# Patient Record
Sex: FEMALE | Race: WHITE | Employment: OTHER | URBAN - METROPOLITAN AREA
[De-identification: names, ages, dates, MRNs, and addresses within clinical notes are randomized per-mention and may not be internally consistent; named-entity substitution may affect disease eponyms.]

---

## 2017-05-08 ENCOUNTER — OFFICE VISIT (OUTPATIENT)
Dept: ORTHOPEDIC SURGERY | Age: 77
End: 2017-05-08

## 2017-05-08 VITALS
WEIGHT: 270 LBS | HEART RATE: 86 BPM | DIASTOLIC BLOOD PRESSURE: 73 MMHG | HEIGHT: 64 IN | SYSTOLIC BLOOD PRESSURE: 164 MMHG | BODY MASS INDEX: 46.1 KG/M2

## 2017-05-08 DIAGNOSIS — Z96.651 HISTORY OF TOTAL RIGHT KNEE REPLACEMENT: ICD-10-CM

## 2017-05-08 DIAGNOSIS — Z96.652 HISTORY OF TOTAL LEFT KNEE REPLACEMENT: ICD-10-CM

## 2017-05-08 DIAGNOSIS — Z96.611 S/P REVERSE TOTAL SHOULDER ARTHROPLASTY, RIGHT: Primary | ICD-10-CM

## 2017-05-08 PROCEDURE — G8427 DOCREV CUR MEDS BY ELIG CLIN: HCPCS | Performed by: PHYSICIAN ASSISTANT

## 2017-05-08 PROCEDURE — 1123F ACP DISCUSS/DSCN MKR DOCD: CPT | Performed by: PHYSICIAN ASSISTANT

## 2017-05-08 PROCEDURE — G8417 CALC BMI ABV UP PARAM F/U: HCPCS | Performed by: PHYSICIAN ASSISTANT

## 2017-05-08 PROCEDURE — 99212 OFFICE O/P EST SF 10 MIN: CPT | Performed by: PHYSICIAN ASSISTANT

## 2017-05-08 PROCEDURE — 1090F PRES/ABSN URINE INCON ASSESS: CPT | Performed by: PHYSICIAN ASSISTANT

## 2017-05-08 PROCEDURE — 1036F TOBACCO NON-USER: CPT | Performed by: PHYSICIAN ASSISTANT

## 2017-05-08 PROCEDURE — 4040F PNEUMOC VAC/ADMIN/RCVD: CPT | Performed by: PHYSICIAN ASSISTANT

## 2017-05-08 PROCEDURE — G8400 PT W/DXA NO RESULTS DOC: HCPCS | Performed by: PHYSICIAN ASSISTANT

## 2017-05-12 ENCOUNTER — OFFICE VISIT (OUTPATIENT)
Dept: ORTHOPEDIC SURGERY | Age: 77
End: 2017-05-12

## 2017-05-12 VITALS
RESPIRATION RATE: 16 BRPM | WEIGHT: 270 LBS | SYSTOLIC BLOOD PRESSURE: 132 MMHG | DIASTOLIC BLOOD PRESSURE: 73 MMHG | HEART RATE: 84 BPM | BODY MASS INDEX: 46.1 KG/M2 | HEIGHT: 64 IN

## 2017-05-12 DIAGNOSIS — G56.03 BILATERAL CARPAL TUNNEL SYNDROME: Primary | ICD-10-CM

## 2017-05-12 PROCEDURE — 4040F PNEUMOC VAC/ADMIN/RCVD: CPT | Performed by: ORTHOPAEDIC SURGERY

## 2017-05-12 PROCEDURE — 1036F TOBACCO NON-USER: CPT | Performed by: ORTHOPAEDIC SURGERY

## 2017-05-12 PROCEDURE — G8417 CALC BMI ABV UP PARAM F/U: HCPCS | Performed by: ORTHOPAEDIC SURGERY

## 2017-05-12 PROCEDURE — 99214 OFFICE O/P EST MOD 30 MIN: CPT | Performed by: ORTHOPAEDIC SURGERY

## 2017-05-12 PROCEDURE — 20526 THER INJECTION CARP TUNNEL: CPT | Performed by: ORTHOPAEDIC SURGERY

## 2017-05-12 PROCEDURE — 1090F PRES/ABSN URINE INCON ASSESS: CPT | Performed by: ORTHOPAEDIC SURGERY

## 2017-05-12 PROCEDURE — G8427 DOCREV CUR MEDS BY ELIG CLIN: HCPCS | Performed by: ORTHOPAEDIC SURGERY

## 2017-05-12 PROCEDURE — 1123F ACP DISCUSS/DSCN MKR DOCD: CPT | Performed by: ORTHOPAEDIC SURGERY

## 2017-05-12 PROCEDURE — G8400 PT W/DXA NO RESULTS DOC: HCPCS | Performed by: ORTHOPAEDIC SURGERY

## 2017-08-28 ENCOUNTER — OFFICE VISIT (OUTPATIENT)
Dept: ORTHOPEDIC SURGERY | Age: 77
End: 2017-08-28

## 2017-08-28 VITALS
HEART RATE: 79 BPM | HEIGHT: 64 IN | RESPIRATION RATE: 16 BRPM | SYSTOLIC BLOOD PRESSURE: 121 MMHG | DIASTOLIC BLOOD PRESSURE: 70 MMHG | BODY MASS INDEX: 46.1 KG/M2 | WEIGHT: 270 LBS

## 2017-08-28 DIAGNOSIS — G56.02 CARPAL TUNNEL SYNDROME OF LEFT WRIST: Primary | ICD-10-CM

## 2017-08-28 PROCEDURE — G8427 DOCREV CUR MEDS BY ELIG CLIN: HCPCS | Performed by: ORTHOPAEDIC SURGERY

## 2017-08-28 PROCEDURE — 99213 OFFICE O/P EST LOW 20 MIN: CPT | Performed by: ORTHOPAEDIC SURGERY

## 2017-08-28 PROCEDURE — G8417 CALC BMI ABV UP PARAM F/U: HCPCS | Performed by: ORTHOPAEDIC SURGERY

## 2017-08-28 PROCEDURE — G8400 PT W/DXA NO RESULTS DOC: HCPCS | Performed by: ORTHOPAEDIC SURGERY

## 2017-08-28 PROCEDURE — 1090F PRES/ABSN URINE INCON ASSESS: CPT | Performed by: ORTHOPAEDIC SURGERY

## 2017-08-28 PROCEDURE — 20526 THER INJECTION CARP TUNNEL: CPT | Performed by: ORTHOPAEDIC SURGERY

## 2017-08-28 PROCEDURE — 1123F ACP DISCUSS/DSCN MKR DOCD: CPT | Performed by: ORTHOPAEDIC SURGERY

## 2017-08-28 PROCEDURE — 1036F TOBACCO NON-USER: CPT | Performed by: ORTHOPAEDIC SURGERY

## 2017-08-28 PROCEDURE — 4040F PNEUMOC VAC/ADMIN/RCVD: CPT | Performed by: ORTHOPAEDIC SURGERY

## 2017-08-28 RX ORDER — CEPHALEXIN 250 MG/1
250 CAPSULE ORAL DAILY
COMMUNITY
Start: 2017-07-31

## 2018-04-06 ENCOUNTER — OFFICE VISIT (OUTPATIENT)
Dept: ORTHOPEDIC SURGERY | Age: 78
End: 2018-04-06

## 2018-04-06 VITALS
DIASTOLIC BLOOD PRESSURE: 74 MMHG | HEIGHT: 64 IN | SYSTOLIC BLOOD PRESSURE: 136 MMHG | BODY MASS INDEX: 46.1 KG/M2 | RESPIRATION RATE: 16 BRPM | WEIGHT: 270 LBS | HEART RATE: 75 BPM

## 2018-04-06 DIAGNOSIS — G56.02 CARPAL TUNNEL SYNDROME OF LEFT WRIST: Primary | ICD-10-CM

## 2018-04-06 PROCEDURE — 99213 OFFICE O/P EST LOW 20 MIN: CPT | Performed by: ORTHOPAEDIC SURGERY

## 2018-04-06 PROCEDURE — 20526 THER INJECTION CARP TUNNEL: CPT | Performed by: ORTHOPAEDIC SURGERY

## 2018-04-06 PROCEDURE — G8417 CALC BMI ABV UP PARAM F/U: HCPCS | Performed by: ORTHOPAEDIC SURGERY

## 2018-04-06 PROCEDURE — 4040F PNEUMOC VAC/ADMIN/RCVD: CPT | Performed by: ORTHOPAEDIC SURGERY

## 2018-04-06 PROCEDURE — G8400 PT W/DXA NO RESULTS DOC: HCPCS | Performed by: ORTHOPAEDIC SURGERY

## 2018-04-06 PROCEDURE — G8427 DOCREV CUR MEDS BY ELIG CLIN: HCPCS | Performed by: ORTHOPAEDIC SURGERY

## 2018-04-06 PROCEDURE — 1036F TOBACCO NON-USER: CPT | Performed by: ORTHOPAEDIC SURGERY

## 2018-04-06 PROCEDURE — 1090F PRES/ABSN URINE INCON ASSESS: CPT | Performed by: ORTHOPAEDIC SURGERY

## 2018-04-06 PROCEDURE — 1123F ACP DISCUSS/DSCN MKR DOCD: CPT | Performed by: ORTHOPAEDIC SURGERY

## 2018-05-09 ENCOUNTER — OFFICE VISIT (OUTPATIENT)
Dept: ORTHOPEDIC SURGERY | Age: 78
End: 2018-05-09

## 2018-05-09 VITALS
WEIGHT: 210 LBS | BODY MASS INDEX: 35.85 KG/M2 | DIASTOLIC BLOOD PRESSURE: 76 MMHG | SYSTOLIC BLOOD PRESSURE: 136 MMHG | TEMPERATURE: 97.3 F | HEART RATE: 70 BPM | HEIGHT: 64 IN

## 2018-05-09 DIAGNOSIS — Z96.611 S/P REVERSE TOTAL SHOULDER ARTHROPLASTY, RIGHT: Primary | ICD-10-CM

## 2018-05-09 DIAGNOSIS — Z96.651 HISTORY OF TOTAL RIGHT KNEE REPLACEMENT: ICD-10-CM

## 2018-05-09 DIAGNOSIS — M19.012 ARTHRITIS OF LEFT SHOULDER REGION: ICD-10-CM

## 2018-05-09 DIAGNOSIS — M25.512 LEFT SHOULDER PAIN, UNSPECIFIED CHRONICITY: ICD-10-CM

## 2018-05-09 DIAGNOSIS — Z96.652 HISTORY OF TOTAL LEFT KNEE REPLACEMENT: ICD-10-CM

## 2018-05-09 PROCEDURE — G8417 CALC BMI ABV UP PARAM F/U: HCPCS | Performed by: PHYSICIAN ASSISTANT

## 2018-05-09 PROCEDURE — 1123F ACP DISCUSS/DSCN MKR DOCD: CPT | Performed by: PHYSICIAN ASSISTANT

## 2018-05-09 PROCEDURE — G8427 DOCREV CUR MEDS BY ELIG CLIN: HCPCS | Performed by: PHYSICIAN ASSISTANT

## 2018-05-09 PROCEDURE — 99212 OFFICE O/P EST SF 10 MIN: CPT | Performed by: PHYSICIAN ASSISTANT

## 2018-05-09 PROCEDURE — G8400 PT W/DXA NO RESULTS DOC: HCPCS | Performed by: PHYSICIAN ASSISTANT

## 2018-05-09 PROCEDURE — 20610 DRAIN/INJ JOINT/BURSA W/O US: CPT | Performed by: PHYSICIAN ASSISTANT

## 2018-05-09 PROCEDURE — 4040F PNEUMOC VAC/ADMIN/RCVD: CPT | Performed by: PHYSICIAN ASSISTANT

## 2018-05-09 PROCEDURE — 1090F PRES/ABSN URINE INCON ASSESS: CPT | Performed by: PHYSICIAN ASSISTANT

## 2018-05-09 PROCEDURE — 1036F TOBACCO NON-USER: CPT | Performed by: PHYSICIAN ASSISTANT

## 2018-12-07 ENCOUNTER — ANESTHESIA EVENT (OUTPATIENT)
Dept: OPERATING ROOM | Age: 78
End: 2018-12-07
Payer: MEDICARE

## 2018-12-07 ENCOUNTER — OFFICE VISIT (OUTPATIENT)
Dept: ORTHOPEDIC SURGERY | Age: 78
End: 2018-12-07
Payer: MEDICARE

## 2018-12-07 VITALS
DIASTOLIC BLOOD PRESSURE: 79 MMHG | HEART RATE: 69 BPM | BODY MASS INDEX: 46.1 KG/M2 | WEIGHT: 270 LBS | HEIGHT: 64 IN | SYSTOLIC BLOOD PRESSURE: 155 MMHG

## 2018-12-07 DIAGNOSIS — G56.02 CARPAL TUNNEL SYNDROME OF LEFT WRIST: Primary | ICD-10-CM

## 2018-12-07 PROCEDURE — 1036F TOBACCO NON-USER: CPT | Performed by: ORTHOPAEDIC SURGERY

## 2018-12-07 PROCEDURE — 4040F PNEUMOC VAC/ADMIN/RCVD: CPT | Performed by: ORTHOPAEDIC SURGERY

## 2018-12-07 PROCEDURE — G8427 DOCREV CUR MEDS BY ELIG CLIN: HCPCS | Performed by: ORTHOPAEDIC SURGERY

## 2018-12-07 PROCEDURE — 1090F PRES/ABSN URINE INCON ASSESS: CPT | Performed by: ORTHOPAEDIC SURGERY

## 2018-12-07 PROCEDURE — 99214 OFFICE O/P EST MOD 30 MIN: CPT | Performed by: ORTHOPAEDIC SURGERY

## 2018-12-07 PROCEDURE — G8400 PT W/DXA NO RESULTS DOC: HCPCS | Performed by: ORTHOPAEDIC SURGERY

## 2018-12-07 PROCEDURE — G8484 FLU IMMUNIZE NO ADMIN: HCPCS | Performed by: ORTHOPAEDIC SURGERY

## 2018-12-07 PROCEDURE — 1101F PT FALLS ASSESS-DOCD LE1/YR: CPT | Performed by: ORTHOPAEDIC SURGERY

## 2018-12-07 PROCEDURE — G8417 CALC BMI ABV UP PARAM F/U: HCPCS | Performed by: ORTHOPAEDIC SURGERY

## 2018-12-07 PROCEDURE — 1123F ACP DISCUSS/DSCN MKR DOCD: CPT | Performed by: ORTHOPAEDIC SURGERY

## 2018-12-07 RX ORDER — SODIUM CHLORIDE 0.9 % (FLUSH) 0.9 %
10 SYRINGE (ML) INJECTION EVERY 12 HOURS SCHEDULED
Status: CANCELLED | OUTPATIENT
Start: 2018-12-07

## 2018-12-07 RX ORDER — SODIUM CHLORIDE 0.9 % (FLUSH) 0.9 %
10 SYRINGE (ML) INJECTION PRN
Status: CANCELLED | OUTPATIENT
Start: 2018-12-07

## 2018-12-07 RX ORDER — SODIUM CHLORIDE 9 MG/ML
INJECTION, SOLUTION INTRAVENOUS CONTINUOUS
Status: CANCELLED | OUTPATIENT
Start: 2018-12-07

## 2018-12-07 NOTE — LETTER
[facility]  Surgery Scheduling Form:      DEMOGRAPHICS:                                                                                                              .    Patient Name:  Mildred Brown  Patient :  1940   Patient SS#:  [id number]    Patient Phone:  [phone] (home) [phone] (work)    Patient Address:  [address] [place] [address]    PCP:  Dave Lobo DO  Insurance:   Payor/Plan Subscr  Sex Relation Sub. Ins. ID Effective Group Num   1. [id number] [facility] 1940 Female  [id number] 1/1/15                                    [address] [address]   2. [address] 1940 Female  [id number]                                    [address]     DIAGNOSIS & PROCEDURE:                                                                                            .  Diagnosis:   left Carpal Tunnel Syndrome (354.0)    G56.02  Operation:  left Carpal Tunnel Release  [CPT: 12886]  Location:  [facility] [place]  Surgeon:  Jessica Estrada    SCHEDULING INFORMATION:                                                                                         .  Surgeon's Scheduling Instruction:  elective    Requested Date:    OR Time:  11:00 Patient Arrival Time:  9:30    OR Time Required:  15  Minutes  Anesthesia:  MAC/TIVA  Equipment:  None  Mini C-Arm:  No   Standard C-Arm:  No  Status:  outpatient  PAT Required:  Yes  Comments:                 ALLERGIES:   SEE LIST                       Rosaria Burow B. Waunita Goodell, MD  18     BILLING INFORMATION:                                                                                                    .    Procedure:       CPT Code Modifier
4. I have also been informed by the informing physician that there are other risks from both known and unknown causes that are attendant to the performance of any surgical procedure. I am aware that the practice of medicine and surgery is not an exact science, and that no guarantees have been made to me concerning the results of the operation and/or procedure(s). 5. I   CONSENT / REFUSE CONSENT  (strike the phrase that does not apply) to the taking of photographs before, during and/or after the operation or procedure for scientific/educational purposes. 6. I consent to the administration of anesthesia and to the use of such anesthetics as may be deemed advisable by the anesthesiologist who has been engaged by me or my physician. 7. I certify that I have read and understand the above consent to operation and/or other procedure(s); that the explanations therein referred to were made to me by the informing physician in advance of my signing this consent; that all blanks or statements requiring insertion or completion were filled in and inapplicable paragraphs, if any, were stricken before I signed; and that all questions asked by me about the operation and/or procedure(s) which I have consented to have been fully answered in a satisfactory manner.               _______________________  Witness        Signature Of Patient          Guero MckeonAndrea Juarez      _______________________  Informing Physician         Signature of Informing Physician           If patient is unable to sign or is a minor, complete one of the following:    (A)  Patient is a minor   years of age. (B)  Patient is unable to sign because: The undersigned represents that he or she is duly authorized to execute this consent for and on behalf of the above named patient.                Witness               o  Parent  o  Guardian   o  Spouse       o  Other (specify)

## 2018-12-08 NOTE — PROGRESS NOTES
Ms. Delbert Andrade returns today in follow-up of her previously treated  left Carpal Tunnel Syndrome. She was last seen in April, 2018 at which time she was treated with Steroid Injection. She experienced moderate relief of her initial symptoms. She  has noticed symptom recurrence over the last several months. She returns today with Worsened symptoms of left Carpal Tunnel Syndrome, requesting further treatment. The patient's , past medical history, medications, allergies,  family history, social history, and review of systems have been reviewed and are recorded in the chart. Physical Exam:  Vitals  BP: (!) 155/79  Pulse: 69  Height: 5' 4\" (162.6 cm)  Weight: 270 lb (122.5 kg)  Ms. Delbert Andrade appears well, she is in no apparent distress, she demonstrates appropriate mood & affect. Skin, bilaterally: Skin color, texture, turgor normal. No rashes or lesions. 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 Wrist range of motion is equal bilateral.   There is no evidence of gross joint instability bilaterally. Sensation is subjectively tingling in the Median Innervated Digits and objectively present in the same distribution on the Left, greater than Right. Vascular examination reveals normal and good capillary refill bilaterally. Swelling is mild in the distal volar forearm on the Left, normal on the Right. Muscular strength is clinically appropriate bilaterally. Impression:  Ms. Delbert Andrade is showing evidence of persistent Carpal Tunnel Syndrome after previous treatment. She requests additional treatment at this time. Plan:  After discussion of the treatment options available for carpal tunnel syndrome and review of her past treatments, I have outlined for Ms. Delbert Andrade the surgical treatment of carpal tunnel syndrome and release of the transverse carpal ligament.   I have discussed the details of the surgical procedure, the pre, thien and

## 2018-12-11 RX ORDER — ALENDRONATE SODIUM 70 MG/1
70 TABLET ORAL
COMMUNITY
Start: 2017-07-11

## 2018-12-11 RX ORDER — FERROUS SULFATE 325(65) MG
325 TABLET ORAL
COMMUNITY
End: 2019-06-27 | Stop reason: ALTCHOICE

## 2018-12-11 RX ORDER — RIBOFLAVIN (VITAMIN B2) 100 MG
100 TABLET ORAL DAILY
COMMUNITY
End: 2019-10-08

## 2018-12-13 ENCOUNTER — HOSPITAL ENCOUNTER (OUTPATIENT)
Age: 78
Setting detail: OUTPATIENT SURGERY
Discharge: HOME | End: 2018-12-13
Attending: ORTHOPAEDIC SURGERY | Admitting: ORTHOPAEDIC SURGERY
Payer: MEDICARE

## 2018-12-13 ENCOUNTER — ANESTHESIA (OUTPATIENT)
Dept: OPERATING ROOM | Age: 78
End: 2018-12-13
Payer: MEDICARE

## 2018-12-13 VITALS
HEIGHT: 64 IN | RESPIRATION RATE: 18 BRPM | DIASTOLIC BLOOD PRESSURE: 81 MMHG | HEART RATE: 79 BPM | TEMPERATURE: 96.9 F | BODY MASS INDEX: 37.11 KG/M2 | SYSTOLIC BLOOD PRESSURE: 124 MMHG | WEIGHT: 217.37 LBS | OXYGEN SATURATION: 97 %

## 2018-12-13 VITALS
OXYGEN SATURATION: 93 % | SYSTOLIC BLOOD PRESSURE: 124 MMHG | RESPIRATION RATE: 1 BRPM | DIASTOLIC BLOOD PRESSURE: 59 MMHG

## 2018-12-13 PROCEDURE — 3700000000 HC ANESTHESIA ATTENDED CARE: Performed by: ORTHOPAEDIC SURGERY

## 2018-12-13 PROCEDURE — 7100000011 HC PHASE II RECOVERY - ADDTL 15 MIN: Performed by: ORTHOPAEDIC SURGERY

## 2018-12-13 PROCEDURE — 7100000001 HC PACU RECOVERY - ADDTL 15 MIN: Performed by: ORTHOPAEDIC SURGERY

## 2018-12-13 PROCEDURE — 2500000003 HC RX 250 WO HCPCS: Performed by: NURSE ANESTHETIST, CERTIFIED REGISTERED

## 2018-12-13 PROCEDURE — 2709999900 HC NON-CHARGEABLE SUPPLY: Performed by: ORTHOPAEDIC SURGERY

## 2018-12-13 PROCEDURE — 7100000000 HC PACU RECOVERY - FIRST 15 MIN: Performed by: ORTHOPAEDIC SURGERY

## 2018-12-13 PROCEDURE — 7100000010 HC PHASE II RECOVERY - FIRST 15 MIN: Performed by: ORTHOPAEDIC SURGERY

## 2018-12-13 PROCEDURE — 2580000003 HC RX 258: Performed by: NURSE ANESTHETIST, CERTIFIED REGISTERED

## 2018-12-13 PROCEDURE — 6360000002 HC RX W HCPCS: Performed by: NURSE ANESTHETIST, CERTIFIED REGISTERED

## 2018-12-13 PROCEDURE — 2500000003 HC RX 250 WO HCPCS: Performed by: ORTHOPAEDIC SURGERY

## 2018-12-13 PROCEDURE — 3600000005 HC SURGERY LEVEL 5 BASE: Performed by: ORTHOPAEDIC SURGERY

## 2018-12-13 RX ORDER — LIDOCAINE HYDROCHLORIDE 20 MG/ML
INJECTION, SOLUTION EPIDURAL; INFILTRATION; INTRACAUDAL; PERINEURAL PRN
Status: DISCONTINUED | OUTPATIENT
Start: 2018-12-13 | End: 2018-12-13 | Stop reason: SDUPTHER

## 2018-12-13 RX ORDER — FENTANYL CITRATE 50 UG/ML
50 INJECTION, SOLUTION INTRAMUSCULAR; INTRAVENOUS EVERY 5 MIN PRN
Status: DISCONTINUED | OUTPATIENT
Start: 2018-12-13 | End: 2018-12-13 | Stop reason: HOSPADM

## 2018-12-13 RX ORDER — OXYCODONE HYDROCHLORIDE AND ACETAMINOPHEN 5; 325 MG/1; MG/1
1 TABLET ORAL PRN
Status: DISCONTINUED | OUTPATIENT
Start: 2018-12-13 | End: 2018-12-13 | Stop reason: HOSPADM

## 2018-12-13 RX ORDER — ONDANSETRON 2 MG/ML
4 INJECTION INTRAMUSCULAR; INTRAVENOUS
Status: DISCONTINUED | OUTPATIENT
Start: 2018-12-13 | End: 2018-12-13 | Stop reason: HOSPADM

## 2018-12-13 RX ORDER — PROPOFOL 10 MG/ML
INJECTION, EMULSION INTRAVENOUS PRN
Status: DISCONTINUED | OUTPATIENT
Start: 2018-12-13 | End: 2018-12-13 | Stop reason: SDUPTHER

## 2018-12-13 RX ORDER — MORPHINE SULFATE 2 MG/ML
1 INJECTION, SOLUTION INTRAMUSCULAR; INTRAVENOUS EVERY 5 MIN PRN
Status: DISCONTINUED | OUTPATIENT
Start: 2018-12-13 | End: 2018-12-13 | Stop reason: HOSPADM

## 2018-12-13 RX ORDER — PROPOFOL 10 MG/ML
INJECTION, EMULSION INTRAVENOUS CONTINUOUS PRN
Status: DISCONTINUED | OUTPATIENT
Start: 2018-12-13 | End: 2018-12-13 | Stop reason: SDUPTHER

## 2018-12-13 RX ORDER — SODIUM CHLORIDE 9 MG/ML
INJECTION, SOLUTION INTRAVENOUS CONTINUOUS PRN
Status: DISCONTINUED | OUTPATIENT
Start: 2018-12-13 | End: 2018-12-13 | Stop reason: SDUPTHER

## 2018-12-13 RX ORDER — MEPERIDINE HYDROCHLORIDE 25 MG/ML
12.5 INJECTION INTRAMUSCULAR; INTRAVENOUS; SUBCUTANEOUS EVERY 5 MIN PRN
Status: DISCONTINUED | OUTPATIENT
Start: 2018-12-13 | End: 2018-12-13 | Stop reason: HOSPADM

## 2018-12-13 RX ORDER — MORPHINE SULFATE 2 MG/ML
2 INJECTION, SOLUTION INTRAMUSCULAR; INTRAVENOUS EVERY 5 MIN PRN
Status: DISCONTINUED | OUTPATIENT
Start: 2018-12-13 | End: 2018-12-13 | Stop reason: HOSPADM

## 2018-12-13 RX ORDER — FENTANYL CITRATE 50 UG/ML
25 INJECTION, SOLUTION INTRAMUSCULAR; INTRAVENOUS EVERY 5 MIN PRN
Status: DISCONTINUED | OUTPATIENT
Start: 2018-12-13 | End: 2018-12-13 | Stop reason: HOSPADM

## 2018-12-13 RX ORDER — OXYCODONE HYDROCHLORIDE AND ACETAMINOPHEN 5; 325 MG/1; MG/1
2 TABLET ORAL PRN
Status: DISCONTINUED | OUTPATIENT
Start: 2018-12-13 | End: 2018-12-13 | Stop reason: HOSPADM

## 2018-12-13 RX ADMIN — PROPOFOL 100 MG: 10 INJECTION, EMULSION INTRAVENOUS at 11:04

## 2018-12-13 RX ADMIN — PROPOFOL 140 MCG/KG/MIN: 10 INJECTION, EMULSION INTRAVENOUS at 11:04

## 2018-12-13 RX ADMIN — SODIUM CHLORIDE: 9 INJECTION, SOLUTION INTRAVENOUS at 11:00

## 2018-12-13 RX ADMIN — LIDOCAINE HYDROCHLORIDE 100 MG: 20 INJECTION, SOLUTION EPIDURAL; INFILTRATION; INTRACAUDAL; PERINEURAL at 11:04

## 2018-12-13 ASSESSMENT — PULMONARY FUNCTION TESTS
PIF_VALUE: 0
PIF_VALUE: 1
PIF_VALUE: 0

## 2018-12-13 ASSESSMENT — PAIN SCALES - GENERAL: PAINLEVEL_OUTOF10: 0

## 2018-12-13 ASSESSMENT — PAIN - FUNCTIONAL ASSESSMENT: PAIN_FUNCTIONAL_ASSESSMENT: 0-10

## 2018-12-13 ASSESSMENT — LIFESTYLE VARIABLES: SMOKING_STATUS: 0

## 2018-12-13 NOTE — PROGRESS NOTES
OPA removed
awake and oriented, VSS, no c/o at this time
[facility] fax number:  [phone]  Please note these are generalized instructions for all surgical cases, you may be provided with more specific instructions according to your surgery.

## 2018-12-13 NOTE — OP NOTE
incised under direct visualization. The contents of the carpal tunnel were inspected and found to be free of mass, lesion or other abnormality. Digital palpation revealed no further constriction about the median nerve. The wound was irrigated copiously with sterile saline for irrigation and the pneumotourniquet was deflated after a period of 3 minutes elevation. The fingers were immediately pink & well perfused. Hemostasis was easily obtained with direct pressure and electrocautery and the wound was closed with interrupted sutures. The wound was dressed with adaptic, dry sterile dressings and a bulky soft hand & wrist dressing was applied. Ms. Ebony Knowles  was awakened from light sedation, having tolerated the procedure without apparent complication. She  was returned to the recovery room in stable condition. At the conclusion of the procedure all needle, instrument, and sponge counts were correct. Livia Jeffrey MD   12/13/2018, 11:15 AM

## 2018-12-13 NOTE — H&P
Pre-operative Update of H&P:    I  have seen & examined Ms. Judge Robertson related solely to her hand and upper extremity conditions, prior to the scheduled procedure on the date of her surgery. The indications for the planned surgical procedure & and her upper-extremity conditionare unchanged. Please see the Anesthesia Pre-Op Note from date of surgery for Ms. Regina Lawson's systemic evaluation.

## 2018-12-21 ENCOUNTER — OFFICE VISIT (OUTPATIENT)
Dept: ORTHOPEDIC SURGERY | Age: 78
End: 2018-12-21

## 2018-12-21 VITALS — HEIGHT: 64 IN | RESPIRATION RATE: 16 BRPM | WEIGHT: 217 LBS | BODY MASS INDEX: 37.05 KG/M2

## 2018-12-21 DIAGNOSIS — G56.03 BILATERAL CARPAL TUNNEL SYNDROME: Primary | ICD-10-CM

## 2018-12-21 PROCEDURE — 99024 POSTOP FOLLOW-UP VISIT: CPT | Performed by: PHYSICIAN ASSISTANT

## 2018-12-21 PROCEDURE — APPSS15 APP SPLIT SHARED TIME 0-15 MINUTES: Performed by: PHYSICIAN ASSISTANT

## 2018-12-21 NOTE — PROGRESS NOTES
Ms. Pushpa Banerjee returns today in follow-up of her recent left Carpal Tunnel Release done approximately 1 week ago. She has done well noting mild discomfort and no other reported complications. She notes pre-operative symptoms to be Partially Resolved at this time. Physical Exam:  Skin incision is healing well, no significant drainage, no dehiscence. Digital range of motion is full and equal bilateral.  Wrist range of motion is full and equal bilateral.  Sensation is Partially Resolved from preoperatvely  Vascular examination reveals normal, good capillary refill and good color. Swelling is minimal.  There is no clinical evidence of persistant Median Nerve compression. Impression:  Ms. Pushpa Banerjee is doing well after recent left Carpal Tunnel Release. Plan:  Ms. Pushpa Banerjee is instructed in work on Active & Passive range of motion of the digits, wrist, & elbow. These modalities were specifically demonstrated to her today. We discussed the appropriateness of gradual resumption of use of the operated hand and the return to normal use as comfort allows. She is given instructions regarding management of the fresh surgical incision and progressive use of desensitization and tissue massage techniques. We discussed the appropriate expectations and timeline for symptom improvement. She is provided a written patient instruction sheet titled: Postoperative Instructions After Carpal Tunnel Release. Further, she may schedule an appointment for approximately 2-4 weeks from now, or contact me by telephone over the next 2-4 weeks if her symptoms have not fully resolved or if she has not regained full & painless return of function after sutures have been removed. She is also specifically instructed to return to the office or call for an appointment sooner if her symptoms are changing or worsening prior to that time.

## 2019-01-02 ENCOUNTER — TELEPHONE (OUTPATIENT)
Dept: ORTHOPEDIC SURGERY | Age: 79
End: 2019-01-02

## 2019-03-05 ENCOUNTER — OFFICE VISIT (OUTPATIENT)
Dept: ORTHOPEDIC SURGERY | Age: 79
End: 2019-03-05
Payer: MEDICARE

## 2019-03-05 VITALS
SYSTOLIC BLOOD PRESSURE: 118 MMHG | TEMPERATURE: 97.8 F | WEIGHT: 217 LBS | HEIGHT: 64 IN | HEART RATE: 90 BPM | BODY MASS INDEX: 37.05 KG/M2 | DIASTOLIC BLOOD PRESSURE: 77 MMHG

## 2019-03-05 DIAGNOSIS — M25.552 PAIN OF LEFT HIP JOINT: ICD-10-CM

## 2019-03-05 DIAGNOSIS — M16.12 ARTHRITIS OF LEFT HIP: ICD-10-CM

## 2019-03-05 DIAGNOSIS — M19.012 ARTHRITIS OF LEFT SHOULDER REGION: ICD-10-CM

## 2019-03-05 DIAGNOSIS — M25.512 LEFT SHOULDER PAIN, UNSPECIFIED CHRONICITY: Primary | ICD-10-CM

## 2019-03-05 PROCEDURE — G8427 DOCREV CUR MEDS BY ELIG CLIN: HCPCS | Performed by: PHYSICIAN ASSISTANT

## 2019-03-05 PROCEDURE — 99214 OFFICE O/P EST MOD 30 MIN: CPT | Performed by: PHYSICIAN ASSISTANT

## 2019-03-05 PROCEDURE — G8417 CALC BMI ABV UP PARAM F/U: HCPCS | Performed by: PHYSICIAN ASSISTANT

## 2019-03-05 PROCEDURE — 1101F PT FALLS ASSESS-DOCD LE1/YR: CPT | Performed by: PHYSICIAN ASSISTANT

## 2019-03-05 PROCEDURE — G8400 PT W/DXA NO RESULTS DOC: HCPCS | Performed by: PHYSICIAN ASSISTANT

## 2019-03-05 PROCEDURE — 1090F PRES/ABSN URINE INCON ASSESS: CPT | Performed by: PHYSICIAN ASSISTANT

## 2019-03-05 PROCEDURE — 4040F PNEUMOC VAC/ADMIN/RCVD: CPT | Performed by: PHYSICIAN ASSISTANT

## 2019-03-05 PROCEDURE — 20610 DRAIN/INJ JOINT/BURSA W/O US: CPT | Performed by: PHYSICIAN ASSISTANT

## 2019-03-05 PROCEDURE — 1123F ACP DISCUSS/DSCN MKR DOCD: CPT | Performed by: PHYSICIAN ASSISTANT

## 2019-03-05 PROCEDURE — G8484 FLU IMMUNIZE NO ADMIN: HCPCS | Performed by: PHYSICIAN ASSISTANT

## 2019-03-05 PROCEDURE — 1036F TOBACCO NON-USER: CPT | Performed by: PHYSICIAN ASSISTANT

## 2019-03-06 PROBLEM — M16.12 ARTHRITIS OF LEFT HIP: Status: ACTIVE | Noted: 2019-03-06

## 2019-03-06 PROBLEM — M19.012 ARTHRITIS OF LEFT SHOULDER REGION: Status: ACTIVE | Noted: 2019-03-06

## 2019-04-29 ENCOUNTER — OFFICE VISIT (OUTPATIENT)
Dept: ORTHOPEDIC SURGERY | Age: 79
End: 2019-04-29
Payer: MEDICARE

## 2019-04-29 VITALS
RESPIRATION RATE: 16 BRPM | HEART RATE: 79 BPM | WEIGHT: 217 LBS | DIASTOLIC BLOOD PRESSURE: 70 MMHG | SYSTOLIC BLOOD PRESSURE: 123 MMHG | HEIGHT: 64 IN | BODY MASS INDEX: 37.05 KG/M2

## 2019-04-29 DIAGNOSIS — G56.01 CARPAL TUNNEL SYNDROME OF RIGHT WRIST: Primary | ICD-10-CM

## 2019-04-29 PROCEDURE — 99213 OFFICE O/P EST LOW 20 MIN: CPT | Performed by: ORTHOPAEDIC SURGERY

## 2019-04-29 PROCEDURE — 1090F PRES/ABSN URINE INCON ASSESS: CPT | Performed by: ORTHOPAEDIC SURGERY

## 2019-04-29 PROCEDURE — 4040F PNEUMOC VAC/ADMIN/RCVD: CPT | Performed by: ORTHOPAEDIC SURGERY

## 2019-04-29 PROCEDURE — 20526 THER INJECTION CARP TUNNEL: CPT | Performed by: ORTHOPAEDIC SURGERY

## 2019-04-29 PROCEDURE — 1123F ACP DISCUSS/DSCN MKR DOCD: CPT | Performed by: ORTHOPAEDIC SURGERY

## 2019-04-29 PROCEDURE — G8400 PT W/DXA NO RESULTS DOC: HCPCS | Performed by: ORTHOPAEDIC SURGERY

## 2019-04-29 PROCEDURE — G8427 DOCREV CUR MEDS BY ELIG CLIN: HCPCS | Performed by: ORTHOPAEDIC SURGERY

## 2019-04-29 PROCEDURE — G8417 CALC BMI ABV UP PARAM F/U: HCPCS | Performed by: ORTHOPAEDIC SURGERY

## 2019-04-29 PROCEDURE — 1036F TOBACCO NON-USER: CPT | Performed by: ORTHOPAEDIC SURGERY

## 2019-04-29 NOTE — PROGRESS NOTES
Ms. Jorge Calderón returns today in follow-up of her previously treated  right Carpal Tunnel Syndrome. She was last seen in May, 2017 at which time she was treated with Steroid Injection. She experienced moderate relief of her initial symptoms. She  has noticed symptom worsening over the last several months. She returns today with worsened symptoms of right Carpal Tunnel Syndrome, requesting further treatment. The patient's , past medical history, medications, allergies,  family history, social history, and review of systems have been reviewed and are recorded in the chart. Physical Exam:  Vitals  BP: 123/70  Pulse: 79  Resp: 16  Height: 5' 4\" (162.6 cm)  Weight: 217 lb (98.4 kg)  Ms. Jorge Calderón appears well, she is in no apparent distress, she demonstrates appropriate mood & affect. Skin, bilaterally: Skin color, texture, turgor normal. No rashes or lesions. Digital range of motion is severely limited by distal osteoarthritis . Wrist range of motion is equal bilateral.   There is no evidence of gross joint instability bilaterally. Sensation is subjectively decreased, tingling in the Median Innervated Digits and objectively present in the same distribution on the Right, normal on the Left. Vascular examination reveals normal and good capillary refill bilaterally. Swelling is mild in the distal volar forearm on the Right, normal on the Left. Muscular strength is clinically appropriate bilaterally. Examination for Carpal Tunnel Syndrome shows Carpal Tunnel Compression Test to be mildly positive on the right & negative  on the left. The patient displays moderate baseline symptoms to potentially confound the exam.  The thenar musculature is mildly atrophied & weakened. Examination for Stenosing Tenosynovitis demonstrates no evidence of tenderness, thickening or nodularity at the A-1 pulleys of the digits bilaterally.   There no palpable triggering or any finger or thumb. Impression:  Ms. Karin Sadler is showing evidence of persistent Carpal Tunnel Syndrome after previous treatment. She requests additional treatment at this time. Plan:  After discussion of the treatment options available for carpal tunnel syndrome and review of Ms. Regina Lawson's previous treatments, we have selected to proceed with an injection to the right carpal tunnel(s). I have outlined for her the nature of the injection, and the pre, thien and post injection considerations and the appropriate expectations for this injection. We discussed appropriate expectations for symptom resolution & likely duration of the relief. She voiced an understanding of these, had her questions answered fully and did wish to proceed. Procedure: right Carpal Tunnel Injection  [second Injection]: After full discussion of the nature of this process and outlining a treatment plan with Ms. Karin Sadler, we discussed the complications, limitations, expectations, alternatives, and risks of injection to the carpal tunnel. She understood this information well and verbally consented to this treatment. The skin of the symptomatic extremity was prepped with Isopropyl Alcohol and under aseptic conditions the carpal tunnel was injected with a combination of 1 ml of 0.25% Bupivacaine without Epinephrine and 40 mg of Triamcinolone (40 mg/ml). There was good filling of the carpal tunnel. A  dry, sterile bandage was applied and she tolerated the injection without difficulty. I advised her of the expected response, possible reactions and the instructions for care of the hand. I have also discussed with Ms. Karin Sadler the other treatment options available to her for this condition. We have today selected to proceed with treatment by injection with steroid medication.   She and I have agreed that if our current course of Injection treatment does not prove to be effective over the short term future, that she will schedule a follow-up appointment to discuss and select an alternate course of therapy including possibly further conservative treatment or surgical treatment. Ms. Dilan Khan has been given a full verbal list of instructions and precautions related to her present condition. I have asked her to followup with me in the office at the prescribed time. She is also specifically requested to call or return to the office sooner if her symptoms change or worsen prior to the next scheduled appointment.

## 2019-04-29 NOTE — Clinical Note
Dear  Sumit Weston, DO,Thank you very much for your referral or Ms. Jm Burks to me for evaluation and treatment of her Hand & Wrist condition. I appreciate your confidence in me and thank you for allowing me the opportunity to care for your patients. If I can be of any further assistance to you on this or any other patient, please do not hesitate to contact me. Sincerely,Manfred Alcantar MD

## 2019-04-29 NOTE — PATIENT INSTRUCTIONS
Information & Instructions   After Finger, Hand, Wrist, or Elbow Injection    Rah Lee MD    You have received an injection of local anesthetic (Bupivicaine without Epinephrine) for comfort & a steroid (Kenalog) for its strong anti-inflammatory effects. In order to give the medication a chance to reduce your inflammation and discomfort, it is recommended that you take it easy for a day or so. You may use your hand and arm as you feel comfortable, but you should avoid highly strenuous activity and heavy use for several days. Relief from the injection will often not begin for several days, and you may not feel full relief for up to one month. It is not uncommon to experience some local discomfort or pain at or around the injection site for a few days. To relieve these symptoms you may do the following if you feel necessary:       Apply ice to the affected area 20 minutes on and 20 minutes off. Do not apply ice directly to the skin. Use a thin layer (T-shirt, pillowcase, towel, etc.) to protect the skin. - If allowed by your other medical physicians, you may take -     2 Tylenol extra strength tablets every 4-6 hours       1-2 Aleve tablets twice a day     2-3 Advil tablets two to three times a day    If you are diabetic, the steroid medication may increase your blood sugar, so you are advised to monitor your sugar more closely so you can adjust it accordingly for a few days following your injection. If you need assistance with the control of you blood sugar, please contact you primary care physician for further advice. I will request that you please call the office one month after your injection at [phone] if you have not experienced relief of your symptoms (unless I have instructed you otherwise). If your injection has given you good relief of you symptoms as expected, then you only need to call the office if your symptoms return.

## 2019-05-09 ENCOUNTER — OFFICE VISIT (OUTPATIENT)
Dept: ORTHOPEDIC SURGERY | Age: 79
End: 2019-05-09
Payer: MEDICARE

## 2019-05-09 VITALS
DIASTOLIC BLOOD PRESSURE: 80 MMHG | BODY MASS INDEX: 37.05 KG/M2 | WEIGHT: 217 LBS | HEIGHT: 64 IN | SYSTOLIC BLOOD PRESSURE: 143 MMHG | HEART RATE: 78 BPM | TEMPERATURE: 97.2 F

## 2019-05-09 DIAGNOSIS — Z96.611 S/P REVERSE TOTAL SHOULDER ARTHROPLASTY, RIGHT: Primary | ICD-10-CM

## 2019-05-09 DIAGNOSIS — Z96.652 HISTORY OF TOTAL LEFT KNEE REPLACEMENT: ICD-10-CM

## 2019-05-09 DIAGNOSIS — Z96.651 HISTORY OF TOTAL RIGHT KNEE REPLACEMENT: ICD-10-CM

## 2019-05-09 PROCEDURE — 1090F PRES/ABSN URINE INCON ASSESS: CPT | Performed by: PHYSICIAN ASSISTANT

## 2019-05-09 PROCEDURE — G8427 DOCREV CUR MEDS BY ELIG CLIN: HCPCS | Performed by: PHYSICIAN ASSISTANT

## 2019-05-09 PROCEDURE — 1123F ACP DISCUSS/DSCN MKR DOCD: CPT | Performed by: PHYSICIAN ASSISTANT

## 2019-05-09 PROCEDURE — G8417 CALC BMI ABV UP PARAM F/U: HCPCS | Performed by: PHYSICIAN ASSISTANT

## 2019-05-09 PROCEDURE — 4040F PNEUMOC VAC/ADMIN/RCVD: CPT | Performed by: PHYSICIAN ASSISTANT

## 2019-05-09 PROCEDURE — 99212 OFFICE O/P EST SF 10 MIN: CPT | Performed by: PHYSICIAN ASSISTANT

## 2019-05-09 PROCEDURE — 1036F TOBACCO NON-USER: CPT | Performed by: PHYSICIAN ASSISTANT

## 2019-05-09 PROCEDURE — G8400 PT W/DXA NO RESULTS DOC: HCPCS | Performed by: PHYSICIAN ASSISTANT

## 2019-05-09 NOTE — PROGRESS NOTES
Subjective:      Patient ID: Dav Jacinto is a 77 y.o.  female. Chief Complaint   Patient presents with    Shoulder Problem     Right reverse TSR 6.11.2014    Knee Problem     Right TKA 3.12.2013    Knee Problem     Left TKA 3.23.2010        HPI: She is here for follow-up on her joint arthroplasties including left and right knee as well as right shoulder. All 3 joint arthroplasties are doing well. No complaints or issues. She does state that her left shoulder is doing much better status post cortisone injection several weeks ago. Review of Systems:   Negative for fever or chills. Past Medical History:   Diagnosis Date    Arthritis     Constipation     Environmental allergies     Fall at home 1/25/15    fell in garage, called 911 for assist to get up    GERD (gastroesophageal reflux disease)     Hyperlipidemia     Neuropathy of leg     left leg foot drop wears brace    OAB (overactive bladder)     UTI (lower urinary tract infection)        Family History   Problem Relation Age of Onset    Heart Disease Mother     High Blood Pressure Mother     Stroke Mother     Heart Disease Father     High Blood Pressure Father     Stroke Father        Past Surgical History:   Procedure Laterality Date    BACK SURGERY  12/16/10    L4-5 Left complete, radical facetectomy, L3-4-5 nerve root exploration and foraminotomy, pedicle screws    BREAST BIOPSY Right     x2    CARPAL TUNNEL RELEASE Left 12/13/2018    LEFT CARPAL TUNNEL RELEASE performed by Lewis Chaves MD at [address]  06/23/2011    HOT SNARE CECAL POLYPECTOMY    COLONOSCOPY  9-8-14    Colonoscopy. No biopsies.   No polypectomies    HYSTERECTOMY, VAGINAL      TVT    JOINT REPLACEMENT Bilateral     LAMINECTOMY  2015    L3-4-5 decompression of nerve roots      PILONIDAL CYST EXCISION      SALPINGO-OOPHORECTOMY      bilateral    SHOULDER ARTHROPLASTY Right 6/11/14    SINUS SURGERY      right x2    SPINAL FUSION 12/16/10    posterior fusion, local bone graft    TOTAL KNEE ARTHROPLASTY  2010    left knee    TOTAL KNEE ARTHROPLASTY Right 2013    TUBAL LIGATION      UPPER GASTROINTESTINAL ENDOSCOPY  14    Esophagogastroduodenoscopy, with biopsy of the antrum       Social History     Occupational History    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   Tobacco Use    Smoking status: Former Smoker     Last attempt to quit: 3/11/1963     Years since quittin.2    Smokeless tobacco: Never Used    Tobacco comment: quite age 20   Substance and Sexual Activity    Alcohol use: Yes     Comment: soc    Drug use: No    Sexual activity: Not on file       Current Outpatient Medications   Medication Sig Dispense Refill    alendronate (FOSAMAX) 70 MG tablet Take 70 mg by mouth      Ascorbic Acid (VITAMIN C) 100 MG tablet Take 100 mg by mouth      ferrous sulfate 325 (65 Fe) MG tablet Take 325 mg by mouth      Mirabegron ER 50 MG TB24 Take by mouth      cephALEXin (KEFLEX) 250 MG capsule daily       pantoprazole (PROTONIX) 20 MG tablet Take 20 mg by mouth daily      gabapentin (NEURONTIN) 300 MG capsule Take 2 capsules by mouth 2 times daily. 90 capsule 3    Loratadine (CLARITIN) 10 MG CAPS Take 1 capsule by mouth as needed       B Complex-Biotin-FA (HM VITAMIN B100 COMPLEX PO) Take 1 capsule by mouth daily.  Calcium Carb-Cholecalciferol (CALCIUM + D3) 600-200 MG-UNIT TABS Take 1 capsule by mouth 2 times daily       Vitamin D (CHOLECALCIFEROL) 1000 UNITS CAPS capsule Take 1,000 Units by mouth daily.  polyethylene glycol (MIRALAX) PACK packet Take 17 g by mouth daily.  fluticasone (FLONASE) 50 MCG/ACT nasal spray 2 sprays by Nasal route as needed.  fish oil-omega-3 fatty acids 1000 MG capsule Take 1 g by mouth daily.  Multiple Vitamin (MULTIVITAMIN PO) Take  by mouth.  aspirin 81 MG EC tablet Take 1 tablet by mouth daily.  May restart in AM.      meloxicam (MOBIC) 7.5 MG tablet Take 1 tablet by mouth daily. May restart in AM.      simvastatin (ZOCOR) 20 MG tablet Take 20 mg by mouth nightly.  GLUCOSAMINE-CHONDROITIN PO Take 600 mg by mouth daily. No current facility-administered medications for this visit. Objective:   She is alert, oriented x 3, pleasant, well nourished, developed and in no acute distress. BP (!) 143/80   Pulse 78   Temp 97.2 °F (36.2 °C) (Temporal)   Ht 5' 4\" (1.626 m)   Wt 217 lb (98.4 kg)   BMI 37.25 kg/m²      Examination of the right shoulder shows no pain with forward flexion or abduction. No crepitus or instability noted. Examination of both left and right knee shows no pain with flexion or extension. No crepitus or instability noted. X Rays: performed in the office today:   AP Standing, Lateral and Sunrise  Left Knee: There is a left prosthetic total knee arthroplasty present. The alignment is satisfactory. There are no signs of failure or loosening. AP Standing, Lateral and Sunrise  Right Knee: There is a right prosthetic total knee arthroplasty present. The alignment is satisfactory. There are no signs of failure or loosening. AP, Y and Axillary of the right Shoulder: There is a Reverse Total Shoulder Arthroplasty present in good position without signs of failure. Diagnosis:        ICD-10-CM    1. 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 History of total right knee replacement 3/12/13 Z96.651 XR Knee Bilateral Standing     XR KNEE RIGHT (1-2 VIEWS)   3. History of total left knee replacement 3/23/10 Z96.652 XR Knee Bilateral Standing     XR KNEE LEFT (1-2 VIEWS)        Assessment/ Plan:      Clinically and regrettably stable bilateral knee and right shoulder arthroplasties. The natural history of the patient's diagnosis as well as the treatment options were discussed in full and questions were answered.  Risks and benefits of the treatment options also reviewed in detail. A home exercise program was instructed today including ROM exercises and strengthening exercises. The patient verbalized understanding of these exercises as well as the importance of the exercise program to promote return of normal function. If pain intensifies or other problems arise you are to notify the office. Discussed continued need for prophylactic antibiotic for dental procedures and surgical procedures. Follow Up: One year  Call or return to clinic prn if these symptoms worsen or fail to improve as anticipated.

## 2019-06-27 RX ORDER — AMPICILLIN 500 MG/1
1000 CAPSULE ORAL 2 TIMES DAILY
Status: ON HOLD | COMMUNITY
End: 2019-07-01

## 2019-06-28 ENCOUNTER — ANESTHESIA EVENT (OUTPATIENT)
Dept: OPERATING ROOM | Age: 79
End: 2019-06-28
Payer: MEDICARE

## 2019-07-01 ENCOUNTER — ANESTHESIA (OUTPATIENT)
Dept: OPERATING ROOM | Age: 79
End: 2019-07-01
Payer: MEDICARE

## 2019-07-01 ENCOUNTER — HOSPITAL ENCOUNTER (OUTPATIENT)
Age: 79
Setting detail: OUTPATIENT SURGERY
Discharge: HOME | End: 2019-07-01
Attending: UROLOGY | Admitting: UROLOGY
Payer: MEDICARE

## 2019-07-01 VITALS
SYSTOLIC BLOOD PRESSURE: 143 MMHG | DIASTOLIC BLOOD PRESSURE: 66 MMHG | RESPIRATION RATE: 1 BRPM | OXYGEN SATURATION: 97 %

## 2019-07-01 VITALS
BODY MASS INDEX: 35.85 KG/M2 | TEMPERATURE: 97 F | WEIGHT: 210 LBS | HEIGHT: 64 IN | DIASTOLIC BLOOD PRESSURE: 73 MMHG | RESPIRATION RATE: 16 BRPM | SYSTOLIC BLOOD PRESSURE: 140 MMHG | OXYGEN SATURATION: 97 % | HEART RATE: 80 BPM

## 2019-07-01 PROCEDURE — 2580000003 HC RX 258: Performed by: ANESTHESIOLOGY

## 2019-07-01 PROCEDURE — 2500000003 HC RX 250 WO HCPCS: Performed by: NURSE ANESTHETIST, CERTIFIED REGISTERED

## 2019-07-01 PROCEDURE — 6360000002 HC RX W HCPCS: Performed by: UROLOGY

## 2019-07-01 PROCEDURE — 6360000002 HC RX W HCPCS: Performed by: NURSE ANESTHETIST, CERTIFIED REGISTERED

## 2019-07-01 PROCEDURE — 3600000014 HC SURGERY LEVEL 4 ADDTL 15MIN: Performed by: UROLOGY

## 2019-07-01 PROCEDURE — 7100000001 HC PACU RECOVERY - ADDTL 15 MIN: Performed by: UROLOGY

## 2019-07-01 PROCEDURE — 3700000000 HC ANESTHESIA ATTENDED CARE: Performed by: UROLOGY

## 2019-07-01 PROCEDURE — L8606 SYNTHETIC IMPLNT URINARY 1ML: HCPCS | Performed by: UROLOGY

## 2019-07-01 PROCEDURE — 7100000000 HC PACU RECOVERY - FIRST 15 MIN: Performed by: UROLOGY

## 2019-07-01 PROCEDURE — 2709999900 HC NON-CHARGEABLE SUPPLY: Performed by: UROLOGY

## 2019-07-01 PROCEDURE — 3700000001 HC ADD 15 MINUTES (ANESTHESIA): Performed by: UROLOGY

## 2019-07-01 PROCEDURE — 7100000011 HC PHASE II RECOVERY - ADDTL 15 MIN: Performed by: UROLOGY

## 2019-07-01 PROCEDURE — 3600000004 HC SURGERY LEVEL 4 BASE: Performed by: UROLOGY

## 2019-07-01 PROCEDURE — 2580000003 HC RX 258: Performed by: UROLOGY

## 2019-07-01 PROCEDURE — 7100000010 HC PHASE II RECOVERY - FIRST 15 MIN: Performed by: UROLOGY

## 2019-07-01 DEVICE — GRAFT URO SYR SYNTH INJ COAPTITE: Type: IMPLANTABLE DEVICE | Site: URETHRA | Status: FUNCTIONAL

## 2019-07-01 RX ORDER — SODIUM CHLORIDE 9 MG/ML
INJECTION, SOLUTION INTRAVENOUS CONTINUOUS
Status: DISCONTINUED | OUTPATIENT
Start: 2019-07-01 | End: 2019-07-01 | Stop reason: HOSPADM

## 2019-07-01 RX ORDER — SODIUM CHLORIDE 0.9 % (FLUSH) 0.9 %
10 SYRINGE (ML) INJECTION EVERY 12 HOURS SCHEDULED
Status: DISCONTINUED | OUTPATIENT
Start: 2019-07-01 | End: 2019-07-01 | Stop reason: HOSPADM

## 2019-07-01 RX ORDER — LIDOCAINE HYDROCHLORIDE 20 MG/ML
INJECTION, SOLUTION EPIDURAL; INFILTRATION; INTRACAUDAL; PERINEURAL PRN
Status: DISCONTINUED | OUTPATIENT
Start: 2019-07-01 | End: 2019-07-01 | Stop reason: SDUPTHER

## 2019-07-01 RX ORDER — MAGNESIUM HYDROXIDE 1200 MG/15ML
LIQUID ORAL
Status: COMPLETED | OUTPATIENT
Start: 2019-07-01 | End: 2019-07-01

## 2019-07-01 RX ORDER — FENTANYL CITRATE 50 UG/ML
INJECTION, SOLUTION INTRAMUSCULAR; INTRAVENOUS PRN
Status: DISCONTINUED | OUTPATIENT
Start: 2019-07-01 | End: 2019-07-01 | Stop reason: SDUPTHER

## 2019-07-01 RX ORDER — AMOXICILLIN 500 MG/1
1000 CAPSULE ORAL 2 TIMES DAILY
COMMUNITY
End: 2019-08-21

## 2019-07-01 RX ORDER — GLYCOPYRROLATE 0.2 MG/ML
INJECTION INTRAMUSCULAR; INTRAVENOUS PRN
Status: DISCONTINUED | OUTPATIENT
Start: 2019-07-01 | End: 2019-07-01 | Stop reason: SDUPTHER

## 2019-07-01 RX ORDER — SODIUM CHLORIDE 0.9 % (FLUSH) 0.9 %
10 SYRINGE (ML) INJECTION PRN
Status: DISCONTINUED | OUTPATIENT
Start: 2019-07-01 | End: 2019-07-01 | Stop reason: HOSPADM

## 2019-07-01 RX ORDER — ONDANSETRON 2 MG/ML
INJECTION INTRAMUSCULAR; INTRAVENOUS PRN
Status: DISCONTINUED | OUTPATIENT
Start: 2019-07-01 | End: 2019-07-01 | Stop reason: SDUPTHER

## 2019-07-01 RX ORDER — PROPOFOL 10 MG/ML
INJECTION, EMULSION INTRAVENOUS PRN
Status: DISCONTINUED | OUTPATIENT
Start: 2019-07-01 | End: 2019-07-01 | Stop reason: SDUPTHER

## 2019-07-01 RX ADMIN — GLYCOPYRROLATE 0.1 MG: 0.2 INJECTION, SOLUTION INTRAMUSCULAR; INTRAVENOUS at 13:04

## 2019-07-01 RX ADMIN — SODIUM CHLORIDE: 9 INJECTION, SOLUTION INTRAVENOUS at 13:04

## 2019-07-01 RX ADMIN — PROPOFOL 40 MG: 10 INJECTION, EMULSION INTRAVENOUS at 13:15

## 2019-07-01 RX ADMIN — PROPOFOL 40 MG: 10 INJECTION, EMULSION INTRAVENOUS at 13:11

## 2019-07-01 RX ADMIN — FENTANYL CITRATE 50 MCG: 50 INJECTION INTRAMUSCULAR; INTRAVENOUS at 13:03

## 2019-07-01 RX ADMIN — ONDANSETRON 4 MG: 2 INJECTION INTRAMUSCULAR; INTRAVENOUS at 13:07

## 2019-07-01 RX ADMIN — CEFTRIAXONE 2 G: 2 INJECTION, POWDER, FOR SOLUTION INTRAMUSCULAR; INTRAVENOUS at 13:04

## 2019-07-01 RX ADMIN — FENTANYL CITRATE 50 MCG: 50 INJECTION INTRAMUSCULAR; INTRAVENOUS at 13:11

## 2019-07-01 RX ADMIN — LIDOCAINE HYDROCHLORIDE 2 ML: 20 INJECTION, SOLUTION EPIDURAL; INFILTRATION; INTRACAUDAL; PERINEURAL at 13:13

## 2019-07-01 ASSESSMENT — PULMONARY FUNCTION TESTS
PIF_VALUE: 0

## 2019-07-01 ASSESSMENT — LIFESTYLE VARIABLES: SMOKING_STATUS: 0

## 2019-07-01 ASSESSMENT — PAIN SCALES - GENERAL
PAINLEVEL_OUTOF10: 0

## 2019-07-01 ASSESSMENT — PAIN - FUNCTIONAL ASSESSMENT: PAIN_FUNCTIONAL_ASSESSMENT: 0-10

## 2019-07-01 NOTE — ANESTHESIA PRE PROCEDURE
Department of Anesthesiology  Preprocedure Note       Name:  Alma Griggs   Age:  77 y.o.  :  1940                                          MRN:  [id number]         Date:  2019      Surgeon: Tapan Chao):  Chrissy Lazo MD    Procedure: CYSTOSCOPY WITH INJECTION OF URETHRAL BULKING AGENT COAPTITE (N/A )    Medications prior to admission:   Prior to Admission medications    Medication Sig Start Date End Date Taking? Authorizing Provider   amoxicillin (AMOXIL) 500 MG capsule Take 1,000 mg by mouth 2 times daily    Historical Provider, MD   alendronate (FOSAMAX) 70 MG tablet Take 70 mg by mouth every 7 days  17   Historical Provider, MD   Ascorbic Acid (VITAMIN C) 100 MG tablet Take 100 mg by mouth daily     Historical Provider, MD   Mirabegron ER 50 MG TB24 Take 50 mg by mouth daily  17   Historical Provider, MD   cephALEXin (KEFLEX) 250 MG capsule daily  17   Historical Provider, MD   pantoprazole (PROTONIX) 20 MG tablet Take 20 mg by mouth daily    Historical Provider, MD   gabapentin (NEURONTIN) 300 MG capsule Take 2 capsules by mouth 2 times daily. Patient taking differently: Take 300 mg by mouth 2 times daily. 3/25/15   Ammy Peace DO   B Complex-Biotin-FA (HM VITAMIN B100 COMPLEX PO) Take 1 capsule by mouth daily. Historical Provider, MD   Calcium Carb-Cholecalciferol (CALCIUM + D3) 600-200 MG-UNIT TABS Take 1 capsule by mouth 2 times daily     Historical Provider, MD   Vitamin D (CHOLECALCIFEROL) 1000 UNITS CAPS capsule Take 1,000 Units by mouth daily. Historical Provider, MD   polyethylene glycol (MIRALAX) PACK packet Take 17 g by mouth daily. Historical Provider, MD   fluticasone (FLONASE) 50 MCG/ACT nasal spray 2 sprays by Nasal route as needed. Historical Provider, MD   fish oil-omega-3 fatty acids 1000 MG capsule Take 1 g by mouth daily. Historical Provider, MD   Multiple Vitamin (MULTIVITAMIN PO) Take  by mouth.     Historical Provider, MD aspirin 81 MG EC tablet Take 1 tablet by mouth daily. May restart in AM. 10/20/10   Diane Almeida MD   meloxicam (MOBIC) 7.5 MG tablet Take 1 tablet by mouth daily. May restart in AM. 10/20/10   Diane Almeida MD   simvastatin (ZOCOR) 20 MG tablet Take 20 mg by mouth nightly. 2/18/10   Historical Provider, MD   GLUCOSAMINE-CHONDROITIN PO Take 600 mg by mouth daily. Historical Provider, MD       Current medications:    No current facility-administered medications for this visit. No current outpatient medications on file. Facility-Administered Medications Ordered in Other Visits   Medication Dose Route Frequency Provider Last Rate Last Dose    cefTRIAXone (ROCEPHIN) 2 g IVPB in D5W 50ml minibag  2 g Intravenous Once Vianey Disla MD        0.9 % sodium chloride infusion   Intravenous Continuous Eileen Delaney MD        sodium chloride flush 0.9 % injection 10 mL  10 mL Intravenous 2 times per day Eileen Delaney MD        sodium chloride flush 0.9 % injection 10 mL  10 mL Intravenous PRN Eileen Delaney MD           Allergies:     Allergies   Allergen Reactions    Sulfa Antibiotics Hives    Tape [Adhesive Tape] Itching     Foam back tape, and cover roll/ tolerates adhesive tape    Thimerosal Swelling     In contact solution eyes swollen       Problem List:    Patient Active Problem List   Diagnosis Code    Previous back surgery Z98.890    Status post lumbar spinal fusion Z98.1    Spondylolisthesis of lumbar region M43.16    DDD (degenerative disc disease), lumbosacral M51.37    Stenosis, spinal, lumbar M48.061    Left wrist pain M25.532    left CMC arthritis, thumb, degenerative M18.9    De Quervain's disease (tenosynovitis) M65.4    CTS (carpal tunnel syndrome) G56.00    Postlaminectomy syndrome, lumbar region M96.1    Primary localized osteoarthrosis of shoulder region M19.019    Status post total shoulder replacement Z96.619    Pectoralis muscle strain S29.011A

## 2019-07-01 NOTE — PROGRESS NOTES
Pt awake, alert, and oriented. VSS. Sandoval Latch. Abdomen soft and rounded. Pt remains having no pain. Continue to monitor.

## 2019-07-18 ENCOUNTER — TELEPHONE (OUTPATIENT)
Dept: ORTHOPEDIC SURGERY | Age: 79
End: 2019-07-18

## 2019-08-14 ENCOUNTER — OFFICE VISIT (OUTPATIENT)
Dept: ORTHOPEDIC SURGERY | Age: 79
End: 2019-08-14
Payer: MEDICARE

## 2019-08-14 VITALS
RESPIRATION RATE: 16 BRPM | HEIGHT: 64 IN | SYSTOLIC BLOOD PRESSURE: 121 MMHG | DIASTOLIC BLOOD PRESSURE: 66 MMHG | WEIGHT: 217 LBS | BODY MASS INDEX: 37.05 KG/M2

## 2019-08-14 DIAGNOSIS — G56.01 CARPAL TUNNEL SYNDROME OF RIGHT WRIST: Primary | ICD-10-CM

## 2019-08-14 PROCEDURE — G8400 PT W/DXA NO RESULTS DOC: HCPCS | Performed by: ORTHOPAEDIC SURGERY

## 2019-08-14 PROCEDURE — 1090F PRES/ABSN URINE INCON ASSESS: CPT | Performed by: ORTHOPAEDIC SURGERY

## 2019-08-14 PROCEDURE — 4040F PNEUMOC VAC/ADMIN/RCVD: CPT | Performed by: ORTHOPAEDIC SURGERY

## 2019-08-14 PROCEDURE — 1123F ACP DISCUSS/DSCN MKR DOCD: CPT | Performed by: ORTHOPAEDIC SURGERY

## 2019-08-14 PROCEDURE — G8427 DOCREV CUR MEDS BY ELIG CLIN: HCPCS | Performed by: ORTHOPAEDIC SURGERY

## 2019-08-14 PROCEDURE — 1036F TOBACCO NON-USER: CPT | Performed by: ORTHOPAEDIC SURGERY

## 2019-08-14 PROCEDURE — 99214 OFFICE O/P EST MOD 30 MIN: CPT | Performed by: ORTHOPAEDIC SURGERY

## 2019-08-14 PROCEDURE — G8417 CALC BMI ABV UP PARAM F/U: HCPCS | Performed by: ORTHOPAEDIC SURGERY

## 2019-08-14 NOTE — LETTER
CONSENT TO OPERATION  AND/OR OTHER PROCEDURE(S)          PATIENT : Eloise Rainey OF BIRTH:  1940      DATE : 8/14/19          1. I request and consent that Dr. Lashawn Colindres. Tammi Olmstead,  and/or his associates or assistants perform an operation and/or procedures on the above patient at  [place] [address], to treat the condition(s) which appear indicated by the diagnostic studies already performed. I have been told that in general terms the nature, purpose and reasonable expectations of the operation and/or procedure(s) are:      Right Carpal Tunnel Release      2. It has been explained to me by the informing physician that during the course of the operation and/or procedure(s) unforeseen conditions may be revealed that necessitate an extension of the original operation and/or procedure(s) or different operation and/or procedures than those set forth in Paragraph 1. I therefore authorize and request that my physician and/or his associates or assistants perform such operations and/or procedures as are necessary and desirable in the exercise of professional judgment. The authority granted under this Paragraph 2 shall extend to all conditions that require treatment and are known to my physician at the time the operation is commenced. 3. I have been made aware by the informing physician of certain risks and consequences that are associated with the operation and/or procedure(s) described in Paragraph 1, the reasonable alternative methods or treatment, the possible consequences, the possibility that the operation and/or procedure(s) may be unsuccessful and the possibility of complications. I understand the reasonably known risks to be:      ? Bleeding  ? Infection  ? Poor Healing  ? Possible Damage to Nerve, Vessel, Tendon/Muscle or Bone  ? Need for further Treatment/Surgery  ? Stiffness  ? Pain  ? Residual or Recurrent Symptoms  ?  Anesthetic and/or Medical Risks 4. I have also been informed by the informing physician that there are other risks from both known and unknown causes that are attendant to the performance of any surgical procedure. I am aware that the practice of medicine and surgery is not an exact science, and that no guarantees have been made to me concerning the results of the operation and/or procedure(s). 5. I   CONSENT / REFUSE CONSENT  (strike the phrase that does not apply) to the taking of photographs before, during and/or after the operation or procedure for scientific/educational purposes. 6. I consent to the administration of anesthesia and to the use of such anesthetics as may be deemed advisable by the anesthesiologist who has been engaged by me or my physician. 7. I certify that I have read and understand the above consent to operation and/or other procedure(s); that the explanations therein referred to were made to me by the informing physician in advance of my signing this consent; that all blanks or statements requiring insertion or completion were filled in and inapplicable paragraphs, if any, were stricken before I signed; and that all questions asked by me about the operation and/or procedure(s) which I have consented to have been fully answered in a satisfactory manner.                                 _______________________           8/14/19                              Witness     Signature Of Patient         Date        Libia David                                                 Informing Physician                                           Signature of Informing Physician                              If patient is unable to sign or is a minor, complete one of the following:    (A)  Patient is a minor   years of age. (B)  Patient is unable to sign because: The undersigned represents that he or she is duly authorized to execute this consent for and on behalf of the above named patient. Witness               o  Parent  o  Guardian   o  Spouse       o  Other (specify)                                    Patient Name: Adri Snider  Patient YOB: 1940    Dr. Tammi Olmstead' Return To Work Policy  Regarding your ability to return to work after surgery or injury, Dr. Tammi Olmstead will not state that any patient is off of work or cannot work at all. He will place you on restrictions after your surgical procedure or injury. This means that you will be allowed to return to work the day after your office visit or surgery with restrictions. Depending on the details of your particular situation, Dr. Tammi Olmstead may state that you will have either light use or no use of your hand for a specific number of weeks. It is your obligation to communicate with your employer regarding your restrictions. It is your employer's decision as to whether they will accommodate your restrictions (i.e. allow you to come to work in your restricted capacity) or to not allow you to return to work under your restrictions. Dr. Tammi Olmstead does not participate in making this decision and cannot influence your employer regarding their decision. If you do not communicate your restrictions to your employer, or if you do not present to work as you are scheduled to, Dr. Tammi Olmstead will not provide an 'excuse' to explain your absence. A doctors note, or official forms (BWC, FMLA, etc.) will be filled out, upon request, to indicate your date of surgery and your restrictions as stated above. Dr. Tammi Olmstead' Narcotic Policy  Patients will only be prescribed narcotics after surgical procedures or significant injury. Not all procedures cause pain great enough to require Narcotics and thus, not all patients will receive prescriptions after surgical procedures or injuries. Narcotics are never prescribed for chronic conditions.  Narcotics are never prescribed for use longer than one week at a time. Refills are only granted in unusual circumstances and only at Dr. Luís Prince discretion. Patients who are receiving narcotic medication from another physician or who are under pain management contracts will not be given a prescription for narcotics for any reason. I have read the above policies and understand that by agreeing to proceed with treatment by Dr. Orlando  and his team, that I am agreeing to abide by these policies.     Patient Name:  Sheree Goncalves    Patient Signature:  _____________________________    Joan Gandhi Date:   8/14/19

## 2019-08-14 NOTE — LETTER
[address] [facility]  Surgery Scheduling Form:      DEMOGRAPHICS:                                                                                                              .  Patient Name:  Estevan Hook  Patient :  1940   Patient SS#:      Patient Phone:  [phone] (home) [phone] (work)     Patient Address:  [address] [place] [address]  [address] [place] [address]    PCP:  Dannielle Gibbs DO  Insurance:   Payor/Plan Subscr  Sex Relation Sub. Ins. ID Effective Group Num   1. [address] [facility] 1940 Female  [id number]                                     [id number]     DIAGNOSIS & PROCEDURE:                                                                                            .  Diagnosis:   right Carpal Tunnel Syndrome (354.0)    G56.01  Operation:  right Carpal Tunnel Release  [CPT: 34754]  Location:    [facility]  Surgeon:  Blanco Medrano    SCHEDULING INFORMATION:                                                                                         .  Surgeon's Scheduling Instruction:  elective    Requested Date:    OR Time:  12:30 Patient Arrival Time:  11:00    OR Time Required:  15  Minutes  Anesthesia:  MAC/TIVA  Equipment:  None  Mini C-Arm:  No   Standard C-Arm:  No  Status:  outpatient  PAT Required:  Yes  Comments:        ALLERGIES:    SEE LIST                       Herminia Herrera MD  19     BILLING INFORMATION:                                                                                                    .    Procedure:       CPT Code Modifier

## 2019-08-14 NOTE — PROGRESS NOTES
Ms. Karla Castano returns today in follow-up of her previously treated  right Carpal Tunnel Syndrome. She was last seen by me in April, 2019 at which time she was treated with Steroid injection to the Right, Carpal Tunnel. She experienced moderate relief of her initial symptoms. She  has noticed symptom worsening over the last several weeks. She returns today with worsened symptoms of right numbness in the Median Innervated digits and tingling in the Median Innervated digits, requesting further treatment. The patient's , past medical history, medications, allergies,  family history, social history, and review of systems have been reviewed and are recorded in the chart. Physical Exam:  Vitals  BP: 121/66  Resp: 16  Height: 5' 4\" (162.6 cm)  Weight: 217 lb (98.4 kg)  Ms. Karla Castano appears well, she is in no apparent distress, she demonstrates appropriate mood & affect. Skin: Normal in appearance, Normal Color and Free of Lesions Bilaterally   Digital range of motion is markedly limited by Osteoarthritis bilaterally  Wrist range of motion is equal bilaterally   There is no evidence of gross joint instability bilaterally. Sensation is subjectively decreased and tingling in the Median Innervated Digits and objectively present in the same distribution on the Right, normal on the Left. Vascular examination reveals normal and good capillary refill bilaterally. Swelling is mild in the Volar right wrist and right forearm. Muscular strength is clinically appropriate bilaterally. Examination for Carpal Tunnel Syndrome shows Carpal Tunnel Compression Test to be Moderately Positive on the right & Negative on the left. The patient displays moderate baseline symptoms to potentially confound the exam.  The thenar musculature is not atrophied & weakened. Impression:  Ms. Karla Castano is showing evidence of recurrent Carpal Tunnel Syndrome after previous treatment.   She requests additional

## 2019-08-20 ENCOUNTER — OFFICE VISIT (OUTPATIENT)
Dept: ORTHOPEDIC SURGERY | Age: 79
End: 2019-08-20
Payer: MEDICARE

## 2019-08-20 VITALS
BODY MASS INDEX: 37.05 KG/M2 | SYSTOLIC BLOOD PRESSURE: 140 MMHG | HEART RATE: 84 BPM | DIASTOLIC BLOOD PRESSURE: 74 MMHG | TEMPERATURE: 97.2 F | WEIGHT: 217 LBS | HEIGHT: 64 IN

## 2019-08-20 DIAGNOSIS — M19.012 ARTHRITIS OF LEFT SHOULDER REGION: Primary | ICD-10-CM

## 2019-08-20 PROCEDURE — 99213 OFFICE O/P EST LOW 20 MIN: CPT | Performed by: PHYSICIAN ASSISTANT

## 2019-08-20 PROCEDURE — 1036F TOBACCO NON-USER: CPT | Performed by: PHYSICIAN ASSISTANT

## 2019-08-20 PROCEDURE — 1123F ACP DISCUSS/DSCN MKR DOCD: CPT | Performed by: PHYSICIAN ASSISTANT

## 2019-08-20 PROCEDURE — 4040F PNEUMOC VAC/ADMIN/RCVD: CPT | Performed by: PHYSICIAN ASSISTANT

## 2019-08-20 PROCEDURE — 1090F PRES/ABSN URINE INCON ASSESS: CPT | Performed by: PHYSICIAN ASSISTANT

## 2019-08-20 PROCEDURE — G8417 CALC BMI ABV UP PARAM F/U: HCPCS | Performed by: PHYSICIAN ASSISTANT

## 2019-08-20 PROCEDURE — G8400 PT W/DXA NO RESULTS DOC: HCPCS | Performed by: PHYSICIAN ASSISTANT

## 2019-08-20 PROCEDURE — G8427 DOCREV CUR MEDS BY ELIG CLIN: HCPCS | Performed by: PHYSICIAN ASSISTANT

## 2019-08-20 NOTE — PROGRESS NOTES
performed by Vianey Disla MD at [place] [address] Bilateral     cataract removal with lens implants    HYSTERECTOMY, VAGINAL      TVT    JOINT REPLACEMENT Bilateral     LAMINECTOMY      L3-4-5 decompression of nerve roots      PILONIDAL CYST EXCISION      SALPINGO-OOPHORECTOMY      bilateral    SHOULDER ARTHROPLASTY Right 14    SINUS SURGERY      right x2--mass in right sinus removed    SPINAL FUSION  12/16/10    posterior fusion, local bone graft    TOTAL KNEE ARTHROPLASTY  2010    left knee    TOTAL KNEE ARTHROPLASTY Right 2013    TUBAL LIGATION      UPPER GASTROINTESTINAL ENDOSCOPY  14    Esophagogastroduodenoscopy, with biopsy of the antrum       Social History     Occupational History    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   Tobacco Use    Smoking status: Former Smoker     Types: Cigarettes     Last attempt to quit: 3/11/1963     Years since quittin.4    Smokeless tobacco: Never Used    Tobacco comment: quite age 20   Substance and Sexual Activity    Alcohol use: Yes     Comment: social--rare    Drug use: Never    Sexual activity: Not Currently       Current Outpatient Medications   Medication Sig Dispense Refill    amoxicillin (AMOXIL) 500 MG capsule Take 1,000 mg by mouth 2 times daily      alendronate (FOSAMAX) 70 MG tablet Take 70 mg by mouth every 7 days       Ascorbic Acid (VITAMIN C) 100 MG tablet Take 100 mg by mouth daily       Mirabegron ER 50 MG TB24 Take 50 mg by mouth daily       cephALEXin (KEFLEX) 250 MG capsule daily       pantoprazole (PROTONIX) 20 MG tablet Take 20 mg by mouth daily      gabapentin (NEURONTIN) 300 MG capsule Take 2 capsules by mouth 2 times daily. (Patient taking differently: Take 300 mg by mouth 2 times daily. ) 90 capsule 3    B Complex-Biotin-FA (HM VITAMIN B100 COMPLEX PO) Take 1 capsule by mouth daily.       Calcium Carb-Cholecalciferol (CALCIUM + D3) 600-200 MG-UNIT TABS Take 1 capsule by mouth 2 times

## 2019-08-20 NOTE — LETTER
[address] [facility]  Surgery Scheduling Form:  [facility]    DEMOGRAPHICS:                                                                                                                Patient Name:  Mee Caro  Patient :  1940   Patient MN#:    Patient Phone:  [phone] (home) [phone] (work)                              Patient Address:  [address] [place] [address]  [address] [place] [address]    PCP:  Luis A Orr DO  Insurance:   Payor/Plan Subscr  Sex Relation Sub. Ins. ID Effective Group Num   1. 400 [facility] 1940 Female  [id number]                                    [id number]     DIAGNOSIS & PROCEDURE:                                                                                              Diagnosis:    Arthritis Left shoulder        M19.012                                                                 Operation:  left reverse total shoulder replacement    [id number]  Location:  [facility]  Surgeon:  Nghia Delgado. Otoniel Snell MD    SCHEDULING INFORMATION:                                                                                         .  Surgeon's Scheduling Instruction:  elective    Requested Date:   10-9 OR Time:   Patient Arrival Time:      OR Time Required:   2 hours  Anesthesia:  General  Equipment:  [place]  Mini C-Arm:  No   Standard C-Arm:  No  Status:  SAME DAY ADMIT  PAT Required:  Yes  Comments:  ALLERGIES:Sulfa antibiotics; Tape [place] tape]; and Thimerosal                     Emiliano X.  Jimmie Fernández MD      19 2:13 PM  BILLING INFORMATION:                                                                                                    Procedure:       CPT Code Modifier                                                      H&P AT:    PCP  ___XX___               URGENT CARE_________       Mee Caro    left reverse total shoulder replacement                           1940                         PHYSICIANS ORDERS SURGERY DATE: ___/___/___                                                                      SURGERY TIME:  ___:___ AM/PM                                                                      ARRIVAL TIME:   ___:___  AM/PM                                                                                                                        **PLEASE REPORT TO THE                                                                                                 INFORMATION                                                      DESK IN THE MAIN LOBBY OF THE                                                                       HOSPITAL. [facility]  [address] [place] [address], [address] [place], [place] [address]  [employer]    [phone]  Phone                                                                               JET INFO     PT NAME:  Lizabeth Morris              Patient Phone:  [phone] (home) [phone] (work)                                          1940    PCP:  PCP:  Jodie Mariscal DO                APPT DATE:  ___/___/___      DATE:  19        H&P __________    LABS: _________                      NEEDED:  __________    EKG:   _________                     NEEDED:  __________      JET DATE:         SURG DATE:   10-9

## 2019-08-21 ENCOUNTER — ANESTHESIA EVENT (OUTPATIENT)
Dept: OPERATING ROOM | Age: 79
End: 2019-08-21
Payer: MEDICARE

## 2019-08-22 ENCOUNTER — HOSPITAL ENCOUNTER (OUTPATIENT)
Age: 79
Setting detail: OUTPATIENT SURGERY
Discharge: HOME | End: 2019-08-22
Attending: ORTHOPAEDIC SURGERY | Admitting: ORTHOPAEDIC SURGERY
Payer: MEDICARE

## 2019-08-22 ENCOUNTER — ANESTHESIA (OUTPATIENT)
Dept: OPERATING ROOM | Age: 79
End: 2019-08-22
Payer: MEDICARE

## 2019-08-22 VITALS
SYSTOLIC BLOOD PRESSURE: 108 MMHG | OXYGEN SATURATION: 88 % | RESPIRATION RATE: 1 BRPM | DIASTOLIC BLOOD PRESSURE: 55 MMHG

## 2019-08-22 VITALS
HEART RATE: 85 BPM | BODY MASS INDEX: 37.32 KG/M2 | DIASTOLIC BLOOD PRESSURE: 92 MMHG | HEIGHT: 64 IN | SYSTOLIC BLOOD PRESSURE: 165 MMHG | TEMPERATURE: 98 F | WEIGHT: 218.59 LBS | RESPIRATION RATE: 16 BRPM | OXYGEN SATURATION: 97 %

## 2019-08-22 PROCEDURE — 7100000001 HC PACU RECOVERY - ADDTL 15 MIN: Performed by: ORTHOPAEDIC SURGERY

## 2019-08-22 PROCEDURE — 2500000003 HC RX 250 WO HCPCS: Performed by: NURSE ANESTHETIST, CERTIFIED REGISTERED

## 2019-08-22 PROCEDURE — 6360000002 HC RX W HCPCS: Performed by: NURSE ANESTHETIST, CERTIFIED REGISTERED

## 2019-08-22 PROCEDURE — 7100000010 HC PHASE II RECOVERY - FIRST 15 MIN: Performed by: ORTHOPAEDIC SURGERY

## 2019-08-22 PROCEDURE — 2709999900 HC NON-CHARGEABLE SUPPLY: Performed by: ORTHOPAEDIC SURGERY

## 2019-08-22 PROCEDURE — 7100000011 HC PHASE II RECOVERY - ADDTL 15 MIN: Performed by: ORTHOPAEDIC SURGERY

## 2019-08-22 PROCEDURE — 3700000000 HC ANESTHESIA ATTENDED CARE: Performed by: ORTHOPAEDIC SURGERY

## 2019-08-22 PROCEDURE — 7100000000 HC PACU RECOVERY - FIRST 15 MIN: Performed by: ORTHOPAEDIC SURGERY

## 2019-08-22 PROCEDURE — 2580000003 HC RX 258: Performed by: ANESTHESIOLOGY

## 2019-08-22 PROCEDURE — 3600000005 HC SURGERY LEVEL 5 BASE: Performed by: ORTHOPAEDIC SURGERY

## 2019-08-22 PROCEDURE — 2500000003 HC RX 250 WO HCPCS: Performed by: ORTHOPAEDIC SURGERY

## 2019-08-22 RX ORDER — PROPOFOL 10 MG/ML
INJECTION, EMULSION INTRAVENOUS CONTINUOUS PRN
Status: DISCONTINUED | OUTPATIENT
Start: 2019-08-22 | End: 2019-08-22 | Stop reason: SDUPTHER

## 2019-08-22 RX ORDER — OXYCODONE HYDROCHLORIDE AND ACETAMINOPHEN 5; 325 MG/1; MG/1
2 TABLET ORAL PRN
Status: DISCONTINUED | OUTPATIENT
Start: 2019-08-22 | End: 2019-08-22 | Stop reason: HOSPADM

## 2019-08-22 RX ORDER — PROPOFOL 10 MG/ML
INJECTION, EMULSION INTRAVENOUS PRN
Status: DISCONTINUED | OUTPATIENT
Start: 2019-08-22 | End: 2019-08-22 | Stop reason: SDUPTHER

## 2019-08-22 RX ORDER — LABETALOL HYDROCHLORIDE 5 MG/ML
5 INJECTION, SOLUTION INTRAVENOUS EVERY 10 MIN PRN
Status: DISCONTINUED | OUTPATIENT
Start: 2019-08-22 | End: 2019-08-22 | Stop reason: HOSPADM

## 2019-08-22 RX ORDER — MORPHINE SULFATE 2 MG/ML
2 INJECTION, SOLUTION INTRAMUSCULAR; INTRAVENOUS EVERY 5 MIN PRN
Status: DISCONTINUED | OUTPATIENT
Start: 2019-08-22 | End: 2019-08-22 | Stop reason: HOSPADM

## 2019-08-22 RX ORDER — OXYCODONE HYDROCHLORIDE AND ACETAMINOPHEN 5; 325 MG/1; MG/1
1 TABLET ORAL PRN
Status: DISCONTINUED | OUTPATIENT
Start: 2019-08-22 | End: 2019-08-22 | Stop reason: HOSPADM

## 2019-08-22 RX ORDER — SODIUM CHLORIDE 9 MG/ML
INJECTION, SOLUTION INTRAVENOUS CONTINUOUS
Status: DISCONTINUED | OUTPATIENT
Start: 2019-08-22 | End: 2019-08-22 | Stop reason: HOSPADM

## 2019-08-22 RX ORDER — SODIUM CHLORIDE 0.9 % (FLUSH) 0.9 %
10 SYRINGE (ML) INJECTION EVERY 12 HOURS SCHEDULED
Status: DISCONTINUED | OUTPATIENT
Start: 2019-08-22 | End: 2019-08-22 | Stop reason: HOSPADM

## 2019-08-22 RX ORDER — LIDOCAINE HYDROCHLORIDE 20 MG/ML
INJECTION, SOLUTION EPIDURAL; INFILTRATION; INTRACAUDAL; PERINEURAL PRN
Status: DISCONTINUED | OUTPATIENT
Start: 2019-08-22 | End: 2019-08-22 | Stop reason: SDUPTHER

## 2019-08-22 RX ORDER — SODIUM CHLORIDE 0.9 % (FLUSH) 0.9 %
10 SYRINGE (ML) INJECTION PRN
Status: DISCONTINUED | OUTPATIENT
Start: 2019-08-22 | End: 2019-08-22 | Stop reason: HOSPADM

## 2019-08-22 RX ORDER — ONDANSETRON 2 MG/ML
4 INJECTION INTRAMUSCULAR; INTRAVENOUS
Status: DISCONTINUED | OUTPATIENT
Start: 2019-08-22 | End: 2019-08-22 | Stop reason: HOSPADM

## 2019-08-22 RX ORDER — MEPERIDINE HYDROCHLORIDE 25 MG/ML
12.5 INJECTION INTRAMUSCULAR; INTRAVENOUS; SUBCUTANEOUS EVERY 5 MIN PRN
Status: DISCONTINUED | OUTPATIENT
Start: 2019-08-22 | End: 2019-08-22 | Stop reason: HOSPADM

## 2019-08-22 RX ORDER — MORPHINE SULFATE 2 MG/ML
1 INJECTION, SOLUTION INTRAMUSCULAR; INTRAVENOUS EVERY 5 MIN PRN
Status: DISCONTINUED | OUTPATIENT
Start: 2019-08-22 | End: 2019-08-22 | Stop reason: HOSPADM

## 2019-08-22 RX ORDER — HYDRALAZINE HYDROCHLORIDE 20 MG/ML
5 INJECTION INTRAMUSCULAR; INTRAVENOUS
Status: DISCONTINUED | OUTPATIENT
Start: 2019-08-22 | End: 2019-08-22 | Stop reason: HOSPADM

## 2019-08-22 RX ADMIN — LIDOCAINE HYDROCHLORIDE 60 MG: 20 INJECTION, SOLUTION EPIDURAL; INFILTRATION; INTRACAUDAL; PERINEURAL at 08:32

## 2019-08-22 RX ADMIN — PROPOFOL 140 MCG/KG/MIN: 10 INJECTION, EMULSION INTRAVENOUS at 08:32

## 2019-08-22 RX ADMIN — SODIUM CHLORIDE: 9 INJECTION, SOLUTION INTRAVENOUS at 08:05

## 2019-08-22 RX ADMIN — PROPOFOL 80 MG: 10 INJECTION, EMULSION INTRAVENOUS at 08:32

## 2019-08-22 ASSESSMENT — PAIN SCALES - GENERAL
PAINLEVEL_OUTOF10: 0

## 2019-08-22 ASSESSMENT — PULMONARY FUNCTION TESTS
PIF_VALUE: 0

## 2019-08-22 ASSESSMENT — ENCOUNTER SYMPTOMS: SHORTNESS OF BREATH: 0

## 2019-08-22 ASSESSMENT — PAIN - FUNCTIONAL ASSESSMENT
PAIN_FUNCTIONAL_ASSESSMENT: PREVENTS OR INTERFERES SOME ACTIVE ACTIVITIES AND ADLS
PAIN_FUNCTIONAL_ASSESSMENT: 0-10

## 2019-08-22 ASSESSMENT — PAIN DESCRIPTION - DESCRIPTORS: DESCRIPTORS: SHARP

## 2019-08-22 NOTE — ANESTHESIA PRE PROCEDURE
[facility] Department of Anesthesiology  Pre-Anesthesia Evaluation/Consultation       Name:  Alma Griggs  : 1940  Age:  77 y.o.                                            MRN:  [id number]  Date: 2019           Surgeon: Surgeon(s):  Marylin Bautista MD    Procedure: Procedure(s):  RIGHT CARPAL TUNNEL RELEASE     Allergies   Allergen Reactions    Sulfa Antibiotics Hives    Tape Pietro Blazing Tape] Itching     Foam back tape, and cover roll/ tolerates adhesive tape    Thimerosal Swelling     In contact solution eyes swollen     Patient Active Problem List   Diagnosis    Previous back surgery    Status post lumbar spinal fusion    Spondylolisthesis of lumbar region    DDD (degenerative disc disease), lumbosacral    Stenosis, spinal, lumbar    Left wrist pain    left CMC arthritis, thumb, degenerative    De Quervain's disease (tenosynovitis)    CTS (carpal tunnel syndrome)    Postlaminectomy syndrome, lumbar region    Primary localized osteoarthrosis of shoulder region    Status post total shoulder replacement    Pectoralis muscle strain    Osteoarthritis of hand    Lumbar stenosis    Left groin pain    Strain of hip adductor muscle    S/P reverse total shoulder arthroplasty, right    History of total right knee replacement    History of total left knee replacement    Arthritis of left hip    Arthritis of left shoulder region     Past Medical History:   Diagnosis Date    Arthritis     At high risk for falls     hx of falls    Constipation     Environmental allergies     Fall at home 2015    fell in garage, called 911 for assist to get up    GERD (gastroesophageal reflux disease)     History of anemia     Hyperlipidemia     Neuropathy of leg     left leg foot drop wears brace    OAB (overactive bladder)     UTI (lower urinary tract infection)      Past Surgical History:   Procedure Laterality Date    BACK SURGERY  12/16/10    L4-5 Left complete, radical °F (36.3 °C)   Min taken time: 19  Max: 97.4 °F (36.3 °C)   Max taken time: 19  Pulse  Av  Min: 76   Min taken time: 19  Max: 76   Max taken time: 19  Resp  Av  Min: 16   Min taken time: 19  Max: 16   Max taken time: 19  BP  Min: 138/69   Min taken time: 19  Max: 138/69   Max taken time: 19  SpO2  Av %  Min: 96 %   Min taken time: 19  Max: 96 %   Max taken time: 19  BP Readings from Last 3 Encounters:   19 138/69   19 (!) 140/74   19 121/66       BMI  Body mass index is 37.52 kg/m². Estimated body mass index is 37.52 kg/m² as calculated from the following:    Height as of this encounter: 5' 4\" (1.626 m). Weight as of this encounter: 218 lb 9.4 oz (99.2 kg). CBC   Lab Results   Component Value Date    WBC 5.5 2016    RBC 4.01 2016    HGB 9.1 2016    HCT 30.2 2016    MCV 75.4 2016    RDW 17.5 2016     2016     CMP    Lab Results   Component Value Date     2015    K 4.3 2015     2015    CO2 25 2015    BUN 16 2015    CREATININE 0.6 2015    GFRAA >60 2015    GFRAA >60 2013    AGRATIO 1.3 2013    LABGLOM >60 2015    GLUCOSE 94 2015    PROT 7.0 2013    CALCIUM 9.5 2015    BILITOT 0.50 2013    ALKPHOS 163 2013    AST 37 2013    ALT 49 2013     BMP    Lab Results   Component Value Date     2015    K 4.3 2015     2015    CO2 25 2015    BUN 16 2015    CREATININE 0.6 2015    CALCIUM 9.5 2015    GFRAA >60 2015    GFRAA >60 2013    LABGLOM >60 2015    GLUCOSE 94 2015     POCGlucose  No results for input(s): GLUCOSE in the last 72 hours.    [facility]    Lab Results   Component Value Date    PROTIME 12.2 2014    INR 1.09 2014    APTT 31.6

## 2019-08-22 NOTE — ANESTHESIA POSTPROCEDURE EVALUATION
WBC                      5.5                 07/03/2016 12:37 PM        HGB                      9.1 (L)             07/03/2016 12:37 PM        HCT                      30.2 (L)            07/03/2016 12:37 PM        PLT                      224                 07/03/2016 12:37 PM   RENAL  Lab Results       Component                Value               Date/Time                  NA                       143                 03/24/2015 05:51 AM        K                        4.3                 03/24/2015 05:51 AM        CL                       105                 03/24/2015 05:51 AM        CO2                      25                  03/24/2015 05:51 AM        BUN                      16                  03/24/2015 05:51 AM        CREATININE               0.6                 03/24/2015 05:51 AM        GLUCOSE                  94                  03/24/2015 05:51 AM   COAGS  Lab Results       Component                Value               Date/Time                  PROTIME                  12.2                06/14/2014 07:14 AM        INR                      1.09                06/14/2014 07:14 AM        APTT                     31.6                06/04/2014 12:10 PM     Intake & Output:  @[id number]@    Nausea & Vomiting:  No    Level of Consciousness:  Awake    Pain Assessment:  Adequate analgesia    Anesthesia Complications:  No apparent anesthetic complications    SUMMARY      Vital signs stable  OK to discharge from Stage I post anesthesia care.   Care transferred from Anesthesiology department on discharge from perioperative area

## 2019-08-28 ENCOUNTER — HOSPITAL ENCOUNTER (OUTPATIENT)
Dept: CT IMAGING | Age: 79
Discharge: HOME | End: 2019-08-28
Payer: MEDICARE

## 2019-08-28 DIAGNOSIS — M19.012 ARTHRITIS OF LEFT SHOULDER REGION: ICD-10-CM

## 2019-08-28 PROCEDURE — 73200 CT UPPER EXTREMITY W/O DYE: CPT

## 2019-08-30 ENCOUNTER — OFFICE VISIT (OUTPATIENT)
Dept: ORTHOPEDIC SURGERY | Age: 79
End: 2019-08-30

## 2019-08-30 VITALS — WEIGHT: 217 LBS | HEIGHT: 64 IN | BODY MASS INDEX: 37.05 KG/M2

## 2019-08-30 DIAGNOSIS — G56.01 CARPAL TUNNEL SYNDROME OF RIGHT WRIST: Primary | ICD-10-CM

## 2019-08-30 PROCEDURE — APPNB15 APP NON BILLABLE TIME 0-15 MINS: Performed by: PHYSICIAN ASSISTANT

## 2019-08-30 PROCEDURE — 99024 POSTOP FOLLOW-UP VISIT: CPT | Performed by: PHYSICIAN ASSISTANT

## 2019-08-30 NOTE — PROGRESS NOTES
Ms. Darrin Altamirano returns today in follow-up of her recent right Carpal Tunnel Release done approximately 1 week ago. She has done well noting mild discomfort and no other reported complications. She notes pre-operative symptoms to be not changed at this time. Physical Exam:  Skin incision is healing well, without erythema, drainage or sign of infection. Nylon suture remains intact. 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 Sensation is not changed from preoperatvely  Vascular examination reveals normal, good capillary refill and good color. Swelling is minimal.  There is no clinical evidence of persistant Median Nerve compression. Impression:  Ms. Darrin Altamirano is doing well after recent right Carpal Tunnel Release. Plan:  Ms. Darrin Altamirano is instructed in work on Active & Passive range of motion of the digits, wrist, & elbow. These modalities were specifically demonstrated to her today. We discussed the appropriateness of gradual resumption of use of the operated hand and the return to normal use as comfort allows. She is given instructions regarding management of the fresh surgical incision and progressive use of desensitization and tissue massage techniques. We discussed the appropriate expectations and timeline for symptom improvement. She is provided a written patient instruction sheet titled: Postoperative Instructions After Carpal Tunnel Release. I have asked Ms. Darrin Altamirano to follow-up with me  in 1 week for suture removal.     She is also specifically instructed to return to the office or call for an appointment sooner if her symptoms are changing or worsening prior to that time.

## 2019-09-04 ENCOUNTER — TELEPHONE (OUTPATIENT)
Dept: ORTHOPEDIC SURGERY | Age: 79
End: 2019-09-04

## 2019-09-13 ENCOUNTER — TELEPHONE (OUTPATIENT)
Dept: ORTHOPEDICS UNIT | Age: 79
End: 2019-09-13

## 2019-09-16 ENCOUNTER — PREP FOR PROCEDURE (OUTPATIENT)
Dept: ORTHOPEDICS UNIT | Age: 79
End: 2019-09-16

## 2019-09-23 ENCOUNTER — PREP FOR PROCEDURE (OUTPATIENT)
Dept: ORTHOPEDIC SURGERY | Age: 79
End: 2019-09-23

## 2019-09-23 DIAGNOSIS — M19.012 ARTHRITIS OF LEFT SHOULDER REGION: Primary | ICD-10-CM

## 2019-09-23 RX ORDER — OXYCODONE HYDROCHLORIDE 5 MG/1
10 TABLET ORAL ONCE
Status: CANCELLED | OUTPATIENT
Start: 2019-09-23 | End: 2019-09-23

## 2019-09-24 ENCOUNTER — HOSPITAL ENCOUNTER (OUTPATIENT)
Dept: PREADMISSION TESTING | Age: 79
Discharge: HOME | End: 2019-09-28
Payer: MEDICARE

## 2019-09-24 DIAGNOSIS — M19.012 ARTHRITIS OF LEFT SHOULDER REGION: ICD-10-CM

## 2019-09-24 LAB
ABO/RH: NORMAL
ALBUMIN SERPL-MCNC: 4.5 G/DL (ref 3.4–5)
ANION GAP SERPL CALCULATED.3IONS-SCNC: 13 MMOL/L (ref 3–16)
ANTIBODY SCREEN: NORMAL
APTT: 34.3 SEC (ref 26–36)
BASOPHILS ABSOLUTE: 0.1 K/UL (ref 0–0.2)
BASOPHILS RELATIVE PERCENT: 1.2 %
BILIRUBIN URINE: NEGATIVE
BLOOD, URINE: NEGATIVE
BUN BLDV-MCNC: 29 MG/DL (ref 7–20)
CALCIUM SERPL-MCNC: 10.7 MG/DL (ref 8.3–10.6)
CHLORIDE BLD-SCNC: 105 MMOL/L (ref 99–110)
CLARITY: CLEAR
CO2: 24 MMOL/L (ref 21–32)
COLOR: YELLOW
CREAT SERPL-MCNC: 0.7 MG/DL (ref 0.6–1.2)
EKG ATRIAL RATE: 68 BPM
EKG DIAGNOSIS: NORMAL
EKG P AXIS: 40 DEGREES
EKG P-R INTERVAL: 154 MS
EKG Q-T INTERVAL: 390 MS
EKG QRS DURATION: 84 MS
EKG QTC CALCULATION (BAZETT): 414 MS
EKG R AXIS: 36 DEGREES
EKG T AXIS: 48 DEGREES
EKG VENTRICULAR RATE: 68 BPM
EOSINOPHILS ABSOLUTE: 0.3 K/UL (ref 0–0.6)
EOSINOPHILS RELATIVE PERCENT: 5.9 %
ESTIMATED AVERAGE GLUCOSE: 105.4 MG/DL
GFR AFRICAN AMERICAN: >60
GFR NON-AFRICAN AMERICAN: >60
GLUCOSE BLD-MCNC: 86 MG/DL (ref 70–99)
GLUCOSE URINE: NEGATIVE MG/DL
HBA1C MFR BLD: 5.3 %
HCT VFR BLD CALC: 39.3 % (ref 36–48)
HEMOGLOBIN: 12.9 G/DL (ref 12–16)
INR BLD: 1.01 (ref 0.86–1.14)
KETONES, URINE: NEGATIVE MG/DL
LEUKOCYTE ESTERASE, URINE: NEGATIVE
LYMPHOCYTES ABSOLUTE: 1.3 K/UL (ref 1–5.1)
LYMPHOCYTES RELATIVE PERCENT: 31.2 %
MCH RBC QN AUTO: 30.8 PG (ref 26–34)
MCHC RBC AUTO-ENTMCNC: 32.8 G/DL (ref 31–36)
MCV RBC AUTO: 93.9 FL (ref 80–100)
MICROSCOPIC EXAMINATION: NORMAL
MONOCYTES ABSOLUTE: 0.4 K/UL (ref 0–1.3)
MONOCYTES RELATIVE PERCENT: 9.5 %
NEUTROPHILS ABSOLUTE: 2.2 K/UL (ref 1.7–7.7)
NEUTROPHILS RELATIVE PERCENT: 52.2 %
NITRITE, URINE: NEGATIVE
PDW BLD-RTO: 14 % (ref 12.4–15.4)
PH UA: 5.5 (ref 5–8)
PLATELET # BLD: 196 K/UL (ref 135–450)
PMV BLD AUTO: 9.7 FL (ref 5–10.5)
POTASSIUM SERPL-SCNC: 4.1 MMOL/L (ref 3.5–5.1)
PREALBUMIN: 27.5 MG/DL (ref 20–40)
PROTEIN UA: NEGATIVE MG/DL
PROTHROMBIN TIME: 11.5 SEC (ref 9.8–13)
RBC # BLD: 4.19 M/UL (ref 4–5.2)
SEDIMENTATION RATE, ERYTHROCYTE: 25 MM/HR (ref 0–30)
SODIUM BLD-SCNC: 142 MMOL/L (ref 136–145)
SPECIFIC GRAVITY UA: 1.01 (ref 1–1.03)
URINE REFLEX TO CULTURE: NORMAL
URINE TYPE: NORMAL
UROBILINOGEN, URINE: 0.2 E.U./DL
VITAMIN D 25-HYDROXY: 49.5 NG/ML
WBC # BLD: 4.3 K/UL (ref 4–11)

## 2019-09-24 PROCEDURE — 87641 MR-STAPH DNA AMP PROBE: CPT

## 2019-09-24 PROCEDURE — 93010 ELECTROCARDIOGRAM REPORT: CPT | Performed by: INTERNAL MEDICINE

## 2019-09-24 PROCEDURE — 82306 VITAMIN D 25 HYDROXY: CPT

## 2019-09-24 PROCEDURE — 85730 THROMBOPLASTIN TIME PARTIAL: CPT

## 2019-09-24 PROCEDURE — 86901 BLOOD TYPING SEROLOGIC RH(D): CPT

## 2019-09-24 PROCEDURE — 85025 COMPLETE CBC W/AUTO DIFF WBC: CPT

## 2019-09-24 PROCEDURE — 81003 URINALYSIS AUTO W/O SCOPE: CPT

## 2019-09-24 PROCEDURE — 86850 RBC ANTIBODY SCREEN: CPT

## 2019-09-24 PROCEDURE — 83036 HEMOGLOBIN GLYCOSYLATED A1C: CPT

## 2019-09-24 PROCEDURE — 86900 BLOOD TYPING SEROLOGIC ABO: CPT

## 2019-09-24 PROCEDURE — 85652 RBC SED RATE AUTOMATED: CPT

## 2019-09-24 PROCEDURE — 85610 PROTHROMBIN TIME: CPT

## 2019-09-24 PROCEDURE — 84134 ASSAY OF PREALBUMIN: CPT

## 2019-09-24 PROCEDURE — 93005 ELECTROCARDIOGRAM TRACING: CPT

## 2019-09-24 PROCEDURE — 80048 BASIC METABOLIC PNL TOTAL CA: CPT

## 2019-09-24 PROCEDURE — 82040 ASSAY OF SERUM ALBUMIN: CPT

## 2019-09-25 LAB — MRSA SCREEN RT-PCR: NORMAL

## 2019-09-26 ENCOUNTER — TELEPHONE (OUTPATIENT)
Dept: ORTHOPEDIC SURGERY | Age: 79
End: 2019-09-26

## 2019-09-26 DIAGNOSIS — M19.012 ARTHRITIS OF LEFT SHOULDER REGION: Primary | ICD-10-CM

## 2019-10-03 ENCOUNTER — OFFICE VISIT (OUTPATIENT)
Dept: ORTHOPEDIC SURGERY | Age: 79
End: 2019-10-03
Payer: MEDICARE

## 2019-10-03 VITALS
WEIGHT: 215 LBS | TEMPERATURE: 97.8 F | HEIGHT: 64 IN | BODY MASS INDEX: 36.7 KG/M2 | SYSTOLIC BLOOD PRESSURE: 124 MMHG | DIASTOLIC BLOOD PRESSURE: 72 MMHG

## 2019-10-03 DIAGNOSIS — M19.012 ARTHRITIS OF LEFT SHOULDER REGION: Primary | ICD-10-CM

## 2019-10-03 PROCEDURE — G8400 PT W/DXA NO RESULTS DOC: HCPCS | Performed by: ORTHOPAEDIC SURGERY

## 2019-10-03 PROCEDURE — 1090F PRES/ABSN URINE INCON ASSESS: CPT | Performed by: ORTHOPAEDIC SURGERY

## 2019-10-03 PROCEDURE — G8427 DOCREV CUR MEDS BY ELIG CLIN: HCPCS | Performed by: ORTHOPAEDIC SURGERY

## 2019-10-03 PROCEDURE — 99214 OFFICE O/P EST MOD 30 MIN: CPT | Performed by: ORTHOPAEDIC SURGERY

## 2019-10-03 PROCEDURE — 1036F TOBACCO NON-USER: CPT | Performed by: ORTHOPAEDIC SURGERY

## 2019-10-03 PROCEDURE — G8484 FLU IMMUNIZE NO ADMIN: HCPCS | Performed by: ORTHOPAEDIC SURGERY

## 2019-10-03 PROCEDURE — G8417 CALC BMI ABV UP PARAM F/U: HCPCS | Performed by: ORTHOPAEDIC SURGERY

## 2019-10-03 PROCEDURE — 4040F PNEUMOC VAC/ADMIN/RCVD: CPT | Performed by: ORTHOPAEDIC SURGERY

## 2019-10-03 PROCEDURE — 1123F ACP DISCUSS/DSCN MKR DOCD: CPT | Performed by: ORTHOPAEDIC SURGERY

## 2019-10-08 ENCOUNTER — ANESTHESIA EVENT (OUTPATIENT)
Dept: OPERATING ROOM | Age: 79
DRG: 483 | End: 2019-10-08
Payer: MEDICARE

## 2019-10-08 RX ORDER — MULTIVIT WITH MINERALS/LUTEIN
1000 TABLET ORAL DAILY
COMMUNITY

## 2019-10-09 ENCOUNTER — HOSPITAL ENCOUNTER (INPATIENT)
Age: 79
LOS: 1 days | Discharge: NURSING, REHAB OR LONG-TERM CARE | DRG: 483 | End: 2019-10-10
Attending: ORTHOPAEDIC SURGERY | Admitting: ORTHOPAEDIC SURGERY
Payer: MEDICARE

## 2019-10-09 ENCOUNTER — APPOINTMENT (OUTPATIENT)
Dept: GENERAL RADIOLOGY | Age: 79
DRG: 483 | End: 2019-10-09
Attending: ORTHOPAEDIC SURGERY
Payer: MEDICARE

## 2019-10-09 ENCOUNTER — ANESTHESIA (OUTPATIENT)
Dept: OPERATING ROOM | Age: 79
DRG: 483 | End: 2019-10-09
Payer: MEDICARE

## 2019-10-09 VITALS
TEMPERATURE: 95.7 F | SYSTOLIC BLOOD PRESSURE: 102 MMHG | OXYGEN SATURATION: 90 % | RESPIRATION RATE: 1 BRPM | DIASTOLIC BLOOD PRESSURE: 55 MMHG

## 2019-10-09 DIAGNOSIS — M19.012 ARTHRITIS OF LEFT SHOULDER REGION: ICD-10-CM

## 2019-10-09 PROBLEM — M13.812 OTHER SPECIFIED ARTHRITIS, LEFT SHOULDER: Status: ACTIVE | Noted: 2019-10-09

## 2019-10-09 PROBLEM — M17.11 ARTHRITIS OF RIGHT KNEE: Status: ACTIVE | Noted: 2019-10-09

## 2019-10-09 LAB
ABO/RH: NORMAL
ANTIBODY SCREEN: NORMAL
GLUCOSE BLD-MCNC: 123 MG/DL (ref 70–99)
GLUCOSE BLD-MCNC: 191 MG/DL (ref 70–99)
PERFORMED ON: ABNORMAL
PERFORMED ON: ABNORMAL

## 2019-10-09 PROCEDURE — 3600000005 HC SURGERY LEVEL 5 BASE: Performed by: ORTHOPAEDIC SURGERY

## 2019-10-09 PROCEDURE — 6360000002 HC RX W HCPCS

## 2019-10-09 PROCEDURE — 86850 RBC ANTIBODY SCREEN: CPT

## 2019-10-09 PROCEDURE — 7100000000 HC PACU RECOVERY - FIRST 15 MIN: Performed by: ORTHOPAEDIC SURGERY

## 2019-10-09 PROCEDURE — 2500000003 HC RX 250 WO HCPCS: Performed by: ORTHOPAEDIC SURGERY

## 2019-10-09 PROCEDURE — 86901 BLOOD TYPING SEROLOGIC RH(D): CPT

## 2019-10-09 PROCEDURE — 88304 TISSUE EXAM BY PATHOLOGIST: CPT

## 2019-10-09 PROCEDURE — 88311 DECALCIFY TISSUE: CPT

## 2019-10-09 PROCEDURE — 97166 OT EVAL MOD COMPLEX 45 MIN: CPT

## 2019-10-09 PROCEDURE — 6360000002 HC RX W HCPCS: Performed by: ORTHOPAEDIC SURGERY

## 2019-10-09 PROCEDURE — 97530 THERAPEUTIC ACTIVITIES: CPT

## 2019-10-09 PROCEDURE — 2580000003 HC RX 258: Performed by: ORTHOPAEDIC SURGERY

## 2019-10-09 PROCEDURE — 73020 X-RAY EXAM OF SHOULDER: CPT

## 2019-10-09 PROCEDURE — C1776 JOINT DEVICE (IMPLANTABLE): HCPCS | Performed by: ORTHOPAEDIC SURGERY

## 2019-10-09 PROCEDURE — 2500000003 HC RX 250 WO HCPCS

## 2019-10-09 PROCEDURE — 2060000000 HC ICU INTERMEDIATE R&B

## 2019-10-09 PROCEDURE — 3600000015 HC SURGERY LEVEL 5 ADDTL 15MIN: Performed by: ORTHOPAEDIC SURGERY

## 2019-10-09 PROCEDURE — 6370000000 HC RX 637 (ALT 250 FOR IP): Performed by: ORTHOPAEDIC SURGERY

## 2019-10-09 PROCEDURE — 6360000002 HC RX W HCPCS: Performed by: ANESTHESIOLOGY

## 2019-10-09 PROCEDURE — 7100000001 HC PACU RECOVERY - ADDTL 15 MIN: Performed by: ORTHOPAEDIC SURGERY

## 2019-10-09 PROCEDURE — 0RRK00Z REPLACEMENT OF LEFT SHOULDER JOINT WITH REVERSE BALL AND SOCKET SYNTHETIC SUBSTITUTE, OPEN APPROACH: ICD-10-PCS | Performed by: ORTHOPAEDIC SURGERY

## 2019-10-09 PROCEDURE — 2580000003 HC RX 258: Performed by: ANESTHESIOLOGY

## 2019-10-09 PROCEDURE — 97535 SELF CARE MNGMENT TRAINING: CPT

## 2019-10-09 PROCEDURE — 3700000001 HC ADD 15 MINUTES (ANESTHESIA): Performed by: ORTHOPAEDIC SURGERY

## 2019-10-09 PROCEDURE — 3700000000 HC ANESTHESIA ATTENDED CARE: Performed by: ORTHOPAEDIC SURGERY

## 2019-10-09 PROCEDURE — 3209999900 FLUORO FOR SURGICAL PROCEDURES

## 2019-10-09 PROCEDURE — 2709999900 HC NON-CHARGEABLE SUPPLY: Performed by: ORTHOPAEDIC SURGERY

## 2019-10-09 PROCEDURE — 86900 BLOOD TYPING SEROLOGIC ABO: CPT

## 2019-10-09 DEVICE — SPHERE GLEN DIA36MM STD REVERSED AEQUALIS PERFORM: Type: IMPLANTABLE DEVICE | Site: SHOULDER | Status: FUNCTIONAL

## 2019-10-09 DEVICE — SCREW BONE L35MM DIA6.5MM TI ST FULL THRD CTRL FOR GLEN: Type: IMPLANTABLE DEVICE | Site: SHOULDER | Status: FUNCTIONAL

## 2019-10-09 DEVICE — BASEPLATE GLEN DIA25MM 35DEG HALF WDG AUG REVERSED AEQUALIS: Type: IMPLANTABLE DEVICE | Site: SHOULDER | Status: FUNCTIONAL

## 2019-10-09 DEVICE — SCREW BONE L26MM DIA5MM TI ST FULL THRD PERIPH FOR GLEN: Type: IMPLANTABLE DEVICE | Site: SHOULDER | Status: FUNCTIONAL

## 2019-10-09 DEVICE — IMPLANTABLE DEVICE: Type: IMPLANTABLE DEVICE | Site: SHOULDER | Status: FUNCTIONAL

## 2019-10-09 DEVICE — TRAY HUM THK+0MM 3.5MM OFFSET SHLDR HI REVERSED AEQUALIS: Type: IMPLANTABLE DEVICE | Site: SHOULDER | Status: FUNCTIONAL

## 2019-10-09 DEVICE — INSERT HUM DIA36MM THK+6MM B-12.5DEG SHLDR REVERSED: Type: IMPLANTABLE DEVICE | Site: SHOULDER | Status: FUNCTIONAL

## 2019-10-09 RX ORDER — ASPIRIN 81 MG/1
81 TABLET ORAL 2 TIMES DAILY
Qty: 28 TABLET | Refills: 0 | Status: SHIPPED | OUTPATIENT
Start: 2019-10-09 | End: 2019-10-23

## 2019-10-09 RX ORDER — OXYCODONE HYDROCHLORIDE 5 MG/1
5 TABLET ORAL EVERY 4 HOURS PRN
Qty: 40 TABLET | Refills: 0 | Status: SHIPPED | OUTPATIENT
Start: 2019-10-09 | End: 2019-10-16

## 2019-10-09 RX ORDER — PANTOPRAZOLE SODIUM 20 MG/1
20 TABLET, DELAYED RELEASE ORAL DAILY
Status: DISCONTINUED | OUTPATIENT
Start: 2019-10-10 | End: 2019-10-10 | Stop reason: HOSPADM

## 2019-10-09 RX ORDER — DOCUSATE SODIUM 100 MG/1
100 CAPSULE, LIQUID FILLED ORAL 2 TIMES DAILY
Status: DISCONTINUED | OUTPATIENT
Start: 2019-10-09 | End: 2019-10-10 | Stop reason: HOSPADM

## 2019-10-09 RX ORDER — LABETALOL HYDROCHLORIDE 5 MG/ML
5 INJECTION, SOLUTION INTRAVENOUS EVERY 10 MIN PRN
Status: DISCONTINUED | OUTPATIENT
Start: 2019-10-09 | End: 2019-10-09 | Stop reason: HOSPADM

## 2019-10-09 RX ORDER — FENTANYL CITRATE 50 UG/ML
INJECTION, SOLUTION INTRAMUSCULAR; INTRAVENOUS PRN
Status: DISCONTINUED | OUTPATIENT
Start: 2019-10-09 | End: 2019-10-09 | Stop reason: SDUPTHER

## 2019-10-09 RX ORDER — ASPIRIN 81 MG/1
81 TABLET ORAL DAILY
Qty: 30 TABLET | Refills: 3 | Status: SHIPPED | OUTPATIENT
Start: 2019-10-09

## 2019-10-09 RX ORDER — SUCCINYLCHOLINE/SOD CL,ISO/PF 200MG/10ML
SYRINGE (ML) INTRAVENOUS PRN
Status: DISCONTINUED | OUTPATIENT
Start: 2019-10-09 | End: 2019-10-09 | Stop reason: SDUPTHER

## 2019-10-09 RX ORDER — GABAPENTIN 300 MG/1
300 CAPSULE ORAL 2 TIMES DAILY
Qty: 60 CAPSULE | Refills: 1 | Status: SHIPPED | OUTPATIENT
Start: 2019-10-09 | End: 2019-10-10 | Stop reason: HOSPADM

## 2019-10-09 RX ORDER — OXYCODONE HYDROCHLORIDE 10 MG/1
10 TABLET ORAL EVERY 4 HOURS PRN
Status: DISCONTINUED | OUTPATIENT
Start: 2019-10-09 | End: 2019-10-10 | Stop reason: HOSPADM

## 2019-10-09 RX ORDER — SIMVASTATIN 20 MG
20 TABLET ORAL NIGHTLY
Status: DISCONTINUED | OUTPATIENT
Start: 2019-10-09 | End: 2019-10-10 | Stop reason: HOSPADM

## 2019-10-09 RX ORDER — SODIUM CHLORIDE 0.9 % (FLUSH) 0.9 %
10 SYRINGE (ML) INJECTION PRN
Status: DISCONTINUED | OUTPATIENT
Start: 2019-10-09 | End: 2019-10-09 | Stop reason: HOSPADM

## 2019-10-09 RX ORDER — LIDOCAINE HYDROCHLORIDE 20 MG/ML
INJECTION, SOLUTION EPIDURAL; INFILTRATION; INTRACAUDAL; PERINEURAL PRN
Status: DISCONTINUED | OUTPATIENT
Start: 2019-10-09 | End: 2019-10-09 | Stop reason: SDUPTHER

## 2019-10-09 RX ORDER — SODIUM CHLORIDE 0.9 % (FLUSH) 0.9 %
10 SYRINGE (ML) INJECTION PRN
Status: DISCONTINUED | OUTPATIENT
Start: 2019-10-09 | End: 2019-10-10 | Stop reason: HOSPADM

## 2019-10-09 RX ORDER — DEXAMETHASONE SODIUM PHOSPHATE 4 MG/ML
INJECTION, SOLUTION INTRA-ARTICULAR; INTRALESIONAL; INTRAMUSCULAR; INTRAVENOUS; SOFT TISSUE PRN
Status: DISCONTINUED | OUTPATIENT
Start: 2019-10-09 | End: 2019-10-09 | Stop reason: SDUPTHER

## 2019-10-09 RX ORDER — PROPOFOL 10 MG/ML
INJECTION, EMULSION INTRAVENOUS PRN
Status: DISCONTINUED | OUTPATIENT
Start: 2019-10-09 | End: 2019-10-09 | Stop reason: SDUPTHER

## 2019-10-09 RX ORDER — SODIUM CHLORIDE 450 MG/100ML
INJECTION, SOLUTION INTRAVENOUS CONTINUOUS
Status: DISCONTINUED | OUTPATIENT
Start: 2019-10-09 | End: 2019-10-10 | Stop reason: HOSPADM

## 2019-10-09 RX ORDER — ONDANSETRON 2 MG/ML
4 INJECTION INTRAMUSCULAR; INTRAVENOUS
Status: DISCONTINUED | OUTPATIENT
Start: 2019-10-09 | End: 2019-10-09 | Stop reason: HOSPADM

## 2019-10-09 RX ORDER — SODIUM CHLORIDE 0.9 % (FLUSH) 0.9 %
10 SYRINGE (ML) INJECTION EVERY 12 HOURS SCHEDULED
Status: DISCONTINUED | OUTPATIENT
Start: 2019-10-09 | End: 2019-10-10 | Stop reason: HOSPADM

## 2019-10-09 RX ORDER — INSULIN GLARGINE 100 [IU]/ML
0.25 INJECTION, SOLUTION SUBCUTANEOUS NIGHTLY
Status: DISCONTINUED | OUTPATIENT
Start: 2019-10-09 | End: 2019-10-10

## 2019-10-09 RX ORDER — GABAPENTIN 300 MG/1
300 CAPSULE ORAL 2 TIMES DAILY
Status: DISCONTINUED | OUTPATIENT
Start: 2019-10-09 | End: 2019-10-10

## 2019-10-09 RX ORDER — OXYCODONE HYDROCHLORIDE 5 MG/1
10 TABLET ORAL ONCE
Status: COMPLETED | OUTPATIENT
Start: 2019-10-09 | End: 2019-10-09

## 2019-10-09 RX ORDER — NICOTINE POLACRILEX 4 MG
15 LOZENGE BUCCAL PRN
Status: DISCONTINUED | OUTPATIENT
Start: 2019-10-09 | End: 2019-10-10 | Stop reason: HOSPADM

## 2019-10-09 RX ORDER — OMEGA-3/DHA/EPA/FISH OIL 300-1000MG
1 CAPSULE ORAL DAILY
Qty: 1 CAPSULE | Refills: 0
Start: 2019-10-09

## 2019-10-09 RX ORDER — MORPHINE SULFATE 2 MG/ML
2 INJECTION, SOLUTION INTRAMUSCULAR; INTRAVENOUS EVERY 5 MIN PRN
Status: DISCONTINUED | OUTPATIENT
Start: 2019-10-09 | End: 2019-10-09 | Stop reason: HOSPADM

## 2019-10-09 RX ORDER — MORPHINE SULFATE 2 MG/ML
1 INJECTION, SOLUTION INTRAMUSCULAR; INTRAVENOUS EVERY 5 MIN PRN
Status: DISCONTINUED | OUTPATIENT
Start: 2019-10-09 | End: 2019-10-09 | Stop reason: HOSPADM

## 2019-10-09 RX ORDER — MORPHINE SULFATE 2 MG/ML
2 INJECTION, SOLUTION INTRAMUSCULAR; INTRAVENOUS
Status: DISCONTINUED | OUTPATIENT
Start: 2019-10-09 | End: 2019-10-10 | Stop reason: HOSPADM

## 2019-10-09 RX ORDER — DEXTROSE MONOHYDRATE 50 MG/ML
100 INJECTION, SOLUTION INTRAVENOUS PRN
Status: DISCONTINUED | OUTPATIENT
Start: 2019-10-09 | End: 2019-10-10 | Stop reason: HOSPADM

## 2019-10-09 RX ORDER — OXYCODONE HYDROCHLORIDE AND ACETAMINOPHEN 5; 325 MG/1; MG/1
1 TABLET ORAL PRN
Status: DISCONTINUED | OUTPATIENT
Start: 2019-10-09 | End: 2019-10-09 | Stop reason: HOSPADM

## 2019-10-09 RX ORDER — OXYCODONE HYDROCHLORIDE AND ACETAMINOPHEN 5; 325 MG/1; MG/1
2 TABLET ORAL PRN
Status: DISCONTINUED | OUTPATIENT
Start: 2019-10-09 | End: 2019-10-09 | Stop reason: HOSPADM

## 2019-10-09 RX ORDER — ONDANSETRON 2 MG/ML
INJECTION INTRAMUSCULAR; INTRAVENOUS PRN
Status: DISCONTINUED | OUTPATIENT
Start: 2019-10-09 | End: 2019-10-09 | Stop reason: SDUPTHER

## 2019-10-09 RX ORDER — POLYETHYLENE GLYCOL 3350 17 G/17G
17 POWDER, FOR SOLUTION ORAL DAILY
Status: DISCONTINUED | OUTPATIENT
Start: 2019-10-09 | End: 2019-10-10 | Stop reason: HOSPADM

## 2019-10-09 RX ORDER — MEPERIDINE HYDROCHLORIDE 25 MG/ML
12.5 INJECTION INTRAMUSCULAR; INTRAVENOUS; SUBCUTANEOUS EVERY 5 MIN PRN
Status: DISCONTINUED | OUTPATIENT
Start: 2019-10-09 | End: 2019-10-09 | Stop reason: HOSPADM

## 2019-10-09 RX ORDER — OXYCODONE HYDROCHLORIDE 5 MG/1
5 TABLET ORAL EVERY 4 HOURS PRN
Status: DISCONTINUED | OUTPATIENT
Start: 2019-10-09 | End: 2019-10-10 | Stop reason: HOSPADM

## 2019-10-09 RX ORDER — ROCURONIUM BROMIDE 10 MG/ML
INJECTION, SOLUTION INTRAVENOUS PRN
Status: DISCONTINUED | OUTPATIENT
Start: 2019-10-09 | End: 2019-10-09 | Stop reason: SDUPTHER

## 2019-10-09 RX ORDER — HYDRALAZINE HYDROCHLORIDE 20 MG/ML
5 INJECTION INTRAMUSCULAR; INTRAVENOUS
Status: DISCONTINUED | OUTPATIENT
Start: 2019-10-09 | End: 2019-10-09 | Stop reason: HOSPADM

## 2019-10-09 RX ORDER — SODIUM CHLORIDE 9 MG/ML
INJECTION, SOLUTION INTRAVENOUS CONTINUOUS
Status: DISCONTINUED | OUTPATIENT
Start: 2019-10-09 | End: 2019-10-09

## 2019-10-09 RX ORDER — ONDANSETRON 2 MG/ML
4 INJECTION INTRAMUSCULAR; INTRAVENOUS EVERY 6 HOURS PRN
Status: DISCONTINUED | OUTPATIENT
Start: 2019-10-09 | End: 2019-10-10 | Stop reason: HOSPADM

## 2019-10-09 RX ORDER — SODIUM CHLORIDE 0.9 % (FLUSH) 0.9 %
10 SYRINGE (ML) INJECTION EVERY 12 HOURS SCHEDULED
Status: DISCONTINUED | OUTPATIENT
Start: 2019-10-09 | End: 2019-10-09 | Stop reason: HOSPADM

## 2019-10-09 RX ORDER — FLUTICASONE PROPIONATE 50 MCG
2 SPRAY, SUSPENSION (ML) NASAL PRN
Status: DISCONTINUED | OUTPATIENT
Start: 2019-10-09 | End: 2019-10-10 | Stop reason: HOSPADM

## 2019-10-09 RX ORDER — DEXTROSE MONOHYDRATE 25 G/50ML
12.5 INJECTION, SOLUTION INTRAVENOUS PRN
Status: DISCONTINUED | OUTPATIENT
Start: 2019-10-09 | End: 2019-10-10 | Stop reason: HOSPADM

## 2019-10-09 RX ADMIN — SUGAMMADEX 200 MG: 100 INJECTION, SOLUTION INTRAVENOUS at 11:17

## 2019-10-09 RX ADMIN — OXYCODONE HYDROCHLORIDE 10 MG: 5 TABLET ORAL at 09:10

## 2019-10-09 RX ADMIN — LIDOCAINE HYDROCHLORIDE 50 MG: 20 INJECTION, SOLUTION EPIDURAL; INFILTRATION; INTRACAUDAL; PERINEURAL at 10:08

## 2019-10-09 RX ADMIN — Medication 120 MG: at 10:08

## 2019-10-09 RX ADMIN — POLYETHYLENE GLYCOL 3350 17 G: 17 POWDER, FOR SOLUTION ORAL at 18:10

## 2019-10-09 RX ADMIN — SODIUM CHLORIDE: 9 INJECTION, SOLUTION INTRAVENOUS at 09:00

## 2019-10-09 RX ADMIN — TRANEXAMIC ACID 1000 MG: 1 INJECTION, SOLUTION INTRAVENOUS at 10:13

## 2019-10-09 RX ADMIN — HYDROMORPHONE HYDROCHLORIDE 0.5 MG: 1 INJECTION, SOLUTION INTRAMUSCULAR; INTRAVENOUS; SUBCUTANEOUS at 10:30

## 2019-10-09 RX ADMIN — OXYCODONE HYDROCHLORIDE 10 MG: 10 TABLET ORAL at 16:28

## 2019-10-09 RX ADMIN — ONDANSETRON 4 MG: 2 INJECTION INTRAMUSCULAR; INTRAVENOUS at 10:30

## 2019-10-09 RX ADMIN — Medication 2 G: at 18:10

## 2019-10-09 RX ADMIN — PROPOFOL 120 MG: 10 INJECTION, EMULSION INTRAVENOUS at 10:08

## 2019-10-09 RX ADMIN — VITAMIN D, TAB 1000IU (100/BT) 1000 UNITS: 25 TAB at 18:10

## 2019-10-09 RX ADMIN — ROCURONIUM BROMIDE 30 MG: 10 INJECTION INTRAVENOUS at 10:13

## 2019-10-09 RX ADMIN — HYDROMORPHONE HYDROCHLORIDE 0.5 MG: 1 INJECTION, SOLUTION INTRAMUSCULAR; INTRAVENOUS; SUBCUTANEOUS at 10:25

## 2019-10-09 RX ADMIN — SIMVASTATIN 20 MG: 20 TABLET, FILM COATED ORAL at 20:29

## 2019-10-09 RX ADMIN — FENTANYL CITRATE 100 MCG: 50 INJECTION INTRAMUSCULAR; INTRAVENOUS at 10:08

## 2019-10-09 RX ADMIN — INSULIN LISPRO 1 UNITS: 100 INJECTION, SOLUTION INTRAVENOUS; SUBCUTANEOUS at 23:06

## 2019-10-09 RX ADMIN — DOCUSATE SODIUM 100 MG: 100 CAPSULE, LIQUID FILLED ORAL at 20:29

## 2019-10-09 RX ADMIN — ROCURONIUM BROMIDE 10 MG: 10 INJECTION INTRAVENOUS at 10:08

## 2019-10-09 RX ADMIN — GABAPENTIN 300 MG: 300 CAPSULE ORAL at 20:29

## 2019-10-09 RX ADMIN — HYDROMORPHONE HYDROCHLORIDE 0.5 MG: 1 INJECTION, SOLUTION INTRAMUSCULAR; INTRAVENOUS; SUBCUTANEOUS at 12:52

## 2019-10-09 RX ADMIN — INSULIN GLARGINE 25 UNITS: 100 INJECTION, SOLUTION SUBCUTANEOUS at 23:06

## 2019-10-09 RX ADMIN — Medication 2 G: at 09:55

## 2019-10-09 RX ADMIN — OXYCODONE HYDROCHLORIDE 5 MG: 5 TABLET ORAL at 20:29

## 2019-10-09 RX ADMIN — FAMOTIDINE 20 MG: 10 INJECTION, SOLUTION INTRAVENOUS at 20:29

## 2019-10-09 RX ADMIN — SODIUM CHLORIDE: 4.5 INJECTION, SOLUTION INTRAVENOUS at 23:39

## 2019-10-09 RX ADMIN — DEXAMETHASONE SODIUM PHOSPHATE 8 MG: 4 INJECTION, SOLUTION INTRAMUSCULAR; INTRAVENOUS at 10:30

## 2019-10-09 ASSESSMENT — PULMONARY FUNCTION TESTS
PIF_VALUE: 3
PIF_VALUE: 19
PIF_VALUE: 4
PIF_VALUE: 0
PIF_VALUE: 17
PIF_VALUE: 1
PIF_VALUE: 20
PIF_VALUE: 19
PIF_VALUE: 35
PIF_VALUE: 19
PIF_VALUE: 0
PIF_VALUE: 19
PIF_VALUE: 13
PIF_VALUE: 0
PIF_VALUE: 19
PIF_VALUE: 2
PIF_VALUE: 1
PIF_VALUE: 20
PIF_VALUE: 3
PIF_VALUE: 21
PIF_VALUE: 1
PIF_VALUE: 0
PIF_VALUE: 2
PIF_VALUE: 4
PIF_VALUE: 0
PIF_VALUE: 20
PIF_VALUE: 2
PIF_VALUE: 0
PIF_VALUE: 0
PIF_VALUE: 19
PIF_VALUE: 19
PIF_VALUE: 20
PIF_VALUE: 20
PIF_VALUE: 19
PIF_VALUE: 19
PIF_VALUE: 20
PIF_VALUE: 20
PIF_VALUE: 0
PIF_VALUE: 20
PIF_VALUE: 19
PIF_VALUE: 19
PIF_VALUE: 17
PIF_VALUE: 20
PIF_VALUE: 1
PIF_VALUE: 20
PIF_VALUE: 20
PIF_VALUE: 19
PIF_VALUE: 0
PIF_VALUE: 20
PIF_VALUE: 2
PIF_VALUE: 0
PIF_VALUE: 19
PIF_VALUE: 3
PIF_VALUE: 20
PIF_VALUE: 19
PIF_VALUE: 18
PIF_VALUE: 3
PIF_VALUE: 0
PIF_VALUE: 0
PIF_VALUE: 1
PIF_VALUE: 3
PIF_VALUE: 2
PIF_VALUE: 20
PIF_VALUE: 19
PIF_VALUE: 3
PIF_VALUE: 19
PIF_VALUE: 20
PIF_VALUE: 3
PIF_VALUE: 1
PIF_VALUE: 1
PIF_VALUE: 19
PIF_VALUE: 0
PIF_VALUE: 19
PIF_VALUE: 20
PIF_VALUE: 18
PIF_VALUE: 0
PIF_VALUE: 19
PIF_VALUE: 0
PIF_VALUE: 19
PIF_VALUE: 3
PIF_VALUE: 18
PIF_VALUE: 19
PIF_VALUE: 18
PIF_VALUE: 19
PIF_VALUE: 21
PIF_VALUE: 0
PIF_VALUE: 19
PIF_VALUE: 18
PIF_VALUE: 4
PIF_VALUE: 19
PIF_VALUE: 0
PIF_VALUE: 1
PIF_VALUE: 0
PIF_VALUE: 20
PIF_VALUE: 4
PIF_VALUE: 4
PIF_VALUE: 20
PIF_VALUE: 6
PIF_VALUE: 3
PIF_VALUE: 20
PIF_VALUE: 18
PIF_VALUE: 1
PIF_VALUE: 19
PIF_VALUE: 18
PIF_VALUE: 19
PIF_VALUE: 3
PIF_VALUE: 20
PIF_VALUE: 3
PIF_VALUE: 20
PIF_VALUE: 1
PIF_VALUE: 3
PIF_VALUE: 0
PIF_VALUE: 2
PIF_VALUE: 19

## 2019-10-09 ASSESSMENT — PAIN DESCRIPTION - LOCATION
LOCATION: SHOULDER

## 2019-10-09 ASSESSMENT — ENCOUNTER SYMPTOMS: SHORTNESS OF BREATH: 0

## 2019-10-09 ASSESSMENT — PAIN DESCRIPTION - DESCRIPTORS
DESCRIPTORS: ACHING;CONSTANT
DESCRIPTORS: ACHING;CONSTANT
DESCRIPTORS: ACHING
DESCRIPTORS: ACHING
DESCRIPTORS: ACHING;TENDER
DESCRIPTORS: ACHING;DULL
DESCRIPTORS: ACHING
DESCRIPTORS: ACHING

## 2019-10-09 ASSESSMENT — PAIN DESCRIPTION - PROGRESSION
CLINICAL_PROGRESSION: NOT CHANGED
CLINICAL_PROGRESSION: NOT CHANGED
CLINICAL_PROGRESSION: GRADUALLY IMPROVING
CLINICAL_PROGRESSION: NOT CHANGED
CLINICAL_PROGRESSION: NOT CHANGED
CLINICAL_PROGRESSION: GRADUALLY WORSENING
CLINICAL_PROGRESSION: NOT CHANGED

## 2019-10-09 ASSESSMENT — PAIN - FUNCTIONAL ASSESSMENT
PAIN_FUNCTIONAL_ASSESSMENT: PREVENTS OR INTERFERES WITH MANY ACTIVE NOT PASSIVE ACTIVITIES
PAIN_FUNCTIONAL_ASSESSMENT: ACTIVITIES ARE NOT PREVENTED
PAIN_FUNCTIONAL_ASSESSMENT: PREVENTS OR INTERFERES SOME ACTIVE ACTIVITIES AND ADLS
PAIN_FUNCTIONAL_ASSESSMENT: PREVENTS OR INTERFERES SOME ACTIVE ACTIVITIES AND ADLS
PAIN_FUNCTIONAL_ASSESSMENT: 0-10
PAIN_FUNCTIONAL_ASSESSMENT: PREVENTS OR INTERFERES SOME ACTIVE ACTIVITIES AND ADLS
PAIN_FUNCTIONAL_ASSESSMENT: ACTIVITIES ARE NOT PREVENTED
PAIN_FUNCTIONAL_ASSESSMENT: PREVENTS OR INTERFERES WITH MANY ACTIVE NOT PASSIVE ACTIVITIES

## 2019-10-09 ASSESSMENT — PAIN DESCRIPTION - ONSET
ONSET: ON-GOING

## 2019-10-09 ASSESSMENT — PAIN DESCRIPTION - PAIN TYPE
TYPE: SURGICAL PAIN
TYPE: SURGICAL PAIN
TYPE: ACUTE PAIN;SURGICAL PAIN
TYPE: ACUTE PAIN;SURGICAL PAIN
TYPE: SURGICAL PAIN

## 2019-10-09 ASSESSMENT — PAIN DESCRIPTION - ORIENTATION
ORIENTATION: LEFT

## 2019-10-09 ASSESSMENT — PAIN SCALES - GENERAL
PAINLEVEL_OUTOF10: 5
PAINLEVEL_OUTOF10: 5
PAINLEVEL_OUTOF10: 3
PAINLEVEL_OUTOF10: 5
PAINLEVEL_OUTOF10: 4
PAINLEVEL_OUTOF10: 7
PAINLEVEL_OUTOF10: 7

## 2019-10-09 ASSESSMENT — PAIN DESCRIPTION - FREQUENCY
FREQUENCY: CONTINUOUS

## 2019-10-10 VITALS
DIASTOLIC BLOOD PRESSURE: 67 MMHG | OXYGEN SATURATION: 95 % | SYSTOLIC BLOOD PRESSURE: 124 MMHG | TEMPERATURE: 97.6 F | HEIGHT: 64 IN | WEIGHT: 218.59 LBS | HEART RATE: 75 BPM | BODY MASS INDEX: 37.32 KG/M2 | RESPIRATION RATE: 16 BRPM

## 2019-10-10 LAB
GLUCOSE BLD-MCNC: 104 MG/DL (ref 70–99)
GLUCOSE BLD-MCNC: 110 MG/DL (ref 70–99)
HCT VFR BLD CALC: 32.9 % (ref 36–48)
HEMOGLOBIN: 11.1 G/DL (ref 12–16)
PERFORMED ON: ABNORMAL
PERFORMED ON: ABNORMAL

## 2019-10-10 PROCEDURE — 94760 N-INVAS EAR/PLS OXIMETRY 1: CPT

## 2019-10-10 PROCEDURE — 2580000003 HC RX 258: Performed by: ORTHOPAEDIC SURGERY

## 2019-10-10 PROCEDURE — 6360000002 HC RX W HCPCS: Performed by: ORTHOPAEDIC SURGERY

## 2019-10-10 PROCEDURE — 85018 HEMOGLOBIN: CPT

## 2019-10-10 PROCEDURE — 97162 PT EVAL MOD COMPLEX 30 MIN: CPT

## 2019-10-10 PROCEDURE — 85014 HEMATOCRIT: CPT

## 2019-10-10 PROCEDURE — 97116 GAIT TRAINING THERAPY: CPT

## 2019-10-10 PROCEDURE — 97530 THERAPEUTIC ACTIVITIES: CPT

## 2019-10-10 PROCEDURE — 97535 SELF CARE MNGMENT TRAINING: CPT

## 2019-10-10 PROCEDURE — 6370000000 HC RX 637 (ALT 250 FOR IP): Performed by: ORTHOPAEDIC SURGERY

## 2019-10-10 PROCEDURE — 36415 COLL VENOUS BLD VENIPUNCTURE: CPT

## 2019-10-10 RX ORDER — DOCUSATE SODIUM 100 MG/1
100 CAPSULE, LIQUID FILLED ORAL 2 TIMES DAILY
COMMUNITY

## 2019-10-10 RX ORDER — GABAPENTIN 300 MG/1
600 CAPSULE ORAL 2 TIMES DAILY
Status: DISCONTINUED | OUTPATIENT
Start: 2019-10-10 | End: 2019-10-10 | Stop reason: HOSPADM

## 2019-10-10 RX ORDER — GABAPENTIN 600 MG/1
600 TABLET ORAL 2 TIMES DAILY
Qty: 60 TABLET | Refills: 3 | Status: SHIPPED | OUTPATIENT
Start: 2019-10-10 | End: 2019-11-09

## 2019-10-10 RX ORDER — ACETAMINOPHEN 325 MG/1
650 TABLET ORAL 3 TIMES DAILY PRN
Status: DISCONTINUED | OUTPATIENT
Start: 2019-10-10 | End: 2019-10-10 | Stop reason: HOSPADM

## 2019-10-10 RX ADMIN — OXYCODONE HYDROCHLORIDE 10 MG: 10 TABLET ORAL at 10:56

## 2019-10-10 RX ADMIN — ACETAMINOPHEN 650 MG: 325 TABLET ORAL at 09:30

## 2019-10-10 RX ADMIN — OXYCODONE HYDROCHLORIDE 5 MG: 5 TABLET ORAL at 05:55

## 2019-10-10 RX ADMIN — OXYCODONE HYDROCHLORIDE 10 MG: 10 TABLET ORAL at 16:33

## 2019-10-10 RX ADMIN — GABAPENTIN 300 MG: 300 CAPSULE ORAL at 09:15

## 2019-10-10 RX ADMIN — DOCUSATE SODIUM 100 MG: 100 CAPSULE, LIQUID FILLED ORAL at 09:15

## 2019-10-10 RX ADMIN — PANTOPRAZOLE SODIUM 20 MG: 20 TABLET, DELAYED RELEASE ORAL at 09:15

## 2019-10-10 RX ADMIN — OXYCODONE HYDROCHLORIDE 10 MG: 10 TABLET ORAL at 01:16

## 2019-10-10 RX ADMIN — POLYETHYLENE GLYCOL 3350 17 G: 17 POWDER, FOR SOLUTION ORAL at 09:15

## 2019-10-10 RX ADMIN — Medication 2 G: at 01:16

## 2019-10-10 RX ADMIN — VITAMIN D, TAB 1000IU (100/BT) 1000 UNITS: 25 TAB at 09:15

## 2019-10-10 RX ADMIN — SODIUM CHLORIDE, PRESERVATIVE FREE 10 ML: 5 INJECTION INTRAVENOUS at 09:15

## 2019-10-10 ASSESSMENT — PAIN DESCRIPTION - FREQUENCY
FREQUENCY: CONTINUOUS

## 2019-10-10 ASSESSMENT — PAIN DESCRIPTION - PAIN TYPE
TYPE: SURGICAL PAIN
TYPE: ACUTE PAIN;SURGICAL PAIN
TYPE: SURGICAL PAIN
TYPE: SURGICAL PAIN
TYPE: ACUTE PAIN;SURGICAL PAIN
TYPE: ACUTE PAIN;SURGICAL PAIN
TYPE: SURGICAL PAIN
TYPE: ACUTE PAIN;SURGICAL PAIN

## 2019-10-10 ASSESSMENT — PAIN DESCRIPTION - PROGRESSION
CLINICAL_PROGRESSION: NOT CHANGED

## 2019-10-10 ASSESSMENT — PAIN DESCRIPTION - DESCRIPTORS
DESCRIPTORS: ACHING;CONSTANT
DESCRIPTORS: ACHING
DESCRIPTORS: ACHING;CONSTANT
DESCRIPTORS: ACHING;CONSTANT
DESCRIPTORS: ACHING
DESCRIPTORS: ACHING;CONSTANT
DESCRIPTORS: ACHING;CONSTANT
DESCRIPTORS: ACHING
DESCRIPTORS: ACHING
DESCRIPTORS: ACHING;CONSTANT

## 2019-10-10 ASSESSMENT — PAIN DESCRIPTION - LOCATION
LOCATION: SHOULDER

## 2019-10-10 ASSESSMENT — PAIN DESCRIPTION - ONSET
ONSET: ON-GOING

## 2019-10-10 ASSESSMENT — PAIN DESCRIPTION - ORIENTATION
ORIENTATION: LEFT

## 2019-10-10 ASSESSMENT — PAIN SCALES - GENERAL
PAINLEVEL_OUTOF10: 5
PAINLEVEL_OUTOF10: 7
PAINLEVEL_OUTOF10: 7
PAINLEVEL_OUTOF10: 2
PAINLEVEL_OUTOF10: 4
PAINLEVEL_OUTOF10: 4
PAINLEVEL_OUTOF10: 5
PAINLEVEL_OUTOF10: 2
PAINLEVEL_OUTOF10: 3
PAINLEVEL_OUTOF10: 4
PAINLEVEL_OUTOF10: 7
PAINLEVEL_OUTOF10: 2

## 2019-10-10 ASSESSMENT — PAIN - FUNCTIONAL ASSESSMENT
PAIN_FUNCTIONAL_ASSESSMENT: PREVENTS OR INTERFERES SOME ACTIVE ACTIVITIES AND ADLS
PAIN_FUNCTIONAL_ASSESSMENT: PREVENTS OR INTERFERES SOME ACTIVE ACTIVITIES AND ADLS
PAIN_FUNCTIONAL_ASSESSMENT: PREVENTS OR INTERFERES WITH MANY ACTIVE NOT PASSIVE ACTIVITIES
PAIN_FUNCTIONAL_ASSESSMENT: PREVENTS OR INTERFERES SOME ACTIVE ACTIVITIES AND ADLS
PAIN_FUNCTIONAL_ASSESSMENT: PREVENTS OR INTERFERES WITH MANY ACTIVE NOT PASSIVE ACTIVITIES
PAIN_FUNCTIONAL_ASSESSMENT: PREVENTS OR INTERFERES SOME ACTIVE ACTIVITIES AND ADLS

## 2019-10-14 ENCOUNTER — TELEPHONE (OUTPATIENT)
Dept: ORTHOPEDICS UNIT | Age: 79
End: 2019-10-14

## 2019-10-15 ENCOUNTER — TELEPHONE (OUTPATIENT)
Dept: ORTHOPEDICS UNIT | Age: 79
End: 2019-10-15

## 2019-10-24 ENCOUNTER — OFFICE VISIT (OUTPATIENT)
Dept: ORTHOPEDIC SURGERY | Age: 79
End: 2019-10-24

## 2019-10-24 VITALS — HEIGHT: 64 IN | BODY MASS INDEX: 37.22 KG/M2 | WEIGHT: 218 LBS | RESPIRATION RATE: 16 BRPM | TEMPERATURE: 97.9 F

## 2019-10-24 DIAGNOSIS — Z96.612 STATUS POST REVERSE TOTAL SHOULDER REPLACEMENT, LEFT: Primary | ICD-10-CM

## 2019-10-24 PROCEDURE — 99024 POSTOP FOLLOW-UP VISIT: CPT | Performed by: ORTHOPAEDIC SURGERY

## 2019-10-25 ENCOUNTER — TELEPHONE (OUTPATIENT)
Dept: ORTHOPEDIC SURGERY | Age: 79
End: 2019-10-25

## 2019-10-31 ENCOUNTER — HOSPITAL ENCOUNTER (OUTPATIENT)
Age: 79
Discharge: HOME | End: 2019-10-31
Payer: MEDICARE

## 2019-10-31 LAB
A/G RATIO: 1.7 (ref 1.1–2.2)
ALBUMIN SERPL-MCNC: 4.3 G/DL (ref 3.4–5)
ALP BLD-CCNC: 141 U/L (ref 40–129)
ALT SERPL-CCNC: 27 U/L (ref 10–40)
ANION GAP SERPL CALCULATED.3IONS-SCNC: 13 MMOL/L (ref 3–16)
AST SERPL-CCNC: 24 U/L (ref 15–37)
BASOPHILS ABSOLUTE: 0 K/UL (ref 0–0.2)
BASOPHILS RELATIVE PERCENT: 1.3 %
BILIRUB SERPL-MCNC: 0.4 MG/DL (ref 0–1)
BUN BLDV-MCNC: 18 MG/DL (ref 7–20)
CALCIUM SERPL-MCNC: 10 MG/DL (ref 8.3–10.6)
CHLORIDE BLD-SCNC: 106 MMOL/L (ref 99–110)
CHOLESTEROL, TOTAL: 159 MG/DL (ref 0–199)
CO2: 23 MMOL/L (ref 21–32)
CREAT SERPL-MCNC: 0.6 MG/DL (ref 0.6–1.2)
EOSINOPHILS ABSOLUTE: 0.1 K/UL (ref 0–0.6)
EOSINOPHILS RELATIVE PERCENT: 3.9 %
FERRITIN: 76.2 NG/ML (ref 15–150)
GFR AFRICAN AMERICAN: >60
GFR NON-AFRICAN AMERICAN: >60
GLOBULIN: 2.5 G/DL
GLUCOSE BLD-MCNC: 93 MG/DL (ref 70–99)
HCT VFR BLD CALC: 35.8 % (ref 36–48)
HDLC SERPL-MCNC: 61 MG/DL (ref 40–60)
HEMOGLOBIN: 11.9 G/DL (ref 12–16)
IRON SATURATION: 19 % (ref 15–50)
IRON: 46 UG/DL (ref 37–145)
LDL CHOLESTEROL CALCULATED: 86 MG/DL
LYMPHOCYTES ABSOLUTE: 0.9 K/UL (ref 1–5.1)
LYMPHOCYTES RELATIVE PERCENT: 25.3 %
MCH RBC QN AUTO: 30.9 PG (ref 26–34)
MCHC RBC AUTO-ENTMCNC: 33.3 G/DL (ref 31–36)
MCV RBC AUTO: 92.8 FL (ref 80–100)
MONOCYTES ABSOLUTE: 0.4 K/UL (ref 0–1.3)
MONOCYTES RELATIVE PERCENT: 10.5 %
NEUTROPHILS ABSOLUTE: 2 K/UL (ref 1.7–7.7)
NEUTROPHILS RELATIVE PERCENT: 59 %
PDW BLD-RTO: 13.7 % (ref 12.4–15.4)
PLATELET # BLD: 226 K/UL (ref 135–450)
PMV BLD AUTO: 9.7 FL (ref 5–10.5)
POTASSIUM SERPL-SCNC: 4.2 MMOL/L (ref 3.5–5.1)
RBC # BLD: 3.85 M/UL (ref 4–5.2)
SODIUM BLD-SCNC: 142 MMOL/L (ref 136–145)
TOTAL IRON BINDING CAPACITY: 242 UG/DL (ref 260–445)
TOTAL PROTEIN: 6.8 G/DL (ref 6.4–8.2)
TRIGL SERPL-MCNC: 59 MG/DL (ref 0–150)
VLDLC SERPL CALC-MCNC: 12 MG/DL
WBC # BLD: 3.4 K/UL (ref 4–11)

## 2019-10-31 PROCEDURE — 83540 ASSAY OF IRON: CPT

## 2019-10-31 PROCEDURE — 83550 IRON BINDING TEST: CPT

## 2019-10-31 PROCEDURE — 82728 ASSAY OF FERRITIN: CPT

## 2019-10-31 PROCEDURE — 80053 COMPREHEN METABOLIC PANEL: CPT

## 2019-10-31 PROCEDURE — 80061 LIPID PANEL: CPT

## 2019-10-31 PROCEDURE — 36415 COLL VENOUS BLD VENIPUNCTURE: CPT

## 2019-10-31 PROCEDURE — 85025 COMPLETE CBC W/AUTO DIFF WBC: CPT

## 2019-11-13 ENCOUNTER — TELEPHONE (OUTPATIENT)
Dept: ORTHOPEDIC SURGERY | Age: 79
End: 2019-11-13

## 2019-12-05 ENCOUNTER — OFFICE VISIT (OUTPATIENT)
Dept: ORTHOPEDIC SURGERY | Age: 79
End: 2019-12-05

## 2019-12-05 VITALS — HEIGHT: 64 IN | BODY MASS INDEX: 37.22 KG/M2 | WEIGHT: 218 LBS | TEMPERATURE: 97.2 F

## 2019-12-05 DIAGNOSIS — Z96.612 STATUS POST REVERSE TOTAL SHOULDER REPLACEMENT, LEFT: Primary | ICD-10-CM

## 2019-12-05 PROCEDURE — 99024 POSTOP FOLLOW-UP VISIT: CPT | Performed by: PHYSICIAN ASSISTANT

## 2019-12-05 PROCEDURE — APPSS15 APP SPLIT SHARED TIME 0-15 MINUTES: Performed by: PHYSICIAN ASSISTANT

## 2020-02-06 ENCOUNTER — OFFICE VISIT (OUTPATIENT)
Dept: ORTHOPEDIC SURGERY | Age: 80
End: 2020-02-06
Payer: MEDICARE

## 2020-02-06 VITALS
HEIGHT: 64 IN | DIASTOLIC BLOOD PRESSURE: 83 MMHG | BODY MASS INDEX: 35.34 KG/M2 | WEIGHT: 207 LBS | HEART RATE: 92 BPM | TEMPERATURE: 97.7 F | SYSTOLIC BLOOD PRESSURE: 164 MMHG

## 2020-02-06 PROCEDURE — G8484 FLU IMMUNIZE NO ADMIN: HCPCS | Performed by: PHYSICIAN ASSISTANT

## 2020-02-06 PROCEDURE — G8400 PT W/DXA NO RESULTS DOC: HCPCS | Performed by: PHYSICIAN ASSISTANT

## 2020-02-06 PROCEDURE — 1036F TOBACCO NON-USER: CPT | Performed by: PHYSICIAN ASSISTANT

## 2020-02-06 PROCEDURE — G8427 DOCREV CUR MEDS BY ELIG CLIN: HCPCS | Performed by: PHYSICIAN ASSISTANT

## 2020-02-06 PROCEDURE — 99212 OFFICE O/P EST SF 10 MIN: CPT | Performed by: PHYSICIAN ASSISTANT

## 2020-02-06 PROCEDURE — 1123F ACP DISCUSS/DSCN MKR DOCD: CPT | Performed by: PHYSICIAN ASSISTANT

## 2020-02-06 PROCEDURE — 4040F PNEUMOC VAC/ADMIN/RCVD: CPT | Performed by: PHYSICIAN ASSISTANT

## 2020-02-06 PROCEDURE — G8417 CALC BMI ABV UP PARAM F/U: HCPCS | Performed by: PHYSICIAN ASSISTANT

## 2020-02-06 PROCEDURE — 1090F PRES/ABSN URINE INCON ASSESS: CPT | Performed by: PHYSICIAN ASSISTANT

## 2020-02-07 NOTE — PROGRESS NOTES
COLONOSCOPY  14    Colonoscopy. No biopsies. No polypectomies    CYSTOSCOPY N/A 2019    CYSTOSCOPY WITH INJECTION OF URETHRAL BULKING AGENT COAPTITE performed by Emilee Suh MD at [address] Bilateral     cataract removal with lens implants    HYSTERECTOMY, VAGINAL      TVT    JOINT REPLACEMENT Bilateral     LAMINECTOMY      L3-4-5 decompression of nerve roots      PILONIDAL CYST EXCISION      SALPINGO-OOPHORECTOMY      bilateral    SHOULDER ARTHROPLASTY Right 14    SHOULDER SURGERY Left 10/9/2019    LEFT REVERSE TOTAL SHOULDER REPLACEMENT performed by Leland Hodges MD at [place] [address]      right x2--mass in right sinus removed    SPINAL FUSION  12/16/10    posterior fusion, local bone graft    TOTAL KNEE ARTHROPLASTY  2010    left knee    TOTAL KNEE ARTHROPLASTY Right 2013    TUBAL LIGATION      UPPER GASTROINTESTINAL ENDOSCOPY  14    Esophagogastroduodenoscopy, with biopsy of the antrum       Social History     Occupational History    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   Tobacco Use    Smoking status: Former Smoker     Types: Cigarettes     Last attempt to quit: 3/11/1963     Years since quittin.9    Smokeless tobacco: Never Used    Tobacco comment: quite age 20   Substance and Sexual Activity    Alcohol use: Yes     Comment: social--rare    Drug use: Never    Sexual activity: Not Currently       Current Outpatient Medications   Medication Sig Dispense Refill    docusate sodium (COLACE) 100 MG capsule Take 100 mg by mouth 2 times daily      gabapentin (NEURONTIN) 600 MG tablet Take 1 tablet by mouth 2 times daily for 30 days. 60 tablet 3    aspirin 81 MG EC tablet Take 1 tablet by mouth daily Hold for 14 days post shoulder surgery .  See new rx for aspirin and frequency for DVT prophylaxis for 14 days after surgery 30 tablet 3    aspirin EC 81 MG EC tablet Take 1 tablet by mouth 2 times daily for 14 days Please avoid missing doses. 28 tablet 0    fish oil-omega-3 fatty acids 1000 MG capsule Take 1 capsule by mouth daily HOLD for 14 days post shoulder surgery 1 capsule 0    Ascorbic Acid (VITAMIN C) 1000 MG tablet Take 1,000 mg by mouth daily       Cetirizine HCl 10 MG CAPS Take by mouth      alendronate (FOSAMAX) 70 MG tablet Take 70 mg by mouth every 7 days       cephALEXin (KEFLEX) 250 MG capsule Take 250 mg by mouth daily       pantoprazole (PROTONIX) 40 MG tablet Take 40 mg by mouth daily       B Complex-Biotin-FA (HM VITAMIN B100 COMPLEX PO) Take 1 capsule by mouth daily.  Calcium Carb-Cholecalciferol (CALCIUM + D3) 600-200 MG-UNIT TABS Take 1 capsule by mouth 2 times daily       Vitamin D (CHOLECALCIFEROL) 1000 UNITS CAPS capsule Take 1,000 Units by mouth daily.  polyethylene glycol (MIRALAX) PACK packet Take 17 g by mouth daily.  fluticasone (FLONASE) 50 MCG/ACT nasal spray 2 sprays by Nasal route as needed.  Multiple Vitamin (MULTIVITAMIN PO) Take  by mouth.  simvastatin (ZOCOR) 20 MG tablet Take 20 mg by mouth nightly.  GLUCOSAMINE-CHONDROITIN PO Take 600 mg by mouth daily. No current facility-administered medications for this visit. Objective:   She is alert, oriented x 3, pleasant, well nourished, developed and in no acute distress. BP (!) 164/83   Pulse 92   Temp 97.7 °F (36.5 °C) (Temporal)   Ht 5' 4\" (1.626 m)   Wt 207 lb (93.9 kg)   BMI 35.53 kg/m²      She has no pain, crepitus or instability with left or right shoulder range of motion testing. Has no pain, crepitus or instability with left or right knee range of motion testing. X Rays: performed in the office today:   AP, Y and Axillary of the left  Shoulder: There is a left Reverse Total Shoulder Arthroplasty present in good position without signs of failure. AP, Y and Axillary of the right Shoulder:   There is a right Reverse Total Shoulder Arthroplasty present in good position without signs of failure. AP Standing, Lateral and Sunrise  Left Knee: There is a left prosthetic total knee arthroplasty present. The alignment is satisfactory. There are no signs of failure or loosening. AP Standing, Lateral and Sunrise  Right Knee: There is a right prosthetic total knee arthroplasty present. The alignment is satisfactory. There are no signs of failure or loosening. Diagnosis:        ICD-10-CM    1. Status post reverse total shoulder replacement, left 10/9/2019 Z96.612 XR SHOULDER LEFT (MIN 2 VIEWS)   2. S/p reverse total shoulder arthroplasty, right 6-11-14 Z96.611 XR SHOULDER RIGHT (MIN 2 VIEWS)   3. History of total right knee replacement 3/12/13 Z96.651 XR Knee Bilateral Standing     XR KNEE RIGHT (1-2 VIEWS)   4. History of total left knee replacement 3/23/10 Z96.652 XR Knee Bilateral Standing     XR KNEE LEFT (1-2 VIEWS)        Assessment/ Plan:      Clinically and radiographically stable bilateral reverse shoulder arthroplasties and bilateral knee arthroplasties. The natural history of the patient's diagnosis as well as the treatment options were discussed in full and questions were answered. Risks and benefits of the treatment options also reviewed in detail. Recommend annual x-ray and clinical evaluations. Prophylactic antibiotics for dental or surgical procedures for lifetime recommended. Follow Up: 1 year  Call or return to clinic prn if these symptoms worsen or fail to improve as anticipated.

## 2020-12-29 ENCOUNTER — HOSPITAL ENCOUNTER (OUTPATIENT)
Age: 80
Discharge: HOME | End: 2020-12-29
Payer: MEDICARE

## 2020-12-29 LAB
BASOPHILS ABSOLUTE: 0.1 K/UL (ref 0–0.2)
BASOPHILS RELATIVE PERCENT: 1.2 %
EOSINOPHILS ABSOLUTE: 0.3 K/UL (ref 0–0.6)
EOSINOPHILS RELATIVE PERCENT: 6.5 %
FERRITIN: 42.1 NG/ML (ref 15–150)
HCT VFR BLD CALC: 37.5 % (ref 36–48)
HEMOGLOBIN: 11.8 G/DL (ref 12–16)
IRON SATURATION: 17 % (ref 15–50)
IRON: 56 UG/DL (ref 37–145)
LYMPHOCYTES ABSOLUTE: 1.2 K/UL (ref 1–5.1)
LYMPHOCYTES RELATIVE PERCENT: 28.6 %
MCH RBC QN AUTO: 26.7 PG (ref 26–34)
MCHC RBC AUTO-ENTMCNC: 31.5 G/DL (ref 31–36)
MCV RBC AUTO: 84.5 FL (ref 80–100)
MONOCYTES ABSOLUTE: 0.5 K/UL (ref 0–1.3)
MONOCYTES RELATIVE PERCENT: 10.9 %
NEUTROPHILS ABSOLUTE: 2.3 K/UL (ref 1.7–7.7)
NEUTROPHILS RELATIVE PERCENT: 52.8 %
PDW BLD-RTO: 21.6 % (ref 12.4–15.4)
PLATELET # BLD: 215 K/UL (ref 135–450)
PMV BLD AUTO: 9.8 FL (ref 5–10.5)
RBC # BLD: 4.44 M/UL (ref 4–5.2)
T4 FREE: 1.1 NG/DL (ref 0.9–1.8)
TOTAL IRON BINDING CAPACITY: 322 UG/DL (ref 260–445)
TSH SERPL DL<=0.05 MIU/L-ACNC: 4.74 UIU/ML (ref 0.27–4.2)
WBC # BLD: 4.3 K/UL (ref 4–11)

## 2020-12-29 PROCEDURE — 84439 ASSAY OF FREE THYROXINE: CPT

## 2020-12-29 PROCEDURE — 82728 ASSAY OF FERRITIN: CPT

## 2020-12-29 PROCEDURE — 83550 IRON BINDING TEST: CPT

## 2020-12-29 PROCEDURE — 85025 COMPLETE CBC W/AUTO DIFF WBC: CPT

## 2020-12-29 PROCEDURE — 84443 ASSAY THYROID STIM HORMONE: CPT

## 2020-12-29 PROCEDURE — 83540 ASSAY OF IRON: CPT

## 2020-12-29 PROCEDURE — 36415 COLL VENOUS BLD VENIPUNCTURE: CPT

## 2021-03-10 ENCOUNTER — OFFICE VISIT (OUTPATIENT)
Dept: ORTHOPEDIC SURGERY | Age: 81
End: 2021-03-10
Payer: MEDICARE

## 2021-03-10 VITALS — TEMPERATURE: 97.1 F | HEIGHT: 64 IN | WEIGHT: 207 LBS | BODY MASS INDEX: 35.34 KG/M2

## 2021-03-10 DIAGNOSIS — Z96.652 HISTORY OF TOTAL LEFT KNEE REPLACEMENT: ICD-10-CM

## 2021-03-10 DIAGNOSIS — Z96.651 HISTORY OF TOTAL RIGHT KNEE REPLACEMENT: ICD-10-CM

## 2021-03-10 DIAGNOSIS — Z96.612 STATUS POST REVERSE TOTAL SHOULDER REPLACEMENT, LEFT: Primary | ICD-10-CM

## 2021-03-10 DIAGNOSIS — Z96.611 S/P REVERSE TOTAL SHOULDER ARTHROPLASTY, RIGHT: ICD-10-CM

## 2021-03-10 PROCEDURE — G8417 CALC BMI ABV UP PARAM F/U: HCPCS | Performed by: PHYSICIAN ASSISTANT

## 2021-03-10 PROCEDURE — G8400 PT W/DXA NO RESULTS DOC: HCPCS | Performed by: PHYSICIAN ASSISTANT

## 2021-03-10 PROCEDURE — G8427 DOCREV CUR MEDS BY ELIG CLIN: HCPCS | Performed by: PHYSICIAN ASSISTANT

## 2021-03-10 PROCEDURE — 1090F PRES/ABSN URINE INCON ASSESS: CPT | Performed by: PHYSICIAN ASSISTANT

## 2021-03-10 PROCEDURE — G8484 FLU IMMUNIZE NO ADMIN: HCPCS | Performed by: PHYSICIAN ASSISTANT

## 2021-03-10 PROCEDURE — 1036F TOBACCO NON-USER: CPT | Performed by: PHYSICIAN ASSISTANT

## 2021-03-10 PROCEDURE — 1123F ACP DISCUSS/DSCN MKR DOCD: CPT | Performed by: PHYSICIAN ASSISTANT

## 2021-03-10 PROCEDURE — 4040F PNEUMOC VAC/ADMIN/RCVD: CPT | Performed by: PHYSICIAN ASSISTANT

## 2021-03-10 PROCEDURE — 99212 OFFICE O/P EST SF 10 MIN: CPT | Performed by: PHYSICIAN ASSISTANT

## 2021-03-10 NOTE — PROGRESS NOTES
Subjective:      Patient ID: Miguel Angel Honeycutt is a 79 y.o.  female. Chief Complaint   Patient presents with    Follow-up     L reverse TSA 10.9.19; R reverse TSA 6.11.14; L TKA 3.23.10; R TKA 3.12.13        HPI:  Here for annual follow up visit. S/P bilateral knee and shoulder arthroplasties. Surgeon: Mavis Calderón   Issues or complaints: None      Review of Systems:   A 14 point review of systems and history form completed by the patient has been reviewed. This form is scanned in the media tab of the patient's chart under today's date. Past Medical History:   Diagnosis Date    Arthritis     At high risk for falls     hx of falls    Constipation     Environmental allergies     Fall at home 01/25/2015    fell in garage, called 911 for assist to get up    GERD (gastroesophageal reflux disease)     History of anemia     Hyperlipidemia     Neuropathy of leg     left leg foot drop wears brace    OAB (overactive bladder)     UTI (lower urinary tract infection)        Family History   Problem Relation Age of Onset    Heart Disease Mother     High Blood Pressure Mother     Stroke Mother     Heart Disease Father     High Blood Pressure Father     Stroke Father        Past Surgical History:   Procedure Laterality Date    BACK SURGERY  12/16/10    L4-5 Left complete, radical facetectomy, L3-4-5 nerve root exploration and foraminotomy, pedicle screws    BREAST BIOPSY Right     x2    CARPAL TUNNEL RELEASE Left 12/13/2018    LEFT CARPAL TUNNEL RELEASE performed by Vivke [facility]irre [facility] at [facility] Right 8/22/2019    RIGHT CARPAL TUNNEL RELEASE performed by Vivek Aguirre MD at [place] [address]  06/23/2011    HOT SNARE CECAL POLYPECTOMY    COLONOSCOPY  9-8-14    Colonoscopy. No biopsies.   No polypectomies    CYSTOSCOPY N/A 7/1/2019    CYSTOSCOPY WITH INJECTION OF URETHRAL BULKING AGENT COAPTITE performed by Tyler Aguilera MD at [place] [address] Bilateral     cataract removal with lens implants    HYSTERECTOMY, VAGINAL      TVT    JOINT REPLACEMENT Bilateral     LAMINECTOMY      L3-4-5 decompression of nerve roots      PILONIDAL CYST EXCISION      SALPINGO-OOPHORECTOMY      bilateral    SHOULDER ARTHROPLASTY Right 14    SHOULDER SURGERY Left 10/9/2019    LEFT REVERSE TOTAL SHOULDER REPLACEMENT performed by [facility]      right x2--mass in right sinus removed    SPINAL FUSION  12/16/10    posterior fusion, local bone graft    TOTAL KNEE ARTHROPLASTY  2010    left knee    TOTAL KNEE ARTHROPLASTY Right 2013    TUBAL LIGATION      UPPER GASTROINTESTINAL ENDOSCOPY  14    Esophagogastroduodenoscopy, with biopsy of the antrum       Social History     Occupational History    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   Tobacco Use    Smoking status: Former Smoker     Types: Cigarettes     Quit date: 3/11/1963     Years since quittin.0    Smokeless tobacco: Never Used    Tobacco comment: quite age 20   Substance and Sexual Activity    Alcohol use: Yes     Comment: social--rare    Drug use: Never    Sexual activity: Not Currently       Current Outpatient Medications   Medication Sig Dispense Refill    docusate sodium (COLACE) 100 MG capsule Take 100 mg by mouth 2 times daily      gabapentin (NEURONTIN) 600 MG tablet Take 1 tablet by mouth 2 times daily for 30 days. 60 tablet 3    aspirin 81 MG EC tablet Take 1 tablet by mouth daily Hold for 14 days post shoulder surgery . See new rx for aspirin and frequency for DVT prophylaxis for 14 days after surgery 30 tablet 3    aspirin EC 81 MG EC tablet Take 1 tablet by mouth 2 times daily for 14 days Please avoid missing doses.  28 tablet 0    fish oil-omega-3 fatty acids 1000 MG capsule Take 1 capsule by mouth daily HOLD for 14 days post shoulder surgery 1 capsule 0    Ascorbic Acid (VITAMIN C) 1000 MG tablet Take 1,000 mg by mouth daily       Cetirizine HCl 10 MG CAPS Take by mouth      alendronate (FOSAMAX) 70 MG tablet Take 70 mg by mouth every 7 days       cephALEXin (KEFLEX) 250 MG capsule Take 250 mg by mouth daily       pantoprazole (PROTONIX) 40 MG tablet Take 40 mg by mouth daily       B Complex-Biotin-FA (HM VITAMIN B100 COMPLEX PO) Take 1 capsule by mouth daily.  Calcium Carb-Cholecalciferol (CALCIUM + D3) 600-200 MG-UNIT TABS Take 1 capsule by mouth 2 times daily       Vitamin D (CHOLECALCIFEROL) 1000 UNITS CAPS capsule Take 1,000 Units by mouth daily.  polyethylene glycol (MIRALAX) PACK packet Take 17 g by mouth daily.  fluticasone (FLONASE) 50 MCG/ACT nasal spray 2 sprays by Nasal route as needed.  Multiple Vitamin (MULTIVITAMIN PO) Take  by mouth.  simvastatin (ZOCOR) 20 MG tablet Take 20 mg by mouth nightly.  GLUCOSAMINE-CHONDROITIN PO Take 600 mg by mouth daily. No current facility-administered medications for this visit. Objective:   She is alert, oriented x 3, pleasant, well nourished, developed and in no acute distress. Temp 97.1 °F (36.2 °C) (Temporal)   Ht 5' 4\" (1.626 m)   Wt 207 lb (93.9 kg)   BMI 35.53 kg/m²      Examination of the left and right knee: Inspection of the skin demonstrates a well-healed midline incision. Inspection of the soft tissues without significant swelling or erythema. The overall alignment of the knee is neutral.  There is minimal intra-articular effusion. AROM     Extension 0     Flexion  125   There no pain associated with ROM testing. Pes anserine bursa non-tender to palpation. Patellar tendon non-tender to palpation. Quadriceps tendon non-tender to palpation. Collateral ligaments non-tender to palpation. Popliteal fossa non-tender to palpation. Retro patellar crepitus is not present. There is no instability with varus/valgus stress testing in full extension or 90 degrees of flexion.   There is no instability with anterior drawer testing. Extensor Mechanism is intact. Examination of the left and right shoulder:  No pain or crepitus with active or passive range of motion. No instability noted. Active forward flexion to about 110 degrees. Internal rotation to ASIS. External rotation 20 degrees. X Rays: was performed in the office today:   AP, Y and Axillary of the left Shoulder: There is a Reverse Total Shoulder Arthroplasty present in good position without signs of failure. AP, Y and Axillary of the right Shoulder: There is a Reverse Total Shoulder Arthroplasty present in good position without signs of failure. AP Standing, Lateral and Sunrise Left Knee: There is a left cemented knee arthroplasty present. The alignment is satisfactory. There are no signs of failure or loosening. AP Standing, Lateral and Sunrise Right Knee: There is a right cemented total knee arthroplasty present. The alignment is satisfactory. There are no signs of failure or loosening. Diagnosis:        ICD-10-CM    1. Status post reverse total shoulder replacement, left 10/9/2019  Z96.612 XR SHOULDER LEFT (MIN 2 VIEWS)   2. S/p reverse total shoulder arthroplasty, right 6-11-14  Z96.611 XR SHOULDER RIGHT (MIN 2 VIEWS)   3. History of total right knee replacement 3/12/13  Z96.651 XR KNEE BILATERAL STANDING     XR KNEE RIGHT (1-2 VIEWS)   4. History of total left knee replacement 3/23/10  Z96.652 XR KNEE BILATERAL STANDING     XR KNEE LEFT (1-2 VIEWS)          Assessment/ Plan:     Assessment:    Clinically and radiographically stable bilateral shoulder knee arthroplasties years post op. 15 minutes was the total time spent on today's visit reviewing test results or histories in preparation for the visit, time spent on documentation, ordering prescriptions, tests, procedures after the visit. Plan:  1. Medications-discussed need for prophylactic antibiotics for dental or surgical procedures.   2. PT- A home exercise program was reviewed today including ROM exercises and strengthening exercises. The patient verbalized understanding of these exercises as well as the importance of the exercise program to promote normal function. 3. Activity restrictions discussed. Avoid running and jumping activities. 4. Further Imaging- annually. 5. Procedures- none. 6. Follow up- 1 year. 7. Call or return to clinic if these symptoms worsen or fail to improve as anticipated.

## 2021-06-02 ENCOUNTER — HOSPITAL ENCOUNTER (OUTPATIENT)
Age: 81
Discharge: HOME | End: 2021-06-02
Payer: MEDICARE

## 2021-06-02 LAB
A/G RATIO: 1.6 (ref 1.1–2.2)
ALBUMIN SERPL-MCNC: 4.2 G/DL (ref 3.4–5)
ALP BLD-CCNC: 67 U/L (ref 40–129)
ALT SERPL-CCNC: 26 U/L (ref 10–40)
ANION GAP SERPL CALCULATED.3IONS-SCNC: 8 MMOL/L (ref 3–16)
AST SERPL-CCNC: 27 U/L (ref 15–37)
BASOPHILS ABSOLUTE: 0.1 K/UL (ref 0–0.2)
BASOPHILS RELATIVE PERCENT: 1.2 %
BILIRUB SERPL-MCNC: 0.7 MG/DL (ref 0–1)
BUN BLDV-MCNC: 27 MG/DL (ref 7–20)
CALCIUM SERPL-MCNC: 9.9 MG/DL (ref 8.3–10.6)
CHLORIDE BLD-SCNC: 103 MMOL/L (ref 99–110)
CHOLESTEROL, TOTAL: 144 MG/DL (ref 0–199)
CO2: 27 MMOL/L (ref 21–32)
CREAT SERPL-MCNC: 0.9 MG/DL (ref 0.6–1.2)
EOSINOPHILS ABSOLUTE: 0.3 K/UL (ref 0–0.6)
EOSINOPHILS RELATIVE PERCENT: 4.9 %
FERRITIN: 103.6 NG/ML (ref 15–150)
GFR AFRICAN AMERICAN: >60
GFR NON-AFRICAN AMERICAN: >60
GLOBULIN: 2.6 G/DL
GLUCOSE BLD-MCNC: 85 MG/DL (ref 70–99)
HCT VFR BLD CALC: 39.5 % (ref 36–48)
HDLC SERPL-MCNC: 63 MG/DL (ref 40–60)
HEMOGLOBIN: 13.5 G/DL (ref 12–16)
IRON SATURATION: 50 % (ref 15–50)
IRON: 124 UG/DL (ref 37–145)
LDL CHOLESTEROL CALCULATED: 62 MG/DL
LYMPHOCYTES ABSOLUTE: 1.3 K/UL (ref 1–5.1)
LYMPHOCYTES RELATIVE PERCENT: 24.7 %
MCH RBC QN AUTO: 32.9 PG (ref 26–34)
MCHC RBC AUTO-ENTMCNC: 34.2 G/DL (ref 31–36)
MCV RBC AUTO: 96 FL (ref 80–100)
MONOCYTES ABSOLUTE: 0.6 K/UL (ref 0–1.3)
MONOCYTES RELATIVE PERCENT: 11.4 %
NEUTROPHILS ABSOLUTE: 3 K/UL (ref 1.7–7.7)
NEUTROPHILS RELATIVE PERCENT: 57.8 %
PDW BLD-RTO: 13.8 % (ref 12.4–15.4)
PLATELET # BLD: 159 K/UL (ref 135–450)
PMV BLD AUTO: 10.4 FL (ref 5–10.5)
POTASSIUM SERPL-SCNC: 5 MMOL/L (ref 3.5–5.1)
RBC # BLD: 4.12 M/UL (ref 4–5.2)
SODIUM BLD-SCNC: 138 MMOL/L (ref 136–145)
T4 FREE: 1.1 NG/DL (ref 0.9–1.8)
TOTAL IRON BINDING CAPACITY: 250 UG/DL (ref 260–445)
TOTAL PROTEIN: 6.8 G/DL (ref 6.4–8.2)
TRIGL SERPL-MCNC: 97 MG/DL (ref 0–150)
TSH SERPL DL<=0.05 MIU/L-ACNC: 3.68 UIU/ML (ref 0.27–4.2)
VLDLC SERPL CALC-MCNC: 19 MG/DL
WBC # BLD: 5.2 K/UL (ref 4–11)

## 2021-06-02 PROCEDURE — 80061 LIPID PANEL: CPT

## 2021-06-02 PROCEDURE — 84439 ASSAY OF FREE THYROXINE: CPT

## 2021-06-02 PROCEDURE — 83540 ASSAY OF IRON: CPT

## 2021-06-02 PROCEDURE — 83550 IRON BINDING TEST: CPT

## 2021-06-02 PROCEDURE — 82728 ASSAY OF FERRITIN: CPT

## 2021-06-02 PROCEDURE — 80053 COMPREHEN METABOLIC PANEL: CPT

## 2021-06-02 PROCEDURE — 36415 COLL VENOUS BLD VENIPUNCTURE: CPT

## 2021-06-02 PROCEDURE — 85025 COMPLETE CBC W/AUTO DIFF WBC: CPT

## 2021-06-02 PROCEDURE — 84443 ASSAY THYROID STIM HORMONE: CPT

## 2021-06-02 PROCEDURE — 83036 HEMOGLOBIN GLYCOSYLATED A1C: CPT

## 2021-06-03 LAB
ESTIMATED AVERAGE GLUCOSE: 105.4 MG/DL
HBA1C MFR BLD: 5.3 %

## 2021-10-27 ENCOUNTER — TELEPHONE (OUTPATIENT)
Dept: ORTHOPEDIC SURGERY | Age: 81
End: 2021-10-27

## 2021-10-27 NOTE — TELEPHONE ENCOUNTER
Other Patient is calling and would like a call backm regarding right hand being swollen and swelling going into fingers. Patient says hand is feeling tight.  Ph [phone]

## 2021-11-01 ENCOUNTER — OFFICE VISIT (OUTPATIENT)
Dept: ORTHOPEDIC SURGERY | Age: 81
End: 2021-11-01
Payer: MEDICARE

## 2021-11-01 VITALS — HEIGHT: 64 IN | BODY MASS INDEX: 35.34 KG/M2 | WEIGHT: 207 LBS | RESPIRATION RATE: 15 BRPM

## 2021-11-01 DIAGNOSIS — M25.431 SWELLING OF RIGHT WRIST: Primary | ICD-10-CM

## 2021-11-01 PROCEDURE — G8400 PT W/DXA NO RESULTS DOC: HCPCS | Performed by: ORTHOPAEDIC SURGERY

## 2021-11-01 PROCEDURE — G8484 FLU IMMUNIZE NO ADMIN: HCPCS | Performed by: ORTHOPAEDIC SURGERY

## 2021-11-01 PROCEDURE — G8427 DOCREV CUR MEDS BY ELIG CLIN: HCPCS | Performed by: ORTHOPAEDIC SURGERY

## 2021-11-01 PROCEDURE — 99213 OFFICE O/P EST LOW 20 MIN: CPT | Performed by: ORTHOPAEDIC SURGERY

## 2021-11-01 PROCEDURE — 4040F PNEUMOC VAC/ADMIN/RCVD: CPT | Performed by: ORTHOPAEDIC SURGERY

## 2021-11-01 PROCEDURE — 1123F ACP DISCUSS/DSCN MKR DOCD: CPT | Performed by: ORTHOPAEDIC SURGERY

## 2021-11-01 PROCEDURE — G8417 CALC BMI ABV UP PARAM F/U: HCPCS | Performed by: ORTHOPAEDIC SURGERY

## 2021-11-01 PROCEDURE — 1036F TOBACCO NON-USER: CPT | Performed by: ORTHOPAEDIC SURGERY

## 2021-11-01 PROCEDURE — 1090F PRES/ABSN URINE INCON ASSESS: CPT | Performed by: ORTHOPAEDIC SURGERY

## 2021-11-01 NOTE — Clinical Note
Dear  Karen Hall, DO,    Thank you very much for your referral or Ms. Soheila Ramírez to me for evaluation and treatment of her Hand & Wrist condition. I appreciate your confidence in me and thank you for allowing me the opportunity to care for your patients. If I can be of any further assistance to you on this or any other patient, please do not hesitate to contact me. Sincerely,    Johnny Sousa.  Man Vazquez MD

## 2021-11-01 NOTE — PROGRESS NOTES
Ms. Bryan Morel is a 79 y.o. right handed woman  who is seen today in Hand Surgical Consultation at the request of Tamara Diop DO. She is seen today regarding a 1 week(s) history of right wrist swelling without history previous of injury. She was not seen for this concern by her primary care physician; previous treatment has included no prior treatments used. She reports a history or warmth, some discoloration and  minimal pain located in the Dorsal wrist, no tenderness of the remaining hand or elbow. She  notes today, no neurologic symptoms in the hand. Symptoms are improving over time. I have today reviewed with Bryan Morel the clinically relevant, past medical history, medications, allergies,  family history, social history, and Review Of Systems & I have documented any details relevant to today's presenting complaints in my history above. Ms. Jeannette Lawson's self-reported past medical history, medications, allergies,  family history, social history, and Review Of Systems have been scanned into the chart under the \"Media\" tab. Physical Exam:  Ms. Jeannette Lawson's most recent vitals:  Vitals  Resp: 15  Height: 5' 4\" (162.6 cm)  Weight: 207 lb (93.9 kg)    She is well nourished, oriented to person, place & time. She demonstrates appropriate mood and affect as well as normal gait and station. Skin: Normal in appearance, Normal Color and Free of Lesions Bilaterally   There is minimal pink coloration about the dorsal ulnar carpus  Digital range of motion is limited by Osteoarthritis bilaterally  Wrist range of motion is limited by osteoarthritis bilaterally and swelling on the right. Sensation is subjectively normal in the Whole Hand. All other digits are normally sensate bilaterally  Vascular examination reveals normal, good capillary refill and good color bilaterally. There is no ecchymosis bilaterally. Swelling is mild in the Dorsal wrist & hand.   No other digit or hand bilaterally shows sign of swelling. There is no evidence of gross joint instability bilaterally. Muscular strength is clinically appropriate bilaterally. Minimal pain is elicited with palpation of the Dorsal ulnar aspect of the wrist and about the TFCC. The remainder of the intra-carpal joints are not tender to palpation. There is clinical evidence of skeletal deformity or mal-rotation. Radiographic Evaluation:  Radiographs, taken In My Office were Personally Reviewed & Interpreted by myself today (3 views of the right wrist). They demonstrate no evidence of an acute fracture of the distal radius, ulna, or carpal bones. There is mild widening of the scapho-lunate interval &  mild flexion of the scaphoid. There is mild evidence of degenerative change at the Scapho-lunate Joint(s), all other aspects of the carpus are relatively spared. There is evidence of radiodense accumulation seen in the area of the TFCC & in the SL Interval. There is severe evidence of degenerative change to the distal interphalangeal joints of the index finger and middle finger and proximal interphalangeal joints of the index finger, Middle Finger, Ring Finger and Small Finger. There is moderate evidence of degenerative change to the thumb distal interphalangeal joint and interphalangeal joint and Ring Finger and Small Finger distal interphalangeal joints. There is not evidence of other injury or bony fracture. Impression:  Ms. Israel Peraza has developed an inflammatory appearance such as CPPD that is likely responsible for her dorsal wrist swelling presents requesting further treatment. Plan:  I have had a thorough discussion with Ms. Israel Peraza regarding the treatment options available for her initially presenting Right wrist Pseudo-Gout, which is causing her significant symptoms and difficulty. I have outlined for Ms. Israel Peraza the risk, benefits and consequences of the various treatment modalities, including a reasonable expectation for the long term success of each. We have discussed the possibility that further, more aggressive treatment may be required for her current presenting condition. Based upon our current discussion and a reasonable understating of the options available to her, Ms. Cruz Belcher has selected to proceed with a conservative plan of treatment consisting of: the use of protective splints as needed, activity modification, and the judicious use of over-the-counter anti-inflammatory medications if allowed by her primary care physician. Instructions were given regarding splint use and wear as well as suggestions for use of the other modalities were discussed. We also discussed that appropriate Medical Management by her Primary Care Physician is the mainstay of treatment for Calcium Pyrophosphate Deposition Disease . I have clearly explained to her that the above outlined treatment plan should not be expected to 'cure' her medical condition, but we are rather treating the symptoms with which she presents. She has understood that in order to achieve more durable relief of her symptoms and to prevent future worsening or further damage, that appropriate Medical Management would be required. Ms. Cruz Belcher  voiced an appropriate understanding of our discussion, the options available to her, and of the expectations of her selected  treatment. I have referred Ms. Cruz Belcher back to Danielle Quintanilla DO for further evaluation and treatment of her systemic medical condition as is medically appropriate. Ms. Cruz Belcher has been given a full verbal list of instructions and precautions related to her present condition. I have asked her to followup with me in the office at the prescribed time. She is also specifically requested to call or return to the office sooner if her symptoms change or worsen prior to the next scheduled appointment.

## 2021-12-08 ENCOUNTER — HOSPITAL ENCOUNTER (OUTPATIENT)
Age: 81
Discharge: HOME | End: 2021-12-08
Payer: MEDICARE

## 2021-12-08 LAB
A/G RATIO: 1.7 (ref 1.1–2.2)
ALBUMIN SERPL-MCNC: 4.3 G/DL (ref 3.4–5)
ALP BLD-CCNC: 75 U/L (ref 40–129)
ALT SERPL-CCNC: 30 U/L (ref 10–40)
ANION GAP SERPL CALCULATED.3IONS-SCNC: 11 MMOL/L (ref 3–16)
AST SERPL-CCNC: 26 U/L (ref 15–37)
BILIRUB SERPL-MCNC: 0.7 MG/DL (ref 0–1)
BUN BLDV-MCNC: 27 MG/DL (ref 7–20)
CALCIUM SERPL-MCNC: 10.4 MG/DL (ref 8.3–10.6)
CHLORIDE BLD-SCNC: 104 MMOL/L (ref 99–110)
CHOLESTEROL, TOTAL: 156 MG/DL (ref 0–199)
CO2: 26 MMOL/L (ref 21–32)
CREAT SERPL-MCNC: 0.8 MG/DL (ref 0.6–1.2)
FERRITIN: 124.9 NG/ML (ref 15–150)
GFR AFRICAN AMERICAN: >60
GFR NON-AFRICAN AMERICAN: >60
GLUCOSE BLD-MCNC: 90 MG/DL (ref 70–99)
HDLC SERPL-MCNC: 70 MG/DL (ref 40–60)
IRON SATURATION: 50 % (ref 15–50)
IRON: 121 UG/DL (ref 37–145)
LDL CHOLESTEROL CALCULATED: 73 MG/DL
POTASSIUM SERPL-SCNC: 4.3 MMOL/L (ref 3.5–5.1)
REASON FOR REJECTION: NORMAL
REJECTED TEST: NORMAL
SODIUM BLD-SCNC: 141 MMOL/L (ref 136–145)
TOTAL IRON BINDING CAPACITY: 240 UG/DL (ref 260–445)
TOTAL PROTEIN: 6.9 G/DL (ref 6.4–8.2)
TRIGL SERPL-MCNC: 64 MG/DL (ref 0–150)
VLDLC SERPL CALC-MCNC: 13 MG/DL

## 2021-12-08 PROCEDURE — 80053 COMPREHEN METABOLIC PANEL: CPT

## 2021-12-08 PROCEDURE — 82728 ASSAY OF FERRITIN: CPT

## 2021-12-08 PROCEDURE — 83540 ASSAY OF IRON: CPT

## 2021-12-08 PROCEDURE — 83550 IRON BINDING TEST: CPT

## 2021-12-08 PROCEDURE — 80061 LIPID PANEL: CPT

## 2021-12-08 PROCEDURE — 36415 COLL VENOUS BLD VENIPUNCTURE: CPT

## 2022-03-09 ENCOUNTER — OFFICE VISIT (OUTPATIENT)
Dept: ORTHOPEDIC SURGERY | Age: 82
End: 2022-03-09
Payer: MEDICARE

## 2022-03-09 DIAGNOSIS — Z96.611 S/P REVERSE TOTAL SHOULDER ARTHROPLASTY, RIGHT: ICD-10-CM

## 2022-03-09 DIAGNOSIS — Z96.651 HISTORY OF TOTAL RIGHT KNEE REPLACEMENT: ICD-10-CM

## 2022-03-09 DIAGNOSIS — Z96.652 HISTORY OF TOTAL LEFT KNEE REPLACEMENT: ICD-10-CM

## 2022-03-09 DIAGNOSIS — Z96.612 STATUS POST REVERSE TOTAL SHOULDER REPLACEMENT, LEFT: Primary | ICD-10-CM

## 2022-03-09 PROCEDURE — G8400 PT W/DXA NO RESULTS DOC: HCPCS | Performed by: PHYSICIAN ASSISTANT

## 2022-03-09 PROCEDURE — G8484 FLU IMMUNIZE NO ADMIN: HCPCS | Performed by: PHYSICIAN ASSISTANT

## 2022-03-09 PROCEDURE — G8427 DOCREV CUR MEDS BY ELIG CLIN: HCPCS | Performed by: PHYSICIAN ASSISTANT

## 2022-03-09 PROCEDURE — 4040F PNEUMOC VAC/ADMIN/RCVD: CPT | Performed by: PHYSICIAN ASSISTANT

## 2022-03-09 PROCEDURE — G8417 CALC BMI ABV UP PARAM F/U: HCPCS | Performed by: PHYSICIAN ASSISTANT

## 2022-03-09 PROCEDURE — 1123F ACP DISCUSS/DSCN MKR DOCD: CPT | Performed by: PHYSICIAN ASSISTANT

## 2022-03-09 PROCEDURE — 99213 OFFICE O/P EST LOW 20 MIN: CPT | Performed by: PHYSICIAN ASSISTANT

## 2022-03-09 PROCEDURE — 1036F TOBACCO NON-USER: CPT | Performed by: PHYSICIAN ASSISTANT

## 2022-03-09 PROCEDURE — 1090F PRES/ABSN URINE INCON ASSESS: CPT | Performed by: PHYSICIAN ASSISTANT

## 2022-03-09 NOTE — PROGRESS NOTES
Subjective:      Patient ID: Gab Jerry is a 79 y.o. female who is here for follow up evaluation of her bilateral knee and bilateral shoulder arthroplasties. Right total knee 2013  Left total knee 2010  Right reverse shoulder 2014  Left reverse shoulder 2019    No issues or complaints. Review of Systems:  I have reviewed the clinically relevant past medical history, medications, allergies, family history, social history, and 13 point Review of Systems from the patient's recent history form & documented any details relevant to today's presenting complaints in the history above. The patient's self-reported past medical history, medications, allergies, family history, social history, and Review of Systems form from today's date have been scanned into the chart under the \"Media\" tab.        Past Medical History:   Diagnosis Date    Arthritis     At high risk for falls     hx of falls    Constipation     Environmental allergies     Fall at home 01/25/2015    fell in garage, called 911 for assist to get up    GERD (gastroesophageal reflux disease)     History of anemia     Hyperlipidemia     Neuropathy of leg     left leg foot drop wears brace    OAB (overactive bladder)     UTI (lower urinary tract infection)        Family History   Problem Relation Age of Onset    Heart Disease Mother     High Blood Pressure Mother     Stroke Mother     Heart Disease Father     High Blood Pressure Father     Stroke Father        Past Surgical History:   Procedure Laterality Date    BACK SURGERY  12/16/10    L4-5 Left complete, radical facetectomy, L3-4-5 nerve root exploration and foraminotomy, pedicle screws    BREAST BIOPSY Right     x2    CARPAL TUNNEL RELEASE Left 12/13/2018    LEFT CARPAL TUNNEL RELEASE performed by Adriana Phillips MD at [facility] Right 8/22/2019    RIGHT CARPAL TUNNEL RELEASE performed by Adriana Phillips MD at [address] [place] [address]  06/23/2011    [address] [place] [address] CECAL POLYPECTOMY    COLONOSCOPY  14    Colonoscopy. No biopsies. No polypectomies    CYSTOSCOPY N/A 2019    CYSTOSCOPY WITH INJECTION OF URETHRAL BULKING AGENT COAPTITE performed by Cirilo Summers MD at [facility] Bilateral     cataract removal with lens implants    HYSTERECTOMY, VAGINAL      TVT    JOINT REPLACEMENT Bilateral     LAMINECTOMY      L3-4-5 decompression of nerve roots      PILONIDAL CYST EXCISION      SALPINGO-OOPHORECTOMY      bilateral    SHOULDER ARTHROPLASTY Right 14    SHOULDER SURGERY Left 10/9/2019    LEFT REVERSE TOTAL SHOULDER REPLACEMENT performed by Jono Lou MD at [place] [address]      right x2--mass in right sinus removed    SPINAL FUSION  12/16/10    posterior fusion, local bone graft    TOTAL KNEE ARTHROPLASTY  2010    left knee    TOTAL KNEE ARTHROPLASTY Right 2013    TUBAL LIGATION      UPPER GASTROINTESTINAL ENDOSCOPY  14    Esophagogastroduodenoscopy, with biopsy of the antrum       Social History     Occupational History    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   Tobacco Use    Smoking status: Former Smoker     Types: Cigarettes     Quit date: 3/11/1963     Years since quittin.0    Smokeless tobacco: Never Used    Tobacco comment: quite age 20   Vaping Use    Vaping Use: Never used   Substance and Sexual Activity    Alcohol use: Yes     Comment: social--rare    Drug use: Never    Sexual activity: Not Currently       Current Outpatient Medications   Medication Sig Dispense Refill    docusate sodium (COLACE) 100 MG capsule Take 100 mg by mouth 2 times daily      gabapentin (NEURONTIN) 600 MG tablet Take 1 tablet by mouth 2 times daily for 30 days. 60 tablet 3    aspirin 81 MG EC tablet Take 1 tablet by mouth daily Hold for 14 days post shoulder surgery .  See new rx for aspirin and frequency for DVT prophylaxis for 14 days after surgery 30 tablet 3    aspirin EC 81 MG EC tablet Take 1 tablet by mouth 2 times daily for 14 days Please avoid missing doses. 28 tablet 0    fish oil-omega-3 fatty acids 1000 MG capsule Take 1 capsule by mouth daily HOLD for 14 days post shoulder surgery 1 capsule 0    Ascorbic Acid (VITAMIN C) 1000 MG tablet Take 1,000 mg by mouth daily       Cetirizine HCl 10 MG CAPS Take by mouth      alendronate (FOSAMAX) 70 MG tablet Take 70 mg by mouth every 7 days       cephALEXin (KEFLEX) 250 MG capsule Take 250 mg by mouth daily       pantoprazole (PROTONIX) 40 MG tablet Take 40 mg by mouth daily       B Complex-Biotin-FA (HM VITAMIN B100 COMPLEX PO) Take 1 capsule by mouth daily.  Calcium Carb-Cholecalciferol (CALCIUM + D3) 600-200 MG-UNIT TABS Take 1 capsule by mouth 2 times daily       Vitamin D (CHOLECALCIFEROL) 1000 UNITS CAPS capsule Take 1,000 Units by mouth daily.  polyethylene glycol (MIRALAX) PACK packet Take 17 g by mouth daily.  fluticasone (FLONASE) 50 MCG/ACT nasal spray 2 sprays by Nasal route as needed.  Multiple Vitamin (MULTIVITAMIN PO) Take  by mouth.  simvastatin (ZOCOR) 20 MG tablet Take 20 mg by mouth nightly.  GLUCOSAMINE-CHONDROITIN PO Take 600 mg by mouth daily. No current facility-administered medications for this visit. Objective:     She is alert, oriented x 3, pleasant, well nourished, developed and in no   acute distress. There were no vitals taken for this visit. Examination of the left and right knee: Inspection of the skin demonstrates a well-healed midline incision. Inspection of the soft tissues without significant swelling or erythema. The overall alignment of the knee is neutral.  There is minimal intra-articular effusion. AROM     Extension 0     Flexion  115   There no pain associated with ROM testing. Pes anserine bursa non-tender to palpation. Patellar tendon non-tender to palpation. Quadriceps tendon non-tender to palpation.   Collateral ligaments non-tender to palpation. Popliteal fossa non-tender to palpation. Retro patellar crepitus is not present. There is no instability with varus/valgus stress testing in full extension or 90 degrees of flexion. There is no instability with anterior drawer testing. Extensor Mechanism is intact. Examination of the left and right shoulder:  No pain or crepitus with active or passive range of motion. No instability noted. Active forward flexion to about 110 degrees. Internal rotation to ASIS. External rotation 20 degrees. X Rays: performed in the office today:   AP, Y and Axillary of the left Shoulder: There is a Reverse Total Shoulder Arthroplasty present in good position without signs of failure.       AP, Y and Axillary of the right Shoulder: There is a Reverse Total Shoulder Arthroplasty present in good position without signs of failure.       AP Standing, Lateral and Sunrise Left Knee: There is a left cemented knee arthroplasty present. The alignment is satisfactory. There are no signs of failure or loosening.      AP Standing, Lateral and Sunrise Right Knee: There is a right cemented total knee arthroplasty present. The alignment is satisfactory. There are no signs of failure or loosening.        Diagnosis:       ICD-10-CM    1. Status post reverse total shoulder replacement, left  Z96.612 XR SHOULDER LEFT (MIN 2 VIEWS)   2. S/P reverse total shoulder arthroplasty, right  Z96.611 XR SHOULDER RIGHT (MIN 2 VIEWS)   3. History of total right knee replacement  Z96.651 XR KNEE BILATERAL STANDING     XR KNEE RIGHT (1-2 VIEWS)   4. History of total left knee replacement  Z96.652 XR KNEE BILATERAL STANDING     XR KNEE LEFT (1-2 VIEWS)        Assessment and Plan:       Assessment:    Clinically and radiographically stable bilateral shoulder knee arthroplasties years post op.   15 minutes was the total time spent on today's visit reviewing test results or histories in preparation for the visit, time spent on documentation, ordering prescriptions, tests, procedures after the visit.     Plan:  1. Medications-discussed need for prophylactic antibiotics for dental or surgical procedures. 2. PT- A home exercise program was reviewed today including ROM exercises and strengthening exercises. The patient verbalized understanding of these exercises as well as the importance of the exercise program to promote normal function. 3. Activity restrictions discussed. Avoid running and jumping activities. 4. Further Imaging- annually. 5. Procedures- none. 6. Follow up- 1 year. 7. Call or return to clinic if these symptoms worsen or fail to improve as anticipated. Barbra Monteiro PA-C   Senior Physician Assistant   [facility]                                         Disclaimer: This note was generated with use of a verbal recognition program (DRAGON) and an attempt was made to check for errors. It is possible that there are still dictated errors within this office note. If so, please bring any significant errors to my attention for an addendum. All efforts were made to ensure that this office note is accurate.

## 2022-06-09 ENCOUNTER — HOSPITAL ENCOUNTER (OUTPATIENT)
Age: 82
Discharge: HOME | End: 2022-06-09
Payer: MEDICARE

## 2022-06-09 LAB
A/G RATIO: 1.4 (ref 1.1–2.2)
ALBUMIN SERPL-MCNC: 4.2 G/DL (ref 3.4–5)
ALP BLD-CCNC: 93 U/L (ref 40–129)
ALT SERPL-CCNC: 41 U/L (ref 10–40)
ANION GAP SERPL CALCULATED.3IONS-SCNC: 12 MMOL/L (ref 3–16)
AST SERPL-CCNC: 36 U/L (ref 15–37)
BASOPHILS ABSOLUTE: 0 K/UL (ref 0–0.2)
BASOPHILS RELATIVE PERCENT: 1.1 %
BILIRUB SERPL-MCNC: 0.6 MG/DL (ref 0–1)
BUN BLDV-MCNC: 25 MG/DL (ref 7–20)
CALCIUM SERPL-MCNC: 10.5 MG/DL (ref 8.3–10.6)
CHLORIDE BLD-SCNC: 105 MMOL/L (ref 99–110)
CHOLESTEROL, TOTAL: 164 MG/DL (ref 0–199)
CO2: 24 MMOL/L (ref 21–32)
CREAT SERPL-MCNC: 0.8 MG/DL (ref 0.6–1.2)
EOSINOPHILS ABSOLUTE: 0.2 K/UL (ref 0–0.6)
EOSINOPHILS RELATIVE PERCENT: 5.2 %
FERRITIN: 180.2 NG/ML (ref 15–150)
GFR AFRICAN AMERICAN: >60
GFR NON-AFRICAN AMERICAN: >60
GLUCOSE BLD-MCNC: 87 MG/DL (ref 70–99)
HCT VFR BLD CALC: 39.8 % (ref 36–48)
HDLC SERPL-MCNC: 63 MG/DL (ref 40–60)
HEMOGLOBIN: 13.4 G/DL (ref 12–16)
IRON SATURATION: 44 % (ref 15–50)
IRON: 102 UG/DL (ref 37–145)
LDL CHOLESTEROL CALCULATED: 87 MG/DL
LYMPHOCYTES ABSOLUTE: 1 K/UL (ref 1–5.1)
LYMPHOCYTES RELATIVE PERCENT: 22.7 %
MCH RBC QN AUTO: 32.4 PG (ref 26–34)
MCHC RBC AUTO-ENTMCNC: 33.7 G/DL (ref 31–36)
MCV RBC AUTO: 96.2 FL (ref 80–100)
MONOCYTES ABSOLUTE: 0.4 K/UL (ref 0–1.3)
MONOCYTES RELATIVE PERCENT: 10.1 %
NEUTROPHILS ABSOLUTE: 2.7 K/UL (ref 1.7–7.7)
NEUTROPHILS RELATIVE PERCENT: 60.9 %
PDW BLD-RTO: 13.8 % (ref 12.4–15.4)
PLATELET # BLD: 158 K/UL (ref 135–450)
PMV BLD AUTO: 9.9 FL (ref 5–10.5)
POTASSIUM SERPL-SCNC: 4.5 MMOL/L (ref 3.5–5.1)
RBC # BLD: 4.14 M/UL (ref 4–5.2)
SODIUM BLD-SCNC: 141 MMOL/L (ref 136–145)
TOTAL IRON BINDING CAPACITY: 234 UG/DL (ref 260–445)
TOTAL PROTEIN: 7.1 G/DL (ref 6.4–8.2)
TRIGL SERPL-MCNC: 69 MG/DL (ref 0–150)
TSH SERPL DL<=0.05 MIU/L-ACNC: 3.82 UIU/ML (ref 0.27–4.2)
VLDLC SERPL CALC-MCNC: 14 MG/DL
WBC # BLD: 4.4 K/UL (ref 4–11)

## 2022-06-09 PROCEDURE — 85025 COMPLETE CBC W/AUTO DIFF WBC: CPT

## 2022-06-09 PROCEDURE — 83540 ASSAY OF IRON: CPT

## 2022-06-09 PROCEDURE — 36415 COLL VENOUS BLD VENIPUNCTURE: CPT

## 2022-06-09 PROCEDURE — 84443 ASSAY THYROID STIM HORMONE: CPT

## 2022-06-09 PROCEDURE — 80061 LIPID PANEL: CPT

## 2022-06-09 PROCEDURE — 80053 COMPREHEN METABOLIC PANEL: CPT

## 2022-06-09 PROCEDURE — 83550 IRON BINDING TEST: CPT

## 2022-06-09 PROCEDURE — 82728 ASSAY OF FERRITIN: CPT

## 2022-12-05 ENCOUNTER — TELEPHONE (OUTPATIENT)
Dept: ORTHOPEDIC SURGERY | Age: 82
End: 2022-12-05

## 2022-12-05 ENCOUNTER — OFFICE VISIT (OUTPATIENT)
Dept: ORTHOPEDIC SURGERY | Age: 82
End: 2022-12-05
Payer: MEDICARE

## 2022-12-05 VITALS — HEIGHT: 64 IN | WEIGHT: 207 LBS | BODY MASS INDEX: 35.34 KG/M2 | RESPIRATION RATE: 16 BRPM

## 2022-12-05 DIAGNOSIS — Z96.612 STATUS POST REVERSE TOTAL SHOULDER REPLACEMENT, LEFT: Primary | ICD-10-CM

## 2022-12-05 PROCEDURE — 1036F TOBACCO NON-USER: CPT | Performed by: PHYSICIAN ASSISTANT

## 2022-12-05 PROCEDURE — 1090F PRES/ABSN URINE INCON ASSESS: CPT | Performed by: PHYSICIAN ASSISTANT

## 2022-12-05 PROCEDURE — G8427 DOCREV CUR MEDS BY ELIG CLIN: HCPCS | Performed by: PHYSICIAN ASSISTANT

## 2022-12-05 PROCEDURE — G8417 CALC BMI ABV UP PARAM F/U: HCPCS | Performed by: PHYSICIAN ASSISTANT

## 2022-12-05 PROCEDURE — 99213 OFFICE O/P EST LOW 20 MIN: CPT | Performed by: PHYSICIAN ASSISTANT

## 2022-12-05 PROCEDURE — 1123F ACP DISCUSS/DSCN MKR DOCD: CPT | Performed by: PHYSICIAN ASSISTANT

## 2022-12-05 PROCEDURE — G8400 PT W/DXA NO RESULTS DOC: HCPCS | Performed by: PHYSICIAN ASSISTANT

## 2022-12-05 PROCEDURE — G8484 FLU IMMUNIZE NO ADMIN: HCPCS | Performed by: PHYSICIAN ASSISTANT

## 2022-12-06 NOTE — PROGRESS NOTES
Subjective:      Patient ID: Reji Boyle is a 79 y.o. female who is here for follow up evaluation of left shoulder pain. She has a history of a prior left reverse shoulder arthroplasty performed 10/9/2019. She states last week while doing an activity she had a discomfort in the left shoulder. There was a rather intense pain that lasted for several moments. Pain gradually improved. Again last night she felt similar pain in the left shoulder when she stretched her arm. She is having left shoulder pain now that wax and wanes between 5 and 10/10 VAS. She does report falling multiple times in the past few weeks. Review Of Systems:   Constitutional: denies fever, chills, weight loss. MSK: denies pain in other joints, muscle aches. Neurological: denies numbness, tingling, weakness LUE.        Past Medical History:   Diagnosis Date    Arthritis     At high risk for falls     hx of falls    Constipation     Environmental allergies     Fall at home 01/25/2015    fell in garage, called 911 for assist to get up    GERD (gastroesophageal reflux disease)     History of anemia     Hyperlipidemia     Neuropathy of leg     left leg foot drop wears brace    OAB (overactive bladder)     UTI (lower urinary tract infection)        Family History   Problem Relation Age of Onset    Heart Disease Mother     High Blood Pressure Mother     Stroke Mother     Heart Disease Father     High Blood Pressure Father     Stroke Father        Past Surgical History:   Procedure Laterality Date    BACK SURGERY  12/16/10    L4-5 Left complete, radical facetectomy, L3-4-5 nerve root exploration and foraminotomy, pedicle screws    BREAST BIOPSY Right     x2    CARPAL TUNNEL RELEASE Left 12/13/2018    LEFT CARPAL TUNNEL RELEASE performed by Richard Presley MD at [facility] Right 8/22/2019    RIGHT CARPAL TUNNEL RELEASE performed by Richard Presley MD at [address] [facility],[address]  06/23/2011    HOT SNARE CECAL POLYPECTOMY    COLONOSCOPY  14    Colonoscopy. No biopsies. No polypectomies    CYSTOSCOPY N/A 2019    CYSTOSCOPY WITH INJECTION OF URETHRAL BULKING AGENT COAPTITE performed by Laron Ivory MD at [facility] Bilateral     cataract removal with lens implants    HYSTERECTOMY, VAGINAL      TVT    JOINT REPLACEMENT Bilateral     LAMINECTOMY      L3-4-5 decompression of nerve roots      PILONIDAL CYST EXCISION      SALPINGO-OOPHORECTOMY      bilateral    SHOULDER ARTHROPLASTY Right 14    SHOULDER SURGERY Left 10/9/2019    LEFT REVERSE TOTAL SHOULDER REPLACEMENT performed by Alize Mills MD at [facility]      right x2--mass in right sinus removed    SPINAL FUSION  12/16/10    posterior fusion, local bone graft    TOTAL KNEE ARTHROPLASTY  2010    left knee    TOTAL KNEE ARTHROPLASTY Right 2013    TUBAL LIGATION      UPPER GASTROINTESTINAL ENDOSCOPY  14    Esophagogastroduodenoscopy, with biopsy of the antrum       Social History     Occupational History    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   Tobacco Use    Smoking status: Former     Types: Cigarettes     Quit date: 3/11/1963     Years since quittin.7    Smokeless tobacco: Never    Tobacco comments:     quite age 20   Vaping Use    Vaping Use: Never used   Substance and Sexual Activity    Alcohol use: Yes     Comment: social--rare    Drug use: Never    Sexual activity: Not Currently       Current Outpatient Medications   Medication Sig Dispense Refill    docusate sodium (COLACE) 100 MG capsule Take 100 mg by mouth 2 times daily      gabapentin (NEURONTIN) 600 MG tablet Take 1 tablet by mouth 2 times daily for 30 days. 60 tablet 3    aspirin 81 MG EC tablet Take 1 tablet by mouth daily Hold for 14 days post shoulder surgery .  See new rx for aspirin and frequency for DVT prophylaxis for 14 days after surgery 30 tablet 3    aspirin EC 81 MG EC tablet Take 1 tablet by mouth 2 times daily for 14 days Please avoid missing doses. 28 tablet 0    fish oil-omega-3 fatty acids 1000 MG capsule Take 1 capsule by mouth daily HOLD for 14 days post shoulder surgery 1 capsule 0    Ascorbic Acid (VITAMIN C) 1000 MG tablet Take 1,000 mg by mouth daily       Cetirizine HCl 10 MG CAPS Take by mouth      alendronate (FOSAMAX) 70 MG tablet Take 70 mg by mouth every 7 days       cephALEXin (KEFLEX) 250 MG capsule Take 250 mg by mouth daily       pantoprazole (PROTONIX) 40 MG tablet Take 40 mg by mouth daily       B Complex-Biotin-FA (HM VITAMIN B100 COMPLEX PO) Take 1 capsule by mouth daily. Calcium Carb-Cholecalciferol (CALCIUM + D3) 600-200 MG-UNIT TABS Take 1 capsule by mouth 2 times daily       Vitamin D (CHOLECALCIFEROL) 1000 UNITS CAPS capsule Take 1,000 Units by mouth daily. polyethylene glycol (MIRALAX) PACK packet Take 17 g by mouth daily. fluticasone (FLONASE) 50 MCG/ACT nasal spray 2 sprays by Nasal route as needed. Multiple Vitamin (MULTIVITAMIN PO) Take  by mouth. simvastatin (ZOCOR) 20 MG tablet Take 20 mg by mouth nightly. GLUCOSAMINE-CHONDROITIN PO Take 600 mg by mouth daily. No current facility-administered medications for this visit. Objective:     She is alert, oriented x 3, pleasant, well nourished, developed and in no   acute distress. Resp 16   Ht 5' 4\" (1.626 m)   Wt 207 lb (93.9 kg)   BMI 35.53 kg/m²      Left shoulder exam:  Mild pain without crepitus with active or passive range of motion. No instability noted. Active forward flexion to about 110 degrees. Internal rotation to ASIS. External rotation 20 degrees. X Rays: performed in the office today:   AP, Y and Axillary of the left Shoulder: There is a Reverse Total Shoulder Arthroplasty present in good position without signs of failure.       Diagnosis:       ICD-10-CM    1. Status post reverse total shoulder replacement, left  Z96.612 XR SHOULDER LEFT (MIN 2 VIEWS)           Assessment and Plan: Assessment:  Left shoulder pain status post left reverse shoulder arthroplasty. No clinical or radiographic signs of failure. She has fallen several times due to her thoracic spinal myelopathy. I had an extensive discussion with Ms. Deanna Larsen regarding the natural history, etiology, and long term consequences of her condition. I have presented reasonable alternatives to the patient's proposed care, treatment, and services. Risks and benefits of the treatment options also reviewed in detail. I have outlined a treatment plan with them. She has had full opportunity to ask her questions. I have answered them all to her satisfaction. I feel that Ms. Deanna Larsen understands our discussion today. Plan:    PT-   A home exercise program was instructed today including ROM exercises and strengthening exercises. The patient verbalized understanding of these exercises as well as the importance of the exercise program to promote return of normal function. If pain intensifies or other problems arise you are to notify the office. Follow up- if symptoms have not resolved in 4-6 weeks. Call or return to clinic if these symptoms worsen or fail to improve as anticipated. Leanna Chris PA-C   Senior Physician Assistant   [facility]                                         Disclaimer: This note was generated with use of a verbal recognition program (DRAGON) and an attempt was made to check for errors. It is possible that there are still dictated errors within this office note. If so, please bring any significant errors to my attention for an addendum. All efforts were made to ensure that this office note is accurate.

## 2023-03-10 ENCOUNTER — HOSPITAL ENCOUNTER (EMERGENCY)
Age: 83
Discharge: HOME | End: 2023-03-10
Attending: EMERGENCY MEDICINE
Payer: MEDICARE

## 2023-03-10 VITALS
BODY MASS INDEX: 33.29 KG/M2 | RESPIRATION RATE: 18 BRPM | TEMPERATURE: 98.2 F | WEIGHT: 195 LBS | HEIGHT: 64 IN | HEART RATE: 80 BPM | DIASTOLIC BLOOD PRESSURE: 55 MMHG | OXYGEN SATURATION: 95 % | SYSTOLIC BLOOD PRESSURE: 119 MMHG

## 2023-03-10 DIAGNOSIS — R79.1 ELEVATED INR: Primary | ICD-10-CM

## 2023-03-10 DIAGNOSIS — D64.9 ANEMIA, UNSPECIFIED TYPE: ICD-10-CM

## 2023-03-10 LAB
A/G RATIO: 1 (ref 1.1–2.2)
ABO/RH: NORMAL
ALBUMIN SERPL-MCNC: 2.4 G/DL (ref 3.4–5)
ALP BLD-CCNC: 340 U/L (ref 40–129)
ALT SERPL-CCNC: 95 U/L (ref 10–40)
ANION GAP SERPL CALCULATED.3IONS-SCNC: 8 MMOL/L (ref 3–16)
ANTIBODY SCREEN: NORMAL
APTT: 56.4 SEC (ref 23–34.3)
AST SERPL-CCNC: 111 U/L (ref 15–37)
BASOPHILS ABSOLUTE: 0 K/UL (ref 0–0.2)
BASOPHILS RELATIVE PERCENT: 0.2 %
BILIRUB SERPL-MCNC: 0.3 MG/DL (ref 0–1)
BUN BLDV-MCNC: 22 MG/DL (ref 7–20)
CALCIUM SERPL-MCNC: 9.2 MG/DL (ref 8.3–10.6)
CHLORIDE BLD-SCNC: 99 MMOL/L (ref 99–110)
CO2: 27 MMOL/L (ref 21–32)
CREAT SERPL-MCNC: 0.6 MG/DL (ref 0.6–1.2)
EOSINOPHILS ABSOLUTE: 0.3 K/UL (ref 0–0.6)
EOSINOPHILS RELATIVE PERCENT: 3.1 %
GFR SERPL CREATININE-BSD FRML MDRD: >60 ML/MIN/{1.73_M2}
GLUCOSE BLD-MCNC: 103 MG/DL (ref 70–99)
HCT VFR BLD CALC: 25.4 % (ref 36–48)
HEMOGLOBIN: 8.3 G/DL (ref 12–16)
INR BLD: 4.38 (ref 0.87–1.14)
LYMPHOCYTES ABSOLUTE: 1.2 K/UL (ref 1–5.1)
LYMPHOCYTES RELATIVE PERCENT: 12.2 %
MCH RBC QN AUTO: 29.7 PG (ref 26–34)
MCHC RBC AUTO-ENTMCNC: 32.9 G/DL (ref 31–36)
MCV RBC AUTO: 90.4 FL (ref 80–100)
MONOCYTES ABSOLUTE: 1.2 K/UL (ref 0–1.3)
MONOCYTES RELATIVE PERCENT: 12.9 %
NEUTROPHILS ABSOLUTE: 6.9 K/UL (ref 1.7–7.7)
NEUTROPHILS RELATIVE PERCENT: 71.6 %
PDW BLD-RTO: 17.6 % (ref 12.4–15.4)
PLATELET # BLD: 297 K/UL (ref 135–450)
PMV BLD AUTO: 8 FL (ref 5–10.5)
POTASSIUM REFLEX MAGNESIUM: 3.6 MMOL/L (ref 3.5–5.1)
PROTHROMBIN TIME: 42.3 SEC (ref 11.7–14.5)
RBC # BLD: 2.81 M/UL (ref 4–5.2)
SODIUM BLD-SCNC: 134 MMOL/L (ref 136–145)
TOTAL PROTEIN: 4.8 G/DL (ref 6.4–8.2)
WBC # BLD: 9.7 K/UL (ref 4–11)

## 2023-03-10 PROCEDURE — 80053 COMPREHEN METABOLIC PANEL: CPT

## 2023-03-10 PROCEDURE — 85610 PROTHROMBIN TIME: CPT

## 2023-03-10 PROCEDURE — 86850 RBC ANTIBODY SCREEN: CPT

## 2023-03-10 PROCEDURE — 86900 BLOOD TYPING SEROLOGIC ABO: CPT

## 2023-03-10 PROCEDURE — 99283 EMERGENCY DEPT VISIT LOW MDM: CPT

## 2023-03-10 PROCEDURE — 86901 BLOOD TYPING SEROLOGIC RH(D): CPT

## 2023-03-10 PROCEDURE — 85025 COMPLETE CBC W/AUTO DIFF WBC: CPT

## 2023-03-10 PROCEDURE — 85730 THROMBOPLASTIN TIME PARTIAL: CPT

## 2023-03-10 ASSESSMENT — PAIN DESCRIPTION - LOCATION: LOCATION: BACK

## 2023-03-10 ASSESSMENT — LIFESTYLE VARIABLES
HOW OFTEN DO YOU HAVE A DRINK CONTAINING ALCOHOL: NEVER
HOW MANY STANDARD DRINKS CONTAINING ALCOHOL DO YOU HAVE ON A TYPICAL DAY: PATIENT DOES NOT DRINK

## 2023-03-10 ASSESSMENT — PAIN SCALES - GENERAL: PAINLEVEL_OUTOF10: 6

## 2023-03-10 ASSESSMENT — PAIN - FUNCTIONAL ASSESSMENT: PAIN_FUNCTIONAL_ASSESSMENT: 0-10

## 2023-03-10 NOTE — ED NOTES
Pt report given to [facility], states no questions or concerns.       Nora Gibson RN  03/10/23 [id number]

## 2023-03-10 NOTE — ED PROVIDER NOTES
[facility] Emergency Department      Pt Name: Adamaris Bustamante  MRN: [id number]  [place] 1940  Date of evaluation: 3/10/2023  Provider: Wilma Bautista MD  CHIEF COMPLAINT  Chief Complaint   Patient presents with    Abnormal Lab     Pt sent by EMS from [address] [place] [address], there for rehab from back surgery on March 2nd, fusion in Lumbar spine, pt sent for INR at 7.0, on coumadin, got a shot of vitamin K last night 2 mg     HPI  Adamaris Bustamante is a 79 y.o. female who presents because of elevated INR. She is at [address] [place] [address] [place] for rehab from back surgery done on March 2. She is on warfarin to prevent blood clot. She had labs done yesterday and her INR was noted to be over 5. However, the blood draw was taken after she received her evening dose of warfarin. From the med rec, she is also on Lovenox shots. She has not noticed any abnormal bleeding. Denies any increase in pain. She had labs done again this morning and her INR was found to be 7 so she was sent in for evaluation. REVIEW OF SYSTEMS:  No fever, no new weakness, no bleeding , no sob Pertinent positives and negatives as per the HPI. All other pertinent review of systems reviewed and negative. Nursing notes reviewed.     PAST MEDICAL HISTORY  Past Medical History:   Diagnosis Date    Arthritis     At high risk for falls     hx of falls    Constipation     Environmental allergies     Fall at home 01/25/2015    fell in garage, called 911 for assist to get up    GERD (gastroesophageal reflux disease)     History of anemia     Hyperlipidemia     Neuropathy of leg     left leg foot drop wears brace    OAB (overactive bladder)     UTI (lower urinary tract infection)      SURGICAL HISTORY  Past Surgical History:   Procedure Laterality Date    BACK SURGERY  12/16/10    L4-5 Left complete, radical facetectomy, L3-4-5 nerve root exploration and foraminotomy, pedicle screws    BREAST BIOPSY Right     x2    CARPAL TUNNEL RELEASE Left 12/13/2018 LEFT CARPAL TUNNEL RELEASE performed by Jason Cheek MD at [facility] Right 8/22/2019    RIGHT CARPAL TUNNEL RELEASE performed by Jason Cheek MD at [address] [facility],[address]  06/23/2011    HOT SNARE CECAL POLYPECTOMY    COLONOSCOPY  9-8-14    Colonoscopy. No biopsies. No polypectomies    CYSTOSCOPY N/A 7/1/2019    CYSTOSCOPY WITH INJECTION OF URETHRAL BULKING AGENT COAPTITE performed by Erik Aviles MD at [facility]. [facility] Bilateral     cataract removal with lens implants    HYSTERECTOMY, VAGINAL      TVT    JOINT REPLACEMENT Bilateral     LAMINECTOMY  2015    L3-4-5 decompression of nerve roots      PILONIDAL CYST EXCISION      SALPINGO-OOPHORECTOMY      bilateral    SHOULDER ARTHROPLASTY Right 6/11/14    SHOULDER SURGERY Left 10/9/2019    LEFT REVERSE TOTAL SHOULDER REPLACEMENT performed by Oj Moran MD at [facility]      right x2--mass in right sinus removed    SPINAL FUSION  12/16/10    posterior fusion, local bone graft    TOTAL KNEE ARTHROPLASTY  03/23/2010    left knee    TOTAL KNEE ARTHROPLASTY Right 03/12/2013    TUBAL LIGATION      UPPER GASTROINTESTINAL ENDOSCOPY  9-8-14    Esophagogastroduodenoscopy, with biopsy of the antrum     MEDICATIONS:  No current facility-administered medications on file prior to encounter. Current Outpatient Medications on File Prior to Encounter   Medication Sig Dispense Refill    docusate sodium (COLACE) 100 MG capsule Take 100 mg by mouth 2 times daily      gabapentin (NEURONTIN) 600 MG tablet Take 1 tablet by mouth 2 times daily for 30 days. 60 tablet 3    aspirin 81 MG EC tablet Take 1 tablet by mouth daily Hold for 14 days post shoulder surgery . See new rx for aspirin and frequency for DVT prophylaxis for 14 days after surgery 30 tablet 3    aspirin EC 81 MG EC tablet Take 1 tablet by mouth 2 times daily for 14 days Please avoid missing doses.  28 tablet 0    fish oil-omega-3 fatty acids 1000 MG capsule Take 1 capsule by mouth daily HOLD for 14 days post shoulder surgery 1 capsule 0    Ascorbic Acid (VITAMIN C) 1000 MG tablet Take 1,000 mg by mouth daily       Cetirizine HCl 10 MG CAPS Take by mouth      alendronate (FOSAMAX) 70 MG tablet Take 70 mg by mouth every 7 days       cephALEXin (KEFLEX) 250 MG capsule Take 250 mg by mouth daily       pantoprazole (PROTONIX) 40 MG tablet Take 40 mg by mouth daily       B Complex-Biotin-FA (HM VITAMIN B100 COMPLEX PO) Take 1 capsule by mouth daily. Calcium Carb-Cholecalciferol (CALCIUM + D3) 600-200 MG-UNIT TABS Take 1 capsule by mouth 2 times daily       Vitamin D (CHOLECALCIFEROL) 1000 UNITS CAPS capsule Take 1,000 Units by mouth daily. polyethylene glycol (MIRALAX) PACK packet Take 17 g by mouth daily. fluticasone (FLONASE) 50 MCG/ACT nasal spray 2 sprays by Nasal route as needed. Multiple Vitamin (MULTIVITAMIN PO) Take  by mouth. simvastatin (ZOCOR) 20 MG tablet Take 20 mg by mouth nightly. GLUCOSAMINE-CHONDROITIN PO Take 600 mg by mouth daily. ALLERGIES  Sulfa antibiotics, Tape [adhesive tape], and Thimerosal  FAMILY HISTORY  Family History   Problem Relation Age of Onset    Heart Disease Mother     High Blood Pressure Mother     Stroke Mother     Heart Disease Father     High Blood Pressure Father     Stroke Father      SOCIAL HISTORY:  Social History     Tobacco Use    Smoking status: Former     Types: Cigarettes     Quit date: 3/11/1963     Years since quittin.0    Smokeless tobacco: Never    Tobacco comments:     quite age 20   Vaping Use    Vaping Use: Never used   Substance Use Topics    Alcohol use: Yes     Comment: social--rare    Drug use: Never     IMMUNIZATIONS:  Noncontributory    PHYSICAL EXAM  VITAL SIGNS:  Blood pressure 122/60, pulse 82, temperature 98.2 °F (36.8 °C), temperature source Oral, resp. rate 18, height 5' 4\" (1.626 m), weight 195 lb (88.5 kg), SpO2 96 %.   Constitutional:  79 y.o. female alert, cooperative  HENT:  Atraumatic, mucous membranes moist  Eyes:   Conjunctiva clear, no icterus  Neck:  Supple, no JVD, no signs of injury  Cardiovascular:  Regular, no rubs  Thorax & Lungs:  No accessory muscle usage, clear  Abdomen:  Soft, non distended, bowel sounds present, NT  Back:  No deformity, ML scar with staples in place, some bruising to lower back and slight ooze of serosanguinous material to the lower part of the wound  Genitalia:  Deferred  Rectal:  Deferred  Extremities:  No cyanosis, no edema  Skin:  Exposed areas warm, dry  Neurologic:  Alert, no slurred speech  Psychiatric:  Affect appropriate    RESULTS / MEDICAL DECISION MAKING:  Labs resulted at the time of this note reviewed.   Labs Reviewed   CBC WITH AUTO DIFFERENTIAL - Abnormal; Notable for the following components:       Result Value    RBC 2.81 (*)     Hemoglobin 8.3 (*)     Hematocrit 25.4 (*)     RDW 17.6 (*)     All other components within normal limits   COMPREHENSIVE METABOLIC PANEL W/ REFLEX TO MG FOR LOW K - Abnormal; Notable for the following components:    Sodium 134 (*)     Glucose 103 (*)     BUN 22 (*)     Total Protein 4.8 (*)     Albumin 2.4 (*)     Albumin/Globulin Ratio 1.0 (*)     Alkaline Phosphatase 340 (*)     ALT 95 (*)      (*)     All other components within normal limits   PROTIME-INR - Abnormal; Notable for the following components:    Protime 42.3 (*)     INR 4.38 (*)     All other components within normal limits   APTT - Abnormal; Notable for the following components:    aPTT 56.4 (*)     All other components within normal limits   TYPE AND SCREEN     RADIOLOGY:  None     ED COURSE:    Vitals:    03/10/23 1730 03/10/23 1800 03/10/23 1830 03/10/23 1900   BP: (!) 117/103 (!) 112/51 (!) 106/53 (!) 119/55   Pulse: 79   80   Resp:       Temp:       TempSrc:       SpO2: 92%   95%   Weight:       Height:         History from : Patient  Limitations to history : None  Chronic Conditions:  has a past medical history of Arthritis, At high risk for falls, Constipation, Environmental allergies, Fall at home, GERD (gastroesophageal reflux disease), History of anemia, Hyperlipidemia, Neuropathy of leg, OAB (overactive bladder), and UTI (lower urinary tract infection). Records Reviewed : Outpatient Notes hgb post op in 9 range  Disposition Considerations (Tests not ordered but considered, Shared Decision Making, Pt Expectation of Test or Tx.):  MRI not deemed clinically necessary in the ED setting  Discussion with Other Profesionals : None    PROCEDURES:  None    CRITICAL CARE:  None    CONSULTATIONS:  None    Bryan Morel is a 79 y.o. female who presented because of elevated INR. It remains elevated though improved from 7 after vitamin K dose. We advised to stop the lovenox shots as pt is supratherapeutic on warfarin now. Hold tonight's dose, further instruction as per her prescribing provider. She is anemic though is post op from back procedure and was noted to be anemic post operatively as well. Caregivers to be given appropriate discharge instructions through nursing report to the nursing home. Referral to nursing home provider for follow up care. FOLLOW UP:    Tamara Diop DO  [address]  [phone]    Schedule an appointment as soon as possible for a visit       [facility] Emergency Department  [address]  Go to   If symptoms worsen    FINAL IMPRESSION:    1. Elevated INR    2. Anemia, unspecified type        (Please note that I used voice recognition software to generate this note.   Occasionally words are mistranscribed despite my efforts to edit errors.)        Felix Olea MD  03/11/23 4070

## 2023-03-11 NOTE — ED NOTES
Pt report given to [facility] at this time.       [facility], RN  03/10/23 4222

## 2023-03-19 ENCOUNTER — HOSPITAL ENCOUNTER (EMERGENCY)
Age: 83
Discharge: ANOTHER HOSPITAL | End: 2023-03-19
Attending: EMERGENCY MEDICINE
Payer: MEDICARE

## 2023-03-19 ENCOUNTER — APPOINTMENT (OUTPATIENT)
Dept: CT IMAGING | Age: 83
End: 2023-03-19
Payer: MEDICARE

## 2023-03-19 VITALS
DIASTOLIC BLOOD PRESSURE: 69 MMHG | TEMPERATURE: 98.9 F | OXYGEN SATURATION: 95 % | HEART RATE: 116 BPM | BODY MASS INDEX: 37.76 KG/M2 | WEIGHT: 220 LBS | SYSTOLIC BLOOD PRESSURE: 119 MMHG | RESPIRATION RATE: 19 BRPM

## 2023-03-19 DIAGNOSIS — K92.2 ACUTE GI BLEEDING: Primary | ICD-10-CM

## 2023-03-19 DIAGNOSIS — Z79.01 ANTICOAGULATED: ICD-10-CM

## 2023-03-19 DIAGNOSIS — T81.31XA DEHISCENCE OF OPERATIVE WOUND, INITIAL ENCOUNTER: ICD-10-CM

## 2023-03-19 DIAGNOSIS — I48.0 PAROXYSMAL ATRIAL FIBRILLATION (HCC): ICD-10-CM

## 2023-03-19 DIAGNOSIS — T79.2XXA POST-TRAUMATIC SEROMA (HCC): ICD-10-CM

## 2023-03-19 LAB
ABO + RH BLD: NORMAL
ABO + RH BLD: NORMAL
ANION GAP SERPL CALCULATED.3IONS-SCNC: 7 MMOL/L (ref 3–16)
BASOPHILS # BLD: 0 K/UL (ref 0–0.2)
BASOPHILS NFR BLD: 0.5 %
BLD GP AB SCN SERPL QL: NORMAL
BUN SERPL-MCNC: 22 MG/DL (ref 7–20)
CALCIUM SERPL-MCNC: 9.2 MG/DL (ref 8.3–10.6)
CHLORIDE SERPL-SCNC: 101 MMOL/L (ref 99–110)
CO2 SERPL-SCNC: 28 MMOL/L (ref 21–32)
CREAT SERPL-MCNC: 0.5 MG/DL (ref 0.6–1.2)
DEPRECATED RDW RBC AUTO: 17.6 % (ref 12.4–15.4)
EOSINOPHIL # BLD: 0.1 K/UL (ref 0–0.6)
EOSINOPHIL NFR BLD: 0.8 %
FOLATE SERPL-MCNC: >20 NG/ML (ref 4.78–24.2)
GFR SERPLBLD CREATININE-BSD FMLA CKD-EPI: >60 ML/MIN/{1.73_M2}
GLUCOSE SERPL-MCNC: 108 MG/DL (ref 70–99)
HCT VFR BLD AUTO: 19.5 % (ref 36–48)
HCT VFR BLD AUTO: 21.2 % (ref 36–48)
HEMOCCULT STL QL: ABNORMAL
HGB BLD-MCNC: 6.5 G/DL (ref 12–16)
IMMATURE RETIC FRACT: 0.58 (ref 0.21–0.37)
INR PPP: 4.57 (ref 0.87–1.14)
LYMPHOCYTES # BLD: 0.6 K/UL (ref 1–5.1)
LYMPHOCYTES NFR BLD: 8.9 %
MAGNESIUM SERPL-MCNC: 2.8 MG/DL (ref 1.8–2.4)
MCH RBC QN AUTO: 29.6 PG (ref 26–34)
MCHC RBC AUTO-ENTMCNC: 33.3 G/DL (ref 31–36)
MCV RBC AUTO: 89 FL (ref 80–100)
MONOCYTES # BLD: 0.9 K/UL (ref 0–1.3)
MONOCYTES NFR BLD: 13.2 %
NEUTROPHILS # BLD: 5.4 K/UL (ref 1.7–7.7)
NEUTROPHILS NFR BLD: 76.6 %
PLATELET # BLD AUTO: 463 K/UL (ref 135–450)
PMV BLD AUTO: 6.6 FL (ref 5–10.5)
POTASSIUM SERPL-SCNC: 4.2 MMOL/L (ref 3.5–5.1)
PROTHROMBIN TIME: 43.8 SEC (ref 11.7–14.5)
RBC # BLD AUTO: 2.19 M/UL (ref 4–5.2)
RETICS # AUTO: 0.11 M/UL (ref 0.02–0.1)
RETICS/RBC NFR AUTO: 4.75 % (ref 0.5–2.18)
SODIUM SERPL-SCNC: 136 MMOL/L (ref 136–145)
TROPONIN T SERPL-MCNC: <0.01 NG/ML
VIT B12 SERPL-MCNC: 1260 PG/ML (ref 211–911)
WBC # BLD AUTO: 7.1 K/UL (ref 4–11)

## 2023-03-19 PROCEDURE — 86923 COMPATIBILITY TEST ELECTRIC: CPT

## 2023-03-19 PROCEDURE — 96374 THER/PROPH/DIAG INJ IV PUSH: CPT

## 2023-03-19 PROCEDURE — 82607 VITAMIN B-12: CPT

## 2023-03-19 PROCEDURE — 85025 COMPLETE CBC W/AUTO DIFF WBC: CPT

## 2023-03-19 PROCEDURE — 83735 ASSAY OF MAGNESIUM: CPT

## 2023-03-19 PROCEDURE — 2580000003 HC RX 258: Performed by: EMERGENCY MEDICINE

## 2023-03-19 PROCEDURE — 82746 ASSAY OF FOLIC ACID SERUM: CPT

## 2023-03-19 PROCEDURE — 85045 AUTOMATED RETICULOCYTE COUNT: CPT

## 2023-03-19 PROCEDURE — 36415 COLL VENOUS BLD VENIPUNCTURE: CPT

## 2023-03-19 PROCEDURE — 84484 ASSAY OF TROPONIN QUANT: CPT

## 2023-03-19 PROCEDURE — 83540 ASSAY OF IRON: CPT

## 2023-03-19 PROCEDURE — 93005 ELECTROCARDIOGRAM TRACING: CPT | Performed by: EMERGENCY MEDICINE

## 2023-03-19 PROCEDURE — 80048 BASIC METABOLIC PNL TOTAL CA: CPT

## 2023-03-19 PROCEDURE — 99285 EMERGENCY DEPT VISIT HI MDM: CPT

## 2023-03-19 PROCEDURE — 86900 BLOOD TYPING SEROLOGIC ABO: CPT

## 2023-03-19 PROCEDURE — 83550 IRON BINDING TEST: CPT

## 2023-03-19 PROCEDURE — 6360000002 HC RX W HCPCS: Performed by: EMERGENCY MEDICINE

## 2023-03-19 PROCEDURE — 82270 OCCULT BLOOD FECES: CPT

## 2023-03-19 PROCEDURE — 86901 BLOOD TYPING SEROLOGIC RH(D): CPT

## 2023-03-19 PROCEDURE — 6360000004 HC RX CONTRAST MEDICATION: Performed by: EMERGENCY MEDICINE

## 2023-03-19 PROCEDURE — C9113 INJ PANTOPRAZOLE SODIUM, VIA: HCPCS | Performed by: EMERGENCY MEDICINE

## 2023-03-19 PROCEDURE — 74177 CT ABD & PELVIS W/CONTRAST: CPT

## 2023-03-19 PROCEDURE — 85610 PROTHROMBIN TIME: CPT

## 2023-03-19 PROCEDURE — 86850 RBC ANTIBODY SCREEN: CPT

## 2023-03-19 RX ORDER — OXYCODONE HCL 10 MG/1
10 TABLET, FILM COATED, EXTENDED RELEASE ORAL 4 TIMES DAILY PRN
COMMUNITY

## 2023-03-19 RX ORDER — PANTOPRAZOLE SODIUM 40 MG/10ML
80 INJECTION, POWDER, LYOPHILIZED, FOR SOLUTION INTRAVENOUS ONCE
Status: COMPLETED | OUTPATIENT
Start: 2023-03-19 | End: 2023-03-19

## 2023-03-19 RX ORDER — SODIUM CHLORIDE 9 MG/ML
INJECTION, SOLUTION INTRAVENOUS PRN
Status: DISCONTINUED | OUTPATIENT
Start: 2023-03-19 | End: 2023-03-19 | Stop reason: HOSPADM

## 2023-03-19 RX ADMIN — PANTOPRAZOLE SODIUM 8 MG/HR: 40 INJECTION, POWDER, FOR SOLUTION INTRAVENOUS at 14:30

## 2023-03-19 RX ADMIN — IOPAMIDOL 75 ML: 755 INJECTION, SOLUTION INTRAVENOUS at 13:03

## 2023-03-19 RX ADMIN — PANTOPRAZOLE SODIUM 80 MG: 40 INJECTION, POWDER, FOR SOLUTION INTRAVENOUS at 14:07

## 2023-03-19 ASSESSMENT — PAIN - FUNCTIONAL ASSESSMENT: PAIN_FUNCTIONAL_ASSESSMENT: 0-10

## 2023-03-19 ASSESSMENT — PAIN SCALES - GENERAL: PAINLEVEL_OUTOF10: 5

## 2023-03-19 ASSESSMENT — PAIN DESCRIPTION - LOCATION: LOCATION: BACK

## 2023-03-19 ASSESSMENT — PAIN DESCRIPTION - ORIENTATION: ORIENTATION: LOWER

## 2023-03-19 NOTE — ED NOTES
Report given to first care, with IV protonix hanging at time of transfer. This nurse called report to Lainey Lopez RN at [facility] and told her that patient did not receive 1 unit of blood transfusion d/t blood bank called as patient was being transferred to transport stretcher at time blood was ready.       Emily Thakkar RN  03/19/23 2440

## 2023-03-19 NOTE — CONSENT
Informed Consent for Blood Component Transfusion Note    I have discussed with the patient the rationale for blood component transfusion; its benefits in treating or preventing fatigue, organ damage, or death; and its risk which includes mild transfusion reactions, rare risk of blood borne infection, or more serious but rare reactions. I have discussed the alternatives to transfusion, including the risk and consequences of not receiving transfusion. The patient had an opportunity to ask questions and had agreed to proceed with transfusion of blood components.     Electronically signed by Lefty Solorzano DO on 3/19/23 at 1:35 PM EDT

## 2023-03-19 NOTE — ED PROVIDER NOTES
radical facetectomy, L3-4-5 nerve root exploration and foraminotomy, pedicle screws    BREAST BIOPSY Right     x2    CARPAL TUNNEL RELEASE Left 12/13/2018    LEFT CARPAL TUNNEL RELEASE performed by Patricia Healy MD at [facility] Right 8/22/2019    RIGHT CARPAL TUNNEL RELEASE performed by Patricia Healy MD at [address] [facility]  06/23/2011    HOT SNARE CECAL POLYPECTOMY    COLONOSCOPY  9-8-14    Colonoscopy. No biopsies. No polypectomies    CYSTOSCOPY N/A 7/1/2019    CYSTOSCOPY WITH INJECTION OF URETHRAL BULKING AGENT COAPTITE performed by Olivia Ndiaye MD at [facility]. [facility] Bilateral     cataract removal with lens implants    HYSTERECTOMY, VAGINAL      TVT    JOINT REPLACEMENT Bilateral     LAMINECTOMY  2015    L3-4-5 decompression of nerve roots      PILONIDAL CYST EXCISION      SALPINGO-OOPHORECTOMY      bilateral    SHOULDER ARTHROPLASTY Right 6/11/14    SHOULDER SURGERY Left 10/9/2019    LEFT REVERSE TOTAL SHOULDER REPLACEMENT performed by Domonique Barrett MD at [facility]      right x2--mass in right sinus removed    SPINAL FUSION  12/16/10    posterior fusion, local bone graft    TOTAL KNEE ARTHROPLASTY  03/23/2010    left knee    TOTAL KNEE ARTHROPLASTY Right 03/12/2013    TUBAL LIGATION      UPPER GASTROINTESTINAL ENDOSCOPY  9-8-14    Esophagogastroduodenoscopy, with biopsy of the antrum       Home medications:   Discharge Medication List as of 3/19/2023  4:53 PM        CONTINUE these medications which have NOT CHANGED    Details   oxyCODONE (OXYCONTIN) 10 MG extended release tablet Take 10 mg by mouth 4 times daily as needed for Pain. Historical Med      docusate sodium (COLACE) 100 MG capsule Take 100 mg by mouth 2 times dailyHistorical Med      gabapentin (NEURONTIN) 600 MG tablet Take 1 tablet by mouth 2 times daily for 30 days. , Disp-60 tablet, R-3Print      aspirin 81 MG EC tablet Take 1 tablet by mouth daily Hold for 14 days post shoulder

## 2023-03-20 LAB
BLOOD BANK DISPENSE STATUS: NORMAL
BLOOD BANK PRODUCT CODE: NORMAL
BPU ID: NORMAL
DESCRIPTION BLOOD BANK: NORMAL
EKG ATRIAL RATE: 87 BPM
EKG DIAGNOSIS: NORMAL
EKG P AXIS: 41 DEGREES
EKG P-R INTERVAL: 138 MS
EKG Q-T INTERVAL: 268 MS
EKG QRS DURATION: 84 MS
EKG QTC CALCULATION (BAZETT): 322 MS
EKG R AXIS: 34 DEGREES
EKG T AXIS: -7 DEGREES
EKG VENTRICULAR RATE: 87 BPM
IRON SATN MFR SERPL: 9 % (ref 15–50)
IRON SERPL-MCNC: 17 UG/DL (ref 37–145)
TIBC SERPL-MCNC: 179 UG/DL (ref 260–445)

## 2023-03-20 PROCEDURE — 93010 ELECTROCARDIOGRAM REPORT: CPT | Performed by: INTERNAL MEDICINE

## 2023-07-25 PROCEDURE — 99285 EMERGENCY DEPT VISIT HI MDM: CPT

## 2023-07-26 ENCOUNTER — HOSPITAL ENCOUNTER (INPATIENT)
Age: 83
LOS: 7 days | Discharge: HOME | DRG: 981 | End: 2023-08-02
Attending: EMERGENCY MEDICINE | Admitting: STUDENT IN AN ORGANIZED HEALTH CARE EDUCATION/TRAINING PROGRAM
Payer: MEDICARE

## 2023-07-26 DIAGNOSIS — N30.00 ACUTE CYSTITIS WITHOUT HEMATURIA: Primary | ICD-10-CM

## 2023-07-26 DIAGNOSIS — Z86.19 HISTORY OF CLOSTRIDIOIDES DIFFICILE COLITIS: ICD-10-CM

## 2023-07-26 DIAGNOSIS — E27.8 ADRENAL NODULE (HCC): ICD-10-CM

## 2023-07-26 DIAGNOSIS — R53.81 MALAISE: ICD-10-CM

## 2023-07-26 DIAGNOSIS — I95.9 HYPOTENSION, UNSPECIFIED HYPOTENSION TYPE: ICD-10-CM

## 2023-07-26 DIAGNOSIS — B37.49 YEAST UTI: ICD-10-CM

## 2023-07-26 DIAGNOSIS — R79.89 TROPONIN LEVEL ELEVATED: ICD-10-CM

## 2023-07-26 PROBLEM — Z78.9 NURSING HOME RESIDENT: Status: ACTIVE | Noted: 2023-07-26

## 2023-07-26 PROBLEM — N39.0 URINARY TRACT INFECTION ASSOCIATED WITH INDWELLING URETHRAL CATHETER, INITIAL ENCOUNTER (HCC): Status: ACTIVE | Noted: 2023-07-26

## 2023-07-26 PROBLEM — S31.000A SACRAL WOUND: Status: ACTIVE | Noted: 2023-07-26

## 2023-07-26 PROBLEM — T83.511A URINARY TRACT INFECTION ASSOCIATED WITH INDWELLING URETHRAL CATHETER, INITIAL ENCOUNTER (HCC): Status: ACTIVE | Noted: 2023-07-26

## 2023-07-26 PROBLEM — E66.09 CLASS 1 OBESITY DUE TO EXCESS CALORIES WITH BODY MASS INDEX (BMI) OF 32.0 TO 32.9 IN ADULT: Status: ACTIVE | Noted: 2023-07-26

## 2023-07-26 PROBLEM — R77.8 TROPONIN LEVEL ELEVATED: Status: ACTIVE | Noted: 2023-07-26

## 2023-07-26 PROBLEM — R41.82 ALTERED MENTAL STATUS: Status: ACTIVE | Noted: 2023-07-26

## 2023-07-26 PROBLEM — E66.811 CLASS 1 OBESITY DUE TO EXCESS CALORIES WITH BODY MASS INDEX (BMI) OF 32.0 TO 32.9 IN ADULT: Status: ACTIVE | Noted: 2023-07-26

## 2023-07-26 LAB
ALBUMIN SERPL-MCNC: 1.9 G/DL (ref 3.4–5)
ALBUMIN SERPL-MCNC: 2 G/DL (ref 3.4–5)
ALBUMIN/GLOB SERPL: 0.4 {RATIO} (ref 1.1–2.2)
ALP SERPL-CCNC: 212 U/L (ref 40–129)
ALP SERPL-CCNC: 262 U/L (ref 40–129)
ALT SERPL-CCNC: 51 U/L (ref 10–40)
ALT SERPL-CCNC: 68 U/L (ref 10–40)
ANION GAP SERPL CALCULATED.3IONS-SCNC: 8 MMOL/L (ref 3–16)
ANION GAP SERPL CALCULATED.3IONS-SCNC: 8 MMOL/L (ref 3–16)
AST SERPL-CCNC: 60 U/L (ref 15–37)
AST SERPL-CCNC: 99 U/L (ref 15–37)
BACTERIA URNS QL MICRO: ABNORMAL /HPF
BASOPHILS # BLD: 0.1 K/UL (ref 0–0.2)
BASOPHILS NFR BLD: 0.7 %
BILIRUB DIRECT SERPL-MCNC: <0.2 MG/DL (ref 0–0.3)
BILIRUB INDIRECT SERPL-MCNC: ABNORMAL MG/DL (ref 0–1)
BILIRUB SERPL-MCNC: 0.4 MG/DL (ref 0–1)
BILIRUB SERPL-MCNC: <0.2 MG/DL (ref 0–1)
BILIRUB UR QL STRIP.AUTO: NEGATIVE
BUDDING YEAST: PRESENT
BUN SERPL-MCNC: 15 MG/DL (ref 7–20)
BUN SERPL-MCNC: 15 MG/DL (ref 7–20)
C DIFF TOX A+B STL QL IA: NORMAL
CALCIUM OXALATE CRYSTALS: PRESENT
CALCIUM SERPL-MCNC: 9.3 MG/DL (ref 8.3–10.6)
CALCIUM SERPL-MCNC: 9.5 MG/DL (ref 8.3–10.6)
CHLORIDE SERPL-SCNC: 102 MMOL/L (ref 99–110)
CHLORIDE SERPL-SCNC: 107 MMOL/L (ref 99–110)
CLARITY UR: ABNORMAL
CO2 SERPL-SCNC: 24 MMOL/L (ref 21–32)
CO2 SERPL-SCNC: 24 MMOL/L (ref 21–32)
COLOR UR: YELLOW
CREAT SERPL-MCNC: <0.5 MG/DL (ref 0.6–1.2)
CREAT SERPL-MCNC: <0.5 MG/DL (ref 0.6–1.2)
CRP SERPL-MCNC: 66.2 MG/L (ref 0–5.1)
DEPRECATED RDW RBC AUTO: 21.9 % (ref 12.4–15.4)
DEPRECATED RDW RBC AUTO: 22.1 % (ref 12.4–15.4)
EKG ATRIAL RATE: 68 BPM
EKG DIAGNOSIS: NORMAL
EKG P AXIS: 23 DEGREES
EKG P-R INTERVAL: 148 MS
EKG Q-T INTERVAL: 426 MS
EKG QRS DURATION: 100 MS
EKG QTC CALCULATION (BAZETT): 452 MS
EKG R AXIS: 5 DEGREES
EKG T AXIS: -10 DEGREES
EKG VENTRICULAR RATE: 68 BPM
EOSINOPHIL # BLD: 0.1 K/UL (ref 0–0.6)
EOSINOPHIL NFR BLD: 0.9 %
EPI CELLS #/AREA URNS AUTO: 1 /HPF (ref 0–5)
ERYTHROCYTE [SEDIMENTATION RATE] IN BLOOD BY WESTERGREN METHOD: 84 MM/HR (ref 0–30)
FOLATE SERPL-MCNC: >20 NG/ML (ref 4.78–24.2)
GFR SERPLBLD CREATININE-BSD FMLA CKD-EPI: >60 ML/MIN/{1.73_M2}
GFR SERPLBLD CREATININE-BSD FMLA CKD-EPI: >60 ML/MIN/{1.73_M2}
GLUCOSE SERPL-MCNC: 110 MG/DL (ref 70–99)
GLUCOSE SERPL-MCNC: 87 MG/DL (ref 70–99)
GLUCOSE UR STRIP.AUTO-MCNC: NEGATIVE MG/DL
HCT VFR BLD AUTO: 25.7 % (ref 36–48)
HCT VFR BLD AUTO: 29 % (ref 36–48)
HGB BLD-MCNC: 8 G/DL (ref 12–16)
HGB BLD-MCNC: 9.2 G/DL (ref 12–16)
HGB UR QL STRIP.AUTO: ABNORMAL
HYALINE CASTS #/AREA URNS AUTO: 12 /LPF (ref 0–8)
HYALINE CASTS: PRESENT
INR PPP: 1.15 (ref 0.84–1.16)
KETONES UR STRIP.AUTO-MCNC: NEGATIVE MG/DL
LACTATE BLDV-SCNC: 0.9 MMOL/L (ref 0.4–2)
LACTATE BLDV-SCNC: 1.1 MMOL/L (ref 0.4–1.9)
LEUKOCYTE ESTERASE UR QL STRIP.AUTO: ABNORMAL
LYMPHOCYTES # BLD: 1 K/UL (ref 1–5.1)
LYMPHOCYTES NFR BLD: 12.3 %
MAGNESIUM SERPL-MCNC: 2 MG/DL (ref 1.8–2.4)
MCH RBC QN AUTO: 25.7 PG (ref 26–34)
MCH RBC QN AUTO: 26.1 PG (ref 26–34)
MCHC RBC AUTO-ENTMCNC: 31.3 G/DL (ref 31–36)
MCHC RBC AUTO-ENTMCNC: 31.5 G/DL (ref 31–36)
MCV RBC AUTO: 82.3 FL (ref 80–100)
MCV RBC AUTO: 82.7 FL (ref 80–100)
MONOCYTES # BLD: 0.9 K/UL (ref 0–1.3)
MONOCYTES NFR BLD: 10.5 %
MUCUS: PRESENT
NEUTROPHILS # BLD: 6.4 K/UL (ref 1.7–7.7)
NEUTROPHILS NFR BLD: 75.6 %
NITRITE UR QL STRIP.AUTO: NEGATIVE
NT-PROBNP SERPL-MCNC: 559 PG/ML (ref 0–449)
PH UR STRIP.AUTO: 5.5 [PH] (ref 5–8)
PHOSPHATE SERPL-MCNC: 2.7 MG/DL (ref 2.5–4.9)
PLATELET # BLD AUTO: 370 K/UL (ref 135–450)
PLATELET # BLD AUTO: 384 K/UL (ref 135–450)
PMV BLD AUTO: 7.6 FL (ref 5–10.5)
PMV BLD AUTO: 8 FL (ref 5–10.5)
POTASSIUM SERPL-SCNC: 3 MMOL/L (ref 3.5–5.1)
POTASSIUM SERPL-SCNC: 4.8 MMOL/L (ref 3.5–5.1)
PROCALCITONIN SERPL IA-MCNC: 0.13 NG/ML (ref 0–0.15)
PROT SERPL-MCNC: 5.2 G/DL (ref 6.4–8.2)
PROT SERPL-MCNC: 6.3 G/DL (ref 6.4–8.2)
PROT UR STRIP.AUTO-MCNC: 30 MG/DL
PROTHROMBIN TIME: 14.7 SEC (ref 11.5–14.8)
RBC # BLD AUTO: 3.13 M/UL (ref 4–5.2)
RBC # BLD AUTO: 3.51 M/UL (ref 4–5.2)
RBC CLUMPS #/AREA URNS AUTO: 76 /HPF (ref 0–4)
REASON FOR REJECTION: NORMAL
REJECTED TEST: NORMAL
SODIUM SERPL-SCNC: 134 MMOL/L (ref 136–145)
SODIUM SERPL-SCNC: 139 MMOL/L (ref 136–145)
SP GR UR STRIP.AUTO: 1.01 (ref 1–1.03)
TROPONIN, HIGH SENSITIVITY: 62 NG/L (ref 0–14)
TROPONIN, HIGH SENSITIVITY: 63 NG/L (ref 0–14)
UA COMPLETE W REFLEX CULTURE PNL UR: YES
UA DIPSTICK W REFLEX MICRO PNL UR: YES
URN SPEC COLLECT METH UR: ABNORMAL
UROBILINOGEN UR STRIP-ACNC: 0.2 E.U./DL
VIT B12 SERPL-MCNC: 676 PG/ML (ref 211–911)
WBC # BLD AUTO: 7.1 K/UL (ref 4–11)
WBC # BLD AUTO: 8.5 K/UL (ref 4–11)
WBC #/AREA URNS AUTO: 785 /HPF (ref 0–5)
YEAST HYPHAE, UR: PRESENT

## 2023-07-26 PROCEDURE — APPNB30 APP NON BILLABLE TIME 0-30 MINS: Performed by: NURSE PRACTITIONER

## 2023-07-26 PROCEDURE — 82746 ASSAY OF FOLIC ACID SERUM: CPT

## 2023-07-26 PROCEDURE — 84145 PROCALCITONIN (PCT): CPT

## 2023-07-26 PROCEDURE — 2580000003 HC RX 258

## 2023-07-26 PROCEDURE — 85025 COMPLETE CBC W/AUTO DIFF WBC: CPT

## 2023-07-26 PROCEDURE — 2580000003 HC RX 258: Performed by: PHYSICIAN ASSISTANT

## 2023-07-26 PROCEDURE — 87077 CULTURE AEROBIC IDENTIFY: CPT

## 2023-07-26 PROCEDURE — 6370000000 HC RX 637 (ALT 250 FOR IP): Performed by: EMERGENCY MEDICINE

## 2023-07-26 PROCEDURE — 11046 DBRDMT MUSC&/FSCA EA ADDL: CPT | Performed by: SURGERY

## 2023-07-26 PROCEDURE — 0KBN0ZZ EXCISION OF RIGHT HIP MUSCLE, OPEN APPROACH: ICD-10-PCS | Performed by: SURGERY

## 2023-07-26 PROCEDURE — 96361 HYDRATE IV INFUSION ADD-ON: CPT

## 2023-07-26 PROCEDURE — 87449 NOS EACH ORGANISM AG IA: CPT

## 2023-07-26 PROCEDURE — 11043 DBRDMT MUSC&/FSCA 1ST 20/<: CPT | Performed by: SURGERY

## 2023-07-26 PROCEDURE — 6360000002 HC RX W HCPCS: Performed by: EMERGENCY MEDICINE

## 2023-07-26 PROCEDURE — 96374 THER/PROPH/DIAG INJ IV PUSH: CPT

## 2023-07-26 PROCEDURE — 87186 SC STD MICRODIL/AGAR DIL: CPT

## 2023-07-26 PROCEDURE — 87324 CLOSTRIDIUM AG IA: CPT

## 2023-07-26 PROCEDURE — 87040 BLOOD CULTURE FOR BACTERIA: CPT

## 2023-07-26 PROCEDURE — 82728 ASSAY OF FERRITIN: CPT

## 2023-07-26 PROCEDURE — 2580000003 HC RX 258: Performed by: EMERGENCY MEDICINE

## 2023-07-26 PROCEDURE — 81001 URINALYSIS AUTO W/SCOPE: CPT

## 2023-07-26 PROCEDURE — 80053 COMPREHEN METABOLIC PANEL: CPT

## 2023-07-26 PROCEDURE — 6370000000 HC RX 637 (ALT 250 FOR IP): Performed by: PHYSICIAN ASSISTANT

## 2023-07-26 PROCEDURE — 6360000002 HC RX W HCPCS: Performed by: INTERNAL MEDICINE

## 2023-07-26 PROCEDURE — 86140 C-REACTIVE PROTEIN: CPT

## 2023-07-26 PROCEDURE — 2580000003 HC RX 258: Performed by: INTERNAL MEDICINE

## 2023-07-26 PROCEDURE — 6360000002 HC RX W HCPCS: Performed by: PHYSICIAN ASSISTANT

## 2023-07-26 PROCEDURE — 83880 ASSAY OF NATRIURETIC PEPTIDE: CPT

## 2023-07-26 PROCEDURE — 96360 HYDRATION IV INFUSION INIT: CPT

## 2023-07-26 PROCEDURE — 99223 1ST HOSP IP/OBS HIGH 75: CPT | Performed by: INTERNAL MEDICINE

## 2023-07-26 PROCEDURE — 83735 ASSAY OF MAGNESIUM: CPT

## 2023-07-26 PROCEDURE — 93010 ELECTROCARDIOGRAM REPORT: CPT | Performed by: INTERNAL MEDICINE

## 2023-07-26 PROCEDURE — 0KBP0ZZ EXCISION OF LEFT HIP MUSCLE, OPEN APPROACH: ICD-10-PCS | Performed by: SURGERY

## 2023-07-26 PROCEDURE — 87086 URINE CULTURE/COLONY COUNT: CPT

## 2023-07-26 PROCEDURE — 36415 COLL VENOUS BLD VENIPUNCTURE: CPT

## 2023-07-26 PROCEDURE — 85610 PROTHROMBIN TIME: CPT

## 2023-07-26 PROCEDURE — 82607 VITAMIN B-12: CPT

## 2023-07-26 PROCEDURE — 85652 RBC SED RATE AUTOMATED: CPT

## 2023-07-26 PROCEDURE — 84100 ASSAY OF PHOSPHORUS: CPT

## 2023-07-26 PROCEDURE — P9047 ALBUMIN (HUMAN), 25%, 50ML: HCPCS | Performed by: INTERNAL MEDICINE

## 2023-07-26 PROCEDURE — 83540 ASSAY OF IRON: CPT

## 2023-07-26 PROCEDURE — 1200000000 HC SEMI PRIVATE

## 2023-07-26 PROCEDURE — 85027 COMPLETE CBC AUTOMATED: CPT

## 2023-07-26 PROCEDURE — 83550 IRON BINDING TEST: CPT

## 2023-07-26 PROCEDURE — 6370000000 HC RX 637 (ALT 250 FOR IP): Performed by: INTERNAL MEDICINE

## 2023-07-26 PROCEDURE — 83605 ASSAY OF LACTIC ACID: CPT

## 2023-07-26 PROCEDURE — 93005 ELECTROCARDIOGRAM TRACING: CPT | Performed by: EMERGENCY MEDICINE

## 2023-07-26 PROCEDURE — 84484 ASSAY OF TROPONIN QUANT: CPT

## 2023-07-26 RX ORDER — GABAPENTIN 300 MG/1
600 CAPSULE ORAL 3 TIMES DAILY
Status: DISCONTINUED | OUTPATIENT
Start: 2023-07-26 | End: 2023-08-02 | Stop reason: HOSPADM

## 2023-07-26 RX ORDER — ENOXAPARIN SODIUM 100 MG/ML
40 INJECTION SUBCUTANEOUS DAILY
Status: DISCONTINUED | OUTPATIENT
Start: 2023-07-26 | End: 2023-08-02 | Stop reason: HOSPADM

## 2023-07-26 RX ORDER — ATORVASTATIN CALCIUM 10 MG/1
10 TABLET, FILM COATED ORAL NIGHTLY
COMMUNITY

## 2023-07-26 RX ORDER — SODIUM CHLORIDE 9 MG/ML
INJECTION, SOLUTION INTRAVENOUS CONTINUOUS
Status: DISCONTINUED | OUTPATIENT
Start: 2023-07-26 | End: 2023-08-01

## 2023-07-26 RX ORDER — BACLOFEN 10 MG/1
10 TABLET ORAL 3 TIMES DAILY
Status: DISCONTINUED | OUTPATIENT
Start: 2023-07-26 | End: 2023-08-02 | Stop reason: HOSPADM

## 2023-07-26 RX ORDER — METOPROLOL SUCCINATE 100 MG/1
100 TABLET, EXTENDED RELEASE ORAL DAILY
Status: ON HOLD | COMMUNITY
Start: 2023-06-10 | End: 2023-08-02 | Stop reason: SDUPTHER

## 2023-07-26 RX ORDER — LACTOBACILLUS RHAMNOSUS GG 10B CELL
1 CAPSULE ORAL 2 TIMES DAILY WITH MEALS
Status: DISCONTINUED | OUTPATIENT
Start: 2023-07-26 | End: 2023-08-02 | Stop reason: HOSPADM

## 2023-07-26 RX ORDER — FUROSEMIDE 20 MG/1
20 TABLET ORAL DAILY
Status: ON HOLD | COMMUNITY
End: 2023-08-12 | Stop reason: HOSPADM

## 2023-07-26 RX ORDER — SODIUM CHLORIDE 9 MG/ML
INJECTION, SOLUTION INTRAVENOUS PRN
Status: DISCONTINUED | OUTPATIENT
Start: 2023-07-26 | End: 2023-08-02 | Stop reason: HOSPADM

## 2023-07-26 RX ORDER — VANCOMYCIN HYDROCHLORIDE 50 MG/ML
250 KIT ORAL EVERY 8 HOURS SCHEDULED
Status: DISCONTINUED | OUTPATIENT
Start: 2023-07-26 | End: 2023-07-26

## 2023-07-26 RX ORDER — ALBUMIN (HUMAN) 12.5 G/50ML
25 SOLUTION INTRAVENOUS EVERY 8 HOURS
Status: COMPLETED | OUTPATIENT
Start: 2023-07-26 | End: 2023-07-27

## 2023-07-26 RX ORDER — MIDODRINE HYDROCHLORIDE 5 MG/1
10 TABLET ORAL
Status: DISCONTINUED | OUTPATIENT
Start: 2023-07-26 | End: 2023-07-26

## 2023-07-26 RX ORDER — 0.9 % SODIUM CHLORIDE 0.9 %
500 INTRAVENOUS SOLUTION INTRAVENOUS ONCE
Status: COMPLETED | OUTPATIENT
Start: 2023-07-26 | End: 2023-07-26

## 2023-07-26 RX ORDER — SODIUM CHLORIDE 9 MG/ML
INJECTION, SOLUTION INTRAVENOUS
Status: COMPLETED
Start: 2023-07-26 | End: 2023-07-26

## 2023-07-26 RX ORDER — ONDANSETRON 2 MG/ML
4 INJECTION INTRAMUSCULAR; INTRAVENOUS EVERY 6 HOURS PRN
Status: DISCONTINUED | OUTPATIENT
Start: 2023-07-26 | End: 2023-08-02 | Stop reason: HOSPADM

## 2023-07-26 RX ORDER — ACETAMINOPHEN 325 MG/1
650 TABLET ORAL EVERY 6 HOURS PRN
Status: DISCONTINUED | OUTPATIENT
Start: 2023-07-26 | End: 2023-08-02 | Stop reason: HOSPADM

## 2023-07-26 RX ORDER — ATORVASTATIN CALCIUM 10 MG/1
10 TABLET, FILM COATED ORAL NIGHTLY
Status: DISCONTINUED | OUTPATIENT
Start: 2023-07-26 | End: 2023-08-02 | Stop reason: HOSPADM

## 2023-07-26 RX ORDER — GABAPENTIN 600 MG/1
600 TABLET ORAL 3 TIMES DAILY
COMMUNITY

## 2023-07-26 RX ORDER — CIPROFLOXACIN 500 MG/1
500 TABLET, FILM COATED ORAL 2 TIMES DAILY
Status: ON HOLD | COMMUNITY
End: 2023-08-02 | Stop reason: SDUPTHER

## 2023-07-26 RX ORDER — FLUCONAZOLE 100 MG/1
200 TABLET ORAL DAILY
Status: COMPLETED | OUTPATIENT
Start: 2023-07-27 | End: 2023-07-29

## 2023-07-26 RX ORDER — ACETAMINOPHEN 650 MG/1
650 SUPPOSITORY RECTAL EVERY 6 HOURS PRN
Status: DISCONTINUED | OUTPATIENT
Start: 2023-07-26 | End: 2023-08-02 | Stop reason: HOSPADM

## 2023-07-26 RX ORDER — SENNOSIDES A AND B 8.6 MG/1
1 TABLET, FILM COATED ORAL DAILY PRN
Status: DISCONTINUED | OUTPATIENT
Start: 2023-07-26 | End: 2023-08-02 | Stop reason: HOSPADM

## 2023-07-26 RX ORDER — ASPIRIN 81 MG/1
81 TABLET ORAL DAILY
Status: CANCELLED | OUTPATIENT
Start: 2023-07-26

## 2023-07-26 RX ORDER — LANOLIN ALCOHOL/MO/W.PET/CERES
325 CREAM (GRAM) TOPICAL 2 TIMES DAILY WITH MEALS
COMMUNITY

## 2023-07-26 RX ORDER — SENNOSIDES A AND B 8.6 MG/1
2 TABLET, FILM COATED ORAL DAILY
COMMUNITY
Start: 2023-06-10

## 2023-07-26 RX ORDER — BACLOFEN 10 MG/1
10 TABLET ORAL 3 TIMES DAILY
COMMUNITY

## 2023-07-26 RX ORDER — METRONIDAZOLE 250 MG/1
500 TABLET ORAL EVERY 8 HOURS SCHEDULED
Status: CANCELLED | OUTPATIENT
Start: 2023-07-26

## 2023-07-26 RX ORDER — LACTOBACILLUS RHAMNOSUS GG 10B CELL
1 CAPSULE ORAL DAILY
COMMUNITY
Start: 2023-06-10

## 2023-07-26 RX ORDER — MIDODRINE HYDROCHLORIDE 5 MG/1
15 TABLET ORAL
Status: DISCONTINUED | OUTPATIENT
Start: 2023-07-26 | End: 2023-07-30

## 2023-07-26 RX ORDER — SODIUM CHLORIDE 0.9 % (FLUSH) 0.9 %
5-40 SYRINGE (ML) INJECTION PRN
Status: DISCONTINUED | OUTPATIENT
Start: 2023-07-26 | End: 2023-08-02 | Stop reason: HOSPADM

## 2023-07-26 RX ORDER — VANCOMYCIN HYDROCHLORIDE 50 MG/ML
250 KIT ORAL EVERY 6 HOURS SCHEDULED
Status: DISCONTINUED | OUTPATIENT
Start: 2023-07-26 | End: 2023-08-02 | Stop reason: HOSPADM

## 2023-07-26 RX ORDER — POTASSIUM CHLORIDE 20 MEQ/1
20 TABLET, EXTENDED RELEASE ORAL DAILY
COMMUNITY
Start: 2023-06-10

## 2023-07-26 RX ORDER — VANCOMYCIN HYDROCHLORIDE 50 MG/ML
125 KIT ORAL EVERY 6 HOURS SCHEDULED
Status: CANCELLED | OUTPATIENT
Start: 2023-07-26

## 2023-07-26 RX ORDER — MIDODRINE HYDROCHLORIDE 5 MG/1
10 TABLET ORAL ONCE
Status: COMPLETED | OUTPATIENT
Start: 2023-07-26 | End: 2023-07-26

## 2023-07-26 RX ORDER — PANTOPRAZOLE SODIUM 40 MG/1
40 TABLET, DELAYED RELEASE ORAL DAILY
Status: DISCONTINUED | OUTPATIENT
Start: 2023-07-26 | End: 2023-08-02 | Stop reason: HOSPADM

## 2023-07-26 RX ORDER — 0.9 % SODIUM CHLORIDE 0.9 %
30 INTRAVENOUS SOLUTION INTRAVENOUS ONCE
Status: COMPLETED | OUTPATIENT
Start: 2023-07-26 | End: 2023-07-26

## 2023-07-26 RX ORDER — POTASSIUM CHLORIDE 20 MEQ/1
40 TABLET, EXTENDED RELEASE ORAL
Status: COMPLETED | OUTPATIENT
Start: 2023-07-26 | End: 2023-07-27

## 2023-07-26 RX ORDER — SODIUM HYPOCHLORITE 1.25 MG/ML
50 SOLUTION TOPICAL
COMMUNITY
Start: 2023-06-09

## 2023-07-26 RX ORDER — SODIUM CHLORIDE 9 MG/ML
INJECTION, SOLUTION INTRAVENOUS
Status: DISCONTINUED
Start: 2023-07-26 | End: 2023-07-26 | Stop reason: WASHOUT

## 2023-07-26 RX ORDER — FLUCONAZOLE 2 MG/ML
200 INJECTION, SOLUTION INTRAVENOUS ONCE
Status: COMPLETED | OUTPATIENT
Start: 2023-07-26 | End: 2023-07-26

## 2023-07-26 RX ORDER — POTASSIUM CHLORIDE 1.5 G/1.58G
20 POWDER, FOR SOLUTION ORAL DAILY
Status: ON HOLD | COMMUNITY
End: 2023-08-02 | Stop reason: HOSPADM

## 2023-07-26 RX ORDER — SODIUM CHLORIDE 9 MG/ML
INJECTION, SOLUTION INTRAVENOUS
Status: DISPENSED
Start: 2023-07-26 | End: 2023-07-27

## 2023-07-26 RX ORDER — SODIUM CHLORIDE 0.9 % (FLUSH) 0.9 %
5-40 SYRINGE (ML) INJECTION EVERY 12 HOURS SCHEDULED
Status: DISCONTINUED | OUTPATIENT
Start: 2023-07-26 | End: 2023-08-02 | Stop reason: HOSPADM

## 2023-07-26 RX ORDER — BISACODYL 10 MG
10 SUPPOSITORY, RECTAL RECTAL DAILY PRN
COMMUNITY
Start: 2023-06-09

## 2023-07-26 RX ADMIN — Medication 250 MG: at 07:24

## 2023-07-26 RX ADMIN — SODIUM CHLORIDE 500 ML: 9 INJECTION, SOLUTION INTRAVENOUS at 17:26

## 2023-07-26 RX ADMIN — GABAPENTIN 600 MG: 300 CAPSULE ORAL at 20:41

## 2023-07-26 RX ADMIN — BACLOFEN 10 MG: 10 TABLET ORAL at 09:49

## 2023-07-26 RX ADMIN — CEFTRIAXONE SODIUM 1000 MG: 1 INJECTION, POWDER, FOR SOLUTION INTRAMUSCULAR; INTRAVENOUS at 02:54

## 2023-07-26 RX ADMIN — MIDODRINE HYDROCHLORIDE 10 MG: 5 TABLET ORAL at 00:52

## 2023-07-26 RX ADMIN — BACLOFEN 10 MG: 10 TABLET ORAL at 20:41

## 2023-07-26 RX ADMIN — GABAPENTIN 600 MG: 300 CAPSULE ORAL at 09:49

## 2023-07-26 RX ADMIN — MEROPENEM 1000 MG: 1 INJECTION, POWDER, FOR SOLUTION INTRAVENOUS at 11:50

## 2023-07-26 RX ADMIN — PANTOPRAZOLE SODIUM 40 MG: 40 TABLET, DELAYED RELEASE ORAL at 09:50

## 2023-07-26 RX ADMIN — BACLOFEN 10 MG: 10 TABLET ORAL at 16:54

## 2023-07-26 RX ADMIN — SODIUM CHLORIDE 500 ML: 9 INJECTION, SOLUTION INTRAVENOUS at 11:56

## 2023-07-26 RX ADMIN — SODIUM CHLORIDE 1641 ML: 9 INJECTION, SOLUTION INTRAVENOUS at 01:06

## 2023-07-26 RX ADMIN — Medication 1 CAPSULE: at 09:49

## 2023-07-26 RX ADMIN — Medication 250 MG: at 16:54

## 2023-07-26 RX ADMIN — POTASSIUM CHLORIDE 40 MEQ: 1500 TABLET, EXTENDED RELEASE ORAL at 16:54

## 2023-07-26 RX ADMIN — GABAPENTIN 600 MG: 300 CAPSULE ORAL at 16:54

## 2023-07-26 RX ADMIN — SODIUM CHLORIDE, PRESERVATIVE FREE 10 ML: 5 INJECTION INTRAVENOUS at 09:50

## 2023-07-26 RX ADMIN — ALBUMIN (HUMAN) 25 G: 0.25 INJECTION, SOLUTION INTRAVENOUS at 18:50

## 2023-07-26 RX ADMIN — Medication 1 CAPSULE: at 16:54

## 2023-07-26 RX ADMIN — SODIUM CHLORIDE: 9 INJECTION, SOLUTION INTRAVENOUS at 05:49

## 2023-07-26 RX ADMIN — FLUCONAZOLE 200 MG: 200 INJECTION, SOLUTION INTRAVENOUS at 03:39

## 2023-07-26 RX ADMIN — ACETAMINOPHEN 650 MG: 325 TABLET ORAL at 18:28

## 2023-07-26 RX ADMIN — MIDODRINE HYDROCHLORIDE 10 MG: 5 TABLET ORAL at 11:53

## 2023-07-26 RX ADMIN — SODIUM CHLORIDE: 9 INJECTION, SOLUTION INTRAVENOUS at 11:48

## 2023-07-26 RX ADMIN — ENOXAPARIN SODIUM 40 MG: 100 INJECTION SUBCUTANEOUS at 09:51

## 2023-07-26 RX ADMIN — MIDODRINE HYDROCHLORIDE 15 MG: 5 TABLET ORAL at 17:20

## 2023-07-26 RX ADMIN — SODIUM CHLORIDE 500 ML: 9 INJECTION, SOLUTION INTRAVENOUS at 11:53

## 2023-07-26 RX ADMIN — POTASSIUM CHLORIDE 40 MEQ: 1500 TABLET, EXTENDED RELEASE ORAL at 11:54

## 2023-07-26 RX ADMIN — MIDODRINE HYDROCHLORIDE 10 MG: 5 TABLET ORAL at 16:54

## 2023-07-26 RX ADMIN — ATORVASTATIN CALCIUM 10 MG: 10 TABLET, FILM COATED ORAL at 20:41

## 2023-07-26 ASSESSMENT — ENCOUNTER SYMPTOMS
ABDOMINAL PAIN: 0
DIARRHEA: 1
BACK PAIN: 0
SHORTNESS OF BREATH: 0
RHINORRHEA: 0
COUGH: 0
SORE THROAT: 0
SINUS PRESSURE: 0
CONSTIPATION: 0
SINUS PAIN: 0
WHEEZING: 0
NAUSEA: 0
EYE REDNESS: 0
EYE DISCHARGE: 0

## 2023-07-26 ASSESSMENT — LIFESTYLE VARIABLES
HOW MANY STANDARD DRINKS CONTAINING ALCOHOL DO YOU HAVE ON A TYPICAL DAY: PATIENT DOES NOT DRINK
HOW OFTEN DO YOU HAVE A DRINK CONTAINING ALCOHOL: NEVER

## 2023-07-26 ASSESSMENT — PAIN SCALES - GENERAL: PAINLEVEL_OUTOF10: 0

## 2023-07-26 NOTE — ED NOTES
ED TO INPATIENT SBAR HANDOFF    Patient Name: Jesi Nance   :  1940  79 y.o. MRN:  [id number]  Preferred Name  [id number] [facility]  ED Room #:  ED-0008/08  Family/Caregiver Present no   Restraints no   Sitter no   Sepsis Risk Score Sepsis Risk Score: 1.01    Situation  Code Status: Full Code No additional code details. Allergies: Sulfa antibiotics, Tape [adhesive tape], and Thimerosal  Weight: Patient Vitals for the past 96 hrs (Last 3 readings):   Weight   23 0046 220 lb 7.4 oz (100 kg)     Arrived from: [facility]   Chief Complaint:   Chief Complaint   Patient presents with    Other     Pt arrives from [facility] via [employer]. Ems stated rich millard wanted her to come in because her bl;ood pressure was low and was difficult to arouse. Pt is a/o x4 in triage with bp 122/102   Pt is being treated for c. diff     Hospital Problem/Diagnosis:  Principal Problem:    Urinary tract infection associated with indwelling urethral catheter, initial encounter [facility])  Resolved Problems:    * No resolved hospital problems.  *    Imaging:   No orders to display     Abnormal labs:   Abnormal Labs Reviewed   CBC WITH AUTO DIFFERENTIAL - Abnormal; Notable for the following components:       Result Value    RBC 3.51 (*)     Hemoglobin 9.2 (*)     Hematocrit 29.0 (*)     RDW 22.1 (*)     All other components within normal limits   COMPREHENSIVE METABOLIC PANEL W/ REFLEX TO MG FOR LOW K - Abnormal; Notable for the following components:    Sodium 134 (*)     Glucose 110 (*)     Creatinine <0.5 (*)     Total Protein 6.3 (*)     Albumin 1.9 (*)     Albumin/Globulin Ratio 0.4 (*)     Alkaline Phosphatase 262 (*)     ALT 68 (*)     AST 99 (*)     All other components within normal limits   TROPONIN - Abnormal; Notable for the following components:    Troponin, High Sensitivity 62 (*)     All other components within normal limits   BRAIN NATRIURETIC PEPTIDE - Abnormal; Notable for the following components:    Pro- swallow:    Estephania Coma Scale (GCS): Estephania Coma Scale  Eye Opening: Spontaneous  Best Verbal Response: Oriented  Best Motor Response: Obeys commands  Estephania Coma Scale Score: 15  Active LDA's:   Peripheral IV 07/26/23 Left;Proximal Forearm (Active)       Peripheral IV 07/26/23 Proximal;Right Forearm (Active)     PO Status: Regular  Pertinent or High Risk Medications/Drips: no   If Yes, please provide details:     Pending Blood Product Administration: no       You may also review the ED PT Care Timeline found under the Summary Nursing Index tab. Recommendation    Pending orders   Plan for Discharge (if known):    Additional Comments:    If any further questions, please call Sending RN at [phone]    Electronically signed by: Electronically signed by Cary Nayak RN on 7/26/2023 at 3:46 AM       Cary Nayak RN  07/26/23 1438

## 2023-07-26 NOTE — ED PROVIDER NOTES
[facility] ([facility]) Emergency [address]    Taylor Finnegan MD, am the primary clinician of record. CHIEF COMPLAINT  Chief Complaint   Patient presents with    Other     [facility] Ems stated [facility] wanted her to come in because her bl;ood pressure was low and was difficult to arouse. Pt is a/o x4 in triage with bp 122/102   Pt is being treated for c. diff        HISTORY OF PRESENT ILLNESS  Maria Victoria Fair is a 79 y.o. female  who presents to the ED complaining of apparently difficulty being aroused, patient still feels fairly sleepy but engages easily in conversation. She says her BP was low there and although in triage here was 122/102, was down to 85/52 shortly after that. Tells me she has low BP the low 100's at baseline. No fevers recently. No dysuria, has an indwelling Antunez. Currently being treated for c-difficile diarrhea and this is improving with her treatment. She is not currently having any abdominal pains though. No CP or SOB. She says she might be dehydrated, no nausea/vomiting but poor PO intake/appetite. Currently on oral Flagyl and vancomycin for her C. difficile. He has a history of sacral decubitus ulcer as well. She lives at a nursing home. She actually tells me she does not feel acutely unwell. No other complaints, modifying factors or associated symptoms. I have reviewed the following from the nursing documentation.     Past Medical History:   Diagnosis Date    Arthritis     At high risk for falls     hx of falls    Constipation     Environmental allergies     Fall at home 01/25/2015    fell in garage, called 911 for assist to get up    GERD (gastroesophageal reflux disease)     History of anemia     Hyperlipidemia     Neuropathy of leg     left leg foot drop wears brace    OAB (overactive bladder)     UTI (lower urinary tract infection)      Past Surgical History:   Procedure Laterality Date    BACK SURGERY  12/16/10 present  Prior EKG comparison: EKG dated 3/19/23 is significantly different due to [facility]' pattern and nonspecific anterior changes    RADIOLOGY  Any applicable radiology studies including x-ray, CT, MRI, and/or ultrasound, were reviewed independently by me in addition to the radiologist.  I reviewed all radiology images and reports as well from this evaluation. No imaging performed      ED COURSE/MDM  Patient seen and evaluated. Old records reviewed. Labs and imaging reviewed. The patient's ED workup was notable for hypotension with some lethargy although arouses easily to conversation and on reassessment, after p.o. midodrine and IV fluid resuscitation, her blood pressure has stabilized and she is not toxic appearing. She has no SIRS criteria to suggest severe sepsis or septic shock at this time although does have a urinary tract infection with indwelling Antunez and yeast on her urine as well. She was given IV Rocephin and IV Diflucan for this. She complains of no abdominal pain and has no abdominal tenderness so I do not feel a CT abdomen and pelvis is currently warranted. She is currently being treated for C. difficile with oral Flagyl/vancomycin and says that it is improving and has not had diarrhea here. Patient will be admitted for further hemodynamic monitoring at this time. Of note, troponin is mildly elevated at 62, will be rechecked, EKG has some nonspecific changes compared to previous however has had no chest pain or anginal equivalent symptoms at any point and this may have been related to her hypotension and not related to acute coronary syndrome which does not clinically correlate with her presentation at all. Is this patient to be included in the SEP-1 Core Measure? No   Exclusion criteria - the patient is NOT to be included for SEP-1 Core Measure due to:   Alternative explanation for abnormal labs/vitals that do not relate to sepsis, see MDM for further explanation  NO SIRS criteria

## 2023-07-26 NOTE — CONSULTS
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  At this point, a VAC might be able to seal between the edges      Obed Joiner

## 2023-07-26 NOTE — CONSULTS
Infectious Diseases   Consult Note        Admission Date: 7/26/2023  Hospital Day: Hospital Day: 1   Attending: Sophia Styles MD  Date of service: 7/26/23     Reason for admission: Malaise [R53.81]  Troponin level elevated [R77.8]  Yeast UTI [B37.49]  Acute cystitis without hematuria [N30.00]  Hypotension, unspecified hypotension type [I95.9]  Urinary tract infection associated with indwelling urethral catheter, initial encounter ([address] ) [A88.914H, N39.0]  History of Clostridioides difficile colitis [Z86.19]    Chief complaint/ Reason for consult: C. difficile diarrhea, UTI    Microbiology:        I have reviewed allavailable micro lab data and cultures    Blood culture (2/2) - collected on 7/26/2023: In process    Urine culture  - collected on 7/26/2023: In process      Antibiotics and immunizations:       Current antibiotics: All antibiotics and their doses were reviewed by me    Recent Abx Admin                     meropenem (MERREM) 1,000 mg in sodium chloride 0.9 % 100 mL IVPB (mini-bag) (mg) 1,000 mg New Bag 07/26/23 1150    vancomycin MALORIE [facility]) 50 MG/ML oral solution 250 mg (mg) 250 mg Given 07/26/23 0724    fluconazole (DIFLUCAN) in 0.9 % sodium chloride IVPB 200 mg (mg) 200 mg New Bag 07/26/23 0339    cefTRIAXone (ROCEPHIN) 1,000 mg in sodium chloride 0.9 % 50 mL IVPB (mini-bag) (mg) 1,000 mg New Bag 07/26/23 0254                      Immunization History: All immunization history was reviewed by me today. Immunization History   Administered Date(s) Administered    COVID-19, PFIZER Bivalent, DO NOT Dilute, (age 12y+), IM, 30 mcg/0.3 mL 09/15/2022    Influenza Virus Vaccine 10/03/2013    Influenza Whole 10/01/2010    Pneumococcal Conjugate 7-valent (Lylia Daniela) 09/03/2012       Known drug allergies:      All allergies were reviewed and updated    Allergies   Allergen Reactions    Sulfa Antibiotics Hives    Tape [Adhesive Tape] Itching     Foam back tape, and cover roll/ tolerates adhesive tape CKTOTAL  ESR:   Lab Results   Component Value Date    SEDRATE 84 (H) 07/26/2023     CRP: No results found for: CRP      Imaging: All pertinent images and reports for the current visit were reviewed by me during this visit. I reviewed the chest x-ray/CT scan/MRI images and independently interpreted the findings and results today. No orders to display       Outside records:    Labs, Microbiology, Radiology and pertinent results from Care everywhere, if available, were reviewed as a part ofthe consultation.       Problem list:       Patient Active Problem List   Diagnosis Code    Previous back surgery Z98.890    Status post lumbar spinal fusion Z98.1    Spondylolisthesis of lumbar region M43.16    DDD (degenerative disc disease), lumbosacral M51.37    Stenosis, spinal, lumbar M48.061    Left wrist pain M25.532    left CMC arthritis, thumb, degenerative M18.9    De Quervain's disease (tenosynovitis) M65.4    CTS (carpal tunnel syndrome) G56.00    Postlaminectomy syndrome, lumbar region M96.1    Primary localized osteoarthrosis of shoulder region M19.019    Status post total shoulder replacement Z96.619    Pectoralis muscle strain S29.011A    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    Lumbar stenosis M48.061    Left groin pain R10.32    Strain of hip adductor muscle S76.019A    S/P reverse total shoulder arthroplasty, 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    History of total right knee replacement Z96.651    History of total left knee replacement Z96.652    Arthritis of left hip M16.12    Arthritis of left shoulder region M19.012    Other specified arthritis, left shoulder M13.812    Arthritis of right knee M17.11    Status post reverse total shoulder replacement, 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

## 2023-07-26 NOTE — PROGRESS NOTES
Nutrition Note    RECOMMENDATIONS  PO Diet: Continue current diet  ONS: Ordered Ensure HP BID    NUTRITION ASSESSMENT   Pt triggered for nutrition consult r/t wounds, ONS. Pt with increased protein needs AEB note of sacral decubitus ulcer. Upon visiting, reported poor appetite for the last couple of months with UBW around 200 lb, current documented wt of 189 lb. Unable to verify reported wt loss via wt hx in EMR. Willing to receive ONS to offer additional protein to promote wound healing. RD will order and continue to monitor for adequate po intake. Nutrition Related Findings: NS at 75 mL/hr. K+ 3.0 and replacement scheduled. LBM 7/25, GI WDL. No edema noted. Wounds: Wound Consult Pending (sacral decubitus ulcer)  Nutrition Education:  Education not indicated   Nutrition Goals: PO intake 50% or greater, by next RD assessment     MALNUTRITION ASSESSMENT   Chronic Illness  Malnutrition Status: Insufficient data    NUTRITION DIAGNOSIS   Increased nutrient needs related to increase demand for energy/nutrients as evidenced by wounds  Inadequate oral intake related to inadequate protein-energy intake as evidenced by poor intake prior to admission    CURRENT NUTRITION THERAPIES  ADULT DIET; Regular     PO Intake: Unable to assess   PO Supplement Intake:None Ordered    ANTHROPOMETRICS  Current Height: 5' 4\" (162.6 cm)  Current Weight - Scale: 189 lb 9.6 oz (86 kg)    Admission weight:    Ideal Body Weight (IBW): 120 lbs  (55 kg)    Usual Bodyweight 200 lb (90.7 kg)       BMI: 32.4    COMPARATIVE STANDARDS  Total Energy Requirements (kcals/day): 1100-9176     Protein (g):         Fluid (mL/day):  0769-3777    The patient will be monitored per nutrition standards of care. Consult dietitian if additional nutrition interventions are needed prior to RD reassessment.      Daniel Carlson, MS, RD, LD    Contact: [phone]

## 2023-07-26 NOTE — PROGRESS NOTES
Patients BP back down to 75/36. Patient given second dose of Midodrine per scheduled order. Patient with a low grade temp of 99.0. Patient is in bed resting with no s/s. Patient states she feels okay. This writer stayed in room for a while rechecked BP 90/46. This nurse messaged MD to notify. Awaiting response.

## 2023-07-26 NOTE — PROGRESS NOTES
Patient BP 87/48 and Potassium 3.0. This nurse messaged MD. MD was on the floor and came in to see the patient. N.O. obtained for Midodrine 10mg scheduled and K+ 40mEq 2xday. This writer administered both.

## 2023-07-26 NOTE — H&P
[facility] Medicine History & Physical      Patient Name: Hiram Sewell    : 1940    PCP: Jada Chaudhry DO    Date of Service:  Patient seen and examined on 2023     Chief Complaint:  Lethargy and hypotension    History Of Present Illness:    Hiram Sewell is a 79 y.o. female who presented to ED for evaluation of lethargy and hypotension. Patient reports her SBP runs around 100 at baseline. BP in ED 85/52, responsive to fluids and midodrine. Patient has a chronic indwelling catheter. 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 She has a sacral decubitus ulcer as well. Patient reports that she has not had diarrhea for a week. She denies abdominal pain, nausea, vomiting, but reports poor p.o. intake. She denies fever, dizziness, chest pain, shortness of breath, cough. Patient reports she feels generally fatigued but denies any other complaints or concerns at this time. Past Medical History:    Patient has a past medical history of Arthritis, At high risk for falls, Constipation, Environmental allergies, Fall at home, GERD (gastroesophageal reflux disease), History of anemia, Hyperlipidemia, Neuropathy of leg, OAB (overactive bladder), and UTI (lower urinary tract infection). Past Surgical History:    Patient has a past surgical history that includes Total knee arthroplasty (2010); Pilonidal cyst excision; Tubal ligation; sinus surgery; Hysterectomy, vaginal; Salpingo-oophorectomy; laminectomy (); Spinal fusion (12/16/10); Colonoscopy (2011); Total shoulder arthroplasty (Right, 14); Upper gastrointestinal endoscopy (14); Colonoscopy (14); Breast biopsy (Right); joint replacement (Bilateral); Total knee arthroplasty (Right, 2013);  Carpal tunnel release (Left, 2018); eye surgery (Bilateral); back surgery General appearance:  Awake, alert, no apparent distress  HEENT:  Normocephalic, atraumatic without obvious deformity. PERRL. EOM intact. Conjunctivae/corneas clear. Neck: Supple, with full range of motion. No JVD. Trachea midline. Respiratory:  Clear to auscultation bilaterally without rales, wheezes, or rhonchi. Normal respiratory effort. Cardiovascular:  Regular rate and rhythm without murmurs, rubs or gallops. Abdomen: Soft, NT, ND, without rebound or guarding. Normal bowel sounds. Extremities:  No clubbing, cyanosis, or edema bilaterally. Full range of motion without deformity. +2 palpable pulses, equal bilaterally. Capillary refill brisk,< 3 seconds   Skin: +Sacral decubitus ulcer  Neurologic:  +LE weakness, at baseline per patient. Otherwise neurovascularly intact without any focal sensory/motor deficits. Cranial nerves: II-XII intact, grossly non-focal. Alert and oriented x 3. Normal speech. Psychiatric:  Thought content appropriate, normal insight. Labs:   CBC   Recent Labs     07/26/23 0111   WBC 8.5   HGB 9.2*   HCT 29.0*           RENAL  Recent Labs     07/26/23 0111   *   K 4.8      CO2 24   BUN 15   CREATININE <0.5*       LFTS  Recent Labs     07/26/23 0111   AST 99*   ALT 68*   BILITOT 0.4   ALKPHOS 262*       COAG  Recent Labs     07/26/23 0111   INR 1.15       CARDIAC ENZYMES  No results for input(s): TROPONINI in the last 72 hours.     LIPIDS  Cholesterol, Total   Date/Time Value Ref Range Status   06/09/2022 12:00  0 - 199 mg/dL Final     Triglycerides   Date/Time Value Ref Range Status   06/09/2022 12:00 PM 69 0 - 150 mg/dL Final     HDL   Date/Time Value Ref Range Status   06/09/2022 12:00 PM 63 (H) 40 - 60 mg/dL Final     LDL Calculated   Date/Time Value Ref Range Status   06/09/2022 12:00 PM 87 <100 mg/dL Final         Radiology:     No orders to display       ASSESSMENT/PLAN:    Urinary tract infection a/w indwelling urethral catheter  Urine culture sent. 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 Denies any diarrhea in the last week  Continue PO vancomycin  ID consulted    Sacral decubitus ulcer  Wound care consulted    Postoperative infection s/p lumbar spinal fusion  Takes cipro 500 mg BID for suppression  Hold cipro while on meropenem  ID consulted      DVT prophylaxis: Lovenox  Probiotic if on abx: Yes    Diet: ADULT DIET;  Regular  Code Status: Full Code    Consults:  IP CONSULT TO HOSPITALIST  IP CONSULT TO INFECTIOUS DISEASES    Disposition: Admit to Inpatient   ELOS: Greater than two midnights due to medical therapy     Husam Cortes PA-C

## 2023-07-26 NOTE — PROGRESS NOTES
This nurse talked to MD. N.O. obtained to administer additional dose of Midodrine 15 mg and another Sodium chloride bolus. Patient given midodrine and IV bolus running at this time. MD wants BP rechecked in an hour and to notify her of results.

## 2023-07-26 NOTE — PLAN OF CARE
Problem: Pain  Goal: Verbalizes/displays adequate comfort level or baseline comfort level  7/26/2023 1607 by Meche Garcia LPN  Outcome: Progressing  7/26/2023 1328 by Tameka Black  Outcome: Progressing     Problem: Nutrition Deficit:  Goal: Optimize nutritional status  Outcome: Progressing     Problem: Safety - Adult  Goal: Free from fall injury  7/26/2023 1607 by Meche Garcia LPN  Outcome: Progressing  7/26/2023 1328 by Tameka Black  Outcome: Progressing

## 2023-07-26 NOTE — PROGRESS NOTES
This writer changed dressing to coccyx after MD was in room to see wound. Wound cleansed and wet to dry in place. MD consulted General surgery and wound. General surgery to come and look at this afternoon.

## 2023-07-26 NOTE — PROGRESS NOTES
Patient seen and examined by one of my partners earlier today. I agree with their assessment and plan. Patient also seen and examined by me today. Chart reviewed. Additional problems noted on assessment:  Large sacral stage IV decubitus ulcer with necrotic tissue  Anemia (follow-up anemia work-up)  Hypotension (responded to midodrine and IV fluids)  Hypokalemia  Hypoalbuminemia      Necessary orders placed.   Will continue to follow while in the hospital.        Harsha Jefferson MD

## 2023-07-27 LAB
ALBUMIN SERPL-MCNC: 2.4 G/DL (ref 3.4–5)
ALBUMIN/GLOB SERPL: 0.9 {RATIO} (ref 1.1–2.2)
ALP SERPL-CCNC: 177 U/L (ref 40–129)
ALT SERPL-CCNC: 39 U/L (ref 10–40)
ANION GAP SERPL CALCULATED.3IONS-SCNC: 8 MMOL/L (ref 3–16)
AST SERPL-CCNC: 49 U/L (ref 15–37)
BASOPHILS # BLD: 0.1 K/UL (ref 0–0.2)
BASOPHILS NFR BLD: 1 %
BILIRUB SERPL-MCNC: 0.3 MG/DL (ref 0–1)
BUN SERPL-MCNC: 10 MG/DL (ref 7–20)
CALCIUM SERPL-MCNC: 9 MG/DL (ref 8.3–10.6)
CHLORIDE SERPL-SCNC: 115 MMOL/L (ref 99–110)
CO2 SERPL-SCNC: 21 MMOL/L (ref 21–32)
CREAT SERPL-MCNC: <0.5 MG/DL (ref 0.6–1.2)
DEPRECATED RDW RBC AUTO: 22 % (ref 12.4–15.4)
EOSINOPHIL # BLD: 0.2 K/UL (ref 0–0.6)
EOSINOPHIL NFR BLD: 3.5 %
FERRITIN SERPL IA-MCNC: 494.5 NG/ML (ref 15–150)
GFR SERPLBLD CREATININE-BSD FMLA CKD-EPI: >60 ML/MIN/{1.73_M2}
GLUCOSE SERPL-MCNC: 88 MG/DL (ref 70–99)
HCT VFR BLD AUTO: 22.9 % (ref 36–48)
HGB BLD-MCNC: 7.1 G/DL (ref 12–16)
IRON SATN MFR SERPL: 15 % (ref 15–50)
IRON SERPL-MCNC: 19 UG/DL (ref 37–145)
LYMPHOCYTES # BLD: 0.8 K/UL (ref 1–5.1)
LYMPHOCYTES NFR BLD: 15.5 %
MAGNESIUM SERPL-MCNC: 2 MG/DL (ref 1.8–2.4)
MCH RBC QN AUTO: 25.9 PG (ref 26–34)
MCHC RBC AUTO-ENTMCNC: 31.1 G/DL (ref 31–36)
MCV RBC AUTO: 83.5 FL (ref 80–100)
MONOCYTES # BLD: 0.6 K/UL (ref 0–1.3)
MONOCYTES NFR BLD: 12.2 %
NEUTROPHILS # BLD: 3.4 K/UL (ref 1.7–7.7)
NEUTROPHILS NFR BLD: 67.8 %
PHOSPHATE SERPL-MCNC: 2.4 MG/DL (ref 2.5–4.9)
PLATELET # BLD AUTO: 282 K/UL (ref 135–450)
PMV BLD AUTO: 7.9 FL (ref 5–10.5)
POTASSIUM SERPL-SCNC: 3.5 MMOL/L (ref 3.5–5.1)
PROT SERPL-MCNC: 5.1 G/DL (ref 6.4–8.2)
RBC # BLD AUTO: 2.75 M/UL (ref 4–5.2)
SODIUM SERPL-SCNC: 144 MMOL/L (ref 136–145)
TIBC SERPL-MCNC: 123 UG/DL (ref 260–445)
WBC # BLD AUTO: 5 K/UL (ref 4–11)

## 2023-07-27 PROCEDURE — 97166 OT EVAL MOD COMPLEX 45 MIN: CPT

## 2023-07-27 PROCEDURE — 6370000000 HC RX 637 (ALT 250 FOR IP): Performed by: PHYSICIAN ASSISTANT

## 2023-07-27 PROCEDURE — 80053 COMPREHEN METABOLIC PANEL: CPT

## 2023-07-27 PROCEDURE — 6360000002 HC RX W HCPCS: Performed by: INTERNAL MEDICINE

## 2023-07-27 PROCEDURE — 97530 THERAPEUTIC ACTIVITIES: CPT

## 2023-07-27 PROCEDURE — 6370000000 HC RX 637 (ALT 250 FOR IP): Performed by: NURSE PRACTITIONER

## 2023-07-27 PROCEDURE — 6370000000 HC RX 637 (ALT 250 FOR IP): Performed by: INTERNAL MEDICINE

## 2023-07-27 PROCEDURE — 6360000002 HC RX W HCPCS: Performed by: PHYSICIAN ASSISTANT

## 2023-07-27 PROCEDURE — 84100 ASSAY OF PHOSPHORUS: CPT

## 2023-07-27 PROCEDURE — 83735 ASSAY OF MAGNESIUM: CPT

## 2023-07-27 PROCEDURE — 85025 COMPLETE CBC W/AUTO DIFF WBC: CPT

## 2023-07-27 PROCEDURE — 51702 INSERT TEMP BLADDER CATH: CPT

## 2023-07-27 PROCEDURE — 97162 PT EVAL MOD COMPLEX 30 MIN: CPT

## 2023-07-27 PROCEDURE — 36415 COLL VENOUS BLD VENIPUNCTURE: CPT

## 2023-07-27 PROCEDURE — 2580000003 HC RX 258: Performed by: PHYSICIAN ASSISTANT

## 2023-07-27 PROCEDURE — 97535 SELF CARE MNGMENT TRAINING: CPT

## 2023-07-27 PROCEDURE — 1200000000 HC SEMI PRIVATE

## 2023-07-27 PROCEDURE — P9047 ALBUMIN (HUMAN), 25%, 50ML: HCPCS | Performed by: INTERNAL MEDICINE

## 2023-07-27 PROCEDURE — 99233 SBSQ HOSP IP/OBS HIGH 50: CPT | Performed by: INTERNAL MEDICINE

## 2023-07-27 RX ADMIN — MIDODRINE HYDROCHLORIDE 15 MG: 5 TABLET ORAL at 12:30

## 2023-07-27 RX ADMIN — Medication 250 MG: at 12:30

## 2023-07-27 RX ADMIN — SODIUM CHLORIDE, PRESERVATIVE FREE 10 ML: 5 INJECTION INTRAVENOUS at 21:20

## 2023-07-27 RX ADMIN — PANTOPRAZOLE SODIUM 40 MG: 40 TABLET, DELAYED RELEASE ORAL at 09:13

## 2023-07-27 RX ADMIN — HYOSCYAMINE SULFATE: 16 SOLUTION at 13:00

## 2023-07-27 RX ADMIN — POTASSIUM CHLORIDE 40 MEQ: 1500 TABLET, EXTENDED RELEASE ORAL at 09:12

## 2023-07-27 RX ADMIN — ALBUMIN (HUMAN) 25 G: 0.25 INJECTION, SOLUTION INTRAVENOUS at 09:11

## 2023-07-27 RX ADMIN — SODIUM CHLORIDE: 9 INJECTION, SOLUTION INTRAVENOUS at 05:00

## 2023-07-27 RX ADMIN — Medication 1 CAPSULE: at 09:15

## 2023-07-27 RX ADMIN — ALBUMIN (HUMAN) 25 G: 0.25 INJECTION, SOLUTION INTRAVENOUS at 03:29

## 2023-07-27 RX ADMIN — HYOSCYAMINE SULFATE: 16 SOLUTION at 21:29

## 2023-07-27 RX ADMIN — Medication 250 MG: at 05:08

## 2023-07-27 RX ADMIN — BACLOFEN 10 MG: 10 TABLET ORAL at 21:20

## 2023-07-27 RX ADMIN — SODIUM CHLORIDE, PRESERVATIVE FREE 10 ML: 5 INJECTION INTRAVENOUS at 09:15

## 2023-07-27 RX ADMIN — MIDODRINE HYDROCHLORIDE 15 MG: 5 TABLET ORAL at 16:51

## 2023-07-27 RX ADMIN — GABAPENTIN 600 MG: 300 CAPSULE ORAL at 21:19

## 2023-07-27 RX ADMIN — ACETAMINOPHEN 650 MG: 325 TABLET ORAL at 21:19

## 2023-07-27 RX ADMIN — ATORVASTATIN CALCIUM 10 MG: 10 TABLET, FILM COATED ORAL at 21:19

## 2023-07-27 RX ADMIN — GABAPENTIN 600 MG: 300 CAPSULE ORAL at 09:13

## 2023-07-27 RX ADMIN — Medication 1 CAPSULE: at 16:51

## 2023-07-27 RX ADMIN — ALBUMIN (HUMAN) 25 G: 0.25 INJECTION, SOLUTION INTRAVENOUS at 16:53

## 2023-07-27 RX ADMIN — BACLOFEN 10 MG: 10 TABLET ORAL at 16:51

## 2023-07-27 RX ADMIN — Medication 250 MG: at 16:54

## 2023-07-27 RX ADMIN — BACLOFEN 10 MG: 10 TABLET ORAL at 09:13

## 2023-07-27 RX ADMIN — GABAPENTIN 600 MG: 300 CAPSULE ORAL at 14:00

## 2023-07-27 RX ADMIN — ENOXAPARIN SODIUM 40 MG: 100 INJECTION SUBCUTANEOUS at 09:14

## 2023-07-27 RX ADMIN — MIDODRINE HYDROCHLORIDE 15 MG: 5 TABLET ORAL at 09:13

## 2023-07-27 RX ADMIN — FLUCONAZOLE 200 MG: 100 TABLET ORAL at 09:12

## 2023-07-27 RX ADMIN — Medication 250 MG: at 00:02

## 2023-07-27 ASSESSMENT — ENCOUNTER SYMPTOMS
RHINORRHEA: 0
WHEEZING: 0
SINUS PAIN: 0
CONSTIPATION: 0
SINUS PRESSURE: 0
SORE THROAT: 0
ABDOMINAL PAIN: 0
COUGH: 0
NAUSEA: 0
EYE REDNESS: 0
EYE DISCHARGE: 0
DIARRHEA: 1
SHORTNESS OF BREATH: 0
BACK PAIN: 0

## 2023-07-27 NOTE — PLAN OF CARE
Problem: Pain  Goal: Verbalizes/displays adequate comfort level or baseline comfort level  7/27/2023 0620 by Sean Key RN  Outcome: Progressing  7/27/2023 0620 by Sean Key RN  Outcome: Progressing     Problem: Nutrition Deficit:  Goal: Optimize nutritional status  7/27/2023 0620 by Sean Key RN  Outcome: Progressing  7/27/2023 0620 by Sean Key RN  Outcome: Progressing     Problem: Safety - Adult  Goal: Free from fall injury  7/27/2023 0620 by Sean Key RN  Outcome: Progressing  7/27/2023 0620 by Sean Key RN  Outcome: Progressing

## 2023-07-27 NOTE — PLAN OF CARE
Problem: Pain  Goal: Verbalizes/displays adequate comfort level or baseline comfort level  7/27/2023 1323 by Negar Gil RN  Outcome: Progressing  7/27/2023 0620 by Nicolle vEans RN  Outcome: Progressing  7/27/2023 0620 by Nicolle Evans RN  Outcome: Progressing     Problem: Nutrition Deficit:  Goal: Optimize nutritional status  7/27/2023 1323 by Negar Gil RN  Outcome: Progressing  7/27/2023 0620 by Nicolle Evans RN  Outcome: Progressing  7/27/2023 0620 by Nicolle Evans RN  Outcome: Progressing     Problem: Safety - Adult  Goal: Free from fall injury  7/27/2023 1323 by Negar Gil RN  Outcome: Progressing  7/27/2023 0620 by Nicolle Evans RN  Outcome: Progressing  7/27/2023 0620 by Nicolle Evans RN  Outcome: Progressing     Problem: Skin/Tissue Integrity  Goal: Absence of new skin breakdown  Description: 1. Monitor for areas of redness and/or skin breakdown  2. Assess vascular access sites hourly  3. Every 4-6 hours minimum:  Change oxygen saturation probe site  4. Every 4-6 hours:  If on nasal continuous positive airway pressure, respiratory therapy assess nares and determine need for appliance change or resting period.   Outcome: Progressing

## 2023-07-27 NOTE — PROGRESS NOTES
[address] [facility] Department   Phone: [phone]    Occupational Therapy    [x] Initial Evaluation            [] Daily Treatment Note         [] Discharge Summary      Patient: Juancho Davila   : 1940   MRN: [id number]   Date of Service:  2023    Admitting Diagnosis:  Urinary tract infection associated with indwelling urethral catheter [facility])  Current Admission Summary: Juancho Davila is a 79 y.o. female who presented to ED for evaluation of lethargy and hypotension. Patient reports her SBP runs around 100 at baseline. BP in ED 85/52, responsive to fluids and midodrine. Patient has a chronic indwelling catheter. 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 She has a sacral decubitus ulcer as well. Patient reports that she has not had diarrhea for a week. She denies abdominal pain, nausea, vomiting, but reports poor p.o. intake. She denies fever, dizziness, chest pain, shortness of breath, cough. Patient reports she feels generally fatigued but denies any other complaints or concerns at this time. Past Medical History:  has a past medical history of Arthritis, At high risk for falls, Constipation, Environmental allergies, Fall at home, GERD (gastroesophageal reflux disease), History of anemia, Hyperlipidemia, Neuropathy of leg, OAB (overactive bladder), and UTI (lower urinary tract infection). Past Surgical History:  has a past surgical history that includes Total knee arthroplasty (2010); Pilonidal cyst excision; Tubal ligation; sinus surgery; Hysterectomy, vaginal; Salpingo-oophorectomy; laminectomy (); Spinal fusion (12/16/10); Colonoscopy (2011); Total shoulder arthroplasty (Right, 14); Upper gastrointestinal endoscopy (14); Colonoscopy (14);  Breast biopsy (Right); joint replacement (Bilateral); declined  Comments:    Other Therapeutic Interventions    Functional Outcomes  AM-PAC Inpatient Daily Activity Raw Score: 12    Second session: PT returned to pt's room for therex. Pt in same positioning as was left at first session. PT attempting to reposition without assist. OT called for cotx. maxA of 2 for rolling x 2 each way. Total assist for thien care secondary to pt being incontinent of stool, dressing change done from RN while OT/PT in room. Barrier cream applied. Antunez cleaned. All needs met and pt left on R side. Call light in hand, bed alarm on, all needs addressed. Spoke with RN about speciality mattress for pt. Already ordered. Cognition  WFL  Orientation:    alert and oriented x 4  Command Following:   [facility]     Education  Barriers To Learning: none  Patient Education: patient educated on OT role and benefits, plan of care, importance of exercise program, EOB sitting for core strengthening  Learning Assessment:  patient verbalizes and demonstrates understanding    Assessment  Activity Tolerance: tolerated well   Impairments Requiring Therapeutic Intervention: decreased functional mobility, decreased ADL status, decreased ROM, decreased strength, decreased sensation, decreased balance, decreased IADL, decreased posture  Prognosis: fair and guarded  Clinical Assessment: pt has been in and out of the hospital since march 2023. Currently at [facility] for therapy. Readmitted for hypotension/infection. Pt independent prior to march, currently bed bound. Pt would benefit from ongoing skilled OT services in order to maximize independence.  Fair/guarded prognosis  Safety Interventions: patient left in bed, bed alarm in place, call light within reach, patient at risk for falls, and nurse notified    Plan  Frequency: 3-5 x/per week  Current Treatment Recommendations: strengthening, ROM, balance training, functional mobility training, transfer training, and ADL/self-care training    Goals  Patient Goals:

## 2023-07-27 NOTE — PROGRESS NOTES
[address] [facility] Department   Phone: [phone]    Physical Therapy    [x] Initial Evaluation            [x] Daily Treatment Note         [] Discharge Summary      Patient: Tammie Herrmann   : 1940   MRN: [id number]   Date of Service:  2023  Admitting Diagnosis: Urinary tract infection associated with indwelling urethral catheter [facility])  Current Admission Summary: The pt was admitted with a UTI, hypotension and large pressure ulcer. Past Medical History:  has a past medical history of Arthritis, At high risk for falls, Constipation, Environmental allergies, Fall at home, GERD (gastroesophageal reflux disease), History of anemia, Hyperlipidemia, Neuropathy of leg, OAB (overactive bladder), and UTI (lower urinary tract infection). Past Surgical History:  has a past surgical history that includes Total knee arthroplasty (2010); Pilonidal cyst excision; Tubal ligation; sinus surgery; Hysterectomy, vaginal; Salpingo-oophorectomy; laminectomy (); Spinal fusion (12/16/10); Colonoscopy (2011); Total shoulder arthroplasty (Right, 14); Upper gastrointestinal endoscopy (14); Colonoscopy (14); Breast biopsy (Right); joint replacement (Bilateral); Total knee arthroplasty (Right, 2013); Carpal tunnel release (Left, 2018); eye surgery (Bilateral); back surgery (12/16/10); Cystoscopy (N/A, 2019); Carpal tunnel release (Right, 2019); and shoulder surgery (Left, 10/9/2019). Discharge Recommendations: Tammie Herrmann scored a / on the AM-PAC short mobility form. Current research shows that an AM-PAC score of 17 or less is typically not associated with a discharge to the patient's home setting. Based on the patient's AM-PAC score and their current functional mobility deficits, it is recommended that the patient have 3-5 sessions per week of Physical Therapy at d/c to increase the patient's independence.   Please see assessment Raw Score : 6              Cognition  WFL  Orientation:    alert and oriented x 4  Command Following:   [facility]    Education  Barriers To Learning: none  Patient Education: patient educated on PT role and benefits, plan of care, transfer training, discharge recommendations  Learning Assessment:  patient verbalizes and demonstrates understanding    Assessment  Activity Tolerance: Poor for OOB or EOB activity  Impairments Requiring Therapeutic Intervention: decreased functional mobility, decreased ADL status, decreased strength, decreased endurance, decreased sensation, decreased balance, decreased posture  Prognosis: fair  Clinical Assessment: The patient presents with severe sensory and motor loss in BLEs. She has a large pressure ulcer on her sacrum and is incontinent. She has not been sitting EOB or in a w/c lately due to her ulcer. She will benefit from continued skilled PT to regain the ability to sit upright and transfer to her electric w/c. Safety Interventions: patient left in bed, bed alarm in place, call light within reach, and nurse notified, pt on L side    Plan  Frequency: 3-5 x/per week  Current Treatment Recommendations: strengthening, balance training, functional mobility training, transfer training, endurance training, neuromuscular re-education, and safety education    Goals  Patient Goals: ultimately return to transferring to her w/c and discharging home   Short Term Goals:  Time Frame: To be met prior to DC  Patient will complete bed mobility at maximum assistance   Patient will complete transfers at dependent assistance, with use of maxi kevin   Patient will sit EOB for 10' with SBA for balance      Above goals reviewed on 7/27/2023. All goals are ongoing at this time unless indicated above.     Therapy Session Time      Individual Group Co-treatment   Time In     0395   Time Out     0643   Minutes     61         Second Session Therapy Time:   Individual Concurrent Group Co-treatment   Time In 3278

## 2023-07-27 NOTE — PROGRESS NOTES
Sacral and thoracic wound care completed with Dakins . 25% solution. Patient denied pain and tolerated well. VSS.  Patient stable and denied needs when writer left room

## 2023-07-27 NOTE — PROGRESS NOTES
4 Eyes Skin Assessment     NAME:  Ty Vail OF BIRTH:  1940  MEDICAL RECORD NUMBER:  [id number]    The patient is being assessed for  Admission    I agree that at least one RN has performed a thorough Head to Toe Skin Assessment on the patient. ALL assessment sites listed below have been assessed. Areas assessed by both nurses:    Head, Face, Ears, Shoulders, Back, Chest, Arms, Elbows, Hands, Sacrum. Buttock, Coccyx, Ischium, and Legs. Feet and Heels        Does the Patient have a Wound? Yes wound(s) were present on assessment.  LDA wound assessment was Initiated and completed by RN       William Prevention initiated by RN: Yes  Wound Care Orders initiated by RN: Yes done    Pressure Injury (Stage 3,4, Unstageable, DTI, NWPT, and Complex wounds) if present, place Wound referral order by RN under : Yes    New Ostomies, if present place, Ostomy referral order under : No     Nurse 1 eSignature: Electronically signed by Lizette Rees RN on 7/27/23 at 5:53 AM EDT    **SHARE this note so that the co-signing nurse can place an eSignature**    Nurse 2 eSignature: Electronically signed by Neal Daniel RN on 7/27/23 at 5:55 AM EDT

## 2023-07-27 NOTE — CARE COORDINATION
Discharge Planning:     (CM) called Lin Fare with [facility] [phone] who advised Patient is skilled with them and is traditional Medicare so she can come back with no pre-cert needed. CM team to follow.      Electronically signed by MANSOOR Martinez on 7/27/2023 at 8:23 AM

## 2023-07-27 NOTE — CARE COORDINATION
Patient has consult for sacral wound. Spoke to nurse Mati Overton. Patient was seen by Dr Jarod Lomax on 7/26 and bedside debridement was done. Gen surgery also wrote dressing orders. No further orders at this time. Specialty bed ordered today. Please consult again if WOC RN is needed.  [facility], BSN, RN, [address] [facility]  [phone]

## 2023-07-28 ENCOUNTER — APPOINTMENT (OUTPATIENT)
Dept: GENERAL RADIOLOGY | Age: 83
DRG: 981 | End: 2023-07-28
Payer: MEDICARE

## 2023-07-28 PROBLEM — Z22.39 VRE (VANCOMYCIN RESISTANT ENTEROCOCCUS) CULTURE POSITIVE: Status: ACTIVE | Noted: 2023-07-28

## 2023-07-28 LAB
BACTERIA UR CULT: ABNORMAL
BACTERIA UR CULT: ABNORMAL
ORGANISM: ABNORMAL
ORGANISM: ABNORMAL

## 2023-07-28 PROCEDURE — 97530 THERAPEUTIC ACTIVITIES: CPT

## 2023-07-28 PROCEDURE — 6370000000 HC RX 637 (ALT 250 FOR IP): Performed by: NURSE PRACTITIONER

## 2023-07-28 PROCEDURE — 6370000000 HC RX 637 (ALT 250 FOR IP): Performed by: INTERNAL MEDICINE

## 2023-07-28 PROCEDURE — 74018 RADEX ABDOMEN 1 VIEW: CPT

## 2023-07-28 PROCEDURE — 11043 DBRDMT MUSC&/FSCA 1ST 20/<: CPT | Performed by: SURGERY

## 2023-07-28 PROCEDURE — 97535 SELF CARE MNGMENT TRAINING: CPT

## 2023-07-28 PROCEDURE — APPSS15 APP SPLIT SHARED TIME 0-15 MINUTES: Performed by: NURSE PRACTITIONER

## 2023-07-28 PROCEDURE — 99233 SBSQ HOSP IP/OBS HIGH 50: CPT | Performed by: INTERNAL MEDICINE

## 2023-07-28 PROCEDURE — 1200000000 HC SEMI PRIVATE

## 2023-07-28 PROCEDURE — 11046 DBRDMT MUSC&/FSCA EA ADDL: CPT | Performed by: SURGERY

## 2023-07-28 PROCEDURE — 6370000000 HC RX 637 (ALT 250 FOR IP): Performed by: PHYSICIAN ASSISTANT

## 2023-07-28 PROCEDURE — 6360000002 HC RX W HCPCS: Performed by: PHYSICIAN ASSISTANT

## 2023-07-28 PROCEDURE — 2580000003 HC RX 258: Performed by: PHYSICIAN ASSISTANT

## 2023-07-28 PROCEDURE — APPNB30 APP NON BILLABLE TIME 0-30 MINS: Performed by: NURSE PRACTITIONER

## 2023-07-28 RX ORDER — NITROFURANTOIN 25; 75 MG/1; MG/1
100 CAPSULE ORAL EVERY 12 HOURS SCHEDULED
Status: DISCONTINUED | OUTPATIENT
Start: 2023-07-28 | End: 2023-08-02 | Stop reason: HOSPADM

## 2023-07-28 RX ADMIN — HYOSCYAMINE SULFATE: 16 SOLUTION at 09:19

## 2023-07-28 RX ADMIN — MIDODRINE HYDROCHLORIDE 15 MG: 5 TABLET ORAL at 18:31

## 2023-07-28 RX ADMIN — Medication 250 MG: at 18:30

## 2023-07-28 RX ADMIN — ACETAMINOPHEN 650 MG: 325 TABLET ORAL at 09:15

## 2023-07-28 RX ADMIN — GABAPENTIN 600 MG: 300 CAPSULE ORAL at 09:13

## 2023-07-28 RX ADMIN — Medication 1 CAPSULE: at 18:31

## 2023-07-28 RX ADMIN — Medication 250 MG: at 04:57

## 2023-07-28 RX ADMIN — ENOXAPARIN SODIUM 40 MG: 100 INJECTION SUBCUTANEOUS at 09:15

## 2023-07-28 RX ADMIN — GABAPENTIN 600 MG: 300 CAPSULE ORAL at 21:11

## 2023-07-28 RX ADMIN — SODIUM CHLORIDE: 9 INJECTION, SOLUTION INTRAVENOUS at 18:30

## 2023-07-28 RX ADMIN — PANTOPRAZOLE SODIUM 40 MG: 40 TABLET, DELAYED RELEASE ORAL at 09:15

## 2023-07-28 RX ADMIN — MIDODRINE HYDROCHLORIDE 15 MG: 5 TABLET ORAL at 09:15

## 2023-07-28 RX ADMIN — MIDODRINE HYDROCHLORIDE 15 MG: 5 TABLET ORAL at 14:31

## 2023-07-28 RX ADMIN — BACLOFEN 10 MG: 10 TABLET ORAL at 09:15

## 2023-07-28 RX ADMIN — Medication 250 MG: at 00:00

## 2023-07-28 RX ADMIN — Medication 1 CAPSULE: at 09:15

## 2023-07-28 RX ADMIN — Medication 250 MG: at 14:32

## 2023-07-28 RX ADMIN — FLUCONAZOLE 200 MG: 100 TABLET ORAL at 09:14

## 2023-07-28 RX ADMIN — NITROFURANTOIN (MONOHYDRATE/MACROCRYSTALS) 100 MG: 75; 25 CAPSULE ORAL at 21:11

## 2023-07-28 RX ADMIN — ATORVASTATIN CALCIUM 10 MG: 10 TABLET, FILM COATED ORAL at 21:11

## 2023-07-28 RX ADMIN — GABAPENTIN 600 MG: 300 CAPSULE ORAL at 14:31

## 2023-07-28 RX ADMIN — SODIUM CHLORIDE, PRESERVATIVE FREE 10 ML: 5 INJECTION INTRAVENOUS at 09:18

## 2023-07-28 RX ADMIN — SODIUM CHLORIDE: 9 INJECTION, SOLUTION INTRAVENOUS at 09:22

## 2023-07-28 RX ADMIN — BACLOFEN 10 MG: 10 TABLET ORAL at 21:11

## 2023-07-28 ASSESSMENT — ENCOUNTER SYMPTOMS
SINUS PRESSURE: 0
CONSTIPATION: 0
WHEEZING: 0
NAUSEA: 0
COUGH: 0
DIARRHEA: 0
SINUS PAIN: 0
EYE DISCHARGE: 0
ABDOMINAL PAIN: 0
BACK PAIN: 0
SORE THROAT: 0
SHORTNESS OF BREATH: 0
RHINORRHEA: 0
EYE REDNESS: 0

## 2023-07-28 NOTE — PLAN OF CARE
Problem: Pain  Goal: Verbalizes/displays adequate comfort level or baseline comfort level  7/28/2023 1454 by Walt Perez RN  Outcome: Progressing  7/28/2023 0537 by Jake Houston  Outcome: Progressing     Problem: Nutrition Deficit:  Goal: Optimize nutritional status  7/28/2023 1454 by Walt Perez RN  Outcome: Progressing  7/28/2023 0537 by Jake Houston  Outcome: Progressing     Problem: Safety - Adult  Goal: Free from fall injury  7/28/2023 1454 by Walt Perez RN  Outcome: Progressing  7/28/2023 0537 by EDYTA Kauffman  Outcome: Progressing     Problem: Skin/Tissue Integrity  Goal: Absence of new skin breakdown  Description: 1. Monitor for areas of redness and/or skin breakdown  2. Assess vascular access sites hourly  3. Every 4-6 hours minimum:  Change oxygen saturation probe site  4. Every 4-6 hours:  If on nasal continuous positive airway pressure, respiratory therapy assess nares and determine need for appliance change or resting period.   7/28/2023 1454 by Walt Perez RN  Outcome: Progressing  7/28/2023 0537 by Jake Houston  Outcome: Progressing

## 2023-07-28 NOTE — PLAN OF CARE
Problem: Pain  Goal: Verbalizes/displays adequate comfort level or baseline comfort level  Outcome: Progressing     Problem: Nutrition Deficit:  Goal: Optimize nutritional status  Outcome: Progressing     Problem: Safety - Adult  Goal: Free from fall injury  Outcome: Progressing     Problem: Skin/Tissue Integrity  Goal: Absence of new skin breakdown  Description: 1. Monitor for areas of redness and/or skin breakdown  2. Assess vascular access sites hourly  3. Every 4-6 hours minimum:  Change oxygen saturation probe site  4. Every 4-6 hours:  If on nasal continuous positive airway pressure, respiratory therapy assess nares and determine need for appliance change or resting period.   Outcome: Progressing

## 2023-07-28 NOTE — PROGRESS NOTES
Consult / Procedure Note:     Sacral area necrotic wound  Site confirmed, pt consents to excisional debridement of the site  Extensive tissue loss done to bone, Stage 4, necrotic muscle and fascia present  Overall 12 X 10 cm wound. Fractur of sacral / coccyx junction now evident with debridement     Chloraprep Prep  No local placed  #15 blade and scissors excisional debridement done  Full thickness Skin, SQ, muscle, fascia removed; 5 cm X 5 cm size  Cleaned with NS  Packed with Dakins wet to dry  Pt tolerated well  Site hemostatic  DSD placed overlying the area  Dressing care instructions reviewed with pt's nurse, all questions answered  Will kathia Monday for additional debridement, or pt can be scheduled in the [facility] if discharged  Concern with size could need colostomy if progresses further towards the anus.  At this point, a VAC might be able to seal between the edges        [facility]

## 2023-07-28 NOTE — PROGRESS NOTES
Infectious Diseases   Progress Note      Admission Date: 7/26/2023  Hospital Day: Hospital Day: 3   Attending: Estefani Osborn MD  Date of service: 7/28/2023     Chief complaint/ Reason for consult: Altered mental status on admission  C. difficile diarrhea  Candida urinary tract infection  Hypotension    Microbiology:        I have reviewed allavailable micro lab data and cultures    Blood culture (2/2) - collected on 7/26/2023: Negative  Urine culture  - collected on 7/26/2023: Greater than 100,000 CFU per mL of Candida albicans, greater than 100,000 CFU per mL of Enterococcus faecium    Susceptibility    Vancomycin Resistant Enterococcus faecium (1)    Antibiotic Interpretation Microscan  Method Status    ampicillin Resistant >=32 mcg/mL BACTERIAL SUSCEPTIBILITY PANEL BY MILVIA     ciprofloxacin Resistant >=8 mcg/mL BACTERIAL SUSCEPTIBILITY PANEL BY MILVIA     levofloxacin Resistant >=8 mcg/mL BACTERIAL SUSCEPTIBILITY PANEL BY MILVIA     linezolid Sensitive 2 mcg/mL BACTERIAL SUSCEPTIBILITY PANEL BY MILVIA     nitrofurantoin Sensitive 32 mcg/mL BACTERIAL SUSCEPTIBILITY PANEL BY MILVIA     tetracycline Resistant >=16 mcg/mL BACTERIAL SUSCEPTIBILITY PANEL BY MILVIA     vancomycin Resistant >=32 mcg/mL BACTERIAL SUSCEPTIBILITY PANEL BY MILVIA       Antibiotics and immunizations:       Current antibiotics: All antibiotics and their doses were reviewed by me    Recent Abx Admin                     fluconazole (DIFLUCAN) tablet 200 mg (mg) 200 mg Given 07/28/23 0914    vancomycin (FIRVANQ) 50 MG/ML oral solution 250 mg (mg) 250 mg Given 07/28/23 0457     250 mg Given  0000     250 mg Given 07/27/23 1654                      Immunization History: All immunization history was reviewed by me today.     Immunization History   Administered Date(s) Administered    COVID-19, PFIZER Bivalent, DO NOT Dilute, (age 12y+), IM, 30 mcg/0.3 mL 09/15/2022    Influenza Virus Vaccine 10/03/2013    Influenza Whole 10/01/2010    Pneumococcal Conjugate ALKPHOS 177 07/27/2023 06:27 AM    ALT 39 07/27/2023 06:27 AM    AST 49 07/27/2023 06:27 AM    PROT 5.1 07/27/2023 06:27 AM    BILITOT 0.3 07/27/2023 06:27 AM    BILIDIR <0.2 07/26/2023 05:32 PM    IBILI see below 07/26/2023 05:32 PM    LABALBU 2.4 07/27/2023 06:27 AM       CPK: No results found for: CKTOTAL  ESR:   Lab Results   Component Value Date    SEDRATE 84 (H) 07/26/2023     CRP:   Lab Results   Component Value Date    CRP 66.2 (H) 07/26/2023           Imaging: All pertinent images and reports for the current visit were reviewed by me during this visit. I reviewed the chest x-ray/CT scan/MRI images and independently interpreted the findings and results today. XR ABDOMEN (KUB) (SINGLE AP VIEW)    (Results Pending)       Medications: All current and past medications were reviewed.      sodium chloride flush  5-40 mL IntraVENous 2 times per day    enoxaparin  40 mg SubCUTAneous Daily    lactobacillus  1 capsule Oral BID WC    atorvastatin  10 mg Oral Nightly    baclofen  10 mg Oral TID    gabapentin  600 mg Oral TID    pantoprazole  40 mg Oral Daily    vancomycin  250 mg Oral 4 times per day    fluconazole  200 mg Oral Daily    Sodium Hypochlorite   Irrigation BID    midodrine  15 mg Oral TID WC        sodium chloride 25 mL/hr at 07/26/23 1148    sodium chloride 75 mL/hr at 07/28/23 6179       sodium chloride flush, sodium chloride, acetaminophen **OR** acetaminophen, senna, ondansetron      Problem list:       Patient Active Problem List   Diagnosis Code    Previous back surgery Z98.890    Status post lumbar spinal fusion Z98.1    Spondylolisthesis of lumbar region M43.16    DDD (degenerative disc disease), lumbosacral M51.37    Stenosis, spinal, lumbar M48.061    Left wrist pain M25.532    left CMC arthritis, thumb, degenerative M18.9    De Quervain's disease (tenosynovitis) M65.4    CTS (carpal tunnel syndrome) G56.00    Postlaminectomy syndrome, lumbar region M96.1    Primary localized osteoarthrosis of shoulder region M19.019    Status post total shoulder replacement Z96.619    Pectoralis muscle strain S29.011A    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    Weight loss counseling, encounter for Z71.3    Lumbar stenosis M48.061    Left groin pain R10.32    Strain of hip adductor muscle S76.019A    S/P reverse total shoulder arthroplasty, 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    History of total right knee replacement Z96.651    History of total left knee replacement Z96.652    Arthritis of left hip M16.12    Arthritis of left shoulder region M19.012    Other specified arthritis, left shoulder M13.812    Arthritis of right knee M17.11    Status post reverse total shoulder replacement, left 10/9/2019 Z96.612    Yeast UTI B37.49    Acute cystitis without hematuria N30.00    Altered mental status R41.82    History of Clostridioides difficile colitis Z86.19    Hypotension I95.9    Malaise R53.81    Troponin level elevated R77.8    Nursing home resident Z59.3    Class 1 obesity due to excess calories with body mass index (BMI) of 32.0 to 32.9 in adult E66.09, Z68.32    Sacral wound S31.000A       Please note that this chart was generated using Dragon dictation software. Although every effort was made to ensure the accuracy of this automated transcription, some errors in transcription may have occurred inadvertently. If you may need any clarification, please do not hesitate to contact me through EPIC or at the phone number provided below with my electronic signature. Any pictures or media included in this note were obtained after taking informed verbal consent from the patient and with their approval to include those in the patient's medical record. Teresa Leon MD, MPH, FACP, Count includes the [facility]  7/28/2023, 1:32 PM  [facility] Infectious Disease   [address] [facility] ([address] [place] [address])  [address] [place] [address] [place], [facility] [place] [address]  Office: [phone]  Fax: [phone]  In-person Clinic days:  Tuesday & Thursday a.m.   Virtual clinic days: Monday,

## 2023-07-28 NOTE — PROGRESS NOTES
[address] [facility] Department   Phone: [phone]    Occupational Therapy    [] Initial Evaluation            [x] Daily Treatment Note         [] Discharge Summary      Patient: Nicolas Hughes   : 1940   MRN: [id number]   Date of Service:  2023    Admitting Diagnosis:  Yeast UTI  Current Admission Summary: Nicolas Hughes is a 79 y.o. female who presented to ED for evaluation of lethargy and hypotension. Patient reports her SBP runs around 100 at baseline. BP in ED 85/52, responsive to fluids and midodrine. Patient has a chronic indwelling catheter. 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 She has a sacral decubitus ulcer as well. Patient reports that she has not had diarrhea for a week. She denies abdominal pain, nausea, vomiting, but reports poor p.o. intake. She denies fever, dizziness, chest pain, shortness of breath, cough. Patient reports she feels generally fatigued but denies any other complaints or concerns at this time. Past Medical History:  has a past medical history of Arthritis, At high risk for falls, Constipation, Environmental allergies, Fall at home, GERD (gastroesophageal reflux disease), History of anemia, Hyperlipidemia, Neuropathy of leg, OAB (overactive bladder), and UTI (lower urinary tract infection). Past Surgical History:  has a past surgical history that includes Total knee arthroplasty (2010); Pilonidal cyst excision; Tubal ligation; sinus surgery; Hysterectomy, vaginal; Salpingo-oophorectomy; laminectomy (); Spinal fusion (12/16/10); Colonoscopy (2011); Total shoulder arthroplasty (Right, 14); Upper gastrointestinal endoscopy (14); Colonoscopy (14); Breast biopsy (Right); joint replacement (Bilateral); Total knee arthroplasty (Right, 2013);  Carpal tunnel release (Left, 12/13/2018); eye surgery (Bilateral); back surgery (12/16/10); Cystoscopy (N/A, 7/1/2019); Carpal tunnel release (Right, 8/22/2019); and shoulder surgery (Left, 10/9/2019). Discharge Recommendations: Maria Victoria Fair scored a 12/24 on the AM-PAC ADL Inpatient form. Current research shows that an AM-PAC score of 17 or less is typically not associated with a discharge to the patient's home setting. Based on the patient's AM-PAC score and their current ADL deficits, it is recommended that the patient have 3-5 sessions per week of Occupational Therapy at d/c to increase the patient's independence. Please see assessment section for further patient specific details. If patient discharges prior to next session this note will serve as a discharge summary. Please see below for the latest assessment towards goals. DME Required For Discharge: DME to be determined at next level of care, DME to be determined pending patient progress    Precautions/Restrictions: high fall risk  Weight Bearing Restrictions: no restrictions  [] Right Upper Extremity  [] Left Upper Extremity [] Right Lower Extremity  [] Left Lower Extremity     Required Braces/Orthotics: no braces required   [] Right  [] Left  Positional Restrictions:no positional restrictions    Pre-Admission Information   [facility] Pt has been in and out of the hospital since march 2023. Prior to march 2023 pt was living at home and independent. Pt was sitting EOB with staff. Pt was doing a slide board transfer but stopped ulcer on buttocks.  Nedra Sees lift to mechanical w/c but has not been getting up to a w/c d/t the decubitus ulcer     Examination   Vision:   Vision Corrective Device: wears glasses for reading  Hearing:   WFL  Perception:   WFL  Observation:   General Observation:  stage 4 decubitus ulcer  Posture:   Poor   Sensation:   reports numbness and tingling in (B) LE and reduced light touch to (B) LE  Proprioception: diminished proprioception in (B) LE  Tone:   Normotonic  Coordination Testing:   Coordination and Movement Description: (R) UE, (L) UE, (R) LE, (L) LE, trunk, fine motor impairments, gross motor impairments    ROM:   (B) Shoulder AROM WFL  (B) Elbow AROM WFL  (B) Wrist AROM WFL  Strength:   (B) UE strength grossly 4    Therapist Clinical Decision Making (Complexity): medium complexity  Clinical Presentation: evolving      Subjective  General: pt supine rolled on side on arrival with nursing student at [facility] completing pericare   Pain: 0/10  Pain Interventions: not applicable        Activities of Daily Living  Basic Activities of Daily Living   Grooming - set up   Lower Extremity Dressing: dependent  Dressing Comments: to remove/replace pressure relief boots   Toileting: dependent. Toileting Comments: dependent   General Comments: completed pericare in bed with nurse present to complete wound dressing change   Instrumental Activities of Daily Living  No IADL completed on this date. Functional Mobility  Bed Mobility:  Rolling Left: 2 person assistance with maxA of 2   Rolling Right: 2 person assistance with maxA of 2   Comments:  Transfers:  Dependent transfer completed with mechanical maxisky/maximove lift system from supine in bed  to reclining chair and return to bed . Comments:  Functional Mobility  No functional mobility completed on this date secondary to weakness. Balance:  Pt unable to tolerate sitting/standing position during for rating of balance component.  Pt declined  Comments:    Other Therapeutic Interventions    Functional Outcomes       Second session: PT returned to pt's for return to bed - completed grooming from seated position with set up - two person assist to place lift pad, 2 person assist for maxi kevin to return to [facility]     Cognition  [facility]  Orientation:    alert and oriented x 4  Command Following:   [facility]     Education  Barriers To Learning: none  Patient Education: patient educated on

## 2023-07-28 NOTE — PROGRESS NOTES
Sacral and thoracic wound care completed with Dakins . 25% solution. Patient denied pain and tolerated well. VSS. Patient stable and denied needs when writer left room.   Electronically signed by Walt Perez RN on 7/28/2023 at 2:58 PM

## 2023-07-28 NOTE — PROGRESS NOTES
Wound care completed with Dakins . 25% solution. Patient denies pain. VSS. Patient in bed resting and denies needs before writer left the room.

## 2023-07-28 NOTE — PROGRESS NOTES
[address] [facility] Department   Phone: [phone]    Physical Therapy    [] Initial Evaluation            [x] Daily Treatment Note         [] Discharge Summary      Patient: Mouna Howell   : 1940   MRN: [id number]   Date of Service:  2023  Admitting Diagnosis: Yeast UTI    Current Admission Summary: The pt was admitted with a UTI, hypotension and large pressure ulcer. Past Medical History:  has a past medical history of Arthritis, At high risk for falls, Constipation, Environmental allergies, Fall at home, GERD (gastroesophageal reflux disease), History of anemia, Hyperlipidemia, Neuropathy of leg, OAB (overactive bladder), and UTI (lower urinary tract infection). Past Surgical History:  has a past surgical history that includes Total knee arthroplasty (2010); Pilonidal cyst excision; Tubal ligation; sinus surgery; Hysterectomy, vaginal; Salpingo-oophorectomy; laminectomy (); Spinal fusion (12/16/10); Colonoscopy (2011); Total shoulder arthroplasty (Right, 14); Upper gastrointestinal endoscopy (14); Colonoscopy (14); Breast biopsy (Right); joint replacement (Bilateral); Total knee arthroplasty (Right, 2013); Carpal tunnel release (Left, 2018); eye surgery (Bilateral); back surgery (12/16/10); Cystoscopy (N/A, 2019); Carpal tunnel release (Right, 2019); and shoulder surgery (Left, 10/9/2019). Discharge Recommendations: Mouna Howell scored a 7/24 on the AM-PAC short mobility form. Current research shows that an AM-PAC score of 17 or less is typically not associated with a discharge to the patient's home setting. Based on the patient's AM-PAC score and their current functional mobility deficits, it is recommended that the patient have 3-5 sessions per week of Physical Therapy at d/c to increase the patient's independence. Please see assessment section for further patient specific details.   If patient discharges prior to next session this note will serve as a discharge summary. Please see below for the latest assessment towards goals. DME Required For Discharge: DME to be determined at next level of care  Precautions/Restrictions: high fall risk  Weight Bearing Restrictions: no restrictions  [] Right Upper Extremity  [] Left Upper Extremity [] Right Lower Extremity  [] Left Lower Extremity     Required Braces/Orthotics: ankle foot orthotic -not present at hospital   [] Right  [] Left  Positional Restrictions:no positional restrictions    Pre-Admission Information     Pt currently at [facility]. Pt has been in and out of the hospital since march 2023. Prior to march 2023 pt was living at home and independent. Pt was sitting EOB with staff. Pt was doing a slide board transfer but stopped due to ulcer on buttocks. Kristin Omalley lift to mechanical w/c but has not been getting up to a w/c lately d/t the decubitus ulcer     Examination   Vision:   Vision Corrective Device: wears glasses for reading  Hearing:   [facility]  Observation:   General Observation:  large pressure ulcer over sacrum, mepiplex removed as it was soiled and pericare completed, RN notified and planning to do a dressing change soon  Posture:   Good  Sensation:   reports numbness and tingling in (B) LE -not sensitive to light touch in R foot, tingling in L foot and distal to B knees  Proprioception:    diminished proprioception in (B) LE  Tone:   Normotonic  Coordination Testing:   Unable to formally test secondary to weakness     ROM:   (B) LE PROM WFL  Strength:   1/5 ankle DF strength, unable to bridge or complete a heelslide or a SLR on BLE      Subjective  General: Patient reports being agreeable to getting up to chair.   Pain: 0/10  Pain Interventions: repositioned        Functional Mobility  Bed Mobility:  Rolling Left: 2 person assistance with MAX assist   Rolling Right: 2 person assistance with MAX assist   Comments: Multiple rolling right and left to clean and change dressing. Transfers:  Dependent transfer completed with mechanical maxisky/maximove lift system from bed to chair. Comments:  Patient sat in chair for approximately an hour before therapy returned and transferred patient back to bed. Wheelchair Mobility:  No w/c mobility completed on this date. Comments:  Balance:  Pt unable to tolerate sitting/standing position during for rating of balance component. Comments:    Other Therapeutic Interventions    Functional Outcomes  AM-PAC Inpatient Mobility Raw Score : 7              Cognition  WFL  Orientation:    alert and oriented x 4  Command Following:   [facility]    Education  Barriers To Learning: none  Patient Education: patient educated on PT role and benefits, plan of care, transfer training, discharge recommendations  Learning Assessment:  patient verbalizes and demonstrates understanding    Assessment  Activity Tolerance: Poor, requires lift equipment for transfers. Impairments Requiring Therapeutic Intervention: decreased functional mobility, decreased ADL status, decreased strength, decreased endurance, decreased sensation, decreased balance, decreased posture  Prognosis: fair  Clinical Assessment: Patient with limited ability to participate in therapy, requires MAX x2 for rolling and total assist with lift for transfers. Will continue to follow and progress as able. Recommend 24 hour assist and continued therapy at d/c. Safety Interventions: patient left in bed, bed alarm in place, call light within reach, and nurse notified, pt on L side    Plan  Frequency: 3-5 x/per week  Current Treatment Recommendations: strengthening, balance training, functional mobility training, transfer training, endurance training, neuromuscular re-education, and safety education    Goals  Patient Goals: ultimately return to transferring to her w/c and discharging home   Short Term Goals:  Time Frame:  To be met prior to DC  Patient will complete bed

## 2023-07-29 LAB
DEPRECATED RDW RBC AUTO: 21.4 % (ref 12.4–15.4)
HCT VFR BLD AUTO: 25.6 % (ref 36–48)
HGB BLD-MCNC: 7.9 G/DL (ref 12–16)
MCH RBC QN AUTO: 25.7 PG (ref 26–34)
MCHC RBC AUTO-ENTMCNC: 30.8 G/DL (ref 31–36)
MCV RBC AUTO: 83.5 FL (ref 80–100)
PLATELET # BLD AUTO: 354 K/UL (ref 135–450)
PMV BLD AUTO: 7.7 FL (ref 5–10.5)
RBC # BLD AUTO: 3.06 M/UL (ref 4–5.2)
WBC # BLD AUTO: 3.6 K/UL (ref 4–11)

## 2023-07-29 PROCEDURE — 1200000000 HC SEMI PRIVATE

## 2023-07-29 PROCEDURE — 6360000002 HC RX W HCPCS: Performed by: PHYSICIAN ASSISTANT

## 2023-07-29 PROCEDURE — 36415 COLL VENOUS BLD VENIPUNCTURE: CPT

## 2023-07-29 PROCEDURE — 6370000000 HC RX 637 (ALT 250 FOR IP): Performed by: INTERNAL MEDICINE

## 2023-07-29 PROCEDURE — 85027 COMPLETE CBC AUTOMATED: CPT

## 2023-07-29 PROCEDURE — 6370000000 HC RX 637 (ALT 250 FOR IP): Performed by: NURSE PRACTITIONER

## 2023-07-29 PROCEDURE — 6370000000 HC RX 637 (ALT 250 FOR IP): Performed by: PHYSICIAN ASSISTANT

## 2023-07-29 PROCEDURE — 2580000003 HC RX 258: Performed by: PHYSICIAN ASSISTANT

## 2023-07-29 RX ADMIN — SODIUM CHLORIDE: 9 INJECTION, SOLUTION INTRAVENOUS at 09:38

## 2023-07-29 RX ADMIN — MIDODRINE HYDROCHLORIDE 15 MG: 5 TABLET ORAL at 12:49

## 2023-07-29 RX ADMIN — MIDODRINE HYDROCHLORIDE 15 MG: 5 TABLET ORAL at 09:32

## 2023-07-29 RX ADMIN — PANTOPRAZOLE SODIUM 40 MG: 40 TABLET, DELAYED RELEASE ORAL at 09:32

## 2023-07-29 RX ADMIN — BACLOFEN 10 MG: 10 TABLET ORAL at 21:23

## 2023-07-29 RX ADMIN — FLUCONAZOLE 200 MG: 100 TABLET ORAL at 09:33

## 2023-07-29 RX ADMIN — ATORVASTATIN CALCIUM 10 MG: 10 TABLET, FILM COATED ORAL at 21:23

## 2023-07-29 RX ADMIN — NITROFURANTOIN (MONOHYDRATE/MACROCRYSTALS) 100 MG: 75; 25 CAPSULE ORAL at 21:23

## 2023-07-29 RX ADMIN — BACLOFEN 10 MG: 10 TABLET ORAL at 09:33

## 2023-07-29 RX ADMIN — Medication 250 MG: at 12:49

## 2023-07-29 RX ADMIN — GABAPENTIN 600 MG: 300 CAPSULE ORAL at 09:32

## 2023-07-29 RX ADMIN — Medication 1 CAPSULE: at 09:32

## 2023-07-29 RX ADMIN — GABAPENTIN 600 MG: 300 CAPSULE ORAL at 21:23

## 2023-07-29 RX ADMIN — HYOSCYAMINE SULFATE: 16 SOLUTION at 14:57

## 2023-07-29 RX ADMIN — GABAPENTIN 600 MG: 300 CAPSULE ORAL at 14:54

## 2023-07-29 RX ADMIN — ENOXAPARIN SODIUM 40 MG: 100 INJECTION SUBCUTANEOUS at 09:32

## 2023-07-29 RX ADMIN — Medication 250 MG: at 01:01

## 2023-07-29 RX ADMIN — SODIUM CHLORIDE, PRESERVATIVE FREE 10 ML: 5 INJECTION INTRAVENOUS at 09:33

## 2023-07-29 RX ADMIN — Medication 250 MG: at 05:53

## 2023-07-29 RX ADMIN — Medication 250 MG: at 17:28

## 2023-07-29 RX ADMIN — BACLOFEN 10 MG: 10 TABLET ORAL at 14:55

## 2023-07-29 RX ADMIN — NITROFURANTOIN (MONOHYDRATE/MACROCRYSTALS) 100 MG: 75; 25 CAPSULE ORAL at 09:32

## 2023-07-29 RX ADMIN — HYOSCYAMINE SULFATE: 16 SOLUTION at 02:29

## 2023-07-29 RX ADMIN — Medication 1 CAPSULE: at 17:28

## 2023-07-29 ASSESSMENT — PAIN SCALES - GENERAL: PAINLEVEL_OUTOF10: 0

## 2023-07-29 NOTE — CARE COORDINATION
VM received from MD inquiring if facility pt came from would be able to get her to the wound clinic for an appointment so pt can discharge. MARIA LUISA reviewed chart and called [facility] admissions [phone] and spoke to Nathan Masterson. She states pt was there skilled but heard pt and daughter may have chosen another facility for pt to go to as they need LTC. She also states she would have to try to obtain transport for pt to wound clinic appointments and not sure if they are able and also not confident it could be obtained this weekend. MARIA LUISA sent PS message to MD with barrier to discharge at this time being:    -Unable to make wound care clinic appt on the weekend.    -Need to follow up with patient on if she is returning to [facility] or wanting another snf     Whatever snf pt goes to will need to know day and time of wound clinic appointments in order to attempt to arrange transport for pt.     Shelley Garcia RN, BSN  [phone]

## 2023-07-29 NOTE — PLAN OF CARE
Problem: Pain  Goal: Verbalizes/displays adequate comfort level or baseline comfort level  7/29/2023 1515 by oDnaldo Santos RN  Outcome: Progressing  7/29/2023 0515 by Kashmir Pro  Outcome: Progressing     Problem: Nutrition Deficit:  Goal: Optimize nutritional status  7/29/2023 1515 by Donaldo Santos RN  Outcome: Progressing  7/29/2023 0515 by Kashmir Pro  Outcome: Progressing     Problem: Safety - Adult  Goal: Free from fall injury  7/29/2023 1515 by Donaldo Santos RN  Outcome: Progressing  7/29/2023 0515 by Kashmir Pro  Outcome: Progressing     Problem: Skin/Tissue Integrity  Goal: Absence of new skin breakdown  Description: 1. Monitor for areas of redness and/or skin breakdown  2. Assess vascular access sites hourly  3. Every 4-6 hours minimum:  Change oxygen saturation probe site  4. Every 4-6 hours:  If on nasal continuous positive airway pressure, respiratory therapy assess nares and determine need for appliance change or resting period.   7/29/2023 1515 by Donaldo Santos RN  Outcome: Progressing  7/29/2023 0515 by Kashmir Pro  Outcome: Progressing     Problem: Infection - Adult  Goal: Absence of infection at discharge  Outcome: Progressing     Problem: Hematologic - Adult  Goal: Maintains hematologic stability  Outcome: Progressing     Problem: Skin/Tissue Integrity - Adult  Goal: Incisions, wounds, or drain sites healing without S/S of infection  Outcome: Progressing     Problem: Musculoskeletal - Adult  Goal: Return mobility to safest level of function  Outcome: Progressing     Problem: Genitourinary - Adult  Goal: Urinary catheter remains patent  Outcome: Progressing

## 2023-07-29 NOTE — PROGRESS NOTES
Assessment complete. VSS. Patient resting in bed. Respirations even and easy. Denies pain or SOB at this time. Turned on  left side after being in a semi-fowlers position for breakfast. Call light in reach. Fall precautions in place. No needs expressed at this time.

## 2023-07-29 NOTE — CARE COORDINATION
CM spoke to pt and she does want to return to [facility] on discharge. CM updated will need outpt wound clinic appt made on Monday so that facility can secure transport for day of appt. CM asked pt if family could transport here to wound clinic and she states they work. CM spoke to [facility] in admissions at [facility] [phone] to let her know that yes patient wants to return there. Once outpt wound appt known call facility.     Darcie Ornelas RN, BSN  [phone]

## 2023-07-29 NOTE — PROGRESS NOTES
Shift assessment completed. Routine vitals obtained. Scheduled medications given. Patient is awake, alert and oriented. Respirations are easy and unlabored. Call light within reach. Fall precautions in place. No further concerns expressed.

## 2023-07-29 NOTE — PROGRESS NOTES
Spenser Rebolledo MD    Hospitalist Progress Note      Name:  Azalea Luis /Age/Sex: 1940  (79 y.o. female)   MRN & CSN:  [id number] & [id number] Admission Date/Time: 2023 12:00 AM   Location:  [address] [place], [place] [id number] PCP: Rudy Euceda DO       I saw and examined the patient on 2023 at 1:44 PM.    Hospital Day: 4  Barriers to discharge: Disposition planning  Assessment and Plan:     79years old female nursing home resident bedridden history of ESBL UTI postop infection status post lumbar fusion on chronic Cipro for suppression on oral vancomycin due to recurrent C. difficile infection, sacral decubitus ulcer was transferred to ER for evaluation of lethargy and low running blood pressure     Acute metabolic encephalopathy due to below now improved. Patient back to baseline. Complicated UTI due to [facility] catheter. Urine culture in progress, history of ESBL E. coli, on meropenem ID following. Noted sensitivities will follow-up recommendations from ID. Plan to discharge on Diflucan 200 mg daily for total of 3 days and Macrobid DS) twice daily for 10-days. C. difficile colitis, patient still having diarrhea on vancomycin. Resumed     Hypotension due to above. On midodrine, albumin/IV fluid boluses. Sacral decubitus ulcer stage IV POA. Wound team following. Status post bedside debridement done on 2023, noted general surgery recommendations regarding inpatient versus outpatient further debridement. Postoperative infection status post lumbar spinal fusion. On chronic suppressive Cipro 500 twice daily. Should be resumed after completing course of Macrobid. Follow-up with infectious disease as outpatient    Disposition planning. Initial plan was that patient will go by to nursing home where she is coming from, now family wants to transfer her to another nursing home. Discussed with [facility], appears that patient will have to stay at least until Monday.   In that case will Irrigation BID    midodrine  15 mg Oral TID WC      Infusions:    sodium chloride 25 mL/hr at 07/26/23 1148    sodium chloride 75 mL/hr at 07/29/23 0938     PRN Meds: sodium chloride flush, 5-40 mL, PRN  sodium chloride, , PRN  acetaminophen, 650 mg, Q6H PRN   Or  acetaminophen, 650 mg, Q6H PRN  senna, 1 tablet, Daily PRN  ondansetron, 4 mg, Q6H PRN

## 2023-07-30 LAB
BACTERIA BLD CULT ORG #2: NORMAL
BACTERIA BLD CULT: NORMAL

## 2023-07-30 PROCEDURE — 6360000002 HC RX W HCPCS: Performed by: PHYSICIAN ASSISTANT

## 2023-07-30 PROCEDURE — 1200000000 HC SEMI PRIVATE

## 2023-07-30 PROCEDURE — 6370000000 HC RX 637 (ALT 250 FOR IP): Performed by: NURSE PRACTITIONER

## 2023-07-30 PROCEDURE — 6370000000 HC RX 637 (ALT 250 FOR IP): Performed by: INTERNAL MEDICINE

## 2023-07-30 PROCEDURE — 2580000003 HC RX 258: Performed by: PHYSICIAN ASSISTANT

## 2023-07-30 PROCEDURE — 6370000000 HC RX 637 (ALT 250 FOR IP): Performed by: PHYSICIAN ASSISTANT

## 2023-07-30 RX ORDER — MIDODRINE HYDROCHLORIDE 5 MG/1
5 TABLET ORAL
Status: DISCONTINUED | OUTPATIENT
Start: 2023-07-30 | End: 2023-08-02 | Stop reason: HOSPADM

## 2023-07-30 RX ADMIN — NITROFURANTOIN (MONOHYDRATE/MACROCRYSTALS) 100 MG: 75; 25 CAPSULE ORAL at 08:56

## 2023-07-30 RX ADMIN — HYOSCYAMINE SULFATE: 16 SOLUTION at 01:21

## 2023-07-30 RX ADMIN — Medication 1 CAPSULE: at 17:37

## 2023-07-30 RX ADMIN — Medication 250 MG: at 05:22

## 2023-07-30 RX ADMIN — SODIUM CHLORIDE: 9 INJECTION, SOLUTION INTRAVENOUS at 01:20

## 2023-07-30 RX ADMIN — SODIUM CHLORIDE: 9 INJECTION, SOLUTION INTRAVENOUS at 14:48

## 2023-07-30 RX ADMIN — MIDODRINE HYDROCHLORIDE 5 MG: 5 TABLET ORAL at 17:37

## 2023-07-30 RX ADMIN — BACLOFEN 10 MG: 10 TABLET ORAL at 21:08

## 2023-07-30 RX ADMIN — PANTOPRAZOLE SODIUM 40 MG: 40 TABLET, DELAYED RELEASE ORAL at 08:56

## 2023-07-30 RX ADMIN — SODIUM CHLORIDE, PRESERVATIVE FREE 10 ML: 5 INJECTION INTRAVENOUS at 08:57

## 2023-07-30 RX ADMIN — GABAPENTIN 600 MG: 300 CAPSULE ORAL at 14:44

## 2023-07-30 RX ADMIN — Medication 250 MG: at 17:37

## 2023-07-30 RX ADMIN — ACETAMINOPHEN 650 MG: 325 TABLET ORAL at 08:56

## 2023-07-30 RX ADMIN — Medication 1 CAPSULE: at 08:57

## 2023-07-30 RX ADMIN — ATORVASTATIN CALCIUM 10 MG: 10 TABLET, FILM COATED ORAL at 21:08

## 2023-07-30 RX ADMIN — GABAPENTIN 600 MG: 300 CAPSULE ORAL at 08:56

## 2023-07-30 RX ADMIN — Medication 250 MG: at 00:20

## 2023-07-30 RX ADMIN — BACLOFEN 10 MG: 10 TABLET ORAL at 14:44

## 2023-07-30 RX ADMIN — HYOSCYAMINE SULFATE: 16 SOLUTION at 17:37

## 2023-07-30 RX ADMIN — NITROFURANTOIN (MONOHYDRATE/MACROCRYSTALS) 100 MG: 75; 25 CAPSULE ORAL at 21:08

## 2023-07-30 RX ADMIN — BACLOFEN 10 MG: 10 TABLET ORAL at 08:56

## 2023-07-30 RX ADMIN — ENOXAPARIN SODIUM 40 MG: 100 INJECTION SUBCUTANEOUS at 08:57

## 2023-07-30 RX ADMIN — GABAPENTIN 600 MG: 300 CAPSULE ORAL at 21:08

## 2023-07-30 RX ADMIN — MIDODRINE HYDROCHLORIDE 15 MG: 5 TABLET ORAL at 12:58

## 2023-07-30 RX ADMIN — Medication 250 MG: at 12:59

## 2023-07-30 ASSESSMENT — PAIN SCALES - GENERAL: PAINLEVEL_OUTOF10: 4

## 2023-07-30 ASSESSMENT — PAIN DESCRIPTION - DESCRIPTORS: DESCRIPTORS: ACHING

## 2023-07-30 ASSESSMENT — PAIN DESCRIPTION - LOCATION: LOCATION: GENERALIZED

## 2023-07-30 NOTE — PROGRESS NOTES
Assessment complete. VSS. Patient resting in bed. Respirations even and easy. Reports a generalized aching pain, 4/10; PRN tylenol given. Turned onto left side. Call light in reach. Fall precautions in place. No needs expressed at this time.

## 2023-07-30 NOTE — PROGRESS NOTES
Shift assessment completed. Routine vitals competed. Scheduled medications given. Patient is awake, alert and oriented. Respirations are unlabored. Patient does not appear to be in distress, resting in bed at this time. Call light within reach.

## 2023-07-30 NOTE — PROGRESS NOTES
Germain Singh MD    Hospitalist Progress Note      Name:  Mouna Howell /Age/Sex: 1940  (79 y.o. female)   MRN & CSN:  [id number] & [id number] Admission Date/Time: 2023 12:00 AM   Location:  [facility]11/1595-84 PCP: Nadiya Patricia DO       I saw and examined the patient on 2023 at 2:24 PM.    Hospital Day: 5  Barriers to discharge: Disposition planning, bedside debridement tomorrow  Assessment and Plan:     79years old female nursing home resident bedridden history of ESBL UTI postop infection status post lumbar fusion on chronic Cipro for suppression on oral vancomycin due to recurrent C. difficile infection, sacral decubitus ulcer was transferred to ER for evaluation of lethargy and low running blood pressure     Acute metabolic encephalopathy due to below now improved. Patient back to baseline. Complicated UTI due to [facility] catheter. Urine culture in progress, history of ESBL E. coli, on meropenem ID following. Noted sensitivities will follow-up recommendations from ID. Plan to discharge on Diflucan 200 mg daily for total of 3 days and Macrobid DS twice daily for 10-days. C. difficile colitis, patient still having diarrhea on vancomycin. Resumed     Hypotension due to above. On midodrine, albumin/IV fluid boluses. Stable     Sacral decubitus ulcer stage IV POA. Wound team following. Status post bedside debridement done on 2023, noted general surgery recommendations regarding inpatient versus outpatient further debridement. Since patient will be here until tomorrow, paged and updated Dr. Kaylen Calix     Postoperative infection status post lumbar spinal fusion. On chronic suppressive Cipro 500 twice daily. Should be resumed after completing course of Macrobid. Follow-up with infectious disease as outpatient     Disposition planning.   Initial plan was that patient will go back to nursing home where she is coming from, now family wants to transfer her to PRN  acetaminophen, 650 mg, Q6H PRN   Or  acetaminophen, 650 mg, Q6H PRN  senna, 1 tablet, Daily PRN  ondansetron, 4 mg, Q6H PRN

## 2023-07-30 NOTE — PLAN OF CARE
Problem: Pain  Goal: Verbalizes/displays adequate comfort level or baseline comfort level  7/30/2023 0239 by Jose Burns  Outcome: Progressing     Problem: Nutrition Deficit:  Goal: Optimize nutritional status  7/30/2023 0239 by Jose Burns  Outcome: Progressing     Problem: Safety - Adult  Goal: Free from fall injury  7/30/2023 0239 by Jose Burns  Outcome: Progressing     Problem: Skin/Tissue Integrity  Goal: Absence of new skin breakdown  Description: 1. Monitor for areas of redness and/or skin breakdown  2. Assess vascular access sites hourly  3. Every 4-6 hours minimum:  Change oxygen saturation probe site  4. Every 4-6 hours:  If on nasal continuous positive airway pressure, respiratory therapy assess nares and determine need for appliance change or resting period.   7/30/2023 0239 by Jose Burns  Outcome: Progressing     Problem: Infection - Adult  Goal: Absence of infection at discharge  7/30/2023 0239 by Jose Burns  Outcome: Progressing     Problem: Hematologic - Adult  Goal: Maintains hematologic stability  7/30/2023 0239 by Jose Burns  Outcome: Progressing     Problem: Skin/Tissue Integrity - Adult  Goal: Incisions, wounds, or drain sites healing without S/S of infection  7/30/2023 0239 by Jose Burns  Outcome: Progressing     Problem: Musculoskeletal - Adult  Goal: Return mobility to safest level of function  7/30/2023 0239 by Jose Burns  Outcome: Progressing     Problem: Genitourinary - Adult  Goal: Urinary catheter remains patent  7/30/2023 0239 by Jose Burns  Outcome: Progressing

## 2023-07-31 LAB
ALBUMIN SERPL-MCNC: 2.5 G/DL (ref 3.4–5)
ALBUMIN/GLOB SERPL: 0.9 {RATIO} (ref 1.1–2.2)
ALP SERPL-CCNC: 189 U/L (ref 40–129)
ALT SERPL-CCNC: 35 U/L (ref 10–40)
ANION GAP SERPL CALCULATED.3IONS-SCNC: 8 MMOL/L (ref 3–16)
AST SERPL-CCNC: 29 U/L (ref 15–37)
BASOPHILS # BLD: 0.1 K/UL (ref 0–0.2)
BASOPHILS NFR BLD: 1.2 %
BILIRUB SERPL-MCNC: <0.2 MG/DL (ref 0–1)
BUN SERPL-MCNC: 9 MG/DL (ref 7–20)
CALCIUM SERPL-MCNC: 9.1 MG/DL (ref 8.3–10.6)
CHLORIDE SERPL-SCNC: 106 MMOL/L (ref 99–110)
CO2 SERPL-SCNC: 23 MMOL/L (ref 21–32)
CREAT SERPL-MCNC: <0.5 MG/DL (ref 0.6–1.2)
DEPRECATED RDW RBC AUTO: 21.8 % (ref 12.4–15.4)
EOSINOPHIL # BLD: 0.2 K/UL (ref 0–0.6)
EOSINOPHIL NFR BLD: 2.9 %
GFR SERPLBLD CREATININE-BSD FMLA CKD-EPI: >60 ML/MIN/{1.73_M2}
GLUCOSE BLD-MCNC: 278 MG/DL (ref 70–99)
GLUCOSE SERPL-MCNC: 141 MG/DL (ref 70–99)
HCT VFR BLD AUTO: 26.7 % (ref 36–48)
HGB BLD-MCNC: 8.3 G/DL (ref 12–16)
LACTATE BLDV-SCNC: 1.2 MMOL/L (ref 0.4–2)
LYMPHOCYTES # BLD: 1 K/UL (ref 1–5.1)
LYMPHOCYTES NFR BLD: 14.6 %
MAGNESIUM SERPL-MCNC: 1.8 MG/DL (ref 1.8–2.4)
MCH RBC QN AUTO: 25.7 PG (ref 26–34)
MCHC RBC AUTO-ENTMCNC: 31.1 G/DL (ref 31–36)
MCV RBC AUTO: 82.7 FL (ref 80–100)
MONOCYTES # BLD: 0.6 K/UL (ref 0–1.3)
MONOCYTES NFR BLD: 9.7 %
NEUTROPHILS # BLD: 4.7 K/UL (ref 1.7–7.7)
NEUTROPHILS NFR BLD: 71.6 %
PERFORMED ON: ABNORMAL
PLATELET # BLD AUTO: 384 K/UL (ref 135–450)
PMV BLD AUTO: 7.8 FL (ref 5–10.5)
POTASSIUM SERPL-SCNC: 3.4 MMOL/L (ref 3.5–5.1)
PROT SERPL-MCNC: 5.4 G/DL (ref 6.4–8.2)
RBC # BLD AUTO: 3.23 M/UL (ref 4–5.2)
SODIUM SERPL-SCNC: 137 MMOL/L (ref 136–145)
WBC # BLD AUTO: 6.6 K/UL (ref 4–11)

## 2023-07-31 PROCEDURE — 1200000000 HC SEMI PRIVATE

## 2023-07-31 PROCEDURE — 87040 BLOOD CULTURE FOR BACTERIA: CPT

## 2023-07-31 PROCEDURE — 6360000002 HC RX W HCPCS: Performed by: NURSE PRACTITIONER

## 2023-07-31 PROCEDURE — 6370000000 HC RX 637 (ALT 250 FOR IP): Performed by: INTERNAL MEDICINE

## 2023-07-31 PROCEDURE — 80053 COMPREHEN METABOLIC PANEL: CPT

## 2023-07-31 PROCEDURE — 6370000000 HC RX 637 (ALT 250 FOR IP): Performed by: PHYSICIAN ASSISTANT

## 2023-07-31 PROCEDURE — 83735 ASSAY OF MAGNESIUM: CPT

## 2023-07-31 PROCEDURE — 36415 COLL VENOUS BLD VENIPUNCTURE: CPT

## 2023-07-31 PROCEDURE — 97535 SELF CARE MNGMENT TRAINING: CPT

## 2023-07-31 PROCEDURE — 2580000003 HC RX 258: Performed by: NURSE PRACTITIONER

## 2023-07-31 PROCEDURE — 83605 ASSAY OF LACTIC ACID: CPT

## 2023-07-31 PROCEDURE — 6370000000 HC RX 637 (ALT 250 FOR IP): Performed by: NURSE PRACTITIONER

## 2023-07-31 PROCEDURE — 11044 DBRDMT BONE 1ST 20 SQ CM/<: CPT | Performed by: SURGERY

## 2023-07-31 PROCEDURE — 97110 THERAPEUTIC EXERCISES: CPT

## 2023-07-31 PROCEDURE — 2580000003 HC RX 258: Performed by: PHYSICIAN ASSISTANT

## 2023-07-31 PROCEDURE — 85025 COMPLETE CBC W/AUTO DIFF WBC: CPT

## 2023-07-31 PROCEDURE — 99232 SBSQ HOSP IP/OBS MODERATE 35: CPT | Performed by: INTERNAL MEDICINE

## 2023-07-31 PROCEDURE — APPNB30 APP NON BILLABLE TIME 0-30 MINS: Performed by: NURSE PRACTITIONER

## 2023-07-31 PROCEDURE — 6360000002 HC RX W HCPCS: Performed by: PHYSICIAN ASSISTANT

## 2023-07-31 RX ORDER — POTASSIUM CHLORIDE 20 MEQ/1
40 TABLET, EXTENDED RELEASE ORAL ONCE
Status: COMPLETED | OUTPATIENT
Start: 2023-07-31 | End: 2023-07-31

## 2023-07-31 RX ADMIN — SODIUM CHLORIDE, PRESERVATIVE FREE 10 ML: 5 INJECTION INTRAVENOUS at 01:33

## 2023-07-31 RX ADMIN — BACLOFEN 10 MG: 10 TABLET ORAL at 21:04

## 2023-07-31 RX ADMIN — Medication 250 MG: at 11:55

## 2023-07-31 RX ADMIN — MIDODRINE HYDROCHLORIDE 5 MG: 5 TABLET ORAL at 08:16

## 2023-07-31 RX ADMIN — BACLOFEN 10 MG: 10 TABLET ORAL at 15:39

## 2023-07-31 RX ADMIN — Medication 250 MG: at 23:46

## 2023-07-31 RX ADMIN — Medication 250 MG: at 01:08

## 2023-07-31 RX ADMIN — MIDODRINE HYDROCHLORIDE 5 MG: 5 TABLET ORAL at 11:55

## 2023-07-31 RX ADMIN — MIDODRINE HYDROCHLORIDE 5 MG: 5 TABLET ORAL at 15:37

## 2023-07-31 RX ADMIN — ENOXAPARIN SODIUM 40 MG: 100 INJECTION SUBCUTANEOUS at 08:15

## 2023-07-31 RX ADMIN — Medication 250 MG: at 18:00

## 2023-07-31 RX ADMIN — Medication 250 MG: at 06:10

## 2023-07-31 RX ADMIN — NITROFURANTOIN (MONOHYDRATE/MACROCRYSTALS) 100 MG: 75; 25 CAPSULE ORAL at 08:16

## 2023-07-31 RX ADMIN — GABAPENTIN 600 MG: 300 CAPSULE ORAL at 08:15

## 2023-07-31 RX ADMIN — SODIUM CHLORIDE, PRESERVATIVE FREE 10 ML: 5 INJECTION INTRAVENOUS at 08:16

## 2023-07-31 RX ADMIN — HYOSCYAMINE SULFATE: 16 SOLUTION at 01:34

## 2023-07-31 RX ADMIN — SODIUM CHLORIDE, PRESERVATIVE FREE 10 ML: 5 INJECTION INTRAVENOUS at 21:05

## 2023-07-31 RX ADMIN — BACLOFEN 10 MG: 10 TABLET ORAL at 08:16

## 2023-07-31 RX ADMIN — Medication 1 CAPSULE: at 08:16

## 2023-07-31 RX ADMIN — ACETAMINOPHEN 650 MG: 325 TABLET ORAL at 06:12

## 2023-07-31 RX ADMIN — Medication 1 CAPSULE: at 15:37

## 2023-07-31 RX ADMIN — GABAPENTIN 600 MG: 300 CAPSULE ORAL at 21:04

## 2023-07-31 RX ADMIN — GABAPENTIN 600 MG: 300 CAPSULE ORAL at 15:40

## 2023-07-31 RX ADMIN — NITROFURANTOIN (MONOHYDRATE/MACROCRYSTALS) 100 MG: 75; 25 CAPSULE ORAL at 21:04

## 2023-07-31 RX ADMIN — POTASSIUM CHLORIDE 40 MEQ: 1500 TABLET, EXTENDED RELEASE ORAL at 23:46

## 2023-07-31 RX ADMIN — CEFEPIME 2000 MG: 2 INJECTION, POWDER, FOR SOLUTION INTRAVENOUS at 23:44

## 2023-07-31 RX ADMIN — HYOSCYAMINE SULFATE: 16 SOLUTION at 08:17

## 2023-07-31 RX ADMIN — PANTOPRAZOLE SODIUM 40 MG: 40 TABLET, DELAYED RELEASE ORAL at 08:16

## 2023-07-31 RX ADMIN — ATORVASTATIN CALCIUM 10 MG: 10 TABLET, FILM COATED ORAL at 21:04

## 2023-07-31 ASSESSMENT — ENCOUNTER SYMPTOMS
EYE DISCHARGE: 0
SHORTNESS OF BREATH: 0
RHINORRHEA: 0
NAUSEA: 0
DIARRHEA: 0
SINUS PAIN: 0
BACK PAIN: 0
ABDOMINAL PAIN: 0
WHEEZING: 0
CONSTIPATION: 0
COUGH: 0
SORE THROAT: 0
SINUS PRESSURE: 0
EYE REDNESS: 0

## 2023-07-31 ASSESSMENT — PAIN SCALES - GENERAL
PAINLEVEL_OUTOF10: 2
PAINLEVEL_OUTOF10: 5

## 2023-07-31 ASSESSMENT — PAIN DESCRIPTION - ORIENTATION
ORIENTATION: LEFT
ORIENTATION: LEFT

## 2023-07-31 ASSESSMENT — PAIN DESCRIPTION - LOCATION
LOCATION: NECK
LOCATION: NECK

## 2023-07-31 ASSESSMENT — PAIN DESCRIPTION - DESCRIPTORS
DESCRIPTORS: SORE
DESCRIPTORS: SORE

## 2023-07-31 NOTE — CARE COORDINATION
Discharge Planning:     (MARIA LUISA) called and spoke with [facility] staff, Josse Pacheco, who scheduled patient for the following appointment:    [address] [facility] [address]  [address] [facility]  Phone: [phone]  Appointment: August, 9th at at 9:00am with Dr. Mary Aquino CM called [facility] staff, Jinger Power ([phone]), and informed of above information so that transportation facility can schedule transportation to accommodate patient's appointment.         Ulysses MAX, DHARAS, [facility] -   [phone]    Electronically signed by MANSOOR Pate on 7/31/2023 at 2:23 PM

## 2023-07-31 NOTE — PROGRESS NOTES
(Left, 12/13/2018); eye surgery (Bilateral); back surgery (12/16/10); Cystoscopy (N/A, 7/1/2019); Carpal tunnel release (Right, 8/22/2019); and shoulder surgery (Left, 10/9/2019). Discharge Recommendations: Branden Dozier scored a 12/24 on the AM-PAC ADL Inpatient form. Current research shows that an AM-PAC score of 17 or less is typically not associated with a discharge to the patient's home setting. Based on the patient's AM-PAC score and their current ADL deficits, it is recommended that the patient have 3-5 sessions per week of Occupational Therapy at d/c to increase the patient's independence. Please see assessment section for further patient specific details. If patient discharges prior to next session this note will serve as a discharge summary. Please see below for the latest assessment towards goals. DME Required For Discharge: DME to be determined at next level of care, DME to be determined pending patient progress    Precautions/Restrictions: high fall risk  Weight Bearing Restrictions: no restrictions  [] Right Upper Extremity  [] Left Upper Extremity [] Right Lower Extremity  [] Left Lower Extremity     Required Braces/Orthotics: no braces required   [] Right  [] Left  Positional Restrictions:no positional restrictions    Pre-Admission Information   Pt currently at [facility] for SNF. Pt has been in and out of the hospital since march 2023. Prior to march 2023 pt was living at home and independent. Pt was sitting EOB with staff. Pt was doing a slide board transfer but stopped ulcer on buttocks.  Janicealethea Salt lift to mechanical w/c but has not been getting up to a w/c d/t the decubitus ulcer     Examination   Vision:   Vision Corrective Device: wears glasses for reading  Hearing:   WFL  Perception:   WFL  Observation:   General Observation:  stage 4 decubitus ulcer  Posture:   Poor   Sensation:   reports numbness and tingling in (B) LE and reduced light touch to (B) LE  Proprioception: demonstrates understanding    Assessment  Activity Tolerance: tolerated well   Impairments Requiring Therapeutic Intervention: decreased functional mobility, decreased ADL status, decreased ROM, decreased strength, decreased sensation, decreased balance, decreased IADL, decreased posture  Prognosis: fair and guarded  Clinical Assessment: Patient is currently functioning below baseline. Pt presents with above deficits. She has been in and out of the hospital since march 2023. Currently at [facility] for therapy. Readmitted for hypotension/infection. Pt independent prior to march, currently bed bound. Pt would benefit from ongoing skilled OT services in order to maximize independence and facilitate return to [facility]. Safety Interventions: patient left in bed, bed alarm in place, call light within reach, patient at risk for falls, and nurse notified    Plan  Frequency: 3-5 x/per week  Current Treatment Recommendations: strengthening, ROM, balance training, functional mobility training, transfer training, and ADL/self-care training    Goals  Patient Goals: indpendence   Short Term Goals:  Time Frame: discharge  Bed mobility rolling maxA  Supine to sit maxA of 2   Sitting balance EOB ~2-3 minutes modA  UB ADLs mod I  LB ADLs maxA       Above goals reviewed on 7/31/2023. All goals are ongoing at this time unless indicated above.      Therapy Session Time     Individual Group Co-treatment   Time In 1113     Time Out 1152     Minutes 39       Timed Code Treatment Minutes:  39 Minutes  Total Treatment Minutes:  39 Minutes         Electronically Signed By: Elyssa Parker, [address] [place] [address] OTR/L [id number]

## 2023-07-31 NOTE — PROGRESS NOTES
Assessment complete. VSS. Patient resting in bed. Respirations even and easy. Turned onto right side. Call light in reach. Fall precautions in place. No needs expressed at this time.

## 2023-07-31 NOTE — PROGRESS NOTES
Nutrition Note    RECOMMENDATIONS  PO Diet: regular  ONS: Ensure High Protein BID    NUTRITION ASSESSMENT   Pt reamains regular and PO intake has been mostly %. Pt does report improved appetite and she is drinking the EnsureBID, prefers chocolate. Encouraged continued adequate intake, and ordering a protein choice at each meal.  Pt expressed understanding. Nutrition Related Findings: edema: +1 BLE, +2 perineal, +2 sacral  Wounds: Pressure Injury, Stage IV (sacrum)  Nutrition Education:  Education not indicated   Nutrition Goals: PO intake 50% or greater, by next RD assessment     MALNUTRITION ASSESSMENT   Chronic Illness  Malnutrition Status: No malnutrition      NUTRITION DIAGNOSIS   Increased nutrient needs related to increase demand for energy/nutrients as evidenced by wounds      CURRENT NUTRITION THERAPIES  ADULT DIET; Regular  ADULT ORAL NUTRITION SUPPLEMENT; Breakfast, Dinner; Low Calorie/High Protein Oral Supplement     PO Intake: 51-75%, %   PO Supplement Intake:%      ANTHROPOMETRICS  Current Height: 5' 4\" (162.6 cm)  Current Weight - Scale: 189 lb 9.6 oz (86 kg)    Ideal Body Weight (IBW): 120 lbs  (55 kg)    Usual Bodyweight 200 lb (90.7 kg)       BMI: 32.4      COMPARATIVE STANDARDS  Total Energy Requirements (kcals/day): 1290 - 1548     Protein (g):  65 - 109       Fluid (mL/day):  1290 - 1548    The patient will be monitored per nutrition standards of care. Consult dietitian if additional nutrition interventions are needed prior to RD reassessment.      Dianna Luis RD, LD    Contact: [phone]

## 2023-07-31 NOTE — PROGRESS NOTES
Michelle Velasquez MD  Physician  General Surgery  Progress Notes     Signed  Date of Service:  7/28/2023  5:23 PM     Signed                               Procedure Note:     Sacral area necrotic wound  Site confirmed, pt consents to excisional debridement of the site  Extensive tissue loss done to bone, Stage 4, necrotic muscle and fascia present  Overall 12 X 10 cm wound. Fracture of sacral / coccyx junction now evident with debridement     Chloraprep Prep  No local placed  #15 blade and scissors excisional debridement done  Full thickness Skin, SQ, muscle, fascia, and bone removed; 4 cm X 3 cm size  Cleaned with NS  Packed with Dakins wet to dry  Pt tolerated well  Site hemostatic with silver nitrate and Vicryl suture  DSD placed overlying the area    OK for discharge anytime with outpt daily dressing change. See [facility] for care thereafter.           Michelle Velasquez

## 2023-07-31 NOTE — PROGRESS NOTES
Infectious Diseases   Progress Note      Admission Date: 7/26/2023  Hospital Day: Hospital Day: 6   Attending: Garland Galvin MD  Date of service: 7/31/2023     Chief complaint/ Reason for consult: Altered mental status on admission  C. difficile diarrhea  Candida urinary tract infection  Hypotension    Microbiology:        I have reviewed allavailable micro lab data and cultures    Blood culture (2/2) - collected on 7/26/2023: Negative  Urine culture  - collected on 7/26/2023: Greater than 100,000 CFU per mL of Candida albicans, greater than 100,000 CFU per mL of Enterococcus faecium    Susceptibility    Vancomycin Resistant Enterococcus faecium (1)    Antibiotic Interpretation Microscan  Method Status    ampicillin Resistant >=32 mcg/mL BACTERIAL SUSCEPTIBILITY PANEL BY MILVIA     ciprofloxacin Resistant >=8 mcg/mL BACTERIAL SUSCEPTIBILITY PANEL BY MILVIA     levofloxacin Resistant >=8 mcg/mL BACTERIAL SUSCEPTIBILITY PANEL BY MILVIA     linezolid Sensitive 2 mcg/mL BACTERIAL SUSCEPTIBILITY PANEL BY MILVIA     nitrofurantoin Sensitive 32 mcg/mL BACTERIAL SUSCEPTIBILITY PANEL BY MILVIA     tetracycline Resistant >=16 mcg/mL BACTERIAL SUSCEPTIBILITY PANEL BY MILVIA     vancomycin Resistant >=32 mcg/mL BACTERIAL SUSCEPTIBILITY PANEL BY MILVIA       Antibiotics and immunizations:       Current antibiotics: All antibiotics and their doses were reviewed by me    Recent Abx Admin                     vancomycin [facility]) 50 MG/ML oral solution 250 mg (mg) 250 mg Given 07/31/23 1155     250 mg Given  0610     250 mg Given  0108     250 mg Given 07/30/23 1737    nitrofurantoin (macrocrystal-monohydrate) (MACROBID) capsule 100 mg (mg) 100 mg Given 07/31/23 0816     100 mg Given 07/30/23 2108                      Immunization History: All immunization history was reviewed by me today.     Immunization History   Administered Date(s) Administered    COVID-19, PFIZER Bivalent, DO NOT Dilute, (age 12y+), IM, 30 mcg/0.3 mL 09/15/2022

## 2023-07-31 NOTE — DISCHARGE INSTR - COC
Continuity of Care Form    Patient Name: Devon Chong   :  1940  MRN:  [id number]    Admit date:  2023  Discharge date:  23    Code Status Order: Full Code   Advance Directives:     Admitting Physician:  Princess Dumont DO  PCP: Lona Dunn DO    Discharging Nurse: [facility] Unit/Room#: 6CM-0506/4738-15  Discharging Unit Phone Number:     Emergency Contact:   Extended Emergency Contact Information  Primary Emergency Contact: June Chan  Address: [address] [id number]   [facility] of [address] [place] Phone: [phone]  Work Phone: [phone]  Mobile Phone: [phone]  Relation: Child  Secondary Emergency Contact: Mukesh Lawson  Address: [address]   [facility] of [address] [place] Phone: [phone]  Mobile Phone: [phone]  Relation: Other    Past Surgical History:  Past Surgical History:   Procedure Laterality Date    BACK SURGERY  12/16/10    L4-5 Left complete, radical facetectomy, L3-4-5 nerve root exploration and foraminotomy, pedicle screws    BREAST BIOPSY Right     x2    CARPAL TUNNEL RELEASE Left 2018    LEFT CARPAL TUNNEL RELEASE performed by Delaney Jackson MD at [facility] Right 2019    RIGHT CARPAL TUNNEL RELEASE performed by Delaney Jackson MD at [address] [facility]  2011    HOT SNARE CECAL POLYPECTOMY    COLONOSCOPY  14    Colonoscopy. No biopsies.   No polypectomies    CYSTOSCOPY N/A 2019    CYSTOSCOPY WITH INJECTION OF URETHRAL BULKING AGENT COAPTITE performed by Deisy Arcos MD at [address] Bilateral     cataract removal with lens implants    HYSTERECTOMY, VAGINAL      TVT    JOINT REPLACEMENT Bilateral     LAMINECTOMY      L3-4-5 decompression of nerve roots      PILONIDAL CYST EXCISION      SALPINGO-OOPHORECTOMY      bilateral    SHOULDER ARTHROPLASTY Right 14    SHOULDER SURGERY Left 10/9/2019    LEFT REVERSE TOTAL SHOULDER REPLACEMENT performed by El Dow

## 2023-08-01 LAB
25(OH)D3 SERPL-MCNC: 37.6 NG/ML
ALBUMIN SERPL-MCNC: 2.7 G/DL (ref 3.4–5)
ANION GAP SERPL CALCULATED.3IONS-SCNC: 6 MMOL/L (ref 3–16)
BASOPHILS # BLD: 0 K/UL (ref 0–0.2)
BASOPHILS NFR BLD: 0.6 %
BUN SERPL-MCNC: 7 MG/DL (ref 7–20)
CALCIUM SERPL-MCNC: 9.6 MG/DL (ref 8.3–10.6)
CHLORIDE SERPL-SCNC: 110 MMOL/L (ref 99–110)
CO2 SERPL-SCNC: 25 MMOL/L (ref 21–32)
CREAT SERPL-MCNC: <0.5 MG/DL (ref 0.6–1.2)
DEPRECATED RDW RBC AUTO: 21.8 % (ref 12.4–15.4)
EOSINOPHIL # BLD: 0.1 K/UL (ref 0–0.6)
EOSINOPHIL NFR BLD: 2.3 %
GFR SERPLBLD CREATININE-BSD FMLA CKD-EPI: >60 ML/MIN/{1.73_M2}
GLUCOSE SERPL-MCNC: 86 MG/DL (ref 70–99)
HCT VFR BLD AUTO: 26.2 % (ref 36–48)
HGB BLD-MCNC: 8.1 G/DL (ref 12–16)
LYMPHOCYTES # BLD: 0.6 K/UL (ref 1–5.1)
LYMPHOCYTES NFR BLD: 10.3 %
MCH RBC QN AUTO: 25.8 PG (ref 26–34)
MCHC RBC AUTO-ENTMCNC: 30.9 G/DL (ref 31–36)
MCV RBC AUTO: 83.5 FL (ref 80–100)
MONOCYTES # BLD: 0.5 K/UL (ref 0–1.3)
MONOCYTES NFR BLD: 7.4 %
NEUTROPHILS # BLD: 5 K/UL (ref 1.7–7.7)
NEUTROPHILS NFR BLD: 79.4 %
PHOSPHATE SERPL-MCNC: 3.1 MG/DL (ref 2.5–4.9)
PLATELET # BLD AUTO: 356 K/UL (ref 135–450)
PMV BLD AUTO: 7.6 FL (ref 5–10.5)
POTASSIUM SERPL-SCNC: 3.9 MMOL/L (ref 3.5–5.1)
RBC # BLD AUTO: 3.14 M/UL (ref 4–5.2)
SODIUM SERPL-SCNC: 141 MMOL/L (ref 136–145)
WBC # BLD AUTO: 6.3 K/UL (ref 4–11)

## 2023-08-01 PROCEDURE — 87070 CULTURE OTHR SPECIMN AEROBIC: CPT

## 2023-08-01 PROCEDURE — 87077 CULTURE AEROBIC IDENTIFY: CPT

## 2023-08-01 PROCEDURE — 85025 COMPLETE CBC W/AUTO DIFF WBC: CPT

## 2023-08-01 PROCEDURE — 87186 SC STD MICRODIL/AGAR DIL: CPT

## 2023-08-01 PROCEDURE — 2580000003 HC RX 258: Performed by: PHYSICIAN ASSISTANT

## 2023-08-01 PROCEDURE — 6360000002 HC RX W HCPCS: Performed by: NURSE PRACTITIONER

## 2023-08-01 PROCEDURE — 51798 US URINE CAPACITY MEASURE: CPT

## 2023-08-01 PROCEDURE — 1200000000 HC SEMI PRIVATE

## 2023-08-01 PROCEDURE — 6370000000 HC RX 637 (ALT 250 FOR IP): Performed by: NURSE PRACTITIONER

## 2023-08-01 PROCEDURE — 36415 COLL VENOUS BLD VENIPUNCTURE: CPT

## 2023-08-01 PROCEDURE — 6370000000 HC RX 637 (ALT 250 FOR IP): Performed by: INTERNAL MEDICINE

## 2023-08-01 PROCEDURE — 80069 RENAL FUNCTION PANEL: CPT

## 2023-08-01 PROCEDURE — 6360000002 HC RX W HCPCS: Performed by: PHYSICIAN ASSISTANT

## 2023-08-01 PROCEDURE — 2580000003 HC RX 258: Performed by: NURSE PRACTITIONER

## 2023-08-01 PROCEDURE — 87205 SMEAR GRAM STAIN: CPT

## 2023-08-01 PROCEDURE — 99232 SBSQ HOSP IP/OBS MODERATE 35: CPT | Performed by: INTERNAL MEDICINE

## 2023-08-01 PROCEDURE — 6370000000 HC RX 637 (ALT 250 FOR IP): Performed by: PHYSICIAN ASSISTANT

## 2023-08-01 PROCEDURE — 82306 VITAMIN D 25 HYDROXY: CPT

## 2023-08-01 RX ADMIN — NITROFURANTOIN (MONOHYDRATE/MACROCRYSTALS) 100 MG: 75; 25 CAPSULE ORAL at 22:52

## 2023-08-01 RX ADMIN — CEFEPIME 2000 MG: 2 INJECTION, POWDER, FOR SOLUTION INTRAVENOUS at 05:33

## 2023-08-01 RX ADMIN — NITROFURANTOIN (MONOHYDRATE/MACROCRYSTALS) 100 MG: 75; 25 CAPSULE ORAL at 09:56

## 2023-08-01 RX ADMIN — GABAPENTIN 600 MG: 300 CAPSULE ORAL at 22:52

## 2023-08-01 RX ADMIN — BACLOFEN 10 MG: 10 TABLET ORAL at 22:51

## 2023-08-01 RX ADMIN — GABAPENTIN 600 MG: 300 CAPSULE ORAL at 09:41

## 2023-08-01 RX ADMIN — MIDODRINE HYDROCHLORIDE 5 MG: 5 TABLET ORAL at 09:42

## 2023-08-01 RX ADMIN — HYOSCYAMINE SULFATE: 16 SOLUTION at 02:25

## 2023-08-01 RX ADMIN — SODIUM CHLORIDE, PRESERVATIVE FREE 10 ML: 5 INJECTION INTRAVENOUS at 09:56

## 2023-08-01 RX ADMIN — MIDODRINE HYDROCHLORIDE 5 MG: 5 TABLET ORAL at 13:58

## 2023-08-01 RX ADMIN — ENOXAPARIN SODIUM 40 MG: 100 INJECTION SUBCUTANEOUS at 09:41

## 2023-08-01 RX ADMIN — HYOSCYAMINE SULFATE: 16 SOLUTION at 09:56

## 2023-08-01 RX ADMIN — Medication 1 CAPSULE: at 17:53

## 2023-08-01 RX ADMIN — MIDODRINE HYDROCHLORIDE 5 MG: 5 TABLET ORAL at 17:53

## 2023-08-01 RX ADMIN — Medication 250 MG: at 13:58

## 2023-08-01 RX ADMIN — ATORVASTATIN CALCIUM 10 MG: 10 TABLET, FILM COATED ORAL at 22:52

## 2023-08-01 RX ADMIN — CEFEPIME 2000 MG: 2 INJECTION, POWDER, FOR SOLUTION INTRAVENOUS at 13:59

## 2023-08-01 RX ADMIN — SODIUM CHLORIDE: 9 INJECTION, SOLUTION INTRAVENOUS at 02:23

## 2023-08-01 RX ADMIN — HYOSCYAMINE SULFATE: 16 SOLUTION at 22:32

## 2023-08-01 RX ADMIN — BACLOFEN 10 MG: 10 TABLET ORAL at 13:58

## 2023-08-01 RX ADMIN — BACLOFEN 10 MG: 10 TABLET ORAL at 09:41

## 2023-08-01 RX ADMIN — Medication 1 CAPSULE: at 09:41

## 2023-08-01 RX ADMIN — PANTOPRAZOLE SODIUM 40 MG: 40 TABLET, DELAYED RELEASE ORAL at 09:41

## 2023-08-01 RX ADMIN — SODIUM CHLORIDE, PRESERVATIVE FREE 10 ML: 5 INJECTION INTRAVENOUS at 22:55

## 2023-08-01 RX ADMIN — GABAPENTIN 600 MG: 300 CAPSULE ORAL at 13:58

## 2023-08-01 RX ADMIN — Medication 250 MG: at 05:04

## 2023-08-01 RX ADMIN — Medication 250 MG: at 17:53

## 2023-08-01 ASSESSMENT — ENCOUNTER SYMPTOMS
SINUS PRESSURE: 0
EYE DISCHARGE: 0
ABDOMINAL PAIN: 0
BACK PAIN: 0
SORE THROAT: 0
EYE REDNESS: 0
CONSTIPATION: 0
SHORTNESS OF BREATH: 0
SINUS PAIN: 0
DIARRHEA: 0
WHEEZING: 0
RHINORRHEA: 0
NAUSEA: 0
COUGH: 0

## 2023-08-01 ASSESSMENT — PAIN SCALES - GENERAL
PAINLEVEL_OUTOF10: 0
PAINLEVEL_OUTOF10: 0

## 2023-08-01 NOTE — PROGRESS NOTES
Patient noted with elevated HR trending up to 153, denies chest pain of SOB. T 99.5 and Bp 109/72. On call Miguel shavon NP made aware. New orders given, state labs, increase NS to 150ml/hr. Salty Ramirez NP made aware of lab results new orders given. Patient HR trending between . , she continues to denies chest pain or SOB. Tolerates being reposition. K+ down to 3.4 replaced per orders. Call light in reach with bed in lowest position bed alarm activated. Reported given to on coming RN, she will completed wound culture when dressing change is completed. Patient made aware., verbalized understanding.

## 2023-08-01 NOTE — PROGRESS NOTES
Edgar Laird MD    Hospitalist Progress Note      Name:  Aurelio Hager /Age/Sex: 1940  (79 y.o. female)   MRN & CSN:  [id number] & [id number] Admission Date/Time: 2023 12:00 AM   Location:  5T-5570/5570-01 PCP: Shelia Day: 7    Interval history:  Pt seen and examined today. Overnight events noted, interval ancillary notes and labs reviewed. Status post bedside repeat debridement by general surgery on 22  Afebrile night, WBCs WNL, blood cultures NGTD  denied any fever, chills, chest pain, shortness of breath, nausea vomiting or abdominal pain             Assessment and Plan: This 79years old female nursing home resident bedridden hx of ESBL UTI ,postop infection status post lumbar fusion on chronic Cipro for suppression, on oral vancomycin due to recurrent C. difficile infection, sacral decubitus ulcer was transferred to ER for evaluation of lethargy and low running blood pressure        VRE Enterococcus faecium/Candida albicans UTI  Urine culture grew VRE Enterococcus faecium and Candida albicans  ID on board; meropenem discontinued and switched to Macrobid and Diflucan  Plan to discharge on Diflucan 200 mg daily for total of 3 days and Macrobid DS twice daily for 10-days. Acute metabolic encephalopathy likely secondary to UTI; resolved     C. difficile colitis; on vancomycin. Hypotension; BP stable. Continue midodrine     Sacral decubitus ulcer stage IV POA. Status post bedside debridement done on 23 &   General surgery on board: appreciate their recs       Postoperative infection status post lumbar spinal fusion; On chronic suppressive   Cipro 500 twice daily. Should be resumed after completing course of Macrobid.     Follow-up with infectious disease as outpatient     benign 2.4 cm left adrenal nodule with Hounsfield units of 62 noted on CT  Nonemergent follow-up MRI recommended      Lovenox for DVT prophylaxis  Full

## 2023-08-01 NOTE — DISCHARGE INSTRUCTIONS
Follow up with your PCP within 3-5 days of discharge. Have PCP check blood pressure and adjust BP medication dose if needed  Follow up with infectious disease as instructed   Follow up with as instructed   Take all your medications as prescribed.   Benign 2.4 cm left adrenal nodule noted on CT: Non-emergent follow-up MRI recommended

## 2023-08-01 NOTE — PROGRESS NOTES
Received a message from RN that patient has sacral decubitus ulcer and requested debridement today. Patient's heart rate went up to 153 this evening. Blood pressure 109/72. Temperature 99.5. Concerning of developing sepsis. Ordered CBC CMP, lactic acid, blood cultures x2, blood cultures stat. Is on oral Macrobid and vancomycin. Start cefepime for sepsis. Increased IV fluids to 150 mL/h.

## 2023-08-01 NOTE — PROGRESS NOTES
status is at baseline. Motor: No abnormal muscle tone. Psychiatric:         Mood and Affect: Mood normal.         Behavior: Behavior normal.        *    Lines and drains: All vascular access sites are healthy with no local erythema, discharge or tenderness. Intake and output:    I/O last 3 completed shifts: In: 480 [P.O.:480]  Out: 5050 [Urine:5050]    Lab Data:   All available labs and old records have been reviewed by me. CBC:  Recent Labs     07/31/23 2132 08/01/23  1039   WBC 6.6 6.3   RBC 3.23* 3.14*   HGB 8.3* 8.1*   HCT 26.7* 26.2*    356   MCV 82.7 83.5   MCH 25.7* 25.8*   MCHC 31.1 30.9*   RDW 21.8* 21.8*          BMP:  Recent Labs     07/31/23 2132 08/01/23  1039    141   K 3.4* 3.9    110   CO2 23 25   BUN 9 7   CREATININE <0.5* <0.5*   CALCIUM 9.1 9.6   GLUCOSE 141* 86          Hepatic Function Panel:   Lab Results   Component Value Date/Time    ALKPHOS 189 07/31/2023 09:32 PM    ALT 35 07/31/2023 09:32 PM    AST 29 07/31/2023 09:32 PM    PROT 5.4 07/31/2023 09:32 PM    BILITOT <0.2 07/31/2023 09:32 PM    BILIDIR <0.2 07/26/2023 05:32 PM    IBILI see below 07/26/2023 05:32 PM    LABALBU 2.7 08/01/2023 10:39 AM       CPK: No results found for: CKTOTAL  ESR:   Lab Results   Component Value Date    SEDRATE 84 (H) 07/26/2023     CRP:   Lab Results   Component Value Date    CRP 66.2 (H) 07/26/2023           Imaging: All pertinent images and reports for the current visit were reviewed by me during this visit. I reviewed the chest x-ray/CT scan/MRI images and independently interpreted the findings and results today. XR ABDOMEN (KUB) (SINGLE AP VIEW)   Final Result   Negative for bowel obstruction             Medications: All current and past medications were reviewed.      midodrine  5 mg Oral TID WC    nitrofurantoin (macrocrystal-monohydrate)  100 mg Oral 2 times per day    sodium chloride flush  5-40 mL IntraVENous 2 times per day    enoxaparin  40 mg SubCUTAneous Daily    lactobacillus  1 capsule Oral BID WC    atorvastatin  10 mg Oral Nightly    baclofen  10 mg Oral TID    gabapentin  600 mg Oral TID    pantoprazole  40 mg Oral Daily    vancomycin  250 mg Oral 4 times per day    Sodium Hypochlorite   Irrigation BID        sodium chloride 25 mL/hr at 07/26/23 1148       sodium chloride flush, sodium chloride, acetaminophen **OR** acetaminophen, senna, ondansetron      Problem list:       Patient Active Problem List   Diagnosis Code    Previous back surgery Z98.890    Status post lumbar spinal fusion Z98.1    Spondylolisthesis of lumbar region M43.16    DDD (degenerative disc disease), lumbosacral M51.37    Stenosis, spinal, lumbar M48.061    Left wrist pain M25.532    left CMC arthritis, thumb, degenerative M18.9    De Quervain's disease (tenosynovitis) M65.4    CTS (carpal tunnel syndrome) G56.00    Postlaminectomy syndrome, lumbar region M96.1    Primary localized osteoarthrosis of shoulder region M19.019    Status post total shoulder replacement Z96.619    Pectoralis muscle strain S29.011A    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    Weight loss counseling, encounter for Z71.3    Lumbar stenosis M48.061    Left groin pain R10.32    Strain of hip adductor muscle S76.019A    S/P reverse total shoulder arthroplasty, 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    History of total right knee replacement Z96.651    History of total left knee replacement Z96.652    Arthritis of left hip M16.12    Arthritis of left shoulder region M19.012    Other specified arthritis, left shoulder M13.812    Arthritis of right knee M17.11    Status post reverse total shoulder replacement, 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

## 2023-08-02 VITALS
HEART RATE: 88 BPM | TEMPERATURE: 97.8 F | SYSTOLIC BLOOD PRESSURE: 117 MMHG | WEIGHT: 189.6 LBS | OXYGEN SATURATION: 97 % | DIASTOLIC BLOOD PRESSURE: 78 MMHG | RESPIRATION RATE: 18 BRPM | HEIGHT: 64 IN | BODY MASS INDEX: 32.37 KG/M2

## 2023-08-02 LAB
ALBUMIN SERPL-MCNC: 2.4 G/DL (ref 3.4–5)
ANION GAP SERPL CALCULATED.3IONS-SCNC: 7 MMOL/L (ref 3–16)
BASOPHILS # BLD: 0 K/UL (ref 0–0.2)
BASOPHILS NFR BLD: 0.7 %
BUN SERPL-MCNC: 8 MG/DL (ref 7–20)
CALCIUM SERPL-MCNC: 9.7 MG/DL (ref 8.3–10.6)
CHLORIDE SERPL-SCNC: 109 MMOL/L (ref 99–110)
CO2 SERPL-SCNC: 25 MMOL/L (ref 21–32)
CREAT SERPL-MCNC: <0.5 MG/DL (ref 0.6–1.2)
DEPRECATED RDW RBC AUTO: 21.5 % (ref 12.4–15.4)
EOSINOPHIL # BLD: 0.2 K/UL (ref 0–0.6)
EOSINOPHIL NFR BLD: 3.7 %
GFR SERPLBLD CREATININE-BSD FMLA CKD-EPI: >60 ML/MIN/{1.73_M2}
GLUCOSE SERPL-MCNC: 99 MG/DL (ref 70–99)
HCT VFR BLD AUTO: 25 % (ref 36–48)
HGB BLD-MCNC: 7.7 G/DL (ref 12–16)
LYMPHOCYTES # BLD: 0.3 K/UL (ref 1–5.1)
LYMPHOCYTES NFR BLD: 4 %
MCH RBC QN AUTO: 25.7 PG (ref 26–34)
MCHC RBC AUTO-ENTMCNC: 31 G/DL (ref 31–36)
MCV RBC AUTO: 83.1 FL (ref 80–100)
MONOCYTES # BLD: 0.5 K/UL (ref 0–1.3)
MONOCYTES NFR BLD: 6.9 %
NEUTROPHILS # BLD: 5.6 K/UL (ref 1.7–7.7)
NEUTROPHILS NFR BLD: 84.7 %
PHOSPHATE SERPL-MCNC: 3.3 MG/DL (ref 2.5–4.9)
PLATELET # BLD AUTO: 350 K/UL (ref 135–450)
PMV BLD AUTO: 8.1 FL (ref 5–10.5)
POTASSIUM SERPL-SCNC: 3.2 MMOL/L (ref 3.5–5.1)
RBC # BLD AUTO: 3.01 M/UL (ref 4–5.2)
SODIUM SERPL-SCNC: 141 MMOL/L (ref 136–145)
WBC # BLD AUTO: 6.6 K/UL (ref 4–11)

## 2023-08-02 PROCEDURE — 6370000000 HC RX 637 (ALT 250 FOR IP): Performed by: PHYSICIAN ASSISTANT

## 2023-08-02 PROCEDURE — 85025 COMPLETE CBC W/AUTO DIFF WBC: CPT

## 2023-08-02 PROCEDURE — 84443 ASSAY THYROID STIM HORMONE: CPT

## 2023-08-02 PROCEDURE — 6360000002 HC RX W HCPCS: Performed by: PHYSICIAN ASSISTANT

## 2023-08-02 PROCEDURE — 97535 SELF CARE MNGMENT TRAINING: CPT

## 2023-08-02 PROCEDURE — 6370000000 HC RX 637 (ALT 250 FOR IP): Performed by: INTERNAL MEDICINE

## 2023-08-02 PROCEDURE — 2580000003 HC RX 258: Performed by: PHYSICIAN ASSISTANT

## 2023-08-02 PROCEDURE — 6370000000 HC RX 637 (ALT 250 FOR IP): Performed by: NURSE PRACTITIONER

## 2023-08-02 PROCEDURE — 80069 RENAL FUNCTION PANEL: CPT

## 2023-08-02 PROCEDURE — 36415 COLL VENOUS BLD VENIPUNCTURE: CPT

## 2023-08-02 RX ORDER — VANCOMYCIN HYDROCHLORIDE 50 MG/ML
250 KIT ORAL EVERY 12 HOURS SCHEDULED
Qty: 50 ML | Refills: 0
Start: 2023-08-02 | End: 2023-08-07

## 2023-08-02 RX ORDER — MIDODRINE HYDROCHLORIDE 5 MG/1
5 TABLET ORAL 2 TIMES DAILY PRN
Qty: 30 TABLET | Refills: 0
Start: 2023-08-02 | End: 2023-08-17

## 2023-08-02 RX ORDER — METOPROLOL SUCCINATE 100 MG/1
50 TABLET, EXTENDED RELEASE ORAL DAILY
Qty: 30 TABLET | Refills: 0 | Status: ON HOLD
Start: 2023-08-02 | End: 2023-08-12 | Stop reason: HOSPADM

## 2023-08-02 RX ORDER — POTASSIUM CHLORIDE 20 MEQ/1
40 TABLET, EXTENDED RELEASE ORAL ONCE
Status: COMPLETED | OUTPATIENT
Start: 2023-08-02 | End: 2023-08-02

## 2023-08-02 RX ORDER — CIPROFLOXACIN 500 MG/1
500 TABLET, FILM COATED ORAL 2 TIMES DAILY
Qty: 30 TABLET | Refills: 3
Start: 2023-08-08

## 2023-08-02 RX ORDER — NITROFURANTOIN 25; 75 MG/1; MG/1
100 CAPSULE ORAL EVERY 12 HOURS SCHEDULED
Qty: 10 CAPSULE | Refills: 0
Start: 2023-08-02 | End: 2023-08-07

## 2023-08-02 RX ADMIN — SODIUM CHLORIDE, PRESERVATIVE FREE 10 ML: 5 INJECTION INTRAVENOUS at 09:09

## 2023-08-02 RX ADMIN — PANTOPRAZOLE SODIUM 40 MG: 40 TABLET, DELAYED RELEASE ORAL at 09:08

## 2023-08-02 RX ADMIN — Medication 1 CAPSULE: at 09:08

## 2023-08-02 RX ADMIN — NITROFURANTOIN (MONOHYDRATE/MACROCRYSTALS) 100 MG: 75; 25 CAPSULE ORAL at 09:09

## 2023-08-02 RX ADMIN — ENOXAPARIN SODIUM 40 MG: 100 INJECTION SUBCUTANEOUS at 09:09

## 2023-08-02 RX ADMIN — HYOSCYAMINE SULFATE: 16 SOLUTION at 12:43

## 2023-08-02 RX ADMIN — Medication 250 MG: at 00:31

## 2023-08-02 RX ADMIN — BACLOFEN 10 MG: 10 TABLET ORAL at 14:24

## 2023-08-02 RX ADMIN — POTASSIUM CHLORIDE 40 MEQ: 1500 TABLET, EXTENDED RELEASE ORAL at 12:51

## 2023-08-02 RX ADMIN — MIDODRINE HYDROCHLORIDE 5 MG: 5 TABLET ORAL at 09:08

## 2023-08-02 RX ADMIN — GABAPENTIN 600 MG: 300 CAPSULE ORAL at 14:24

## 2023-08-02 RX ADMIN — Medication 250 MG: at 17:39

## 2023-08-02 RX ADMIN — Medication 250 MG: at 07:02

## 2023-08-02 RX ADMIN — BACLOFEN 10 MG: 10 TABLET ORAL at 09:09

## 2023-08-02 RX ADMIN — Medication 250 MG: at 12:51

## 2023-08-02 RX ADMIN — GABAPENTIN 600 MG: 300 CAPSULE ORAL at 09:08

## 2023-08-02 RX ADMIN — Medication 1 CAPSULE: at 17:39

## 2023-08-02 ASSESSMENT — PAIN SCALES - GENERAL: PAINLEVEL_OUTOF10: 0

## 2023-08-02 NOTE — PROGRESS NOTES
Patients wound dressing changed. Patient picked up by transport. VSS. Both PIVs removed. Patient alert and aware, able to sign for herself.

## 2023-08-02 NOTE — PROGRESS NOTES
Called Nurse [address] and informed her of transport time between 5:30 ad 6:00 pm. Asked that she make sure patient has something to eat per her daughter Laurie's request since  is at dinner time. Also asked that she filled out [address] RN acknowledged and agreed to call me back at [address] as soon as this is accomplished.      Electronically signed by Magan Massey RN on 8/2/2023 at 3:03 PM

## 2023-08-02 NOTE — PROGRESS NOTES
[address] Hospital Department   Phone: [phone]    Occupational Therapy    [] Initial Evaluation            [x] Daily Treatment Note         [] Discharge Summary      Patient: Hennie Hodgkin   : 1940   MRN: [id number]   Date of Service:  2023    Admitting Diagnosis:  Urinary tract infection associated with indwelling urethral catheter [facility])  Current Admission Summary: Hennie Hodgkin is a 79 y.o. female who presented to ED for evaluation of lethargy and hypotension. Patient reports her SBP runs around 100 at baseline. BP in ED 85/52, responsive to fluids and midodrine. Patient has a chronic indwelling catheter. 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 She has a sacral decubitus ulcer as well. Patient reports that she has not had diarrhea for a week. She denies abdominal pain, nausea, vomiting, but reports poor p.o. intake. She denies fever, dizziness, chest pain, shortness of breath, cough. Patient reports she feels generally fatigued but denies any other complaints or concerns at this time. Past Medical History:  has a past medical history of Arthritis, At high risk for falls, Constipation, Environmental allergies, Fall at home, GERD (gastroesophageal reflux disease), History of anemia, Hyperlipidemia, Neuropathy of leg, OAB (overactive bladder), and UTI (lower urinary tract infection). Past Surgical History:  has a past surgical history that includes Total knee arthroplasty (2010); Pilonidal cyst excision; Tubal ligation; sinus surgery; Hysterectomy, vaginal; Salpingo-oophorectomy; laminectomy (); Spinal fusion (12/16/10); Colonoscopy (2011); Total shoulder arthroplasty (Right, 14); Upper gastrointestinal endoscopy (14); Colonoscopy (14); Breast biopsy (Right); joint replacement (Bilateral);  Total therapy. Readmitted for hypotension/infection. Pt independent prior to march, currently bed bound. Pt would benefit from ongoing skilled OT services in order to maximize independence and facilitate return to PLOF. Safety Interventions: patient left in bed, bed alarm in place, call light within reach, patient at risk for falls, and nurse notified    Plan  Frequency: 3-5 x/per week  Current Treatment Recommendations: strengthening, ROM, balance training, functional mobility training, transfer training, and ADL/self-care training    Goals  Patient Goals: indpendence   Short Term Goals:  Time Frame: discharge  Bed mobility rolling maxA  Supine to sit maxA of 2   Sitting balance EOB ~2-3 minutes modA  UB ADLs mod I  LB ADLs maxA       Above goals reviewed on 8/2/2023. All goals are ongoing at this time unless indicated above.      Therapy Session Time     Individual Group Co-treatment   Time In 1426     Time Out 7838     Minutes 27       Timed Code Treatment Minutes:  27 Minutes  Total Treatment Minutes:  27 Minutes         Electronically Signed By: PEYTON Dick/MING [id number]

## 2023-08-02 NOTE — CARE COORDINATION
08/02/23 1642   IMM Letter   IMM Letter given to Patient/Family/Significant other/Guardian/POA/by: Letter given to Ahsan Roach by Gibran Nuñez RN. Pt is agreeable to recieving letter less than four hours from discharge.    IMM Letter date given: 08/02/23   IMM Letter time given: 1642     Electronically signed by Pia Sin RN on 8/2/2023 at 4:42 PM

## 2023-08-02 NOTE — PLAN OF CARE
Problem: Pain  Goal: Verbalizes/displays adequate comfort level or baseline comfort level  Outcome: Completed     Problem: Nutrition Deficit:  Goal: Optimize nutritional status  Outcome: Completed     Problem: Safety - Adult  Goal: Free from fall injury  Outcome: Completed     Problem: Skin/Tissue Integrity  Goal: Absence of new skin breakdown  Description: 1. Monitor for areas of redness and/or skin breakdown  2. Assess vascular access sites hourly  3. Every 4-6 hours minimum:  Change oxygen saturation probe site  4. Every 4-6 hours:  If on nasal continuous positive airway pressure, respiratory therapy assess nares and determine need for appliance change or resting period.   Outcome: Completed     Problem: Infection - Adult  Goal: Absence of infection at discharge  Outcome: Completed     Problem: Hematologic - Adult  Goal: Maintains hematologic stability  Outcome: Completed     Problem: Skin/Tissue Integrity - Adult  Goal: Incisions, wounds, or drain sites healing without S/S of infection  Outcome: Completed     Problem: Musculoskeletal - Adult  Goal: Return mobility to safest level of function  Outcome: Completed     Problem: Genitourinary - Adult  Goal: Urinary catheter remains patent  Outcome: Completed

## 2023-08-02 NOTE — CARE COORDINATION
Patient noted to have a discharge order. Pt has been medically cleared to return to [address]    Patient discharged to   [facility].   [place], [address]   Phone: [phone]  Fax: [phone] (SESU)          [phone] [facility]       Called [phone] (Office)        [phone] (cell) Rosita Cartwright Required/obtained: No pre-cert required per Caroline Marrufo     No Hens - SNF to SNF transfer    Transportation scheduled for 17:30 pm    Transportation provided by [employer]  ([phone]) at 17:30 pm    AVS faxed and agency notified: yes  The following prescriptions sent with pt:  None on file    Family Notified: yes; maria del carmen Romano [phone] called and informed    Nurse to call report to facility:  [phone]

## 2023-08-03 LAB
BACTERIA SPEC AEROBE CULT: ABNORMAL
BACTERIA SPEC AEROBE CULT: ABNORMAL
GRAM STN SPEC: ABNORMAL
ORGANISM: ABNORMAL
ORGANISM: ABNORMAL
TSH SERPL DL<=0.005 MIU/L-ACNC: 2.16 UIU/ML (ref 0.27–4.2)

## 2023-08-04 LAB
BACTERIA BLD CULT ORG #2: NORMAL
BACTERIA BLD CULT: NORMAL

## 2023-08-08 NOTE — DISCHARGE INSTRUCTIONS
[address], [address] [facility]  Telephone: [phone]    Discharge Instructions    Important reminders:    **If you have any signs and symptoms of illness (Cough, fever, congestion, nausea, vomiting, diarrhea, etc.) please call the wound care center prior to your appointment. 1. Increase Protein intake for optimal wound healing  2. No added salt to reduce any swelling  3. If diabetic, maintain good glucose control  4. If you smoke, smoking prohibits wound healing, we ask that you refrain from smoking. 5. When taking antibiotics take the entire prescription as ordered. Do not stop taking until medication is all gone unless otherwise instructed. 6. Exercise as tolerated. 7. Keep weight off wounds and reposition every 2 hours if applicable. 8. If wound(s) is on your lower extremity, elevate legs to the level of the heart or above for 30 minutes 4-5 times a day and/or when sitting. Avoid standing for long periods of time. 9. Do not get wounds wet in bath or shower unless otherwise instructed by your physician. If your wound is on your foot or leg, you may purchase a cast bag. Please ask at the pharmacy. If Vascular testing is ordered, please call [address] [place] [address] [phone]) to schedule. Vascular tests ordered by Wound Care Physicians may take up to 2 hours to complete. Please keep that in mind when scheduling. If Vascular testing is scheduled, please bring supplies to replace your dressing after testing is done. The vascular department does not stock supplies. Wound: Sacrum, Back    With each dressing change, rinse wounds with 0.9% Saline. (May use wound wash or soft contact solution. Both can be purchased at a local drug store). If unable to obtain saline, may use a gentle soap and water. Dressing care: Back- Antibiotic ointment, dry dressing- change Monday, Wednesday, & Friday. Sacrum- In clinic: wet to dry, dry dressing.  At

## 2023-08-09 ENCOUNTER — APPOINTMENT (OUTPATIENT)
Dept: CT IMAGING | Age: 83
DRG: 981 | End: 2023-08-09
Payer: MEDICARE

## 2023-08-09 ENCOUNTER — HOSPITAL ENCOUNTER (INPATIENT)
Age: 83
LOS: 3 days | Discharge: NURSING, REHAB OR LONG-TERM CARE | DRG: 981 | End: 2023-08-13
Attending: INTERNAL MEDICINE | Admitting: SURGERY
Payer: MEDICARE

## 2023-08-09 ENCOUNTER — APPOINTMENT (OUTPATIENT)
Dept: GENERAL RADIOLOGY | Age: 83
DRG: 981 | End: 2023-08-09
Payer: MEDICARE

## 2023-08-09 ENCOUNTER — HOSPITAL ENCOUNTER (OUTPATIENT)
Dept: WOUND CARE | Age: 83
Discharge: HOME | End: 2023-08-09
Payer: MEDICARE

## 2023-08-09 VITALS — DIASTOLIC BLOOD PRESSURE: 76 MMHG | RESPIRATION RATE: 16 BRPM | SYSTOLIC BLOOD PRESSURE: 114 MMHG | HEART RATE: 148 BPM

## 2023-08-09 DIAGNOSIS — I48.91 NEW ONSET ATRIAL FIBRILLATION (HCC): Primary | ICD-10-CM

## 2023-08-09 DIAGNOSIS — R77.8 ELEVATED TROPONIN: ICD-10-CM

## 2023-08-09 DIAGNOSIS — E87.6 HYPOKALEMIA: ICD-10-CM

## 2023-08-09 DIAGNOSIS — I48.91 ATRIAL FIBRILLATION WITH RVR (HCC): ICD-10-CM

## 2023-08-09 DIAGNOSIS — L89.154 PRESSURE ULCER OF SACRAL REGION, STAGE 4 (HCC): ICD-10-CM

## 2023-08-09 DIAGNOSIS — L89.154 DECUBITUS ULCER OF SACRAL REGION, STAGE 4 (HCC): ICD-10-CM

## 2023-08-09 DIAGNOSIS — R79.89 ELEVATED TROPONIN: ICD-10-CM

## 2023-08-09 LAB
ALBUMIN SERPL-MCNC: 2.7 G/DL (ref 3.4–5)
ALBUMIN/GLOB SERPL: 0.8 {RATIO} (ref 1.1–2.2)
ALP SERPL-CCNC: 171 U/L (ref 40–129)
ALT SERPL-CCNC: 30 U/L (ref 10–40)
ANION GAP SERPL CALCULATED.3IONS-SCNC: 10 MMOL/L (ref 3–16)
APTT BLD: 35.8 SEC (ref 22.7–35.9)
AST SERPL-CCNC: 29 U/L (ref 15–37)
BACTERIA URNS QL MICRO: ABNORMAL /HPF
BASOPHILS # BLD: 0.1 K/UL (ref 0–0.2)
BASOPHILS NFR BLD: 0.7 %
BILIRUB SERPL-MCNC: 0.4 MG/DL (ref 0–1)
BILIRUB UR QL STRIP.AUTO: NEGATIVE
BUN SERPL-MCNC: 14 MG/DL (ref 7–20)
CALCIUM OXALATE CRYSTALS: PRESENT
CALCIUM SERPL-MCNC: 10.2 MG/DL (ref 8.3–10.6)
CHLORIDE SERPL-SCNC: 104 MMOL/L (ref 99–110)
CLARITY UR: CLEAR
CO2 SERPL-SCNC: 27 MMOL/L (ref 21–32)
COLOR UR: YELLOW
CREAT SERPL-MCNC: <0.5 MG/DL (ref 0.6–1.2)
DEPRECATED RDW RBC AUTO: 21.3 % (ref 12.4–15.4)
EOSINOPHIL # BLD: 0.2 K/UL (ref 0–0.6)
EOSINOPHIL NFR BLD: 2.2 %
EPI CELLS #/AREA URNS AUTO: 2 /HPF (ref 0–5)
EPITHELIALS (RENAL), 013149: PRESENT
GFR SERPLBLD CREATININE-BSD FMLA CKD-EPI: >60 ML/MIN/{1.73_M2}
GLUCOSE SERPL-MCNC: 112 MG/DL (ref 70–99)
GLUCOSE UR STRIP.AUTO-MCNC: NEGATIVE MG/DL
HCT VFR BLD AUTO: 28.6 % (ref 36–48)
HGB BLD-MCNC: 9 G/DL (ref 12–16)
HGB UR QL STRIP.AUTO: NEGATIVE
HYALINE CASTS #/AREA URNS AUTO: 0 /LPF (ref 0–8)
INR PPP: 1.16 (ref 0.84–1.16)
KETONES UR STRIP.AUTO-MCNC: NEGATIVE MG/DL
LACTATE BLDV-SCNC: 1.6 MMOL/L (ref 0.4–1.9)
LEUKOCYTE ESTERASE UR QL STRIP.AUTO: ABNORMAL
LIPASE SERPL-CCNC: 17 U/L (ref 13–60)
LYMPHOCYTES # BLD: 1.4 K/UL (ref 1–5.1)
LYMPHOCYTES NFR BLD: 20.3 %
MCH RBC QN AUTO: 25.9 PG (ref 26–34)
MCHC RBC AUTO-ENTMCNC: 31.5 G/DL (ref 31–36)
MCV RBC AUTO: 82.2 FL (ref 80–100)
MONOCYTES # BLD: 1 K/UL (ref 0–1.3)
MONOCYTES NFR BLD: 14.2 %
NEUTROPHILS # BLD: 4.4 K/UL (ref 1.7–7.7)
NEUTROPHILS NFR BLD: 62.6 %
NITRITE UR QL STRIP.AUTO: NEGATIVE
NT-PROBNP SERPL-MCNC: 668 PG/ML (ref 0–449)
PH UR STRIP.AUTO: 6 [PH] (ref 5–8)
PLATELET # BLD AUTO: 405 K/UL (ref 135–450)
PMV BLD AUTO: 8 FL (ref 5–10.5)
POTASSIUM SERPL-SCNC: 3.4 MMOL/L (ref 3.5–5.1)
PROCALCITONIN SERPL IA-MCNC: 0.11 NG/ML (ref 0–0.15)
PROT SERPL-MCNC: 6.2 G/DL (ref 6.4–8.2)
PROT UR STRIP.AUTO-MCNC: NEGATIVE MG/DL
PROTHROMBIN TIME: 14.8 SEC (ref 11.5–14.8)
RBC # BLD AUTO: 3.48 M/UL (ref 4–5.2)
RBC CLUMPS #/AREA URNS AUTO: 2 /HPF (ref 0–4)
SODIUM SERPL-SCNC: 141 MMOL/L (ref 136–145)
SP GR UR STRIP.AUTO: 1.01 (ref 1–1.03)
TROPONIN, HIGH SENSITIVITY: 52 NG/L (ref 0–14)
UA COMPLETE W REFLEX CULTURE PNL UR: YES
UA DIPSTICK W REFLEX MICRO PNL UR: YES
URN SPEC COLLECT METH UR: ABNORMAL
UROBILINOGEN UR STRIP-ACNC: 0.2 E.U./DL
WBC # BLD AUTO: 7 K/UL (ref 4–11)
WBC #/AREA URNS AUTO: 10 /HPF (ref 0–5)

## 2023-08-09 PROCEDURE — 85610 PROTHROMBIN TIME: CPT

## 2023-08-09 PROCEDURE — 83690 ASSAY OF LIPASE: CPT

## 2023-08-09 PROCEDURE — 71045 X-RAY EXAM CHEST 1 VIEW: CPT

## 2023-08-09 PROCEDURE — 70450 CT HEAD/BRAIN W/O DYE: CPT

## 2023-08-09 PROCEDURE — 0KBP0ZZ EXCISION OF LEFT HIP MUSCLE, OPEN APPROACH: ICD-10-PCS | Performed by: SURGERY

## 2023-08-09 PROCEDURE — 93005 ELECTROCARDIOGRAM TRACING: CPT | Performed by: INTERNAL MEDICINE

## 2023-08-09 PROCEDURE — 0KBN0ZZ EXCISION OF RIGHT HIP MUSCLE, OPEN APPROACH: ICD-10-PCS | Performed by: SURGERY

## 2023-08-09 PROCEDURE — 99285 EMERGENCY DEPT VISIT HI MDM: CPT

## 2023-08-09 PROCEDURE — 83880 ASSAY OF NATRIURETIC PEPTIDE: CPT

## 2023-08-09 PROCEDURE — 11043 DBRDMT MUSC&/FSCA 1ST 20/<: CPT | Performed by: SURGERY

## 2023-08-09 PROCEDURE — 83605 ASSAY OF LACTIC ACID: CPT

## 2023-08-09 PROCEDURE — 99213 OFFICE O/P EST LOW 20 MIN: CPT

## 2023-08-09 PROCEDURE — 96365 THER/PROPH/DIAG IV INF INIT: CPT

## 2023-08-09 PROCEDURE — 51702 INSERT TEMP BLADDER CATH: CPT

## 2023-08-09 PROCEDURE — 85730 THROMBOPLASTIN TIME PARTIAL: CPT

## 2023-08-09 PROCEDURE — 11042 DBRDMT SUBQ TIS 1ST 20SQCM/<: CPT | Performed by: SURGERY

## 2023-08-09 PROCEDURE — 84484 ASSAY OF TROPONIN QUANT: CPT

## 2023-08-09 PROCEDURE — 81001 URINALYSIS AUTO W/SCOPE: CPT

## 2023-08-09 PROCEDURE — 11046 DBRDMT MUSC&/FSCA EA ADDL: CPT | Performed by: SURGERY

## 2023-08-09 PROCEDURE — 84145 PROCALCITONIN (PCT): CPT

## 2023-08-09 PROCEDURE — 11042 DBRDMT SUBQ TIS 1ST 20SQCM/<: CPT

## 2023-08-09 PROCEDURE — 11043 DBRDMT MUSC&/FSCA 1ST 20/<: CPT

## 2023-08-09 PROCEDURE — 85025 COMPLETE CBC W/AUTO DIFF WBC: CPT

## 2023-08-09 PROCEDURE — 80053 COMPREHEN METABOLIC PANEL: CPT

## 2023-08-09 RX ORDER — SODIUM CHLOR/HYPOCHLOROUS ACID 0.033 %
SOLUTION, IRRIGATION IRRIGATION ONCE
OUTPATIENT
Start: 2023-08-09 | End: 2023-08-09

## 2023-08-09 RX ORDER — CLOBETASOL PROPIONATE 0.5 MG/G
OINTMENT TOPICAL ONCE
OUTPATIENT
Start: 2023-08-09 | End: 2023-08-09

## 2023-08-09 RX ORDER — LIDOCAINE 50 MG/G
OINTMENT TOPICAL ONCE
OUTPATIENT
Start: 2023-08-09 | End: 2023-08-09

## 2023-08-09 RX ORDER — LIDOCAINE 40 MG/G
CREAM TOPICAL ONCE
OUTPATIENT
Start: 2023-08-09 | End: 2023-08-09

## 2023-08-09 RX ORDER — LIDOCAINE HYDROCHLORIDE 20 MG/ML
JELLY TOPICAL ONCE
OUTPATIENT
Start: 2023-08-09 | End: 2023-08-09

## 2023-08-09 RX ORDER — IBUPROFEN 200 MG
TABLET ORAL ONCE
OUTPATIENT
Start: 2023-08-09 | End: 2023-08-09

## 2023-08-09 RX ORDER — BETAMETHASONE DIPROPIONATE 0.05 %
OINTMENT (GRAM) TOPICAL ONCE
OUTPATIENT
Start: 2023-08-09 | End: 2023-08-09

## 2023-08-09 RX ORDER — GENTAMICIN SULFATE 1 MG/G
OINTMENT TOPICAL ONCE
OUTPATIENT
Start: 2023-08-09 | End: 2023-08-09

## 2023-08-09 RX ORDER — LIDOCAINE HYDROCHLORIDE 40 MG/ML
SOLUTION TOPICAL ONCE
OUTPATIENT
Start: 2023-08-09 | End: 2023-08-09

## 2023-08-09 RX ORDER — BACITRACIN ZINC AND POLYMYXIN B SULFATE 500; 1000 [USP'U]/G; [USP'U]/G
OINTMENT TOPICAL ONCE
OUTPATIENT
Start: 2023-08-09 | End: 2023-08-09

## 2023-08-09 RX ORDER — GINSENG 100 MG
CAPSULE ORAL ONCE
OUTPATIENT
Start: 2023-08-09 | End: 2023-08-09

## 2023-08-09 RX ORDER — POTASSIUM CHLORIDE 7.45 MG/ML
10 INJECTION INTRAVENOUS ONCE
Status: COMPLETED | OUTPATIENT
Start: 2023-08-09 | End: 2023-08-10

## 2023-08-09 RX ORDER — DILTIAZEM HCL IN NACL,ISO-OSM 125 MG/125
2.5 PLASTIC BAG, INJECTION (ML) INTRAVENOUS CONTINUOUS
Status: DISCONTINUED | OUTPATIENT
Start: 2023-08-09 | End: 2023-08-10

## 2023-08-09 ASSESSMENT — ENCOUNTER SYMPTOMS
RHINORRHEA: 0
VOMITING: 0
NAUSEA: 0
COUGH: 0
DIARRHEA: 0
ABDOMINAL PAIN: 0
SHORTNESS OF BREATH: 0

## 2023-08-09 NOTE — PLAN OF CARE
Discharge instructions given. Patient verbalized understanding. Return to [facility] in 1 week(s).   Called/faxed orders to  [facility]

## 2023-08-09 NOTE — PROGRESS NOTES
[address] [facility],Third Floor Progress and Procedure Note    Berenice Gibson  AGE: 79 y.o. GENDER: female    : 1940  TODAY'S DATE: 2023    Chief Complaint   Patient presents with    Wound Check     Initial visit for pressure wound        History of Present Illness     Berenice Gibson is a 79 y.o. female who presents today for wound evaluation. 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 Recent back surgery and with chronic pain resulting in weakness and immobility  Wound Pain:  mild  Severity: 2/10   Wound Type: infectious, non-healing surgical, and pressure  Modifying Factors:  decreased mobility, shear force, obesity, and malnutrition  Associated Signs/Symptoms:  drainage    Past Medical History:   Diagnosis Date    Arthritis     At high risk for falls     hx of falls    Constipation     Environmental allergies     Fall at home 2015    fell in garage, called 911 for assist to get up    GERD (gastroesophageal reflux disease)     History of anemia     Hyperlipidemia     Neuropathy of leg     left leg foot drop wears brace    OAB (overactive bladder)     UTI (lower urinary tract infection)      Past Surgical History:   Procedure Laterality Date    BACK SURGERY  12/16/10    L4-5 Left complete, radical facetectomy, L3-4-5 nerve root exploration and foraminotomy, pedicle screws    BREAST BIOPSY Right     x2    CARPAL TUNNEL RELEASE Left 2018    LEFT CARPAL TUNNEL RELEASE performed by Carmelita Garland MD at [facility] Right 2019    RIGHT CARPAL TUNNEL RELEASE performed by Carmelita Garland MD at [facility]  2011    HOT SNARE CECAL POLYPECTOMY    COLONOSCOPY  14    Colonoscopy. No biopsies.   No polypectomies    CYSTOSCOPY N/A 2019    CYSTOSCOPY WITH INJECTION OF URETHRAL BULKING AGENT COAPTITE performed by Yael Duncan MD
Surgical 08/09/23 0859   Dressing Status Clean;Dry; Intact 08/02/23 1600   Wound Cleansed Irrigated with saline 08/02/23 1600   Dressing/Treatment Moist to dry;Gauze dressing/dressing sponge; Foam 08/02/23 1600   Dressing Change Due 08/02/23 08/01/23 2257   Wound Length (cm) 2.8 cm 08/09/23 0859   Wound Width (cm) 1.1 cm 08/09/23 0859   Wound Depth (cm) 0.1 cm 08/09/23 0859   Wound Surface Area (cm^2) 3.08 cm^2 08/09/23 0859   Wound Volume (cm^3) 0.308 cm^3 08/09/23 0859   Post-Procedure Length (cm) 2.8 cm 08/09/23 0916   Post-Procedure Width (cm) 1.1 cm 08/09/23 0916   Post-Procedure Depth (cm) 0.1 cm 08/09/23 0916   Post-Procedure Surface Area (cm^2) 3.08 cm^2 08/09/23 0916   Post-Procedure Volume (cm^3) 0.308 cm^3 08/09/23 0916   Wound Assessment Bleeding 08/09/23 0916   Drainage Amount Moderate (25-50%) 08/09/23 0916   Drainage Description Serosanguinous 08/09/23 0859   Odor None 08/09/23 0859   Alysha-wound Assessment Intact 08/09/23 0859   Margins Defined edges; Attached edges 08/09/23 0859   Wound Thickness Description not for Pressure Injury Partial thickness 07/28/23 2116   Number of days: 14          Patient seen and treated on 8/9/2023    By Felix Costa MD NPI: [id number] (provider/NPI)

## 2023-08-10 PROBLEM — I48.91 NEW ONSET A-FIB (HCC): Status: ACTIVE | Noted: 2023-08-10

## 2023-08-10 LAB
BACTERIA UR CULT: NORMAL
EKG ATRIAL RATE: 107 BPM
EKG ATRIAL RATE: 117 BPM
EKG DIAGNOSIS: NORMAL
EKG DIAGNOSIS: NORMAL
EKG P AXIS: 55 DEGREES
EKG P-R INTERVAL: 114 MS
EKG P-R INTERVAL: 184 MS
EKG Q-T INTERVAL: 330 MS
EKG Q-T INTERVAL: 488 MS
EKG QRS DURATION: 100 MS
EKG QRS DURATION: 98 MS
EKG QTC CALCULATION (BAZETT): 440 MS
EKG QTC CALCULATION (BAZETT): 660 MS
EKG R AXIS: 42 DEGREES
EKG R AXIS: 70 DEGREES
EKG T AXIS: -14 DEGREES
EKG T AXIS: 84 DEGREES
EKG VENTRICULAR RATE: 107 BPM
EKG VENTRICULAR RATE: 110 BPM
LV EF: 63 %
LVEF MODALITY: NORMAL
MAGNESIUM SERPL-MCNC: 2 MG/DL (ref 1.8–2.4)
TROPONIN, HIGH SENSITIVITY: 54 NG/L (ref 0–14)

## 2023-08-10 PROCEDURE — 93306 TTE W/DOPPLER COMPLETE: CPT

## 2023-08-10 PROCEDURE — 2500000003 HC RX 250 WO HCPCS: Performed by: INTERNAL MEDICINE

## 2023-08-10 PROCEDURE — 93010 ELECTROCARDIOGRAM REPORT: CPT | Performed by: INTERNAL MEDICINE

## 2023-08-10 PROCEDURE — 99222 1ST HOSP IP/OBS MODERATE 55: CPT | Performed by: INTERNAL MEDICINE

## 2023-08-10 PROCEDURE — 93005 ELECTROCARDIOGRAM TRACING: CPT | Performed by: INTERNAL MEDICINE

## 2023-08-10 PROCEDURE — 6370000000 HC RX 637 (ALT 250 FOR IP): Performed by: SURGERY

## 2023-08-10 PROCEDURE — 94760 N-INVAS EAR/PLS OXIMETRY 1: CPT

## 2023-08-10 PROCEDURE — 2060000000 HC ICU INTERMEDIATE R&B

## 2023-08-10 PROCEDURE — 6370000000 HC RX 637 (ALT 250 FOR IP): Performed by: INTERNAL MEDICINE

## 2023-08-10 PROCEDURE — 87086 URINE CULTURE/COLONY COUNT: CPT

## 2023-08-10 PROCEDURE — 51702 INSERT TEMP BLADDER CATH: CPT

## 2023-08-10 PROCEDURE — 6360000002 HC RX W HCPCS: Performed by: INTERNAL MEDICINE

## 2023-08-10 PROCEDURE — 83735 ASSAY OF MAGNESIUM: CPT

## 2023-08-10 PROCEDURE — 2580000003 HC RX 258: Performed by: INTERNAL MEDICINE

## 2023-08-10 PROCEDURE — 84484 ASSAY OF TROPONIN QUANT: CPT

## 2023-08-10 RX ORDER — DOCUSATE SODIUM 100 MG/1
100 CAPSULE, LIQUID FILLED ORAL 2 TIMES DAILY
Status: DISCONTINUED | OUTPATIENT
Start: 2023-08-10 | End: 2023-08-13 | Stop reason: HOSPADM

## 2023-08-10 RX ORDER — SODIUM CHLORIDE 0.9 % (FLUSH) 0.9 %
5-40 SYRINGE (ML) INJECTION PRN
Status: DISCONTINUED | OUTPATIENT
Start: 2023-08-10 | End: 2023-08-13 | Stop reason: HOSPADM

## 2023-08-10 RX ORDER — ONDANSETRON 2 MG/ML
4 INJECTION INTRAMUSCULAR; INTRAVENOUS EVERY 6 HOURS PRN
Status: DISCONTINUED | OUTPATIENT
Start: 2023-08-10 | End: 2023-08-13 | Stop reason: HOSPADM

## 2023-08-10 RX ORDER — ATORVASTATIN CALCIUM 10 MG/1
10 TABLET, FILM COATED ORAL NIGHTLY
Status: DISCONTINUED | OUTPATIENT
Start: 2023-08-10 | End: 2023-08-13 | Stop reason: HOSPADM

## 2023-08-10 RX ORDER — SENNOSIDES A AND B 8.6 MG/1
2 TABLET, FILM COATED ORAL DAILY
Status: DISCONTINUED | OUTPATIENT
Start: 2023-08-10 | End: 2023-08-13 | Stop reason: HOSPADM

## 2023-08-10 RX ORDER — LACTOBACILLUS RHAMNOSUS GG 10B CELL
1 CAPSULE ORAL DAILY
Status: DISCONTINUED | OUTPATIENT
Start: 2023-08-10 | End: 2023-08-13 | Stop reason: HOSPADM

## 2023-08-10 RX ORDER — DILTIAZEM HYDROCHLORIDE 5 MG/ML
10 INJECTION INTRAVENOUS ONCE
Status: DISCONTINUED | OUTPATIENT
Start: 2023-08-10 | End: 2023-08-11

## 2023-08-10 RX ORDER — GABAPENTIN 300 MG/1
600 CAPSULE ORAL 3 TIMES DAILY
Status: DISCONTINUED | OUTPATIENT
Start: 2023-08-10 | End: 2023-08-13 | Stop reason: HOSPADM

## 2023-08-10 RX ORDER — POLYETHYLENE GLYCOL 3350 17 G/17G
17 POWDER, FOR SOLUTION ORAL DAILY PRN
Status: DISCONTINUED | OUTPATIENT
Start: 2023-08-10 | End: 2023-08-13 | Stop reason: HOSPADM

## 2023-08-10 RX ORDER — SODIUM CHLORIDE 0.9 % (FLUSH) 0.9 %
5-40 SYRINGE (ML) INJECTION EVERY 12 HOURS SCHEDULED
Status: DISCONTINUED | OUTPATIENT
Start: 2023-08-10 | End: 2023-08-13 | Stop reason: HOSPADM

## 2023-08-10 RX ORDER — SODIUM CHLORIDE 9 MG/ML
INJECTION, SOLUTION INTRAVENOUS PRN
Status: DISCONTINUED | OUTPATIENT
Start: 2023-08-10 | End: 2023-08-13 | Stop reason: HOSPADM

## 2023-08-10 RX ORDER — SIMETHICONE 80 MG
80 TABLET,CHEWABLE ORAL 2 TIMES DAILY
Status: DISCONTINUED | OUTPATIENT
Start: 2023-08-10 | End: 2023-08-13 | Stop reason: HOSPADM

## 2023-08-10 RX ORDER — BACLOFEN 10 MG/1
10 TABLET ORAL 3 TIMES DAILY
Status: DISCONTINUED | OUTPATIENT
Start: 2023-08-10 | End: 2023-08-13 | Stop reason: HOSPADM

## 2023-08-10 RX ORDER — PANTOPRAZOLE SODIUM 40 MG/1
40 TABLET, DELAYED RELEASE ORAL DAILY
Status: DISCONTINUED | OUTPATIENT
Start: 2023-08-10 | End: 2023-08-13 | Stop reason: HOSPADM

## 2023-08-10 RX ORDER — CIPROFLOXACIN 500 MG/1
500 TABLET, FILM COATED ORAL 2 TIMES DAILY
Status: DISCONTINUED | OUTPATIENT
Start: 2023-08-10 | End: 2023-08-12

## 2023-08-10 RX ORDER — DILTIAZEM HCL IN NACL,ISO-OSM 125 MG/125
2.5-15 PLASTIC BAG, INJECTION (ML) INTRAVENOUS CONTINUOUS
Status: DISCONTINUED | OUTPATIENT
Start: 2023-08-10 | End: 2023-08-11

## 2023-08-10 RX ORDER — ACETAMINOPHEN 650 MG/1
650 SUPPOSITORY RECTAL EVERY 6 HOURS PRN
Status: DISCONTINUED | OUTPATIENT
Start: 2023-08-10 | End: 2023-08-13 | Stop reason: HOSPADM

## 2023-08-10 RX ORDER — ENOXAPARIN SODIUM 100 MG/ML
40 INJECTION SUBCUTANEOUS DAILY
Status: DISCONTINUED | OUTPATIENT
Start: 2023-08-10 | End: 2023-08-13 | Stop reason: HOSPADM

## 2023-08-10 RX ORDER — POTASSIUM CHLORIDE 20 MEQ/1
40 TABLET, EXTENDED RELEASE ORAL EVERY 4 HOURS
Status: COMPLETED | OUTPATIENT
Start: 2023-08-10 | End: 2023-08-10

## 2023-08-10 RX ORDER — METOPROLOL SUCCINATE 50 MG/1
50 TABLET, EXTENDED RELEASE ORAL DAILY
Status: DISCONTINUED | OUTPATIENT
Start: 2023-08-10 | End: 2023-08-10

## 2023-08-10 RX ORDER — ACETAMINOPHEN 325 MG/1
650 TABLET ORAL EVERY 6 HOURS PRN
Status: DISCONTINUED | OUTPATIENT
Start: 2023-08-10 | End: 2023-08-13 | Stop reason: HOSPADM

## 2023-08-10 RX ORDER — ONDANSETRON 4 MG/1
4 TABLET, ORALLY DISINTEGRATING ORAL EVERY 8 HOURS PRN
Status: DISCONTINUED | OUTPATIENT
Start: 2023-08-10 | End: 2023-08-13 | Stop reason: HOSPADM

## 2023-08-10 RX ORDER — MIDODRINE HYDROCHLORIDE 5 MG/1
5 TABLET ORAL 2 TIMES DAILY PRN
Status: DISCONTINUED | OUTPATIENT
Start: 2023-08-10 | End: 2023-08-13 | Stop reason: HOSPADM

## 2023-08-10 RX ORDER — POLYETHYLENE GLYCOL 3350 17 G/17G
17 POWDER, FOR SOLUTION ORAL DAILY
Status: DISCONTINUED | OUTPATIENT
Start: 2023-08-10 | End: 2023-08-10

## 2023-08-10 RX ADMIN — POTASSIUM CHLORIDE 40 MEQ: 1500 TABLET, EXTENDED RELEASE ORAL at 16:06

## 2023-08-10 RX ADMIN — CIPROFLOXACIN 500 MG: 500 TABLET, FILM COATED ORAL at 20:45

## 2023-08-10 RX ADMIN — GABAPENTIN 600 MG: 300 CAPSULE ORAL at 08:42

## 2023-08-10 RX ADMIN — BACLOFEN 10 MG: 10 TABLET ORAL at 15:28

## 2023-08-10 RX ADMIN — GABAPENTIN 600 MG: 300 CAPSULE ORAL at 16:06

## 2023-08-10 RX ADMIN — BACLOFEN 10 MG: 10 TABLET ORAL at 08:14

## 2023-08-10 RX ADMIN — SIMETHICONE 80 MG: 80 TABLET, CHEWABLE ORAL at 20:45

## 2023-08-10 RX ADMIN — Medication 1 CAPSULE: at 08:15

## 2023-08-10 RX ADMIN — ATORVASTATIN CALCIUM 10 MG: 10 TABLET, FILM COATED ORAL at 20:45

## 2023-08-10 RX ADMIN — POTASSIUM CHLORIDE 40 MEQ: 1500 TABLET, EXTENDED RELEASE ORAL at 12:58

## 2023-08-10 RX ADMIN — CIPROFLOXACIN 500 MG: 500 TABLET, FILM COATED ORAL at 08:15

## 2023-08-10 RX ADMIN — DILTIAZEM HYDROCHLORIDE 2.5 MG/HR: 5 INJECTION INTRAVENOUS at 10:20

## 2023-08-10 RX ADMIN — GABAPENTIN 600 MG: 300 CAPSULE ORAL at 20:45

## 2023-08-10 RX ADMIN — BACLOFEN 10 MG: 10 TABLET ORAL at 20:45

## 2023-08-10 RX ADMIN — Medication 2.5 MG/HR: at 00:31

## 2023-08-10 RX ADMIN — PANTOPRAZOLE SODIUM 40 MG: 40 TABLET, DELAYED RELEASE ORAL at 08:14

## 2023-08-10 RX ADMIN — POTASSIUM CHLORIDE 10 MEQ: 7.46 INJECTION, SOLUTION INTRAVENOUS at 00:32

## 2023-08-10 RX ADMIN — DOCUSATE SODIUM 100 MG: 100 CAPSULE, LIQUID FILLED ORAL at 08:14

## 2023-08-10 RX ADMIN — SIMETHICONE 80 MG: 80 TABLET, CHEWABLE ORAL at 15:27

## 2023-08-10 ASSESSMENT — PAIN DESCRIPTION - ORIENTATION
ORIENTATION: RIGHT
ORIENTATION: RIGHT

## 2023-08-10 ASSESSMENT — PAIN SCALES - GENERAL
PAINLEVEL_OUTOF10: 0
PAINLEVEL_OUTOF10: 0
PAINLEVEL_OUTOF10: 2
PAINLEVEL_OUTOF10: 2

## 2023-08-10 ASSESSMENT — PAIN DESCRIPTION - DESCRIPTORS: DESCRIPTORS: ACHING;DULL

## 2023-08-10 ASSESSMENT — PAIN DESCRIPTION - LOCATION
LOCATION: LEG
LOCATION: GROIN

## 2023-08-10 NOTE — ED PROVIDER NOTES
[facility]        Pt Name: Hennie Hodgkin  MRN: [id number]  [address] 1940  Date of evaluation: 8/9/2023  Provider: Uzma Momin PA-C  PCP: Gonzalo Black DO  Note Started: 11:13 PM EDT 8/9/23       I have seen and evaluated this patient with my supervising physician Tana Blancas, [address]       Chief Complaint   Patient presents with    Altered Mental Status     Patient comes to ER via EMS for altered mental status from [address] [place] [address]. Per EMS patient was unresponsive and staring blankly. Patient does not remember incident states she \"was really soundly asleep\". Patient  for EMS. Patient is alert/oriented x4 during triage. Irregular Heart Beat     Patient for EMS was -150. Patient during triage . She reports at one time she may have been diagnosed with afib but is unsure and does not take any medication for it. HISTORY OF PRESENT ILLNESS: 1 or more Elements     History From: Patient  Limitations to history : None    Hennie Hodgkin is a 79 y.o. female who presents to the emergency department today for evaluation for concerns of altered mental status. The patient states that she remembers going to bed tonight, and she states that she then remembers waking up in the ambulance ride. When the patient arrives to the ED, she states that she was feeling fatigued but states that she is actually feeling better at this time. Patient states that she does have a wound noted to her sacrum and actually saw wound care for this earlier. Patient has no chest pain. She has no shortness of breath. She denies any abdominal pain, nausea, vomiting or diarrhea. She has no dysuria although does have a Antunez catheter in place. Patient otherwise has no other complaints    Nursing Notes were all reviewed and agreed with or any disagreements were addressed in the HPI.     REVIEW OF SYSTEMS :      Review of Systems

## 2023-08-10 NOTE — ED NOTES
Patient banegas changed, she is unsure the last time it had been changed out. Awaiting urine for urine sample.      Ann Marie Ortiz RN  08/09/23 6941
(GCS):    Active LDA's:   Peripheral IV 08/09/23 Left Antecubital (Active)       Peripheral IV 08/10/23 Left Forearm (Active)     PO Status: Regular  Pertinent or High Risk Medications/Drips: yes   If Yes, please provide details: cardizem @ 2.5 mL/hr  Pending Blood Product Administration: no       You may also review the ED PT Care Timeline found under the Summary Nursing Index tab. Recommendation    Pending orders   Plan for Discharge (if known):    Additional Comments: 18G left AC, 20G left forearm, alert/oriented x4, non-ambulatory, on vancomycin for wound on coccyx, banegas,    If any further questions, please call Sending RN at [phone]    Electronically signed by: Electronically signed by Olga Quijano RN on 8/10/2023 at 1:12 AM      Olga Quijano RN  08/10/23 9301

## 2023-08-10 NOTE — H&P
V2.0  History and Physical      Name:  Dorian Shore /Age/Sex: 1940  (79 y.o. female)   MRN & CSN:  [id number] & [id number] Encounter Date/Time: 8/10/2023 1:32 AM EDT   Location:  ED- PCP: Alba Mcdon[facility] [address] Day: 2    Assessment and Plan:       Hospital Problems             Last Modified POA    * (Principal) New onset a-fib ([address]) 8/10/2023 Yes       Assessment/Plan:    New onset afib with RVR  - rate controlled with dilt gtt at 2.5  - Linn Rico score at least 3 indicating patient for [facility]  - therapeutic Lovenox ordered    Chronic:  Stage 4 sacral decubitus - offload with bed wedges, turn patient every 2 hours while awake  DDD s/p lumbar fusion  Obesity      Diet No diet orders on file   DVT Prophylaxis [] Lovenox, []  Heparin, [] SCDs, [] Ambulation,  [] Eliquis, [] Xarelto, [] Coumadin   Code Status Prior         Personally reviewed Lab Studies and Imaging     Discussed management of the case with ED physician who recommended admission for afib rvr    EKG interpreted personally and results indicating afib    Imaging that was interpreted personally includes CXR and results indicating RONAL    Drugs that require monitoring for toxicity include none and the method of monitoring was n/a        History from:     patient    History of Present Illness:     Chief Complaint: palpitations  Dorian Shore is a 79 y.o. female with pmh of stage 4 sacral decubitus ulcer who presents with new onset afib. She was unable to be roused due to sleeping deeply at nursing home. Vitals check revealed tachycardia. Patient sent to ED. Patient has no complaints. Admit for dilt gtt.      Review of Systems:        Pertinent positives and negatives discussed in HPI     Objective:   No intake or output data in the 24 hours ending 08/10/23 0132   Vitals:   Vitals:    08/10/23 0045 08/10/23 0100 08/10/23 0115 08/10/23 0118   BP: 95/65 100/61 (!) 97/56    Pulse: (!) 105 (!) 103  96   Resp: 17 17 16 15   Temp:

## 2023-08-10 NOTE — CONSULTS
[facility]   Electrophysiology Consultation     Date: 8/10/2023  Reason for Consultation: atrial fibrillation   Consult Requesting Physician: Sabrina Reid MD     CC:  Confused    HPI:   Gisela Hudson is a 79 y.o. female nursing home resident, bedridden, history of UTIs, ESBL, postop infection following a lumbar fusion on chronic to biotics (Cipro) as well as vancomycin due to recurrent C. difficile infection, recently admitted to the hospital at the end of July and discharged on 824 [facility] Enterococcus and Candida albicans UTI in the setting of lethargy and hypotension, sacral decubitus ulcer was stage IV, underwent debridement on 7/26 and 7/3, treated with Ama Dollar was resumed. Patient currently admitted for altered mental status. Found to be intermittently tachycardic. Started on diltiazem. Patient denies palpitations or racing heart. She denies chest pain, shortness of breath, dizziness or lightheadedness, fevers or chills, does have some pain in her sacral area but not worse from baseline, has a loose stools but improved from her recent hospitalization. She does not recall her presentation to the hospital clearly. Review of System:  Complete 10 point ROS performed and negative unless noted in above HPI or below    Prior to Admission medications    Medication Sig Start Date End Date Taking?  Authorizing Provider   Lactobacillus (ACIDOPHILUS/PECTIN) 100 MG CAPS Take 1 capsule by mouth daily   Yes Historical Provider, MD   metoprolol succinate (TOPROL XL) 100 MG extended release tablet Take 0.5 tablets by mouth daily Hold for SBP <110 OR HR60  Patient not taking: Reported on 8/10/2023 8/2/23   Gerald Richardson MD   midodrine (PROAMATINE) 5 MG tablet Take 1 tablet by mouth 2 times daily as needed (SBP<100) 8/2/23 8/17/23  Gerald Richardson MD   ciprofloxacin (CIPRO) 500 MG tablet Take 1 tablet by mouth 2 times daily Indications: prophylactictic/indefinite as oral suppression r/t pseudomonas to

## 2023-08-10 NOTE — ED PROVIDER NOTES
In addition to the advanced practice provider, I personally saw Hennie Hodgkin and performed a substantive portion of the visit including all aspects of the medical decision making. Medical Decision Making  Patient's potassium was found to be mildly low at 3.4. Urine does not show evidence of UTI. Troponin was elevated at 52 and the second blood draw the troponin was 54. BNP is elevated at 668. Chest x-ray did not show any acute changes. CT of the head did not show any acute changes. EKG shows normal sinus rhythm but does appear to change into atrial fibrillation with RVR midway to the EKG. As I was examining the patient the patient's heart rate went from  and then back down to 80 and on the monitor it did appear to be atrial fibrillation with RVR. I am aware that the telemetry monitor in the room is not the best way to diagnose the arrhythmia but it was seen on EKG. Patient was started on Cardizem and will be admitted for observation. Magnesium was found to be within normal limits. Potassium was mildly low and replaced in the ER. Patient does not appear to be septic. A call is placed to hospitalist who accepted the admission. Critical care time: 36 minutes. This excludes separately billable procedures. EKG  EKG done at 2224 shows sinus rhythm with a transition to what appears to be atrial fibrillation at a rate of 130. Good overall axis. Poor R wave transition. No ST elevation. No ST depression. When compared with an old EKG done on July 26, 2023 the overall axis is the same. The R wave progression is the same. The atrial fibrillation is new    SEP-1  Is this patient to be included in the SEP-1 Core Measure due to severe sepsis or septic shock? No    Screenings                   Patient Referrals:  No follow-up provider specified. Discharge Medications:  New Prescriptions    No medications on file       FINAL IMPRESSION  1. New onset atrial fibrillation ([address])    2.

## 2023-08-11 LAB
ANION GAP SERPL CALCULATED.3IONS-SCNC: 8 MMOL/L (ref 3–16)
BASOPHILS # BLD: 0.1 K/UL (ref 0–0.2)
BASOPHILS NFR BLD: 0.7 %
BUN SERPL-MCNC: 10 MG/DL (ref 7–20)
CALCIUM SERPL-MCNC: 9.6 MG/DL (ref 8.3–10.6)
CHLORIDE SERPL-SCNC: 103 MMOL/L (ref 99–110)
CO2 SERPL-SCNC: 25 MMOL/L (ref 21–32)
CREAT SERPL-MCNC: <0.5 MG/DL (ref 0.6–1.2)
DEPRECATED RDW RBC AUTO: 21.3 % (ref 12.4–15.4)
EOSINOPHIL # BLD: 0.1 K/UL (ref 0–0.6)
EOSINOPHIL NFR BLD: 1.8 %
GFR SERPLBLD CREATININE-BSD FMLA CKD-EPI: >60 ML/MIN/{1.73_M2}
GLUCOSE SERPL-MCNC: 104 MG/DL (ref 70–99)
HCT VFR BLD AUTO: 26.6 % (ref 36–48)
HGB BLD-MCNC: 8.3 G/DL (ref 12–16)
LYMPHOCYTES # BLD: 1.1 K/UL (ref 1–5.1)
LYMPHOCYTES NFR BLD: 15.5 %
MCH RBC QN AUTO: 25.7 PG (ref 26–34)
MCHC RBC AUTO-ENTMCNC: 31.2 G/DL (ref 31–36)
MCV RBC AUTO: 82.4 FL (ref 80–100)
MONOCYTES # BLD: 1 K/UL (ref 0–1.3)
MONOCYTES NFR BLD: 14.5 %
NEUTROPHILS # BLD: 4.7 K/UL (ref 1.7–7.7)
NEUTROPHILS NFR BLD: 67.5 %
PLATELET # BLD AUTO: 379 K/UL (ref 135–450)
PMV BLD AUTO: 8.2 FL (ref 5–10.5)
POTASSIUM SERPL-SCNC: 4.1 MMOL/L (ref 3.5–5.1)
RBC # BLD AUTO: 3.23 M/UL (ref 4–5.2)
SODIUM SERPL-SCNC: 136 MMOL/L (ref 136–145)
WBC # BLD AUTO: 7 K/UL (ref 4–11)

## 2023-08-11 PROCEDURE — 97162 PT EVAL MOD COMPLEX 30 MIN: CPT

## 2023-08-11 PROCEDURE — 97530 THERAPEUTIC ACTIVITIES: CPT

## 2023-08-11 PROCEDURE — 6370000000 HC RX 637 (ALT 250 FOR IP): Performed by: INTERNAL MEDICINE

## 2023-08-11 PROCEDURE — 97112 NEUROMUSCULAR REEDUCATION: CPT

## 2023-08-11 PROCEDURE — 85025 COMPLETE CBC W/AUTO DIFF WBC: CPT

## 2023-08-11 PROCEDURE — 2060000000 HC ICU INTERMEDIATE R&B

## 2023-08-11 PROCEDURE — 97166 OT EVAL MOD COMPLEX 45 MIN: CPT

## 2023-08-11 PROCEDURE — 97165 OT EVAL LOW COMPLEX 30 MIN: CPT

## 2023-08-11 PROCEDURE — 6370000000 HC RX 637 (ALT 250 FOR IP): Performed by: SURGERY

## 2023-08-11 PROCEDURE — 6360000002 HC RX W HCPCS: Performed by: SURGERY

## 2023-08-11 PROCEDURE — 2580000003 HC RX 258: Performed by: SURGERY

## 2023-08-11 PROCEDURE — 51702 INSERT TEMP BLADDER CATH: CPT

## 2023-08-11 PROCEDURE — 99232 SBSQ HOSP IP/OBS MODERATE 35: CPT | Performed by: INTERNAL MEDICINE

## 2023-08-11 PROCEDURE — 80048 BASIC METABOLIC PNL TOTAL CA: CPT

## 2023-08-11 PROCEDURE — 36415 COLL VENOUS BLD VENIPUNCTURE: CPT

## 2023-08-11 PROCEDURE — 97535 SELF CARE MNGMENT TRAINING: CPT

## 2023-08-11 RX ORDER — AMIODARONE HYDROCHLORIDE 200 MG/1
200 TABLET ORAL 2 TIMES DAILY
Status: DISCONTINUED | OUTPATIENT
Start: 2023-08-11 | End: 2023-08-13 | Stop reason: HOSPADM

## 2023-08-11 RX ORDER — BACITRACIN ZINC AND POLYMYXIN B SULFATE 500; 1000 [USP'U]/G; [USP'U]/G
OINTMENT TOPICAL 2 TIMES DAILY
Status: DISCONTINUED | OUTPATIENT
Start: 2023-08-11 | End: 2023-08-13 | Stop reason: HOSPADM

## 2023-08-11 RX ADMIN — AMIODARONE HYDROCHLORIDE 200 MG: 200 TABLET ORAL at 20:37

## 2023-08-11 RX ADMIN — Medication 1 CAPSULE: at 09:20

## 2023-08-11 RX ADMIN — SIMETHICONE 80 MG: 80 TABLET, CHEWABLE ORAL at 20:37

## 2023-08-11 RX ADMIN — ENOXAPARIN SODIUM 40 MG: 100 INJECTION SUBCUTANEOUS at 09:19

## 2023-08-11 RX ADMIN — CIPROFLOXACIN 500 MG: 500 TABLET, FILM COATED ORAL at 20:37

## 2023-08-11 RX ADMIN — PANTOPRAZOLE SODIUM 40 MG: 40 TABLET, DELAYED RELEASE ORAL at 09:20

## 2023-08-11 RX ADMIN — BACLOFEN 10 MG: 10 TABLET ORAL at 13:36

## 2023-08-11 RX ADMIN — DOCUSATE SODIUM 100 MG: 100 CAPSULE, LIQUID FILLED ORAL at 20:37

## 2023-08-11 RX ADMIN — BACITRACIN ZINC AND POLYMYXIN B SULFATE: at 23:28

## 2023-08-11 RX ADMIN — BACLOFEN 10 MG: 10 TABLET ORAL at 20:37

## 2023-08-11 RX ADMIN — CIPROFLOXACIN 500 MG: 500 TABLET, FILM COATED ORAL at 09:20

## 2023-08-11 RX ADMIN — BACLOFEN 10 MG: 10 TABLET ORAL at 09:20

## 2023-08-11 RX ADMIN — DILTIAZEM HYDROCHLORIDE 30 MG: 30 TABLET, FILM COATED ORAL at 13:36

## 2023-08-11 RX ADMIN — GABAPENTIN 600 MG: 300 CAPSULE ORAL at 09:20

## 2023-08-11 RX ADMIN — Medication 10 ML: at 20:38

## 2023-08-11 RX ADMIN — AMIODARONE HYDROCHLORIDE 200 MG: 200 TABLET ORAL at 15:17

## 2023-08-11 RX ADMIN — GABAPENTIN 600 MG: 300 CAPSULE ORAL at 20:36

## 2023-08-11 RX ADMIN — ATORVASTATIN CALCIUM 10 MG: 10 TABLET, FILM COATED ORAL at 20:37

## 2023-08-11 RX ADMIN — SIMETHICONE 80 MG: 80 TABLET, CHEWABLE ORAL at 09:20

## 2023-08-11 RX ADMIN — DILTIAZEM HYDROCHLORIDE 60 MG: 30 TABLET, FILM COATED ORAL at 20:36

## 2023-08-11 RX ADMIN — GABAPENTIN 600 MG: 300 CAPSULE ORAL at 13:36

## 2023-08-11 ASSESSMENT — PAIN SCALES - GENERAL
PAINLEVEL_OUTOF10: 0

## 2023-08-11 NOTE — CARE COORDINATION
[facility] Continence Nurse  Consult Note       NAME:  Lorice Opitz  MEDICAL RECORD NUMBER:  [id number]  AGE: 79 y.o. GENDER: female  : 1940  TODAY'S DATE:  2023    Subjective   Reason for WOCN Evaluation and Assessment: wounds to back and sacrum      Lorice Opitz is a 79 y.o. female referred by:   [x] Physician  [x] Nursing  [] Other:     Wound Identification:  Wound Type: pressure and non-healing surgical  Contributing Factors: chronic pressure and decreased mobility    Wound History: wounds present on admission  Current Wound Care Treatment:  dry dressing to back, saline moistened gauze to sacral wound. Patient Goal of Care:  [x] Wound Healing  [] Odor Control  [] Palliative Care  [] Pain Control   [] Other:         PAST MEDICAL HISTORY        Diagnosis Date    Arthritis     At high risk for falls     hx of falls    Constipation     Environmental allergies     Fall at home 2015    fell in garage, called 911 for assist to get up    GERD (gastroesophageal reflux disease)     History of anemia     Hyperlipidemia     Neuropathy of leg     left leg foot drop wears brace    OAB (overactive bladder)     UTI (lower urinary tract infection)        PAST SURGICAL HISTORY    Past Surgical History:   Procedure Laterality Date    BACK SURGERY  12/16/10    L4-5 Left complete, radical facetectomy, L3-4-5 nerve root exploration and foraminotomy, pedicle screws    BREAST BIOPSY Right     x2    CARPAL TUNNEL RELEASE Left 2018    LEFT CARPAL TUNNEL RELEASE performed by Kassie Orourke MD at [facility] Right 2019    RIGHT CARPAL TUNNEL RELEASE performed by Kassie Orourke MD at [address] [place] [address]  2011    HOT SNARE CECAL POLYPECTOMY    COLONOSCOPY  14    Colonoscopy. No biopsies.   No polypectomies    CYSTOSCOPY N/A 2019    CYSTOSCOPY WITH INJECTION OF URETHRAL BULKING AGENT COAPTITE performed by Ruth Anderson MD at [facility]

## 2023-08-11 NOTE — CARE COORDINATION
Case Management Assessment  Initial Evaluation    Date/Time of Evaluation: 8/11/2023 4:34 PM  Assessment Completed by: Michael Fernandez RN    If patient is discharged prior to next notation, then this note serves as note for discharge by case management. Patient Name: Jeffery Gibbs                   YOB: 1940  Diagnosis: Hypokalemia [E87.6]  New onset atrial fibrillation ([address]) [I48.91]  New onset a-fib ([address]) [I48.91]  Elevated troponin [R77.8]  Atrial fibrillation with RVR ([address]) [I48.91]                   Date / Time: 8/9/2023 10:14 PM    Patient Admission Status: Inpatient   Readmission Risk (Low < 19, Mod (19-27), High > 27): Readmission Risk Score: 23.2    Current PCP: Vickey Navarrete, DO  PCP verified by MARIA LUISA? Yes    Chart Reviewed: Yes      History Provided by: Patient, Other (see comment) (Care giver)  Patient Orientation: Alert and Oriented    Patient Cognition: Alert    Hospitalization in the last 30 days (Readmission):  Yes    If yes, Readmission Assessment in  Navigator will be completed. Advance Directives:      Code Status: Full Code   Patient's Primary Decision Maker is: Legal Next of Kin      Discharge Planning:    Patient expects to discharge to: [address] [place] [address] for transportation at discharge:      Financial    Payor: MEDICARE / Plan: MEDICARE PART A AND B / Product Type: *No Product type* /         Current Nursing Home Information:  Patient admitted from:  [facility]. [place], [address]   Phone: [phone]  Fax: [phone] Anne De Leon          [phone] [facility]     MARIA LUISA spoke with  Alexandra in admissions- who stated patient was in Skilled level of care and the plan was for patient to transitioned to LTC at [facility] on 8/14/23. who confirmed the patient is: [address], no Precert required for return.       Patient Covid vaccination status:    Internal Administration   First Dose      Second Dose           Last COVID Lab No results

## 2023-08-11 NOTE — DISCHARGE INSTR - COC
at [address]      right x2--mass in right sinus removed    SPINAL FUSION  12/16/10    posterior fusion, local bone graft    TOTAL KNEE ARTHROPLASTY  03/23/2010    left knee    TOTAL KNEE ARTHROPLASTY Right 03/12/2013    TUBAL LIGATION      UPPER GASTROINTESTINAL ENDOSCOPY  9-8-14    Esophagogastroduodenoscopy, with biopsy of the antrum       Immunization History:   Immunization History   Administered Date(s) Administered    COVID-19, PFIZER Bivalent, DO NOT Dilute, (age 12y+), IM, 30 mcg/0.3 mL 09/15/2022    Influenza Virus Vaccine 10/03/2013    Influenza Whole 10/01/2010    Pneumococcal Conjugate 7-valent (Kierra Peach) 09/03/2012       Active Problems:  Patient Active Problem List   Diagnosis Code    Previous back surgery Z98.890    Status post lumbar spinal fusion Z98.1    Spondylolisthesis of lumbar region M43.16    DDD (degenerative disc disease), lumbosacral M51.37    Stenosis, spinal, lumbar M48.061    Left wrist pain M25.532    left CMC arthritis, thumb, degenerative M18.9    De Quervain's disease (tenosynovitis) M65.4    CTS (carpal tunnel syndrome) G56.00    Postlaminectomy syndrome, lumbar region M96.1    Primary localized osteoarthrosis of shoulder region M19.019    Status post total shoulder replacement Z96.619    Pectoralis muscle strain S29.011A    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    Weight loss counseling, encounter for Z71.3    Lumbar stenosis M48.061    Left groin pain R10.32    Strain of hip adductor muscle S76.019A    S/P reverse total shoulder arthroplasty, 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    History of total right knee replacement Z96.651    History of total left knee replacement Z96.652    Arthritis of left hip M16.12    Arthritis of left shoulder region M19.012    Other specified arthritis, left shoulder M13.812    Arthritis of right knee M17.11    Status post reverse total shoulder replacement, 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

## 2023-08-11 NOTE — CARE COORDINATION
08/11/23 0918   Readmission Assessment   Number of Days since last admission? 8-30 days   Previous Disposition SNF   Who is being Interviewed Patient;Caregiver  ([address] admissions liaison at [facility])   What was the patient's/caregiver's perception as to why they think they needed to return back to the hospital? Other (Comment)  (Patient reports she felt weak and  then found herself in the Hospital. ( [facility]))   Did you visit your Primary Care Physician after you left the hospital, before you returned this time? Yes   Did you see a specialist, such as Cardiac, Pulmonary, Orthopedic Physician, etc. after you left the hospital? No   Who advised the patient to return to the hospital? Skilled Unit   Does the patient report anything that got in the way of taking their medications? No   In our efforts to provide the best possible care to you and others like you, can you think of anything that we could have done to help you after you left the hospital the first time, so that you might not have needed to return so soon?  Other (Comment)  (Patient's reports this is a new problem.)

## 2023-08-12 ENCOUNTER — TELEPHONE (OUTPATIENT)
Dept: CARDIOLOGY CLINIC | Age: 83
End: 2023-08-12

## 2023-08-12 PROBLEM — I47.1 ATRIAL TACHYCARDIA (HCC): Status: ACTIVE | Noted: 2023-08-12

## 2023-08-12 PROBLEM — I47.19 ATRIAL TACHYCARDIA: Status: ACTIVE | Noted: 2023-08-12

## 2023-08-12 PROCEDURE — 2060000000 HC ICU INTERMEDIATE R&B

## 2023-08-12 PROCEDURE — 6370000000 HC RX 637 (ALT 250 FOR IP): Performed by: SURGERY

## 2023-08-12 PROCEDURE — 6360000002 HC RX W HCPCS: Performed by: SURGERY

## 2023-08-12 PROCEDURE — 6370000000 HC RX 637 (ALT 250 FOR IP): Performed by: INTERNAL MEDICINE

## 2023-08-12 PROCEDURE — 99233 SBSQ HOSP IP/OBS HIGH 50: CPT | Performed by: NURSE PRACTITIONER

## 2023-08-12 PROCEDURE — 2580000003 HC RX 258: Performed by: SURGERY

## 2023-08-12 RX ORDER — DILTIAZEM HYDROCHLORIDE 180 MG/1
180 CAPSULE, COATED, EXTENDED RELEASE ORAL DAILY
Status: DISCONTINUED | OUTPATIENT
Start: 2023-08-13 | End: 2023-08-13 | Stop reason: HOSPADM

## 2023-08-12 RX ORDER — AMIODARONE HYDROCHLORIDE 200 MG/1
200 TABLET ORAL 2 TIMES DAILY
Qty: 25 TABLET | Refills: 0 | Status: SHIPPED | OUTPATIENT
Start: 2023-08-12 | End: 2023-08-25

## 2023-08-12 RX ORDER — DILTIAZEM HYDROCHLORIDE 180 MG/1
180 CAPSULE, COATED, EXTENDED RELEASE ORAL DAILY
Qty: 30 CAPSULE | Refills: 0 | Status: SHIPPED | OUTPATIENT
Start: 2023-08-13

## 2023-08-12 RX ORDER — CIPROFLOXACIN 500 MG/1
500 TABLET, FILM COATED ORAL EVERY 12 HOURS SCHEDULED
Status: DISCONTINUED | OUTPATIENT
Start: 2023-08-12 | End: 2023-08-13 | Stop reason: HOSPADM

## 2023-08-12 RX ORDER — AMIODARONE HYDROCHLORIDE 200 MG/1
200 TABLET ORAL DAILY
Qty: 30 TABLET | Refills: 0 | Status: SHIPPED | OUTPATIENT
Start: 2023-08-26

## 2023-08-12 RX ORDER — FUROSEMIDE 20 MG/1
20 TABLET ORAL DAILY
COMMUNITY

## 2023-08-12 RX ADMIN — GABAPENTIN 600 MG: 300 CAPSULE ORAL at 20:17

## 2023-08-12 RX ADMIN — AMIODARONE HYDROCHLORIDE 200 MG: 200 TABLET ORAL at 08:54

## 2023-08-12 RX ADMIN — CIPROFLOXACIN 500 MG: 500 TABLET, FILM COATED ORAL at 08:54

## 2023-08-12 RX ADMIN — ENOXAPARIN SODIUM 40 MG: 100 INJECTION SUBCUTANEOUS at 08:56

## 2023-08-12 RX ADMIN — BACITRACIN ZINC AND POLYMYXIN B SULFATE: at 20:19

## 2023-08-12 RX ADMIN — AMIODARONE HYDROCHLORIDE 200 MG: 200 TABLET ORAL at 20:17

## 2023-08-12 RX ADMIN — DOCUSATE SODIUM 100 MG: 100 CAPSULE, LIQUID FILLED ORAL at 20:17

## 2023-08-12 RX ADMIN — Medication 10 ML: at 20:19

## 2023-08-12 RX ADMIN — Medication 1 CAPSULE: at 08:54

## 2023-08-12 RX ADMIN — SENNOSIDES 17.2 MG: 8.6 TABLET, FILM COATED ORAL at 10:23

## 2023-08-12 RX ADMIN — BACITRACIN ZINC AND POLYMYXIN B SULFATE: at 13:34

## 2023-08-12 RX ADMIN — Medication 10 ML: at 10:23

## 2023-08-12 RX ADMIN — CIPROFLOXACIN 500 MG: 500 TABLET, FILM COATED ORAL at 18:29

## 2023-08-12 RX ADMIN — GABAPENTIN 600 MG: 300 CAPSULE ORAL at 08:54

## 2023-08-12 RX ADMIN — SIMETHICONE 80 MG: 80 TABLET, CHEWABLE ORAL at 20:17

## 2023-08-12 RX ADMIN — BACLOFEN 10 MG: 10 TABLET ORAL at 08:54

## 2023-08-12 RX ADMIN — PANTOPRAZOLE SODIUM 40 MG: 40 TABLET, DELAYED RELEASE ORAL at 08:54

## 2023-08-12 RX ADMIN — DILTIAZEM HYDROCHLORIDE 60 MG: 30 TABLET, FILM COATED ORAL at 05:56

## 2023-08-12 RX ADMIN — SIMETHICONE 80 MG: 80 TABLET, CHEWABLE ORAL at 08:54

## 2023-08-12 RX ADMIN — BACLOFEN 10 MG: 10 TABLET ORAL at 20:16

## 2023-08-12 RX ADMIN — ATORVASTATIN CALCIUM 10 MG: 10 TABLET, FILM COATED ORAL at 20:16

## 2023-08-12 RX ADMIN — DILTIAZEM HYDROCHLORIDE 60 MG: 30 TABLET, FILM COATED ORAL at 20:17

## 2023-08-12 RX ADMIN — DILTIAZEM HYDROCHLORIDE 60 MG: 30 TABLET, FILM COATED ORAL at 14:28

## 2023-08-12 RX ADMIN — GABAPENTIN 600 MG: 300 CAPSULE ORAL at 14:28

## 2023-08-12 RX ADMIN — DOCUSATE SODIUM 100 MG: 100 CAPSULE, LIQUID FILLED ORAL at 10:23

## 2023-08-12 RX ADMIN — BACLOFEN 10 MG: 10 TABLET ORAL at 14:28

## 2023-08-12 ASSESSMENT — PAIN SCALES - GENERAL
PAINLEVEL_OUTOF10: 0

## 2023-08-12 ASSESSMENT — PAIN SCALES - WONG BAKER
WONGBAKER_NUMERICALRESPONSE: 0
WONGBAKER_NUMERICALRESPONSE: 0
WONGBAKER_NUMERICALRESPONSE: NO HURT
WONGBAKER_NUMERICALRESPONSE: 0
WONGBAKER_NUMERICALRESPONSE: NO HURT
WONGBAKER_NUMERICALRESPONSE: 0
WONGBAKER_NUMERICALRESPONSE: 0
WONGBAKER_NUMERICALRESPONSE: NO HURT
WONGBAKER_NUMERICALRESPONSE: 0
WONGBAKER_NUMERICALRESPONSE: NO HURT
WONGBAKER_NUMERICALRESPONSE: 0
WONGBAKER_NUMERICALRESPONSE: 0
WONGBAKER_NUMERICALRESPONSE: NO HURT
WONGBAKER_NUMERICALRESPONSE: NO HURT
WONGBAKER_NUMERICALRESPONSE: 0
WONGBAKER_NUMERICALRESPONSE: NO HURT
WONGBAKER_NUMERICALRESPONSE: NO HURT
WONGBAKER_NUMERICALRESPONSE: 0

## 2023-08-12 NOTE — DISCHARGE SUMMARY
Discharge Summary    Name:  Dorian Shore /Age/Sex: 1940  (79 y.o. female)   MRN & CSN:  [id number] & [id number] Admission Date/Time: 2023 10:14 PM   Attending:  Shama Verma MD Discharging Physician: Shama Verma MD     Hospital Course:   Dorian Shore is a 79 y.o.  female who presented to the hospital with a complaint of palpitations. There was initially concern for A-fib with RVR. However, the patient was deemed to have atrial tachycardia. Patient evaluated by electrophysiology and initiated on amiodarone 200 mg twice daily for total of 14 days, followed by amiodarone 200 mg daily. She was briefly treated with IV diltiazem drip and transitioned to oral diltiazem 180 mg daily on discharge. Patient has a stage IV sacral pressure ulcer. She underwent a bedside debridement on 2023 by Dr. Radha Stone. I discussed the case with Dr. Radha Stone and indicated that wound VAC can be placed  either on follow-up in wound clinic or at the skilled facility. Wound VAC order was placed on discharge to be placed  at the skilled facility. Patient is overall stable for discharge and awaiting accepting facility. 1.  Atrial tachycardia  2. Stage IV sacral pressure ulcer, POA  3. Chronic indwelling Antunez catheter  4. History of MDRO  5. Chronic anemia    The patient expressed appropriate understanding of and agreement with the discharge recommendations, medications, and plan. Consults this admission:  IP CONSULT TO CARDIOLOGY    Discharge Instruction:   Follow up appointments:  Follow-up with the wound clinic and electrophysiology  Primary care physician:  within 2 weeks    Diet:  regular diet   Activity: activity as tolerated  Disposition: Discharged to:   []Home, []HHC, [x]SNF, []Acute Rehab, []Hospice   Condition on discharge: Stable    Discharge Medications:        Medication List        START taking these medications      * amiodarone 200 MG tablet  Commonly known as:

## 2023-08-12 NOTE — CARE COORDINATION
08/12/23 1550   IMM Letter   IMM Letter given to Patient/Family/Significant other/Guardian/POA/by: CM provided copy of IMM letter. IMM Letter date given: 08/12/23   IMM Letter time given: 7312     Patient is aware of discharge and agreeable.    Electronically signed by Kelsey Delgado on 8/12/2023 at 3:51 PM

## 2023-08-12 NOTE — CARE COORDINATION
Case Management -  Discharge Note      Patient Name: Loy Mendez                   YOB: 1940            Readmission Risk (Low < 19, Mod (19-27), High > 27): Readmission Risk Score: 22.9    Current PCP: Jasper Chamorro DO    ([facility]) Important Message from Medicare:    Date: 08/12/2023    PT AM-PAC: 6 /24  OT AM-PAC: 11 /24    Patient/patient representative has been educated on the benefits of SNF as well as the possible risks of declining recommended services. Patient/patient representative has acknowledged the information provided and decided on the following discharge plan. Patient/ patient representative has been provided freedom of choice regarding service provider, supported by basic dialogue that supports the patient's individualized plan of care/goals. (Add Smart Phrase Here/Delete)     Financial    Payor: MEDICARE / Plan: MEDICARE PART A AND B / Product Type: *No Product type* /     Pharmacy:  Potential assistance Purchasing Medications: No  Meds-to-Beds request:        [facility],[address] [place], [address] [place] [address]  Phone: [phone] Fax: [phone]    [address], [address] [facility],[address] [place] [address] [phone] [facility] [phone]  [facility] [address]  Phone: [phone] Fax: [phone]      Notes: Additional Case Management Notes: CM placed call to [facility] in admissions [phone]. Discussed barrier to accepting paitnet. [facility] needs to obtains order for wound vac, that wound note does not specify pressure setting or frequency. Dian Howard also needs to order Rocco Hillister for wound care or have order changed to Normal Saline unitil VASHE or Wound Vac obtained. CM contact Dr. Deng Morataya MD and awaiting response.      Electronically signed by Jovanny Roth on 8/13/2023 at 7:51 AM

## 2023-08-12 NOTE — CARE COORDINATION
MARIA LUISA spoke to Igor Self in Admissions at [facility] who reports the following barriers to admissons :     _ Need wound order using NS, ( facility does not stock VASHE, OR a facility will need to order VASHE.     _ Need order for wound vac, facility unable to order off the wound note. RN and MD made aware. The following message was sent to MD via [employer]. Hi Dr. Lola Maldonado,     Patient is ready to go to SNF, but there are a few loose ends I need help with. The falcility does no stock VASHE wound wash, can we get an order stating Normal Saline can be used until the wound vac is delivered. Also the SNF is asking for a Wound vac order, they state the wound note does not specify all the needed information to get on order.    Thanks,   Marsha GLASS [phone] or can call the Chiquita Jewell [phone]     Electronically signed by Merrill Scott on 8/12/2023 at 3:50 PM

## 2023-08-13 VITALS
DIASTOLIC BLOOD PRESSURE: 58 MMHG | TEMPERATURE: 98.7 F | RESPIRATION RATE: 13 BRPM | HEART RATE: 83 BPM | HEIGHT: 64 IN | BODY MASS INDEX: 32.64 KG/M2 | SYSTOLIC BLOOD PRESSURE: 117 MMHG | WEIGHT: 191.2 LBS | OXYGEN SATURATION: 97 %

## 2023-08-13 PROCEDURE — 93005 ELECTROCARDIOGRAM TRACING: CPT | Performed by: NURSE PRACTITIONER

## 2023-08-13 PROCEDURE — 6370000000 HC RX 637 (ALT 250 FOR IP): Performed by: SURGERY

## 2023-08-13 PROCEDURE — 6370000000 HC RX 637 (ALT 250 FOR IP): Performed by: INTERNAL MEDICINE

## 2023-08-13 PROCEDURE — 6360000002 HC RX W HCPCS: Performed by: SURGERY

## 2023-08-13 PROCEDURE — 99233 SBSQ HOSP IP/OBS HIGH 50: CPT | Performed by: NURSE PRACTITIONER

## 2023-08-13 PROCEDURE — 2580000003 HC RX 258: Performed by: SURGERY

## 2023-08-13 RX ADMIN — GABAPENTIN 600 MG: 300 CAPSULE ORAL at 14:29

## 2023-08-13 RX ADMIN — PANTOPRAZOLE SODIUM 40 MG: 40 TABLET, DELAYED RELEASE ORAL at 08:32

## 2023-08-13 RX ADMIN — SIMETHICONE 80 MG: 80 TABLET, CHEWABLE ORAL at 11:04

## 2023-08-13 RX ADMIN — Medication 1 CAPSULE: at 08:32

## 2023-08-13 RX ADMIN — BACITRACIN ZINC AND POLYMYXIN B SULFATE: at 08:32

## 2023-08-13 RX ADMIN — ENOXAPARIN SODIUM 40 MG: 100 INJECTION SUBCUTANEOUS at 08:32

## 2023-08-13 RX ADMIN — BACLOFEN 10 MG: 10 TABLET ORAL at 14:29

## 2023-08-13 RX ADMIN — Medication 10 ML: at 08:35

## 2023-08-13 RX ADMIN — BACLOFEN 10 MG: 10 TABLET ORAL at 08:32

## 2023-08-13 RX ADMIN — CIPROFLOXACIN 500 MG: 500 TABLET, FILM COATED ORAL at 08:32

## 2023-08-13 RX ADMIN — GABAPENTIN 600 MG: 300 CAPSULE ORAL at 08:32

## 2023-08-13 RX ADMIN — DILTIAZEM HYDROCHLORIDE 180 MG: 180 CAPSULE, EXTENDED RELEASE ORAL at 08:32

## 2023-08-13 RX ADMIN — AMIODARONE HYDROCHLORIDE 200 MG: 200 TABLET ORAL at 08:32

## 2023-08-13 ASSESSMENT — PAIN SCALES - WONG BAKER
WONGBAKER_NUMERICALRESPONSE: NO HURT
WONGBAKER_NUMERICALRESPONSE: 0
WONGBAKER_NUMERICALRESPONSE: 0
WONGBAKER_NUMERICALRESPONSE: NO HURT
WONGBAKER_NUMERICALRESPONSE: 0

## 2023-08-13 ASSESSMENT — PAIN SCALES - GENERAL
PAINLEVEL_OUTOF10: 0

## 2023-08-13 NOTE — CARE COORDINATION
Case Management -  Discharge Note      Patient Name: Branden Dozier                   YOB: 1940            Readmission Risk (Low < 19, Mod (19-27), High > 27): Readmission Risk Score: 22.6    Current PCP: Eleazar Tobias DO    (IMM) Important Message from Medicare:    Date: 08/12/2023    PT AM-PAC: 6 /24  OT AM-PAC: 11 /24    Patient/patient representative has been educated on the benefits of SNF as well as the possible risks of declining recommended services. Patient/patient representative has acknowledged the information provided and decided on the following discharge plan. Patient/ patient representative has been provided freedom of choice regarding service provider, supported by basic dialogue that supports the patient's individualized plan of care/goals. Patient noted to have a discharge order. Pt has been medically cleared [facility] [address]    Patient discharged to     [facility]  [address], [address]  Phone: [phone]  Fax: [phone]        HENS Completed:  Not required verified with Ayo at [facility], she received the [address] from [address] [facility]. Pre-cert required/obtained:  No     Transportation scheduled for 8/13 3:30 pM  Transportation provided by [facility] transport - oralia w/ Angélica SANTOS faxed and agency notified:  [phone]     Family Notified:  Yes, CM notified Donavan Kramer, she aware of transport to [facility] today.      Ophelia Bowie RN  to call report to facility [phone]                   Financial    Payor: MEDICARE / Plan: MEDICARE PART A AND B / Product Type: *No Product type* /     Pharmacy:  Potential assistance Purchasing Medications: No  Meds-to-Beds request:        [facility],[address], [address] [place] [address] [phone]  [address] [place], [place] [address]  Phone: [phone] Fax: [phone] [address]

## 2023-08-13 NOTE — DISCHARGE INSTR - DIET

## 2023-08-14 LAB
EKG ATRIAL RATE: 86 BPM
EKG DIAGNOSIS: NORMAL
EKG P AXIS: 61 DEGREES
EKG P-R INTERVAL: 136 MS
EKG Q-T INTERVAL: 368 MS
EKG QRS DURATION: 92 MS
EKG QTC CALCULATION (BAZETT): 440 MS
EKG R AXIS: 14 DEGREES
EKG T AXIS: -10 DEGREES
EKG VENTRICULAR RATE: 86 BPM

## 2023-08-14 PROCEDURE — 93010 ELECTROCARDIOGRAM REPORT: CPT | Performed by: INTERNAL MEDICINE

## 2023-08-22 ENCOUNTER — HOSPITAL ENCOUNTER (EMERGENCY)
Age: 83
Discharge: HOME | End: 2023-08-22
Payer: MEDICARE

## 2023-08-22 ENCOUNTER — APPOINTMENT (OUTPATIENT)
Dept: GENERAL RADIOLOGY | Age: 83
End: 2023-08-22
Payer: MEDICARE

## 2023-08-22 VITALS
RESPIRATION RATE: 18 BRPM | BODY MASS INDEX: 32.61 KG/M2 | HEART RATE: 89 BPM | WEIGHT: 190 LBS | SYSTOLIC BLOOD PRESSURE: 100 MMHG | DIASTOLIC BLOOD PRESSURE: 63 MMHG | OXYGEN SATURATION: 91 % | TEMPERATURE: 98.2 F

## 2023-08-22 DIAGNOSIS — E87.6 HYPOKALEMIA: ICD-10-CM

## 2023-08-22 DIAGNOSIS — R25.1 TREMOR: ICD-10-CM

## 2023-08-22 DIAGNOSIS — U07.1 COVID: Primary | ICD-10-CM

## 2023-08-22 LAB
ALBUMIN SERPL-MCNC: 2.6 G/DL (ref 3.4–5)
ALBUMIN/GLOB SERPL: 0.6 {RATIO} (ref 1.1–2.2)
ALP SERPL-CCNC: 126 U/L (ref 40–129)
ALT SERPL-CCNC: 17 U/L (ref 10–40)
ANION GAP SERPL CALCULATED.3IONS-SCNC: 12 MMOL/L (ref 3–16)
AST SERPL-CCNC: 18 U/L (ref 15–37)
BASOPHILS # BLD: 0 K/UL (ref 0–0.2)
BASOPHILS NFR BLD: 0.7 %
BILIRUB SERPL-MCNC: 0.4 MG/DL (ref 0–1)
BUN SERPL-MCNC: 13 MG/DL (ref 7–20)
CALCIUM SERPL-MCNC: 9.7 MG/DL (ref 8.3–10.6)
CHLORIDE SERPL-SCNC: 99 MMOL/L (ref 99–110)
CO2 SERPL-SCNC: 24 MMOL/L (ref 21–32)
CREAT SERPL-MCNC: 0.6 MG/DL (ref 0.6–1.2)
DEPRECATED RDW RBC AUTO: 20.4 % (ref 12.4–15.4)
EOSINOPHIL # BLD: 0.2 K/UL (ref 0–0.6)
EOSINOPHIL NFR BLD: 2.5 %
GFR SERPLBLD CREATININE-BSD FMLA CKD-EPI: >60 ML/MIN/{1.73_M2}
GLUCOSE SERPL-MCNC: 110 MG/DL (ref 70–99)
HCT VFR BLD AUTO: 29.2 % (ref 36–48)
HGB BLD-MCNC: 9.2 G/DL (ref 12–16)
LYMPHOCYTES # BLD: 0.4 K/UL (ref 1–5.1)
LYMPHOCYTES NFR BLD: 5.2 %
MAGNESIUM SERPL-MCNC: 1.9 MG/DL (ref 1.8–2.4)
MCH RBC QN AUTO: 25.5 PG (ref 26–34)
MCHC RBC AUTO-ENTMCNC: 31.4 G/DL (ref 31–36)
MCV RBC AUTO: 81.2 FL (ref 80–100)
MONOCYTES # BLD: 0.7 K/UL (ref 0–1.3)
MONOCYTES NFR BLD: 9.8 %
NEUTROPHILS # BLD: 5.6 K/UL (ref 1.7–7.7)
NEUTROPHILS NFR BLD: 81.8 %
PLATELET # BLD AUTO: 463 K/UL (ref 135–450)
PMV BLD AUTO: 7.3 FL (ref 5–10.5)
POTASSIUM SERPL-SCNC: 3.4 MMOL/L (ref 3.5–5.1)
PROT SERPL-MCNC: 6.9 G/DL (ref 6.4–8.2)
RBC # BLD AUTO: 3.6 M/UL (ref 4–5.2)
SODIUM SERPL-SCNC: 135 MMOL/L (ref 136–145)
WBC # BLD AUTO: 6.9 K/UL (ref 4–11)

## 2023-08-22 PROCEDURE — 36415 COLL VENOUS BLD VENIPUNCTURE: CPT

## 2023-08-22 PROCEDURE — 83735 ASSAY OF MAGNESIUM: CPT

## 2023-08-22 PROCEDURE — 80053 COMPREHEN METABOLIC PANEL: CPT

## 2023-08-22 PROCEDURE — 85025 COMPLETE CBC W/AUTO DIFF WBC: CPT

## 2023-08-22 PROCEDURE — 6370000000 HC RX 637 (ALT 250 FOR IP): Performed by: PHYSICIAN ASSISTANT

## 2023-08-22 PROCEDURE — 99284 EMERGENCY DEPT VISIT MOD MDM: CPT

## 2023-08-22 PROCEDURE — 71045 X-RAY EXAM CHEST 1 VIEW: CPT

## 2023-08-22 RX ORDER — POTASSIUM CHLORIDE 20 MEQ/1
20 TABLET, EXTENDED RELEASE ORAL ONCE
Status: COMPLETED | OUTPATIENT
Start: 2023-08-22 | End: 2023-08-22

## 2023-08-22 RX ORDER — ACETAMINOPHEN 500 MG
1000 TABLET ORAL ONCE
Status: COMPLETED | OUTPATIENT
Start: 2023-08-22 | End: 2023-08-22

## 2023-08-22 RX ADMIN — ACETAMINOPHEN 1000 MG: 500 TABLET ORAL at 14:58

## 2023-08-22 RX ADMIN — POTASSIUM CHLORIDE 20 MEQ: 1500 TABLET, EXTENDED RELEASE ORAL at 15:44

## 2023-08-22 ASSESSMENT — PAIN SCALES - GENERAL: PAINLEVEL_OUTOF10: 6

## 2023-08-22 ASSESSMENT — PAIN - FUNCTIONAL ASSESSMENT: PAIN_FUNCTIONAL_ASSESSMENT: NONE - DENIES PAIN

## 2023-08-22 NOTE — ED PROVIDER NOTES
she tested positive for COVID an hour ago. She reports feeling generally weak the last day. EMS reported to the RN that patient's nurse to have her in a car and sent her here because she had \"tremors \". Patient states her hand was shaky when she was trying to eat today and the PCA had to feed her. She does admit to cough and general weakness the last day. Denies objective fever, shortness of breath, vomiting, abdominal pain, diarrhea, chest pain, shortness of breath, weakness, headache, vision change, difficulty swallowing or speaking. Patient reports she is nonambulatory at baseline, in bed and wheelchair for transport. On exam, vitals nonemergent, patient nontoxic-appearing, no distress. Good air movement on exam.  Patient nonfocal neuro exam without active tremors or rigors here. Temp 99.1, given Tylenol. CBC nonemergent, mild platelet elevation at 462, likely acute phase reactant. CMP with potassium 3.4, reflex mag 1.9. Patient given p.o. replacement here. No significant changes on the monitor, abnormal vital signs. COVID likely cause of patient's general malaise and weakness. No tremors, withdrawal, neurodeficits here. Chest x-ray, personally reviewed by me, no acute findings. Patient nonambulatory at baseline, no hypoxia at rest or distress here. Instructed to follow-up with primary care, maintain isolation per CDC guidelines, return for any new or worsening symptoms. Disposition Considerations (tests considered but not done, Admit vs D/C, Shared Decision Making, Pt Expectation of Test or Tx.):        I am the Primary Clinician of Record. FINAL IMPRESSION      1. COVID    2. Tremor    3. Hypokalemia          DISPOSITION/PLAN     DISPOSITION Decision To Discharge 08/22/2023 03:27:03 PM      PATIENT REFERRED TO:  Nadiya Patricia, [address] [facility] [address] [facility] [address] [place] [address]  [phone]    In 2 days  Return for any new or worsening symptoms.       DISCHARGE MEDICATIONS:  New

## 2023-08-22 NOTE — ED NOTES
Pt stating that she feels warm and clammy. Temp rechecked of 98.2.       Tunde Ash RN  08/22/23 0940

## 2023-08-22 NOTE — ED NOTES
Report given to Transport team and Adrienne from [facility]. All questions answered. Pt alert and oriented upon discharge and has no further complaints or needs at this time.       Marija Saldivar RN  08/22/23 6698

## 2023-09-19 NOTE — PATIENT INSTRUCTIONS
[address], [address] [facility]  Telephone: [phone]     Discharge Instructions     Important reminders:     **If you have any signs and symptoms of illness (Cough, fever, congestion, nausea, vomiting, diarrhea, etc.) please call the wound care center prior to your appointment. 1. Increase Protein intake for optimal wound healing  2. No added salt to reduce any swelling  3. If diabetic, maintain good glucose control  4. If you smoke, smoking prohibits wound healing, we ask that you refrain from smoking. 5. When taking antibiotics take the entire prescription as ordered. Do not stop taking until medication is all gone unless otherwise instructed. 6. Exercise as tolerated. 7. Keep weight off wounds and reposition every 2 hours if applicable. 8. If wound(s) is on your lower extremity, elevate legs to the level of the heart or above for 30 minutes 4-5 times a day and/or when sitting. Avoid standing for long periods of time. 9. Do not get wounds wet in bath or shower unless otherwise instructed by your physician. If your wound is on your foot or leg, you may purchase a cast bag. Please ask at the pharmacy. If Vascular testing is ordered, please call [address] [place] [address] [phone]) to schedule. Vascular tests ordered by Wound Care Physicians may take up to 2 hours to complete. Please keep that in mind when scheduling. If Vascular testing is scheduled, please bring supplies to replace your dressing after testing is done. The vascular department does not stock supplies. Wound: Sacrum, Back     With each dressing change, rinse wounds with 0.9% Saline. (May use wound wash or soft contact solution. Both can be purchased at a local drug store). If unable to obtain saline, may use a gentle soap and water. Dressing care: Back- zinc oxide to surrounding irritated skin 2x daily and apply Antibiotic ointment dry dressing- change daily.

## 2023-09-21 ENCOUNTER — HOSPITAL ENCOUNTER (OUTPATIENT)
Dept: WOUND CARE | Age: 83
Discharge: HOME | End: 2023-09-21
Payer: MEDICARE

## 2023-09-21 VITALS — TEMPERATURE: 95.5 F | HEART RATE: 79 BPM | SYSTOLIC BLOOD PRESSURE: 103 MMHG | DIASTOLIC BLOOD PRESSURE: 53 MMHG

## 2023-09-21 DIAGNOSIS — L89.154 DECUBITUS ULCER OF SACRAL REGION, STAGE 4 (HCC): Primary | ICD-10-CM

## 2023-09-21 DIAGNOSIS — Z74.01 BEDRIDDEN: ICD-10-CM

## 2023-09-21 PROCEDURE — 11046 DBRDMT MUSC&/FSCA EA ADDL: CPT

## 2023-09-21 PROCEDURE — 11043 DBRDMT MUSC&/FSCA 1ST 20/<: CPT

## 2023-09-21 RX ORDER — TRIAMCINOLONE ACETONIDE 1 MG/G
OINTMENT TOPICAL ONCE
OUTPATIENT
Start: 2023-09-21 | End: 2023-09-21

## 2023-09-21 RX ORDER — BETAMETHASONE DIPROPIONATE 0.05 %
OINTMENT (GRAM) TOPICAL ONCE
OUTPATIENT
Start: 2023-09-21 | End: 2023-09-21

## 2023-09-21 RX ORDER — SODIUM CHLOR/HYPOCHLOROUS ACID 0.033 %
SOLUTION, IRRIGATION IRRIGATION ONCE
OUTPATIENT
Start: 2023-09-21 | End: 2023-09-21

## 2023-09-21 RX ORDER — LIDOCAINE 50 MG/G
OINTMENT TOPICAL ONCE
OUTPATIENT
Start: 2023-09-21 | End: 2023-09-21

## 2023-09-21 RX ORDER — CLOBETASOL PROPIONATE 0.5 MG/G
OINTMENT TOPICAL ONCE
OUTPATIENT
Start: 2023-09-21 | End: 2023-09-21

## 2023-09-21 RX ORDER — LIDOCAINE HYDROCHLORIDE 40 MG/ML
SOLUTION TOPICAL ONCE
OUTPATIENT
Start: 2023-09-21 | End: 2023-09-21

## 2023-09-21 RX ORDER — LIDOCAINE 40 MG/G
CREAM TOPICAL ONCE
OUTPATIENT
Start: 2023-09-21 | End: 2023-09-21

## 2023-09-21 RX ORDER — GENTAMICIN SULFATE 1 MG/G
OINTMENT TOPICAL ONCE
OUTPATIENT
Start: 2023-09-21 | End: 2023-09-21

## 2023-09-21 RX ORDER — BACITRACIN ZINC AND POLYMYXIN B SULFATE 500; 1000 [USP'U]/G; [USP'U]/G
OINTMENT TOPICAL ONCE
OUTPATIENT
Start: 2023-09-21 | End: 2023-09-21

## 2023-09-21 RX ORDER — IBUPROFEN 200 MG
TABLET ORAL ONCE
OUTPATIENT
Start: 2023-09-21 | End: 2023-09-21

## 2023-09-21 RX ORDER — LIDOCAINE 40 MG/G
CREAM TOPICAL ONCE
Status: DISCONTINUED | OUTPATIENT
Start: 2023-09-21 | End: 2023-09-22 | Stop reason: HOSPADM

## 2023-09-21 RX ORDER — FLUCONAZOLE 100 MG/1
200 TABLET ORAL DAILY
Qty: 2 TABLET | Refills: 0 | Status: SHIPPED | OUTPATIENT
Start: 2023-09-21 | End: 2023-09-22

## 2023-09-21 RX ORDER — LIDOCAINE HYDROCHLORIDE 20 MG/ML
JELLY TOPICAL ONCE
OUTPATIENT
Start: 2023-09-21 | End: 2023-09-21

## 2023-09-21 RX ORDER — FLUCONAZOLE 100 MG/1
100 TABLET ORAL DAILY
Qty: 7 TABLET | Refills: 0 | Status: SHIPPED | OUTPATIENT
Start: 2023-09-21 | End: 2023-09-28

## 2023-09-21 RX ORDER — GINSENG 100 MG
CAPSULE ORAL ONCE
OUTPATIENT
Start: 2023-09-21 | End: 2023-09-21

## 2023-09-21 NOTE — PROGRESS NOTES
[facility]     H&P or Progress Note: Medical Staff Progress Note    Dion Sainz  MEDICAL RECORD NUMBER:  [id number]  AGE: 79 y.o. GENDER: female  : 1940  EPISODE DATE:  2023    Chief complaint and reason for visit:     No chief complaint on file. HPI/Wound Narrative:      Dion Sainz is a 79 y.o. female who presents today for an evaluation of a wound/ulcer. Wound duration:  2023 .    80 yo paraplegic with stage 4 pressure ulcer to sacrum. Here for follow up. Pertinent associated symptoms: drainage , redness, swelling, skin discoloration, and impaired mobility    Medical Decision Making:     Historian(s): patient  and daughter . Comorbid conditions affecting wound healing: As noted in [facility] which was reviewed. Pertinent labs reviewed. Review of medical records and external note (s) from other providers was done for this visit. Problem List Items Addressed This Visit          Other    Decubitus ulcer of sacral region, stage 4 ([address]) - Primary    Relevant Medications    lidocaine (LMX) 4 % cream    Other Relevant Orders    Initiate Outpatient Wound Care Protocol    Bedridden       Wounds/Problems Addressed and Treatment Plan:  Sacral pressure ulcer, stage IV. Debridement done. Silvadene with Dakin's wet-to-dry Kerlix gentle packing only. Moisture dermatitis. Zinc oxide BID. Severe cutaneous candidiasis. Diflucan prescribed. Peripheral Vascular Disease: NA  N/A  Venous Hypertension with acute inflammation and dermatitis: NA  NA  Nutritional support: Education and counseling provided. Protein emphasis with meals and/or protein shakes and Kranthi or similar products  Impaired mobility and/or Offloading needs discussed with education and counseling provided.    Weight-bearing status discussed with instruction and education provided, Pressure relief to help with skin perfusion discussed, and Pressure relief mattress or seat cushion

## 2023-09-21 NOTE — PLAN OF CARE
Discharge instructions given. Patient verbalized understanding. Return to [facility] in 1 week(s). Called/faxed orders to [facility].

## 2023-10-03 NOTE — PATIENT INSTRUCTIONS
[address], [address] [facility]  Telephone: [phone]     Discharge Instructions     Important reminders:     **If you have any signs and symptoms of illness (Cough, fever, congestion, nausea, vomiting, diarrhea, etc.) please call the wound care center prior to your appointment. 1. Increase Protein intake for optimal wound healing  2. No added salt to reduce any swelling  3. If diabetic, maintain good glucose control  4. If you smoke, smoking prohibits wound healing, we ask that you refrain from smoking. 5. When taking antibiotics take the entire prescription as ordered. Do not stop taking until medication is all gone unless otherwise instructed. 6. Exercise as tolerated. 7. Keep weight off wounds and reposition every 2 hours if applicable. 8. If wound(s) is on your lower extremity, elevate legs to the level of the heart or above for 30 minutes 4-5 times a day and/or when sitting. Avoid standing for long periods of time. 9. Do not get wounds wet in bath or shower unless otherwise instructed by your physician. If your wound is on your foot or leg, you may purchase a cast bag. Please ask at the pharmacy. If Vascular testing is ordered, please call [address] [place] [address] [phone]) to schedule. Vascular tests ordered by Wound Care Physicians may take up to 2 hours to complete. Please keep that in mind when scheduling. If Vascular testing is scheduled, please bring supplies to replace your dressing after testing is done. The vascular department does not stock supplies. Wound: Sacrum, Back     With each dressing change, rinse wounds with 0.9% Saline. (May use wound wash or soft contact solution. Both can be purchased at a local drug store). If unable to obtain saline, may use a gentle soap and water. Dressing care: Back and irritated skin- apply zinc oxide 2x daily.   Sacrum- STOP  NPWT,  apply triad in wound and surrounding skin,

## 2023-10-05 ENCOUNTER — HOSPITAL ENCOUNTER (OUTPATIENT)
Dept: WOUND CARE | Age: 83
Discharge: HOME | End: 2023-10-05
Attending: EMERGENCY MEDICINE
Payer: MEDICARE

## 2023-10-05 VITALS — SYSTOLIC BLOOD PRESSURE: 116 MMHG | HEART RATE: 76 BPM | DIASTOLIC BLOOD PRESSURE: 61 MMHG

## 2023-10-05 DIAGNOSIS — L89.154 DECUBITUS ULCER OF SACRAL REGION, STAGE 4 (HCC): Primary | ICD-10-CM

## 2023-10-05 PROCEDURE — 11043 DBRDMT MUSC&/FSCA 1ST 20/<: CPT | Performed by: EMERGENCY MEDICINE

## 2023-10-05 PROCEDURE — 11046 DBRDMT MUSC&/FSCA EA ADDL: CPT

## 2023-10-05 PROCEDURE — 11046 DBRDMT MUSC&/FSCA EA ADDL: CPT | Performed by: EMERGENCY MEDICINE

## 2023-10-05 PROCEDURE — 11043 DBRDMT MUSC&/FSCA 1ST 20/<: CPT

## 2023-10-05 RX ORDER — CLOBETASOL PROPIONATE 0.5 MG/G
OINTMENT TOPICAL ONCE
OUTPATIENT
Start: 2023-10-05 | End: 2023-10-05

## 2023-10-05 RX ORDER — GENTAMICIN SULFATE 1 MG/G
OINTMENT TOPICAL ONCE
OUTPATIENT
Start: 2023-10-05 | End: 2023-10-05

## 2023-10-05 RX ORDER — TRIAMCINOLONE ACETONIDE 1 MG/G
OINTMENT TOPICAL ONCE
OUTPATIENT
Start: 2023-10-05 | End: 2023-10-05

## 2023-10-05 RX ORDER — BETAMETHASONE DIPROPIONATE 0.05 %
OINTMENT (GRAM) TOPICAL ONCE
OUTPATIENT
Start: 2023-10-05 | End: 2023-10-05

## 2023-10-05 RX ORDER — LIDOCAINE 40 MG/G
CREAM TOPICAL ONCE
Status: DISCONTINUED | OUTPATIENT
Start: 2023-10-05 | End: 2023-10-06 | Stop reason: HOSPADM

## 2023-10-05 RX ORDER — SODIUM CHLOR/HYPOCHLOROUS ACID 0.033 %
SOLUTION, IRRIGATION IRRIGATION ONCE
OUTPATIENT
Start: 2023-10-05 | End: 2023-10-05

## 2023-10-05 RX ORDER — GINSENG 100 MG
CAPSULE ORAL ONCE
OUTPATIENT
Start: 2023-10-05 | End: 2023-10-05

## 2023-10-05 RX ORDER — LIDOCAINE HYDROCHLORIDE 20 MG/ML
JELLY TOPICAL ONCE
OUTPATIENT
Start: 2023-10-05 | End: 2023-10-05

## 2023-10-05 RX ORDER — LIDOCAINE 40 MG/G
CREAM TOPICAL ONCE
OUTPATIENT
Start: 2023-10-05 | End: 2023-10-05

## 2023-10-05 RX ORDER — BACITRACIN ZINC AND POLYMYXIN B SULFATE 500; 1000 [USP'U]/G; [USP'U]/G
OINTMENT TOPICAL ONCE
OUTPATIENT
Start: 2023-10-05 | End: 2023-10-05

## 2023-10-05 RX ORDER — LIDOCAINE HYDROCHLORIDE 40 MG/ML
SOLUTION TOPICAL ONCE
OUTPATIENT
Start: 2023-10-05 | End: 2023-10-05

## 2023-10-05 RX ORDER — IBUPROFEN 200 MG
TABLET ORAL ONCE
OUTPATIENT
Start: 2023-10-05 | End: 2023-10-05

## 2023-10-05 RX ORDER — LIDOCAINE 50 MG/G
OINTMENT TOPICAL ONCE
OUTPATIENT
Start: 2023-10-05 | End: 2023-10-05

## 2023-10-05 NOTE — PROGRESS NOTES
[facility]     H&P or Progress Note: Medical Staff Progress Note    Av Ni  MEDICAL RECORD NUMBER:  [id number]  AGE: 79 y.o. GENDER: female  : 1940  EPISODE DATE:  10/5/2023    Chief complaint and reason for visit:     Chief Complaint   Patient presents with    Wound Check     Sacrum        HPI/Wound Narrative:      Av Ni is a 79 y.o. female who presents today for an evaluation of a wound/ulcer. Wound duration:  2023 .    80 yo paraplegic with stage 4 pressure ulcer to sacrum. Here for follow up. Pertinent associated symptoms: drainage , redness, swelling, skin discoloration, and impaired mobility    Medical Decision Making:     Historian(s): patient  and daughter . Comorbid conditions affecting wound healing: As noted in [facility] which was reviewed. Pertinent labs reviewed. Review of medical records and external note (s) from other providers was done for this visit. Problem List Items Addressed This Visit          Other    Decubitus ulcer of sacral region, stage 4 ([address]) - Primary    Relevant Medications    lidocaine (LMX) 4 % cream (Start on 10/5/2023  8:45 AM)    Other Relevant Orders    Initiate Outpatient Wound Care Protocol       Wounds/Problems Addressed and Treatment Plan:  Sacral pressure ulcer, stage IV. Debridement done. Triad with Dakin's wet-to-dry Kerlix gentle packing only. Moisture dermatitis. Zinc oxide BID. Severe cutaneous candidiasis. Diflucan prescribed. Peripheral Vascular Disease: NA  N/A  Venous Hypertension with acute inflammation and dermatitis: NA  NA  Nutritional support: Education and counseling provided. Protein emphasis with meals and/or protein shakes and Kranthi or similar products  Impaired mobility and/or Offloading needs discussed with education and counseling provided.    Weight-bearing status discussed with instruction and education provided, Pressure relief to help with skin perfusion

## 2023-10-05 NOTE — PLAN OF CARE
Discharge instructions given. Patient verbalized understanding. Return to [address] [place] [address] in 1 week(s). Called/faxed orders to [facility].

## 2023-10-19 ENCOUNTER — HOSPITAL ENCOUNTER (OUTPATIENT)
Dept: WOUND CARE | Age: 83
Discharge: HOME | End: 2023-10-19
Attending: EMERGENCY MEDICINE
Payer: MEDICARE

## 2023-10-19 VITALS — HEART RATE: 71 BPM | RESPIRATION RATE: 15 BRPM | SYSTOLIC BLOOD PRESSURE: 114 MMHG | DIASTOLIC BLOOD PRESSURE: 67 MMHG

## 2023-10-19 DIAGNOSIS — L89.154 DECUBITUS ULCER OF SACRAL REGION, STAGE 4 (HCC): Primary | ICD-10-CM

## 2023-10-19 PROCEDURE — 11046 DBRDMT MUSC&/FSCA EA ADDL: CPT

## 2023-10-19 PROCEDURE — 11046 DBRDMT MUSC&/FSCA EA ADDL: CPT | Performed by: EMERGENCY MEDICINE

## 2023-10-19 PROCEDURE — 11043 DBRDMT MUSC&/FSCA 1ST 20/<: CPT

## 2023-10-19 PROCEDURE — 11043 DBRDMT MUSC&/FSCA 1ST 20/<: CPT | Performed by: EMERGENCY MEDICINE

## 2023-10-19 RX ORDER — GENTAMICIN SULFATE 1 MG/G
OINTMENT TOPICAL ONCE
OUTPATIENT
Start: 2023-10-19 | End: 2023-10-19

## 2023-10-19 RX ORDER — GINSENG 100 MG
CAPSULE ORAL ONCE
OUTPATIENT
Start: 2023-10-19 | End: 2023-10-19

## 2023-10-19 RX ORDER — LIDOCAINE 50 MG/G
OINTMENT TOPICAL ONCE
OUTPATIENT
Start: 2023-10-19 | End: 2023-10-19

## 2023-10-19 RX ORDER — LIDOCAINE 40 MG/G
CREAM TOPICAL ONCE
OUTPATIENT
Start: 2023-10-19 | End: 2023-10-19

## 2023-10-19 RX ORDER — LIDOCAINE 40 MG/G
CREAM TOPICAL ONCE
Status: DISCONTINUED | OUTPATIENT
Start: 2023-10-19 | End: 2023-10-20 | Stop reason: HOSPADM

## 2023-10-19 RX ORDER — SODIUM CHLOR/HYPOCHLOROUS ACID 0.033 %
SOLUTION, IRRIGATION IRRIGATION ONCE
OUTPATIENT
Start: 2023-10-19 | End: 2023-10-19

## 2023-10-19 RX ORDER — IBUPROFEN 200 MG
TABLET ORAL ONCE
OUTPATIENT
Start: 2023-10-19 | End: 2023-10-19

## 2023-10-19 RX ORDER — LIDOCAINE HYDROCHLORIDE 20 MG/ML
JELLY TOPICAL ONCE
OUTPATIENT
Start: 2023-10-19 | End: 2023-10-19

## 2023-10-19 RX ORDER — BETAMETHASONE DIPROPIONATE 0.05 %
OINTMENT (GRAM) TOPICAL ONCE
OUTPATIENT
Start: 2023-10-19 | End: 2023-10-19

## 2023-10-19 RX ORDER — BACITRACIN ZINC AND POLYMYXIN B SULFATE 500; 1000 [USP'U]/G; [USP'U]/G
OINTMENT TOPICAL ONCE
OUTPATIENT
Start: 2023-10-19 | End: 2023-10-19

## 2023-10-19 RX ORDER — LIDOCAINE HYDROCHLORIDE 40 MG/ML
SOLUTION TOPICAL ONCE
OUTPATIENT
Start: 2023-10-19 | End: 2023-10-19

## 2023-10-19 RX ORDER — TRIAMCINOLONE ACETONIDE 1 MG/G
OINTMENT TOPICAL ONCE
OUTPATIENT
Start: 2023-10-19 | End: 2023-10-19

## 2023-10-19 RX ORDER — CLOBETASOL PROPIONATE 0.5 MG/G
OINTMENT TOPICAL ONCE
OUTPATIENT
Start: 2023-10-19 | End: 2023-10-19

## 2023-10-19 NOTE — PLAN OF CARE
Discharge instructions given. Patient verbalized understanding. Return to [facility] in 1 week(s). Called/faxed orders to  [facility].

## 2023-10-19 NOTE — PATIENT INSTRUCTIONS
Experience     Thank you for trusting us with your care. You may receive a survey from a company called [employer] asking for your feedback. We would appreciate it if you took a few minutes to share your experience. Your input is very valuable to us.

## 2023-11-02 ENCOUNTER — HOSPITAL ENCOUNTER (OUTPATIENT)
Dept: WOUND CARE | Age: 83
Discharge: HOME | End: 2023-11-02
Attending: EMERGENCY MEDICINE
Payer: MEDICARE

## 2023-11-02 VITALS — HEART RATE: 63 BPM | DIASTOLIC BLOOD PRESSURE: 62 MMHG | TEMPERATURE: 96.8 F | SYSTOLIC BLOOD PRESSURE: 109 MMHG

## 2023-11-02 DIAGNOSIS — L89.154 DECUBITUS ULCER OF SACRAL REGION, STAGE 4 (HCC): Primary | ICD-10-CM

## 2023-11-02 DIAGNOSIS — Z74.01 BEDRIDDEN: ICD-10-CM

## 2023-11-02 PROCEDURE — 11043 DBRDMT MUSC&/FSCA 1ST 20/<: CPT

## 2023-11-02 PROCEDURE — 11046 DBRDMT MUSC&/FSCA EA ADDL: CPT

## 2023-11-02 PROCEDURE — 11046 DBRDMT MUSC&/FSCA EA ADDL: CPT | Performed by: EMERGENCY MEDICINE

## 2023-11-02 PROCEDURE — 11043 DBRDMT MUSC&/FSCA 1ST 20/<: CPT | Performed by: EMERGENCY MEDICINE

## 2023-11-02 RX ORDER — LIDOCAINE HYDROCHLORIDE 20 MG/ML
JELLY TOPICAL ONCE
OUTPATIENT
Start: 2023-11-02 | End: 2023-11-02

## 2023-11-02 RX ORDER — IBUPROFEN 200 MG
TABLET ORAL ONCE
OUTPATIENT
Start: 2023-11-02 | End: 2023-11-02

## 2023-11-02 RX ORDER — LIDOCAINE 40 MG/G
CREAM TOPICAL ONCE
Status: DISCONTINUED | OUTPATIENT
Start: 2023-11-02 | End: 2023-11-03 | Stop reason: HOSPADM

## 2023-11-02 RX ORDER — BACITRACIN ZINC AND POLYMYXIN B SULFATE 500; 1000 [USP'U]/G; [USP'U]/G
OINTMENT TOPICAL ONCE
OUTPATIENT
Start: 2023-11-02 | End: 2023-11-02

## 2023-11-02 RX ORDER — SODIUM CHLOR/HYPOCHLOROUS ACID 0.033 %
SOLUTION, IRRIGATION IRRIGATION ONCE
OUTPATIENT
Start: 2023-11-02 | End: 2023-11-02

## 2023-11-02 RX ORDER — GENTAMICIN SULFATE 1 MG/G
OINTMENT TOPICAL ONCE
OUTPATIENT
Start: 2023-11-02 | End: 2023-11-02

## 2023-11-02 RX ORDER — LIDOCAINE HYDROCHLORIDE 40 MG/ML
SOLUTION TOPICAL ONCE
OUTPATIENT
Start: 2023-11-02 | End: 2023-11-02

## 2023-11-02 RX ORDER — CLOBETASOL PROPIONATE 0.5 MG/G
OINTMENT TOPICAL ONCE
OUTPATIENT
Start: 2023-11-02 | End: 2023-11-02

## 2023-11-02 RX ORDER — BETAMETHASONE DIPROPIONATE 0.05 %
OINTMENT (GRAM) TOPICAL ONCE
OUTPATIENT
Start: 2023-11-02 | End: 2023-11-02

## 2023-11-02 RX ORDER — LIDOCAINE 50 MG/G
OINTMENT TOPICAL ONCE
OUTPATIENT
Start: 2023-11-02 | End: 2023-11-02

## 2023-11-02 RX ORDER — TRIAMCINOLONE ACETONIDE 1 MG/G
OINTMENT TOPICAL ONCE
OUTPATIENT
Start: 2023-11-02 | End: 2023-11-02

## 2023-11-02 RX ORDER — LIDOCAINE 40 MG/G
CREAM TOPICAL ONCE
OUTPATIENT
Start: 2023-11-02 | End: 2023-11-02

## 2023-11-02 RX ORDER — GINSENG 100 MG
CAPSULE ORAL ONCE
OUTPATIENT
Start: 2023-11-02 | End: 2023-11-02

## 2023-11-02 NOTE — PLAN OF CARE
Discharge instructions given. Patient verbalized understanding. Return to [facility] in 1 week(s). Called/faxed orders to [facility].

## 2023-11-02 NOTE — PATIENT INSTRUCTIONS
skin, lightly pack wound with slightly moistened kerlix with Dakins solution wet to dry with ABD pad - change daily and as needed if soiled. Possible wound VAC to consider next week. Gastrologist appt 10/24  Keep using offloading mattress. Give protein supplements in between meals (Kranthi supplements recommended) . Turn side to side ONLY every 1-2 hours (position with pillows and place away from wound sites) and allowed on back for 30mins only during meals. Start Diflucan from pharmacy 9/21    Home Care Agency/Facility: [facility]     Your wound-care supplies will be provided by:   Please note, depending on your insurance coverage, you may have out-of-pocket expenses for these supplies. Someone from the company should call you to confirm your order and discuss those potential costs before they ship your products -- please anticipate that call. If your out-of-pocket cost could be substantial, Many companies have financial hardship programs for patients who qualify, so please ask about that if you might need a hand. If you have any questions about your supplies or your potential out-of-pocket costs, or if you need to place an order for a refill of supplies (typically monthly), please call the company directly. Your  is Sara Riggs up with Dr Elva Denver In 1 week(s) in the wound care center. Wound Care Center Information: Should you experience any significant changes in your wound(s) or have questions about your wound care, please contact the [address] [place] [address] at [phone] Monday  - Thursday 8:00 am - 4:00 pm and Friday 8:00 am - 1:00pm. If you need help with your wound outside these hours and cannot wait until we are again available, contact your PCP or go to the hospital emergency room. PLEASE NOTE: IF YOU ARE UNABLE TO OBTAIN WOUND SUPPLIES, CONTINUE TO USE THE SUPPLIES YOU HAVE AVAILABLE UNTIL YOU ARE ABLE TO REACH US.  IT IS MOST IMPORTANT TO KEEP THE

## 2023-11-02 NOTE — PROGRESS NOTES
[facility]     H&P or Progress Note: Medical Staff Progress Note    Dion Sainz  MEDICAL RECORD NUMBER:  [id number]  AGE: 79 y.o. GENDER: female  : 1940  EPISODE DATE:  2023    Chief complaint and reason for visit:     Chief Complaint   Patient presents with    Wound Check     Buttock F/U        HPI/Wound Narrative:      Dion Sainz is a 79 y.o. female who presents today for an evaluation of a wound/ulcer. Wound duration:  2023 .    78 yo paraplegic with stage 4 pressure ulcer to sacrum. Here for follow up. Pertinent associated symptoms: drainage , redness, swelling, skin discoloration, and impaired mobility    23: not getting turn well the last 2 weeks at [facility]    Medical Decision Making:     Historian(s): patient  and daughter . Comorbid conditions affecting wound healing: As noted in [address] [facility] which was reviewed. Pertinent labs reviewed. Review of medical records and external note (s) from other providers was done for this visit. Problem List Items Addressed This Visit          Other    Decubitus ulcer of sacral region, stage 4 ([address]) - Primary    Relevant Medications    lidocaine (LMX) 4 % cream (Start on 2023  8:45 AM)    Other Relevant Orders    Initiate Outpatient Wound Care Protocol    Bedridden       Wounds/Problems Addressed and Treatment Plan:  Sacral pressure ulcer, stage IV. Improved. Healthier wound bed. Soft fibrous nonviable tissue. Improving slowly. Debridement done. Triad with Dakin's wet-to-dry Kerlix gentle packing only. Moisture dermatitis. Zinc oxide BID. Stooling frequently. Recommend diverting colostomy. Has plans to see a surgeon soon. Severe cutaneous candidiasis. Diflucan prescribed. Peripheral Vascular Disease: NA  N/A  Venous Hypertension with acute inflammation and dermatitis: NA  NA  Nutritional support: Education and counseling provided.   Protein emphasis with meals and/or protein

## 2023-11-15 NOTE — PATIENT INSTRUCTIONS
ABLE TO REACH US. IT IS MOST IMPORTANT TO KEEP THE WOUND COVERED AT ALL TIMES. Patient Experience     Thank you for trusting us with your care. You may receive a survey from a company called [employer] asking for your feedback. We would appreciate it if you took a few minutes to share your experience. Your input is very valuable to us.

## 2023-11-16 ENCOUNTER — HOSPITAL ENCOUNTER (OUTPATIENT)
Dept: WOUND CARE | Age: 83
Discharge: HOME | End: 2023-11-16
Attending: EMERGENCY MEDICINE
Payer: MEDICAID

## 2023-11-16 VITALS — DIASTOLIC BLOOD PRESSURE: 74 MMHG | HEART RATE: 80 BPM | SYSTOLIC BLOOD PRESSURE: 128 MMHG | RESPIRATION RATE: 16 BRPM

## 2023-11-16 DIAGNOSIS — L25.8 DERMATITIS ASSOCIATED WITH MOISTURE FROM STOOL INCONTINENCE: ICD-10-CM

## 2023-11-16 DIAGNOSIS — Z74.01 BEDRIDDEN: Primary | ICD-10-CM

## 2023-11-16 DIAGNOSIS — R15.9 DERMATITIS ASSOCIATED WITH MOISTURE FROM STOOL INCONTINENCE: ICD-10-CM

## 2023-11-16 DIAGNOSIS — L89.154 DECUBITUS ULCER OF SACRAL REGION, STAGE 4 (HCC): ICD-10-CM

## 2023-11-16 PROCEDURE — 11043 DBRDMT MUSC&/FSCA 1ST 20/<: CPT

## 2023-11-16 PROCEDURE — 11043 DBRDMT MUSC&/FSCA 1ST 20/<: CPT | Performed by: EMERGENCY MEDICINE

## 2023-11-16 RX ORDER — BACITRACIN ZINC AND POLYMYXIN B SULFATE 500; 1000 [USP'U]/G; [USP'U]/G
OINTMENT TOPICAL ONCE
OUTPATIENT
Start: 2023-11-16 | End: 2023-11-16

## 2023-11-16 RX ORDER — BETAMETHASONE DIPROPIONATE 0.05 %
OINTMENT (GRAM) TOPICAL ONCE
OUTPATIENT
Start: 2023-11-16 | End: 2023-11-16

## 2023-11-16 RX ORDER — LIDOCAINE HYDROCHLORIDE 20 MG/ML
JELLY TOPICAL ONCE
OUTPATIENT
Start: 2023-11-16 | End: 2023-11-16

## 2023-11-16 RX ORDER — LIDOCAINE 40 MG/G
CREAM TOPICAL ONCE
Status: DISCONTINUED | OUTPATIENT
Start: 2023-11-16 | End: 2023-11-17 | Stop reason: HOSPADM

## 2023-11-16 RX ORDER — SODIUM CHLOR/HYPOCHLOROUS ACID 0.033 %
SOLUTION, IRRIGATION IRRIGATION ONCE
OUTPATIENT
Start: 2023-11-16 | End: 2023-11-16

## 2023-11-16 RX ORDER — CLOBETASOL PROPIONATE 0.5 MG/G
OINTMENT TOPICAL ONCE
OUTPATIENT
Start: 2023-11-16 | End: 2023-11-16

## 2023-11-16 RX ORDER — IBUPROFEN 200 MG
TABLET ORAL ONCE
OUTPATIENT
Start: 2023-11-16 | End: 2023-11-16

## 2023-11-16 RX ORDER — GINSENG 100 MG
CAPSULE ORAL ONCE
OUTPATIENT
Start: 2023-11-16 | End: 2023-11-16

## 2023-11-16 RX ORDER — GENTAMICIN SULFATE 1 MG/G
OINTMENT TOPICAL ONCE
OUTPATIENT
Start: 2023-11-16 | End: 2023-11-16

## 2023-11-16 RX ORDER — LIDOCAINE 40 MG/G
CREAM TOPICAL ONCE
OUTPATIENT
Start: 2023-11-16 | End: 2023-11-16

## 2023-11-16 RX ORDER — LIDOCAINE HYDROCHLORIDE 40 MG/ML
SOLUTION TOPICAL ONCE
OUTPATIENT
Start: 2023-11-16 | End: 2023-11-16

## 2023-11-16 RX ORDER — LIDOCAINE 50 MG/G
OINTMENT TOPICAL ONCE
OUTPATIENT
Start: 2023-11-16 | End: 2023-11-16

## 2023-11-16 RX ORDER — TRIAMCINOLONE ACETONIDE 1 MG/G
OINTMENT TOPICAL ONCE
OUTPATIENT
Start: 2023-11-16 | End: 2023-11-16

## 2023-11-16 NOTE — PROGRESS NOTES
11/16/23 0833   Drainage Amount Moderate (25-50%) 11/16/23 0833   Drainage Description Serosanguinous 11/16/23 0833   Odor None 11/16/23 0833   Alysha-wound Assessment Excoriated 11/16/23 0833   Margins Attached edges; Defined edges 11/16/23 0833   Wound Thickness Description not for Pressure Injury Full thickness 11/02/23 0825   Number of days: 56     Procedures done this visit:   Procedure Note  Indications:  Based on my examination of this patient's wound(s)/ulcer(s) today, debridement is required to promote healing and evaluate the wound base. Performed by: Richi Inman MD  Consent obtained:  Yes  Time out taken:  Yes  Pain Control: Anesthetic  Anesthetic: 4% Lidocaine Cream   Debridement: Excisional Debridement  Using curette the wound(s)/ulcer(s) was/were debrided down through and including the removal of muscle/fascia. Devitalized Tissue Debrided:  fibrin, biofilm, slough, and exudate  Pre Debridement Measurements:  Are located in the [place]  Documentation Flow Sheet  Diabetic/Pressure/Non Pressure Ulcers only:  Ulcer: Pressure ulcer, Stage 4   Wound/Ulcer #: 1  Post Debridement Measurements:  Wound/Ulcer Descriptions are Pre Debridement except measurements: Total Surface Area Debrided:  20 sq cm   Estimated Blood Loss:  Minimal  Hemostasis Achieved:  by pressure  Procedural Pain:  0  / 10   Post Procedural Pain:  0 / 10   Response to treatment:  Well tolerated by patient. Objective:      /74   Pulse 80   Resp 16     Wt Readings from Last 3 Encounters:   08/22/23 86.2 kg (190 lb)   08/13/23 86.7 kg (191 lb 3.2 oz)   07/26/23 86 kg (189 lb 9.6 oz)       PHYSICAL EXAM  General Appearance/Constitutional: Alert and in no acute distress. Nontoxic. Skin: Positive wound per LDA documentation if applicable. No jaundice. Head: Normocephalic and atraumatic. HEENT: Atraumatic. Unremarkable. Extraocular eye movements intact, sclera anicteric.   Pulmonary: Even and unlabored

## 2023-11-16 NOTE — PLAN OF CARE
Discharge instructions given. Patient verbalized understanding. Return to [facility] in 1 week(s). Called/faxed orders to [facility].
contact your PCP or go to the hospital emergency room. PLEASE NOTE: IF YOU ARE UNABLE TO OBTAIN WOUND SUPPLIES, CONTINUE TO USE THE SUPPLIES YOU HAVE AVAILABLE UNTIL YOU ARE ABLE TO REACH US. IT IS MOST IMPORTANT TO KEEP THE WOUND COVERED AT ALL TIMES. Patient Experience     Thank you for trusting us with your care. You may receive a survey from a company called [employer] asking for your feedback. We would appreciate it if you took a few minutes to share your experience. Your input is very valuable to us. Skilled nurse to evaluate and treat for wound care. Change dressing as ordered  once a day on Monday, Tuesday, Wednesday, Thursday, Friday, Saturday, and Sunday using clean technique. Patient/Family/caregiver may change dressings as needed as instructed when Home Care unavailable.     WOUNDS REQUIRING DRESSING Changes:     Wound 09/21/23 Sacrum #1 (Active)   Wound Image   10/05/23 0822   Wound Etiology Pressure Stage 3 11/16/23 0833   Wound Cleansed Cleansed with saline 11/16/23 0833   Wound Length (cm) 4 cm 11/16/23 0833   Wound Width (cm) 5 cm 11/16/23 0833   Wound Depth (cm) 3 cm 11/16/23 0833   Wound Surface Area (cm^2) 20 cm^2 11/16/23 0833   Change in Wound Size % (l*w) 33.33 11/16/23 0833   Wound Volume (cm^3) 60 cm^3 11/16/23 0833   Wound Healing % 50 11/16/23 0833   Post-Procedure Length (cm) 4 cm 11/16/23 0925   Post-Procedure Width (cm) 5 cm 11/16/23 0925   Post-Procedure Depth (cm) 3.01 cm 11/16/23 0925   Post-Procedure Surface Area (cm^2) 20 cm^2 11/16/23 0925   Post-Procedure Volume (cm^3) 60.2 cm^3 11/16/23 0925   Undermining Starts ___ O'Clock 1 10/19/23 0813   Undermining Ends___ O'Clock 4 10/19/23 0813   Undermining Maxium Distance (cm) 3.5 10/19/23 0813   Wound Assessment Bleeding 11/16/23 0925   Drainage Amount Moderate (25-50%) 11/16/23 0833   Drainage Description Serosanguinous 11/16/23 0833   Odor None 11/16/23 0833   Alysha-wound Assessment Excoriated 11/16/23 0833   Margins

## 2023-11-22 ENCOUNTER — APPOINTMENT (OUTPATIENT)
Dept: GENERAL RADIOLOGY | Age: 83
DRG: 329 | End: 2023-11-22
Payer: COMMERCIAL

## 2023-11-22 ENCOUNTER — ANESTHESIA (OUTPATIENT)
Dept: ENDOSCOPY | Age: 83
End: 2023-11-22
Payer: COMMERCIAL

## 2023-11-22 ENCOUNTER — ANESTHESIA EVENT (OUTPATIENT)
Dept: ENDOSCOPY | Age: 83
End: 2023-11-22
Payer: COMMERCIAL

## 2023-11-22 ENCOUNTER — HOSPITAL ENCOUNTER (INPATIENT)
Age: 83
LOS: 7 days | Discharge: HOME | DRG: 329 | End: 2023-11-29
Attending: EMERGENCY MEDICINE | Admitting: INTERNAL MEDICINE
Payer: COMMERCIAL

## 2023-11-22 ENCOUNTER — APPOINTMENT (OUTPATIENT)
Dept: CT IMAGING | Age: 83
DRG: 329 | End: 2023-11-22
Payer: COMMERCIAL

## 2023-11-22 DIAGNOSIS — E27.8 ADRENAL NODULE (HCC): ICD-10-CM

## 2023-11-22 DIAGNOSIS — L89.154 DECUBITUS ULCER OF SACRAL REGION, STAGE 4 (HCC): ICD-10-CM

## 2023-11-22 DIAGNOSIS — K56.609 COLONIC OBSTRUCTION (HCC): ICD-10-CM

## 2023-11-22 DIAGNOSIS — K56.609 LARGE BOWEL OBSTRUCTION (HCC): ICD-10-CM

## 2023-11-22 DIAGNOSIS — A49.9 ESBL (EXTENDED SPECTRUM BETA-LACTAMASE) PRODUCING BACTERIA INFECTION: ICD-10-CM

## 2023-11-22 DIAGNOSIS — K59.00 CONSTIPATION, UNSPECIFIED CONSTIPATION TYPE: Primary | ICD-10-CM

## 2023-11-22 DIAGNOSIS — Z16.12 ESBL (EXTENDED SPECTRUM BETA-LACTAMASE) PRODUCING BACTERIA INFECTION: ICD-10-CM

## 2023-11-22 DIAGNOSIS — K63.1 PERFORATED SIGMOID COLON (HCC): ICD-10-CM

## 2023-11-22 LAB
ALBUMIN SERPL-MCNC: 3.7 G/DL (ref 3.4–5)
ALBUMIN/GLOB SERPL: 0.9 {RATIO} (ref 1.1–2.2)
ALP SERPL-CCNC: 105 U/L (ref 40–129)
ALT SERPL-CCNC: 13 U/L (ref 10–40)
ANION GAP SERPL CALCULATED.3IONS-SCNC: 13 MMOL/L (ref 3–16)
AST SERPL-CCNC: 19 U/L (ref 15–37)
BACTERIA URNS QL MICRO: ABNORMAL /HPF
BASOPHILS # BLD: 0.1 K/UL (ref 0–0.2)
BASOPHILS NFR BLD: 0.6 %
BILIRUB SERPL-MCNC: 0.3 MG/DL (ref 0–1)
BILIRUB UR QL STRIP.AUTO: NEGATIVE
BUN SERPL-MCNC: 49 MG/DL (ref 7–20)
C DIFF TOX A+B STL QL IA: NORMAL
CALCIUM SERPL-MCNC: 10.5 MG/DL (ref 8.3–10.6)
CHLORIDE SERPL-SCNC: 97 MMOL/L (ref 99–110)
CLARITY UR: ABNORMAL
CO2 SERPL-SCNC: 22 MMOL/L (ref 21–32)
COLOR UR: YELLOW
CREAT SERPL-MCNC: 1.1 MG/DL (ref 0.6–1.2)
DEPRECATED RDW RBC AUTO: 17.3 % (ref 12.4–15.4)
EOSINOPHIL # BLD: 0.1 K/UL (ref 0–0.6)
EOSINOPHIL NFR BLD: 0.7 %
EPI CELLS #/AREA URNS AUTO: 20 /HPF (ref 0–5)
GFR SERPLBLD CREATININE-BSD FMLA CKD-EPI: 50 ML/MIN/{1.73_M2}
GLUCOSE SERPL-MCNC: 121 MG/DL (ref 70–99)
GLUCOSE UR STRIP.AUTO-MCNC: NEGATIVE MG/DL
HCT VFR BLD AUTO: 37.6 % (ref 36–48)
HGB BLD-MCNC: 12 G/DL (ref 12–16)
HGB UR QL STRIP.AUTO: ABNORMAL
HYALINE CASTS #/AREA URNS AUTO: 67 /LPF (ref 0–8)
KETONES UR STRIP.AUTO-MCNC: NEGATIVE MG/DL
LACTATE BLDV-SCNC: 1.5 MMOL/L (ref 0.4–1.9)
LACTATE BLDV-SCNC: 1.8 MMOL/L (ref 0.4–1.9)
LEUKOCYTE ESTERASE UR QL STRIP.AUTO: ABNORMAL
LIPASE SERPL-CCNC: 16 U/L (ref 13–60)
LYMPHOCYTES # BLD: 0.8 K/UL (ref 1–5.1)
LYMPHOCYTES NFR BLD: 7.5 %
MCH RBC QN AUTO: 25.1 PG (ref 26–34)
MCHC RBC AUTO-ENTMCNC: 32 G/DL (ref 31–36)
MCV RBC AUTO: 78.5 FL (ref 80–100)
MONOCYTES # BLD: 0.7 K/UL (ref 0–1.3)
MONOCYTES NFR BLD: 7.1 %
NEUTROPHILS # BLD: 8.8 K/UL (ref 1.7–7.7)
NEUTROPHILS NFR BLD: 84.1 %
NITRITE UR QL STRIP.AUTO: NEGATIVE
PH UR STRIP.AUTO: 5.5 [PH] (ref 5–8)
PLATELET # BLD AUTO: 362 K/UL (ref 135–450)
PMV BLD AUTO: 9.1 FL (ref 5–10.5)
POTASSIUM SERPL-SCNC: 3.4 MMOL/L (ref 3.5–5.1)
PROT SERPL-MCNC: 7.7 G/DL (ref 6.4–8.2)
PROT UR STRIP.AUTO-MCNC: 100 MG/DL
RBC # BLD AUTO: 4.79 M/UL (ref 4–5.2)
RBC CLUMPS #/AREA URNS AUTO: 368 /HPF (ref 0–4)
SODIUM SERPL-SCNC: 132 MMOL/L (ref 136–145)
SP GR UR STRIP.AUTO: 1.02 (ref 1–1.03)
UA COMPLETE W REFLEX CULTURE PNL UR: YES
UA DIPSTICK W REFLEX MICRO PNL UR: YES
URN SPEC COLLECT METH UR: ABNORMAL
UROBILINOGEN UR STRIP-ACNC: 1 E.U./DL
WBC # BLD AUTO: 10.5 K/UL (ref 4–11)
WBC #/AREA URNS AUTO: 1704 /HPF (ref 0–5)

## 2023-11-22 PROCEDURE — 74177 CT ABD & PELVIS W/CONTRAST: CPT

## 2023-11-22 PROCEDURE — 6360000002 HC RX W HCPCS: Performed by: ANESTHESIOLOGY

## 2023-11-22 PROCEDURE — 2580000003 HC RX 258: Performed by: INTERNAL MEDICINE

## 2023-11-22 PROCEDURE — 87088 URINE BACTERIA CULTURE: CPT

## 2023-11-22 PROCEDURE — 0DBN0ZZ EXCISION OF SIGMOID COLON, OPEN APPROACH: ICD-10-PCS | Performed by: INTERNAL MEDICINE

## 2023-11-22 PROCEDURE — 87449 NOS EACH ORGANISM AG IA: CPT

## 2023-11-22 PROCEDURE — 99223 1ST HOSP IP/OBS HIGH 75: CPT | Performed by: SURGERY

## 2023-11-22 PROCEDURE — 2580000003 HC RX 258: Performed by: ANESTHESIOLOGY

## 2023-11-22 PROCEDURE — 3609155600 HC COLONOSCOPY WITH DECOMPRESSION: Performed by: INTERNAL MEDICINE

## 2023-11-22 PROCEDURE — 87329 GIARDIA AG IA: CPT

## 2023-11-22 PROCEDURE — 2060000000 HC ICU INTERMEDIATE R&B

## 2023-11-22 PROCEDURE — 0D7M8ZZ DILATION OF DESCENDING COLON, VIA NATURAL OR ARTIFICIAL OPENING ENDOSCOPIC: ICD-10-PCS | Performed by: INTERNAL MEDICINE

## 2023-11-22 PROCEDURE — 7100000000 HC PACU RECOVERY - FIRST 15 MIN: Performed by: INTERNAL MEDICINE

## 2023-11-22 PROCEDURE — 87328 CRYPTOSPORIDIUM AG IA: CPT

## 2023-11-22 PROCEDURE — 87040 BLOOD CULTURE FOR BACTERIA: CPT

## 2023-11-22 PROCEDURE — 6360000002 HC RX W HCPCS: Performed by: INTERNAL MEDICINE

## 2023-11-22 PROCEDURE — 36415 COLL VENOUS BLD VENIPUNCTURE: CPT

## 2023-11-22 PROCEDURE — C1729 CATH, DRAINAGE: HCPCS | Performed by: INTERNAL MEDICINE

## 2023-11-22 PROCEDURE — 87336 ENTAMOEB HIST DISPR AG IA: CPT

## 2023-11-22 PROCEDURE — 74018 RADEX ABDOMEN 1 VIEW: CPT

## 2023-11-22 PROCEDURE — 87186 SC STD MICRODIL/AGAR DIL: CPT

## 2023-11-22 PROCEDURE — 87077 CULTURE AEROBIC IDENTIFY: CPT

## 2023-11-22 PROCEDURE — 83605 ASSAY OF LACTIC ACID: CPT

## 2023-11-22 PROCEDURE — 81001 URINALYSIS AUTO W/SCOPE: CPT

## 2023-11-22 PROCEDURE — 3609019800 HC COLONOSCOPY WITH SUBMUCOSAL INJECTION: Performed by: INTERNAL MEDICINE

## 2023-11-22 PROCEDURE — 6360000004 HC RX CONTRAST MEDICATION: Performed by: PHYSICIAN ASSISTANT

## 2023-11-22 PROCEDURE — 3700000000 HC ANESTHESIA ATTENDED CARE: Performed by: INTERNAL MEDICINE

## 2023-11-22 PROCEDURE — 87324 CLOSTRIDIUM AG IA: CPT

## 2023-11-22 PROCEDURE — 7100000001 HC PACU RECOVERY - ADDTL 15 MIN: Performed by: INTERNAL MEDICINE

## 2023-11-22 PROCEDURE — 99285 EMERGENCY DEPT VISIT HI MDM: CPT

## 2023-11-22 PROCEDURE — 85025 COMPLETE CBC W/AUTO DIFF WBC: CPT

## 2023-11-22 PROCEDURE — 80053 COMPREHEN METABOLIC PANEL: CPT

## 2023-11-22 PROCEDURE — 0D9670Z DRAINAGE OF STOMACH WITH DRAINAGE DEVICE, VIA NATURAL OR ARTIFICIAL OPENING: ICD-10-PCS | Performed by: EMERGENCY MEDICINE

## 2023-11-22 PROCEDURE — 87086 URINE CULTURE/COLONY COUNT: CPT

## 2023-11-22 PROCEDURE — 83690 ASSAY OF LIPASE: CPT

## 2023-11-22 PROCEDURE — 3700000001 HC ADD 15 MINUTES (ANESTHESIA): Performed by: INTERNAL MEDICINE

## 2023-11-22 PROCEDURE — 2500000003 HC RX 250 WO HCPCS: Performed by: ANESTHESIOLOGY

## 2023-11-22 PROCEDURE — 0D1N0Z4 BYPASS SIGMOID COLON TO CUTANEOUS, OPEN APPROACH: ICD-10-PCS | Performed by: INTERNAL MEDICINE

## 2023-11-22 PROCEDURE — 2709999900 HC NON-CHARGEABLE SUPPLY: Performed by: INTERNAL MEDICINE

## 2023-11-22 RX ORDER — LIDOCAINE HYDROCHLORIDE 10 MG/ML
5 INJECTION, SOLUTION EPIDURAL; INFILTRATION; INTRACAUDAL; PERINEURAL ONCE
Status: DISCONTINUED | OUTPATIENT
Start: 2023-11-23 | End: 2023-11-23

## 2023-11-22 RX ORDER — EPHEDRINE SULFATE/0.9% NACL/PF 50 MG/5 ML
SYRINGE (ML) INTRAVENOUS PRN
Status: DISCONTINUED | OUTPATIENT
Start: 2023-11-22 | End: 2023-11-22 | Stop reason: SDUPTHER

## 2023-11-22 RX ORDER — METRONIDAZOLE 500 MG/100ML
500 INJECTION, SOLUTION INTRAVENOUS EVERY 8 HOURS
Status: DISCONTINUED | OUTPATIENT
Start: 2023-11-22 | End: 2023-11-24

## 2023-11-22 RX ORDER — POTASSIUM CHLORIDE 7.45 MG/ML
10 INJECTION INTRAVENOUS
Status: COMPLETED | OUTPATIENT
Start: 2023-11-22 | End: 2023-11-23

## 2023-11-22 RX ORDER — SODIUM CHLORIDE 0.9 % (FLUSH) 0.9 %
5-40 SYRINGE (ML) INJECTION PRN
Status: DISCONTINUED | OUTPATIENT
Start: 2023-11-22 | End: 2023-11-29 | Stop reason: HOSPADM

## 2023-11-22 RX ORDER — SODIUM CHLORIDE 0.9 % (FLUSH) 0.9 %
5-40 SYRINGE (ML) INJECTION EVERY 12 HOURS SCHEDULED
Status: DISCONTINUED | OUTPATIENT
Start: 2023-11-22 | End: 2023-11-29 | Stop reason: HOSPADM

## 2023-11-22 RX ORDER — ONDANSETRON 4 MG/1
4 TABLET, ORALLY DISINTEGRATING ORAL EVERY 8 HOURS PRN
Status: DISCONTINUED | OUTPATIENT
Start: 2023-11-22 | End: 2023-11-29 | Stop reason: HOSPADM

## 2023-11-22 RX ORDER — ACETAMINOPHEN 650 MG/1
650 SUPPOSITORY RECTAL EVERY 6 HOURS PRN
Status: DISCONTINUED | OUTPATIENT
Start: 2023-11-22 | End: 2023-11-24

## 2023-11-22 RX ORDER — SODIUM CHLORIDE, SODIUM LACTATE, POTASSIUM CHLORIDE, CALCIUM CHLORIDE 600; 310; 30; 20 MG/100ML; MG/100ML; MG/100ML; MG/100ML
INJECTION, SOLUTION INTRAVENOUS CONTINUOUS
Status: DISCONTINUED | OUTPATIENT
Start: 2023-11-22 | End: 2023-11-29 | Stop reason: HOSPADM

## 2023-11-22 RX ORDER — SODIUM CHLORIDE 9 MG/ML
INJECTION, SOLUTION INTRAVENOUS CONTINUOUS PRN
Status: DISCONTINUED | OUTPATIENT
Start: 2023-11-22 | End: 2023-11-22 | Stop reason: SDUPTHER

## 2023-11-22 RX ORDER — MAGNESIUM SULFATE IN WATER 40 MG/ML
2000 INJECTION, SOLUTION INTRAVENOUS PRN
Status: DISCONTINUED | OUTPATIENT
Start: 2023-11-22 | End: 2023-11-29 | Stop reason: HOSPADM

## 2023-11-22 RX ORDER — PROPOFOL 10 MG/ML
INJECTION, EMULSION INTRAVENOUS PRN
Status: DISCONTINUED | OUTPATIENT
Start: 2023-11-22 | End: 2023-11-22 | Stop reason: SDUPTHER

## 2023-11-22 RX ORDER — POTASSIUM CHLORIDE 7.45 MG/ML
10 INJECTION INTRAVENOUS PRN
Status: DISCONTINUED | OUTPATIENT
Start: 2023-11-22 | End: 2023-11-29 | Stop reason: HOSPADM

## 2023-11-22 RX ORDER — SODIUM CHLORIDE 9 MG/ML
INJECTION, SOLUTION INTRAVENOUS PRN
Status: DISCONTINUED | OUTPATIENT
Start: 2023-11-22 | End: 2023-11-29 | Stop reason: HOSPADM

## 2023-11-22 RX ORDER — ONDANSETRON 2 MG/ML
4 INJECTION INTRAMUSCULAR; INTRAVENOUS EVERY 6 HOURS PRN
Status: DISCONTINUED | OUTPATIENT
Start: 2023-11-22 | End: 2023-11-29 | Stop reason: HOSPADM

## 2023-11-22 RX ORDER — SODIUM CHLORIDE 9 MG/ML
25 INJECTION, SOLUTION INTRAVENOUS PRN
Status: DISCONTINUED | OUTPATIENT
Start: 2023-11-22 | End: 2023-11-29 | Stop reason: HOSPADM

## 2023-11-22 RX ORDER — ENOXAPARIN SODIUM 100 MG/ML
40 INJECTION SUBCUTANEOUS DAILY
Status: DISCONTINUED | OUTPATIENT
Start: 2023-11-23 | End: 2023-11-29 | Stop reason: HOSPADM

## 2023-11-22 RX ORDER — POLYETHYLENE GLYCOL 3350 17 G/17G
17 POWDER, FOR SOLUTION ORAL DAILY PRN
Status: DISCONTINUED | OUTPATIENT
Start: 2023-11-22 | End: 2023-11-29 | Stop reason: HOSPADM

## 2023-11-22 RX ORDER — ACETAMINOPHEN 325 MG/1
650 TABLET ORAL EVERY 6 HOURS PRN
Status: DISCONTINUED | OUTPATIENT
Start: 2023-11-22 | End: 2023-11-29 | Stop reason: HOSPADM

## 2023-11-22 RX ORDER — POTASSIUM CHLORIDE 20 MEQ/1
40 TABLET, EXTENDED RELEASE ORAL PRN
Status: DISCONTINUED | OUTPATIENT
Start: 2023-11-22 | End: 2023-11-29 | Stop reason: HOSPADM

## 2023-11-22 RX ORDER — SODIUM CHLORIDE 0.9 % (FLUSH) 0.9 %
5-40 SYRINGE (ML) INJECTION EVERY 12 HOURS SCHEDULED
Status: DISCONTINUED | OUTPATIENT
Start: 2023-11-23 | End: 2023-11-29 | Stop reason: HOSPADM

## 2023-11-22 RX ADMIN — PROPOFOL 100 MG: 10 INJECTION, EMULSION INTRAVENOUS at 19:34

## 2023-11-22 RX ADMIN — METRONIDAZOLE 500 MG: 500 INJECTION, SOLUTION INTRAVENOUS at 23:06

## 2023-11-22 RX ADMIN — SODIUM CHLORIDE: 9 INJECTION, SOLUTION INTRAVENOUS at 19:15

## 2023-11-22 RX ADMIN — PROPOFOL 25 MG: 10 INJECTION, EMULSION INTRAVENOUS at 19:49

## 2023-11-22 RX ADMIN — PROPOFOL 25 MG: 10 INJECTION, EMULSION INTRAVENOUS at 19:40

## 2023-11-22 RX ADMIN — IOPAMIDOL 75 ML: 755 INJECTION, SOLUTION INTRAVENOUS at 15:24

## 2023-11-22 RX ADMIN — SODIUM CHLORIDE, POTASSIUM CHLORIDE, SODIUM LACTATE AND CALCIUM CHLORIDE: 600; 310; 30; 20 INJECTION, SOLUTION INTRAVENOUS at 22:47

## 2023-11-22 RX ADMIN — Medication 25 MG: at 19:49

## 2023-11-22 RX ADMIN — Medication 25 MG: at 19:38

## 2023-11-22 ASSESSMENT — ENCOUNTER SYMPTOMS
VOMITING: 0
ABDOMINAL PAIN: 0
RHINORRHEA: 0
COUGH: 0
DIARRHEA: 1
CHEST TIGHTNESS: 0
SHORTNESS OF BREATH: 0
ABDOMINAL DISTENTION: 1
EYE ITCHING: 0
APNEA: 0
CONSTIPATION: 1
BACK PAIN: 0
EYE DISCHARGE: 0
WHEEZING: 0
COLOR CHANGE: 0
DIARRHEA: 0
SHORTNESS OF BREATH: 0
NAUSEA: 0

## 2023-11-22 ASSESSMENT — PAIN SCALES - GENERAL: PAINLEVEL_OUTOF10: 0

## 2023-11-22 NOTE — ACP (ADVANCE CARE PLANNING)
Advanced Care Planning Note. Purpose of Encounter: Advanced care planning in light of hospitalization  Parties In Attendance: Patient,    Decisional Capacity: Yes  Subjective: Patient  understand that this conversation is to address long term care goal  Objective: Patient admitted to the hospital with colonic distention  Goals of Care Determination: Patient would not pursue CPR and Intubation if required.   Consider surgery if required  Code Status: DNR CCA DNI  Time spent on Advanced care Plannin minutes  Advanced Care Planning Documents: documented patient's wishes, would like Francheska Stephenson Friday to make medical decisions if unable to make decisions    Tucker Hawthorne MD  2023 6:34 PM

## 2023-11-22 NOTE — ED NOTES
Removed old catheter and inserted new one     Obdulia Lizama RN  11/22/23 0419
Assessment    Vitals/MEWS:        Vitals:    11/22/23 1315 11/22/23 1600 11/22/23 1745 11/22/23 1815   BP: 119/81  113/84    Pulse: 88  72 75   Resp:   11 (!) 9   SpO2: 100% 97% 98% 98%     FiO2 (%): -  O2 Flow Rate:      Cardiac Rhythm:    Pain Assessment: - [] Verbal [] Vi Prier Scale  Pain Scale:    Last documented pain score (0-10 scale)    Last documented pain medication administered: -  Mental Status: oriented  Orientation Level:    NIH Score:    C-SSRS:    Bedside swallow:    Estephania Coma Scale (GCS): Active LDA's:   Peripheral IV 11/22/23 Right; Anterior Antecubital (Active)     PO Status: Nothing by Mouth  Pertinent or High Risk Medications/Drips: no   If Yes, please provide details: None  Pending Blood Product Administration: no       You may also review the ED PT Care Timeline found under the Summary Nursing Index tab. Recommendation    Pending orders all ed orders completed  Plan for Discharge (if known):    Additional Comments: is parapalegic, denies pain in abdomen due to lack of feeling      If any further questions, please call Sending RN at [phone]    Electronically signed by: Electronically signed by Jeana Ross RN on 11/22/2023 at 6:23 PM       Ysabel Boyd  11/22/23 6303

## 2023-11-22 NOTE — H&P
HOSPITALISTS HISTORY AND PHYSICAL    11/22/2023 6:32 PM    Patient Information:  Ashvin Andrade is a 79 y.o. female [id number]  PCP:  Hugh Miranda DO (Tel: [phone] )    Chief complaint:    Chief Complaint   Patient presents with    Bloated     Pt via EMS from [address] [place] [address], being treated for diarrhea and C diff, pt hasn't had BM in days, very distended, denies pain or nausea       History of Present Illness:  Ashvin Andrade is a 79 y.o. female who came to the hospital from [address] [place] [address] for abdominal distention and constipation. Patient was having incontinence and was started on Imodium prior to this. Patient denies any abdominal pain fever chills nausea vomiting chest pain or shortness of breath. REVIEW OF SYSTEMS:   Constitutional: Negative for fever,chills or night sweats  ENT: Negative for rhinorrhea, epistaxis, hoarseness, sore throat. Respiratory: Negative for shortness of breath,wheezing  Cardiovascular: Negative for chest pain, palpitations   Genitourinary: Negative for polyuria, dysuria   Hematologic/Lymphatic: Negative for bleeding tendency, easy bruising  Musculoskeletal: Negative for myalgias and arthralgias  Neurologic: Negative for confusion,dysarthria. Skin: Negative for itching,rash  Psychiatric: Negative for depression,anxiety, agitation. Endocrine: Negative for polydipsia,polyuria,heat /cold intolerance. Past Medical History:   has a past medical history of Arthritis, At high risk for falls, Constipation, Environmental allergies, Fall at home, GERD (gastroesophageal reflux disease), History of anemia, Hyperlipidemia, Neuropathy of leg, OAB (overactive bladder), and UTI (lower urinary tract infection). Past Surgical History:   has a past surgical history that includes Total knee arthroplasty (03/23/2010); Pilonidal cyst excision; Tubal ligation; sinus surgery;  Hysterectomy,

## 2023-11-22 NOTE — ED PROVIDER NOTES
EMERGENCY MEDICINE ATTENDING NOTE  Corrie Bray. Syd Cantu., GIBRAN SULLIVAN, FAAEM        I have independently seen/performed a substantive portion of the visit (history, physical, and MDM) on Terell Schofield. All diagnostic, treatment, and disposition decisions were made by myself in conjunction with the advanced practice provider. I have participated in the medical decision making and directed the treatment plan and disposition of the patient. For further details of Aristides Jha emergency department encounter, please see the advanced practice provider's documentation. Ester Cantu.DO Arlis Comfort, brianda the primary physician provider of record. CHIEF COMPLAINT  Chief Complaint   Patient presents with    Bloated     Pt via EMS from [address] [place] [address], being treated for diarrhea and C diff, pt hasn't had BM in days, very distended, denies pain or nausea       BRIEF HISTORY  Terell Schofield is a 79 y.o. female  who presents to the ED for evaluation of abdominal distention. Patient was recently having issues with diarrhea. Was started on Imodium for. The meantime came back positive for C. difficile. Patient does not have melena for about 2 days is noted significant distention of her abdomen. Has a history of ileus but is never been this bad. BRIEF/FOCUSED PHYSICAL EXAMINATION  VITAL SIGNS:    ED Triage Vitals [11/22/23 1315]   Enc Vitals Group      /81      Pulse 88      Resp       Temp       Temp src       SpO2 100 %      Weight       Height       Head Circumference       Peak Flow       Pain Score       Pain Loc       Pain Edu? Excl. in [address]? Physical Exam  Vitals and nursing note reviewed. Constitutional:       General: She is not in acute distress. Appearance: She is not ill-appearing or toxic-appearing. Cardiovascular:      Rate and Rhythm: Normal rate and regular rhythm. Pulmonary:      Effort: Pulmonary effort is normal. No respiratory distress.
motion and neck supple. Skin:     General: Skin is warm and dry. Neurological:      Mental Status: She is alert and oriented to person, place, and time. Psychiatric:         Behavior: Behavior normal.           DIAGNOSTIC RESULTS   LABS:    Labs Reviewed   CBC WITH AUTO DIFFERENTIAL - Abnormal; Notable for the following components:       Result Value    MCV 78.5 (*)     MCH 25.1 (*)     RDW 17.3 (*)     Neutrophils Absolute 8.8 (*)     Lymphocytes Absolute 0.8 (*)     All other components within normal limits   COMPREHENSIVE METABOLIC PANEL - Abnormal; Notable for the following components:    Sodium 132 (*)     Potassium 3.4 (*)     Chloride 97 (*)     Glucose 121 (*)     BUN 49 (*)     Est, Glom Filt Rate 50 (*)     Albumin/Globulin Ratio 0.9 (*)     All other components within normal limits   CULTURE, BLOOD 1   CULTURE, BLOOD 2   LIPASE   LACTATE, SEPSIS   URINALYSIS WITH REFLEX TO CULTURE   LACTATE, SEPSIS       When ordered only abnormal lab results are displayed. All other labs were within normal range or not returned as of this dictation. EKG: When ordered, EKG's are interpreted by the Emergency Department Physician in the absence of a cardiologist.  Please see their note for interpretation of EKG. RADIOLOGY:   Non-plain film images such as CT, Ultrasound and MRI are read by the radiologist. Plain radiographic images are visualized and preliminarily interpreted by the ED Provider with the below findings:    Pending     Interpretation per the Radiologist below, if available at the time of this note:    CT ABDOMEN PELVIS W IV CONTRAST Additional Contrast? None    (Results Pending)     No results found. No results found.     PROCEDURES   Unless otherwise noted below, none     Procedures    CRITICAL CARE TIME (.cctime)       PAST MEDICAL HISTORY      has a past medical history of Arthritis, At high risk for falls, Constipation, Environmental allergies, Fall at home (01/25/2015), GERD

## 2023-11-23 PROBLEM — K63.0 ABSCESS OF SIGMOID COLON: Status: ACTIVE | Noted: 2023-11-23

## 2023-11-23 PROBLEM — K59.00 CONSTIPATION: Status: ACTIVE | Noted: 2023-11-23

## 2023-11-23 PROBLEM — K59.81 OGILVIE SYNDROME: Status: ACTIVE | Noted: 2023-11-23

## 2023-11-23 PROBLEM — K56.609 COLONIC OBSTRUCTION (HCC): Status: ACTIVE | Noted: 2023-11-23

## 2023-11-23 LAB
ANION GAP SERPL CALCULATED.3IONS-SCNC: 16 MMOL/L (ref 3–16)
BASOPHILS # BLD: 0.1 K/UL (ref 0–0.2)
BASOPHILS NFR BLD: 0.8 %
BUN SERPL-MCNC: 44 MG/DL (ref 7–20)
CALCIUM SERPL-MCNC: 9.6 MG/DL (ref 8.3–10.6)
CHLORIDE SERPL-SCNC: 100 MMOL/L (ref 99–110)
CO2 SERPL-SCNC: 22 MMOL/L (ref 21–32)
CREAT SERPL-MCNC: 0.9 MG/DL (ref 0.6–1.2)
CRYPTOSP AG STL QL IA: NORMAL
DEPRECATED RDW RBC AUTO: 17.2 % (ref 12.4–15.4)
E HISTOLYT AG STL QL IA: NORMAL
EOSINOPHIL # BLD: 0.1 K/UL (ref 0–0.6)
EOSINOPHIL NFR BLD: 0.9 %
G LAMBLIA AG STL QL IA: NORMAL
GFR SERPLBLD CREATININE-BSD FMLA CKD-EPI: >60 ML/MIN/{1.73_M2}
GLUCOSE SERPL-MCNC: 87 MG/DL (ref 70–99)
HCT VFR BLD AUTO: 36.1 % (ref 36–48)
HGB BLD-MCNC: 11.5 G/DL (ref 12–16)
LYMPHOCYTES # BLD: 0.8 K/UL (ref 1–5.1)
LYMPHOCYTES NFR BLD: 7.3 %
MAGNESIUM SERPL-MCNC: 2.1 MG/DL (ref 1.8–2.4)
MCH RBC QN AUTO: 25.2 PG (ref 26–34)
MCHC RBC AUTO-ENTMCNC: 31.9 G/DL (ref 31–36)
MCV RBC AUTO: 78.8 FL (ref 80–100)
MONOCYTES # BLD: 1 K/UL (ref 0–1.3)
MONOCYTES NFR BLD: 8.9 %
NEUTROPHILS # BLD: 8.8 K/UL (ref 1.7–7.7)
NEUTROPHILS NFR BLD: 82.1 %
PLATELET # BLD AUTO: 293 K/UL (ref 135–450)
PMV BLD AUTO: 9.2 FL (ref 5–10.5)
POTASSIUM SERPL-SCNC: 3.1 MMOL/L (ref 3.5–5.1)
POTASSIUM SERPL-SCNC: 3.4 MMOL/L (ref 3.5–5.1)
RBC # BLD AUTO: 4.58 M/UL (ref 4–5.2)
SODIUM SERPL-SCNC: 138 MMOL/L (ref 136–145)
WBC # BLD AUTO: 10.7 K/UL (ref 4–11)

## 2023-11-23 PROCEDURE — 2500000003 HC RX 250 WO HCPCS: Performed by: INTERNAL MEDICINE

## 2023-11-23 PROCEDURE — 2060000000 HC ICU INTERMEDIATE R&B

## 2023-11-23 PROCEDURE — 6360000002 HC RX W HCPCS: Performed by: INTERNAL MEDICINE

## 2023-11-23 PROCEDURE — 83735 ASSAY OF MAGNESIUM: CPT

## 2023-11-23 PROCEDURE — 02HV33Z INSERTION OF INFUSION DEVICE INTO SUPERIOR VENA CAVA, PERCUTANEOUS APPROACH: ICD-10-PCS | Performed by: INTERNAL MEDICINE

## 2023-11-23 PROCEDURE — 2580000003 HC RX 258: Performed by: INTERNAL MEDICINE

## 2023-11-23 PROCEDURE — 99232 SBSQ HOSP IP/OBS MODERATE 35: CPT | Performed by: SURGERY

## 2023-11-23 PROCEDURE — 36569 INSJ PICC 5 YR+ W/O IMAGING: CPT

## 2023-11-23 PROCEDURE — 80048 BASIC METABOLIC PNL TOTAL CA: CPT

## 2023-11-23 PROCEDURE — 2580000003 HC RX 258: Performed by: NURSE PRACTITIONER

## 2023-11-23 PROCEDURE — 84132 ASSAY OF SERUM POTASSIUM: CPT

## 2023-11-23 PROCEDURE — C1751 CATH, INF, PER/CENT/MIDLINE: HCPCS

## 2023-11-23 PROCEDURE — 36415 COLL VENOUS BLD VENIPUNCTURE: CPT

## 2023-11-23 PROCEDURE — 85025 COMPLETE CBC W/AUTO DIFF WBC: CPT

## 2023-11-23 RX ORDER — SODIUM CHLORIDE 9 MG/ML
25 INJECTION, SOLUTION INTRAVENOUS PRN
Status: DISCONTINUED | OUTPATIENT
Start: 2023-11-23 | End: 2023-11-29 | Stop reason: HOSPADM

## 2023-11-23 RX ORDER — SODIUM CHLORIDE 0.9 % (FLUSH) 0.9 %
5-40 SYRINGE (ML) INJECTION PRN
Status: DISCONTINUED | OUTPATIENT
Start: 2023-11-23 | End: 2023-11-29 | Stop reason: HOSPADM

## 2023-11-23 RX ORDER — LIDOCAINE HYDROCHLORIDE 10 MG/ML
5 INJECTION, SOLUTION EPIDURAL; INFILTRATION; INTRACAUDAL; PERINEURAL ONCE
Status: COMPLETED | OUTPATIENT
Start: 2023-11-23 | End: 2023-11-23

## 2023-11-23 RX ORDER — SODIUM CHLORIDE 0.9 % (FLUSH) 0.9 %
5-40 SYRINGE (ML) INJECTION EVERY 12 HOURS SCHEDULED
Status: DISCONTINUED | OUTPATIENT
Start: 2023-11-23 | End: 2023-11-29 | Stop reason: HOSPADM

## 2023-11-23 RX ADMIN — POTASSIUM CHLORIDE 10 MEQ: 7.46 INJECTION, SOLUTION INTRAVENOUS at 04:40

## 2023-11-23 RX ADMIN — Medication 10 ML: at 13:56

## 2023-11-23 RX ADMIN — CEFTRIAXONE SODIUM 1000 MG: 1 INJECTION, POWDER, FOR SOLUTION INTRAMUSCULAR; INTRAVENOUS at 01:41

## 2023-11-23 RX ADMIN — Medication 10 ML: at 21:06

## 2023-11-23 RX ADMIN — POTASSIUM CHLORIDE 10 MEQ: 7.46 INJECTION, SOLUTION INTRAVENOUS at 14:14

## 2023-11-23 RX ADMIN — LIDOCAINE HYDROCHLORIDE 5 ML: 10 INJECTION, SOLUTION EPIDURAL; INFILTRATION; INTRACAUDAL; PERINEURAL at 13:55

## 2023-11-23 RX ADMIN — SODIUM CHLORIDE 25 ML: 9 INJECTION, SOLUTION INTRAVENOUS at 01:38

## 2023-11-23 RX ADMIN — POTASSIUM CHLORIDE 10 MEQ: 7.46 INJECTION, SOLUTION INTRAVENOUS at 16:12

## 2023-11-23 RX ADMIN — METRONIDAZOLE 500 MG: 500 INJECTION, SOLUTION INTRAVENOUS at 07:25

## 2023-11-23 RX ADMIN — Medication 10 ML: at 21:04

## 2023-11-23 RX ADMIN — POTASSIUM CHLORIDE 10 MEQ: 7.46 INJECTION, SOLUTION INTRAVENOUS at 06:35

## 2023-11-23 RX ADMIN — POTASSIUM CHLORIDE 10 MEQ: 7.46 INJECTION, SOLUTION INTRAVENOUS at 17:12

## 2023-11-23 RX ADMIN — ENOXAPARIN SODIUM 40 MG: 100 INJECTION SUBCUTANEOUS at 08:31

## 2023-11-23 RX ADMIN — POTASSIUM CHLORIDE 10 MEQ: 7.46 INJECTION, SOLUTION INTRAVENOUS at 15:14

## 2023-11-23 RX ADMIN — METRONIDAZOLE 500 MG: 500 INJECTION, SOLUTION INTRAVENOUS at 14:49

## 2023-11-23 RX ADMIN — METRONIDAZOLE 500 MG: 500 INJECTION, SOLUTION INTRAVENOUS at 23:14

## 2023-11-23 RX ADMIN — POTASSIUM CHLORIDE 10 MEQ: 7.46 INJECTION, SOLUTION INTRAVENOUS at 02:58

## 2023-11-23 ASSESSMENT — PAIN SCALES - GENERAL: PAINLEVEL_OUTOF10: 0

## 2023-11-23 NOTE — PLAN OF CARE
Pt admitted from PACU to room 3373. Pt is alert and oriented x3, being disoriented to place. Daughter at bedside during admission. Pt and pt daughter oriented to room and call light. Pt cleaned up and repositioned in bed with pillow support. Shift assessment complete. VSS. Pt has a stage IV pressure ulcer on her coccyx. Wet to dry dressing in place. Rectal tube in place. Antunez catheter in place. Pt has redness and excoriation in her groin area. Inner dry cut and laying at bedside for oncoming RN. Pt has bilateral boots on her heels. Pt has one PIV in her right AC that is positional. LR and Flagyl running through this IV as it is compatible per pharmacy. This RN attempted 2x to get PIV without success. Pt has poor venous access. Veins are small and roll. Other veins have been blown prior to pt being on 3T. This RN called [facility] for multiple PIV request. Provider notified. Pt daughter Laura Dickson at bedside stating the patient is a DNR-CCA and has advanced directive paperwork at home to confirm. This RN asked Laura Dickson to bring in paperwork when able. Pt verbalized that she was NOT DNR-CCA and that she IS a full code. Provider notified. Pt has order in for full code at this time. [facility] called and asked for update from this RN. This RN gave update to [employer] [facility] list faxed to 3T. Call light within reach. Bed alarm engaged. Pt instructed to call for assistance. Pt verbalized understanding. Report given to Cindy Roberts; all questions and concerns addressed during bedside report.

## 2023-11-23 NOTE — ANESTHESIA PRE PROCEDURE
Department of Anesthesiology  Preprocedure Note       Name:  Gisela Hudson   Age:  79 y.o.  :  1940                                          MRN:  [id number]         Date:  2023      Surgeon: Claribel Acosta):  Wiley Garcia MD    Procedure: COLONOSCOPY FLEXIBLE W/ DECOMPRESSION (Right)    Medications prior to admission:   Prior to Admission medications    Medication Sig Start Date End Date Taking? Authorizing Provider   amiodarone (CORDARONE) 200 MG tablet Take 1 tablet by mouth 2 times daily for 25 doses 23  Radha Ochoa MD   amiodarone (CORDARONE) 200 MG tablet Take 1 tablet by mouth daily 23   Radha Ochoa MD   dilTIAZem (CARDIZEM CD) 180 MG extended release capsule Take 1 capsule by mouth daily 23   Radha Ochoa MD   furosemide (LASIX) 20 MG tablet Take 1 tablet by mouth daily    Josse Rojas MD   Lactobacillus (ACIDOPHILUS/PECTIN) 100 MG CAPS Take 1 capsule by mouth daily    Josse Rojas MD   midodrine (PROAMATINE) 5 MG tablet Take 1 tablet by mouth 2 times daily as needed (SBP<100) 23  Gerald Richardson MD   ciprofloxacin (CIPRO) 500 MG tablet Take 1 tablet by mouth 2 times daily Indications: prophylactictic/indefinite as oral suppression r/t pseudomonas to spinal tissues 23   Gerald Richardson MD   atorvastatin (LIPITOR) 10 MG tablet Take 1 tablet by mouth at bedtime    Josse Rojas MD   baclofen (LIORESAL) 10 MG tablet Take 1 tablet by mouth 3 times daily    Josse Rojas MD   gabapentin (NEURONTIN) 600 MG tablet Take 1 tablet by mouth 3 times daily.     Josse Rojas MD   bisacodyl (DULCOLAX) 10 MG suppository Place 1 suppository rectally daily 23   Josse Rojas MD   ferrous sulfate (FE TABS 325) 325 (65 Fe) MG EC tablet Take 1 tablet by mouth with breakfast and with evening meal    Josse Rojas MD   miconazole (MICOTIN) 2 % powder Apply 1 application topically 2 times daily 23

## 2023-11-23 NOTE — ANESTHESIA POSTPROCEDURE EVALUATION
Department of Anesthesiology  Postprocedure Note    Patient: Jesi Nance  MRN: [id number]  YOB: 1940  Date of evaluation: 11/22/2023      Procedure Summary     Date: 11/22/23 Room / Location: [address] [place] [address]    Anesthesia Start: 1924 Anesthesia Stop: 2102    Procedures:       COLONOSCOPY FLEXIBLE W/ DECOMPRESSION (Right)      COLONOSCOPY SUBMUCOSAL INJECTION Diagnosis:       Colonic obstruction ([address])      (Colonic obstruction ([address]) [K56.609])    Surgeons: Nidia Hutchinson MD Responsible Provider: Cordelia Menjivar MD    Anesthesia Type: MAC ASA Status: 3          Anesthesia Type: No value filed.     Ole Phase I: Ole Score: 10    Ole Phase II:        Anesthesia Post Evaluation    Patient location during evaluation: PACU  Patient participation: complete - patient participated  Level of consciousness: awake and alert  Airway patency: patent  Nausea & Vomiting: no nausea and no vomiting  Complications: no  Cardiovascular status: blood pressure returned to baseline  Respiratory status: acceptable  Hydration status: euvolemic  Multimodal analgesia pain management approach  Pain management: adequate

## 2023-11-23 NOTE — PROCEDURES
Colonsocopy Procedure Note     Patient: Damion Santiago MRN: [id number]   YOB: 1940 Age: 79 y.o. Sex: female   Unit: [facility] ENDOSCOPY Room/Bed: [facility] ENDO/NONE Location: [address] [place] [address]    Admitting Physician: Bernarda Dow     Primary Care Physician: Wilda Kumar DO      DATE OF PROCEDURE: 11/22/2023  PROCEDURE: Colonoscopy    PREOPERATIVE DIAGNOSIS: Colonic obstruction ([address]) [K56.609]  HPI: This is a 79y.o. 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 ENDOSCOPIST: Katy Stephenson MD    POSTOPERATIVE DIAGNOSIS:    -Incomplete colonoscopy to markedly dilated distal transverse colon, with decompression performed  -Colonic decompression tube placed to about 45 cm from the anal verge, which should be in the sigmoid/descending colon.  -Severely tortuous sigmoid with abscess cavity expressing purulence at about 20 cm, tattoo ink placed across and immediately distal.      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 The radiologist I spoke to however states that he cannot say that without p.o. contrast.  However, there is certainly no evidence of perforation on the CT scan before the procedure or any free air on the KUB after the procedure. Furthermore, she is substantially more decompressed at this time    PLAN:   -will start ceftriaxone and Flagyl IV  -blood cultures  -stool culture, c diff  -We will review these findings with general surgery in the morning. We may need to consider some additional imaging with p.o. or MN contrast.      INFORMED CONSENT:  Informed consent for colonoscopy was obtained. The benefits and risks including adverse medicine reaction and perforation have been explained.   The patient's

## 2023-11-23 NOTE — PLAN OF CARE
Problem: Discharge Planning  Goal: Discharge to home or other facility with appropriate resources  11/23/2023 0612 by Mora Sampson RN  Outcome: Progressing  Flowsheets (Taken 11/23/2023 0040)  Discharge to home or other facility with appropriate resources: Identify barriers to discharge with patient and caregiver  11/23/2023 0011 by Ulysses Jimenez RN  Outcome: Progressing  Flowsheets (Taken 11/22/2023 2149)  Discharge to home or other facility with appropriate resources:   Identify barriers to discharge with patient and caregiver   Arrange for needed discharge resources and transportation as appropriate   Identify discharge learning needs (meds, wound care, etc)   Arrange for interpreters to assist at discharge as needed   Refer to discharge planning if patient needs post-hospital services based on physician order or complex needs related to functional status, cognitive ability or social support system     Problem: Pain  Goal: Verbalizes/displays adequate comfort level or baseline comfort level  11/23/2023 0612 by Mora Sampson RN  Outcome: Progressing  11/23/2023 0011 by Ulysses Jimenez RN  Outcome: Progressing     Problem: Safety - Adult  Goal: Free from fall injury  11/23/2023 0612 by Mora Sampson RN  Outcome: Progressing  11/23/2023 0011 by Ulysses Jimenez RN  Outcome: Progressing     Problem: Skin/Tissue Integrity  Goal: Absence of new skin breakdown  Description: 1. Monitor for areas of redness and/or skin breakdown  2. Assess vascular access sites hourly  3. Every 4-6 hours minimum:  Change oxygen saturation probe site  4. Every 4-6 hours:  If on nasal continuous positive airway pressure, respiratory therapy assess nares and determine need for appliance change or resting period.   11/23/2023 0612 by Mora Sampson RN  Outcome: Progressing  11/23/2023 0011 by Ulysses Jimenez RN  Outcome: Progressing

## 2023-11-24 ENCOUNTER — ANESTHESIA EVENT (OUTPATIENT)
Dept: OPERATING ROOM | Age: 83
End: 2023-11-24
Payer: COMMERCIAL

## 2023-11-24 ENCOUNTER — ANESTHESIA (OUTPATIENT)
Dept: OPERATING ROOM | Age: 83
End: 2023-11-24
Payer: COMMERCIAL

## 2023-11-24 ENCOUNTER — APPOINTMENT (OUTPATIENT)
Dept: MRI IMAGING | Age: 83
DRG: 329 | End: 2023-11-24
Payer: COMMERCIAL

## 2023-11-24 LAB
ANION GAP SERPL CALCULATED.3IONS-SCNC: 10 MMOL/L (ref 3–16)
BASOPHILS # BLD: 0.1 K/UL (ref 0–0.2)
BASOPHILS NFR BLD: 0.8 %
BUN SERPL-MCNC: 25 MG/DL (ref 7–20)
CALCIUM SERPL-MCNC: 8.9 MG/DL (ref 8.3–10.6)
CHLORIDE SERPL-SCNC: 112 MMOL/L (ref 99–110)
CO2 SERPL-SCNC: 22 MMOL/L (ref 21–32)
CREAT SERPL-MCNC: 0.6 MG/DL (ref 0.6–1.2)
DEPRECATED RDW RBC AUTO: 17.4 % (ref 12.4–15.4)
EOSINOPHIL # BLD: 0.1 K/UL (ref 0–0.6)
EOSINOPHIL NFR BLD: 1 %
GFR SERPLBLD CREATININE-BSD FMLA CKD-EPI: >60 ML/MIN/{1.73_M2}
GLUCOSE SERPL-MCNC: 56 MG/DL (ref 70–99)
HCT VFR BLD AUTO: 29.8 % (ref 36–48)
HGB BLD-MCNC: 9.5 G/DL (ref 12–16)
LYMPHOCYTES # BLD: 0.8 K/UL (ref 1–5.1)
LYMPHOCYTES NFR BLD: 8.8 %
MAGNESIUM SERPL-MCNC: 2 MG/DL (ref 1.8–2.4)
MCH RBC QN AUTO: 25.1 PG (ref 26–34)
MCHC RBC AUTO-ENTMCNC: 31.9 G/DL (ref 31–36)
MCV RBC AUTO: 78.6 FL (ref 80–100)
MONOCYTES # BLD: 0.9 K/UL (ref 0–1.3)
MONOCYTES NFR BLD: 10 %
NEUTROPHILS # BLD: 7.1 K/UL (ref 1.7–7.7)
NEUTROPHILS NFR BLD: 79.4 %
PERFORMED ON: ABNORMAL
PLATELET # BLD AUTO: 263 K/UL (ref 135–450)
PMV BLD AUTO: 8.7 FL (ref 5–10.5)
POC SAMPLE TYPE: ABNORMAL
POTASSIUM BLD-SCNC: 3.1 MMOL/L (ref 3.5–5.1)
POTASSIUM SERPL-SCNC: 2.5 MMOL/L (ref 3.5–5.1)
RBC # BLD AUTO: 3.79 M/UL (ref 4–5.2)
SODIUM SERPL-SCNC: 144 MMOL/L (ref 136–145)
WBC # BLD AUTO: 9 K/UL (ref 4–11)

## 2023-11-24 PROCEDURE — 7100000000 HC PACU RECOVERY - FIRST 15 MIN: Performed by: SURGERY

## 2023-11-24 PROCEDURE — 6360000002 HC RX W HCPCS: Performed by: INTERNAL MEDICINE

## 2023-11-24 PROCEDURE — 2580000003 HC RX 258: Performed by: SURGERY

## 2023-11-24 PROCEDURE — A9577 INJ MULTIHANCE: HCPCS | Performed by: INTERNAL MEDICINE

## 2023-11-24 PROCEDURE — 83735 ASSAY OF MAGNESIUM: CPT

## 2023-11-24 PROCEDURE — 2500000003 HC RX 250 WO HCPCS: Performed by: NURSE ANESTHETIST, CERTIFIED REGISTERED

## 2023-11-24 PROCEDURE — 3600000014 HC SURGERY LEVEL 4 ADDTL 15MIN: Performed by: SURGERY

## 2023-11-24 PROCEDURE — 2580000003 HC RX 258: Performed by: INTERNAL MEDICINE

## 2023-11-24 PROCEDURE — 84132 ASSAY OF SERUM POTASSIUM: CPT

## 2023-11-24 PROCEDURE — C9290 INJ, BUPIVACAINE LIPOSOME: HCPCS | Performed by: SURGERY

## 2023-11-24 PROCEDURE — P9045 ALBUMIN (HUMAN), 5%, 250 ML: HCPCS | Performed by: NURSE ANESTHETIST, CERTIFIED REGISTERED

## 2023-11-24 PROCEDURE — 6360000002 HC RX W HCPCS: Performed by: SURGERY

## 2023-11-24 PROCEDURE — 6360000004 HC RX CONTRAST MEDICATION: Performed by: INTERNAL MEDICINE

## 2023-11-24 PROCEDURE — 88307 TISSUE EXAM BY PATHOLOGIST: CPT

## 2023-11-24 PROCEDURE — 6360000002 HC RX W HCPCS: Performed by: NURSE ANESTHETIST, CERTIFIED REGISTERED

## 2023-11-24 PROCEDURE — 80048 BASIC METABOLIC PNL TOTAL CA: CPT

## 2023-11-24 PROCEDURE — 85025 COMPLETE CBC W/AUTO DIFF WBC: CPT

## 2023-11-24 PROCEDURE — 6370000000 HC RX 637 (ALT 250 FOR IP): Performed by: SURGERY

## 2023-11-24 PROCEDURE — 7100000001 HC PACU RECOVERY - ADDTL 15 MIN: Performed by: SURGERY

## 2023-11-24 PROCEDURE — 2709999900 HC NON-CHARGEABLE SUPPLY: Performed by: SURGERY

## 2023-11-24 PROCEDURE — 6360000002 HC RX W HCPCS: Performed by: ANESTHESIOLOGY

## 2023-11-24 PROCEDURE — 3600000004 HC SURGERY LEVEL 4 BASE: Performed by: SURGERY

## 2023-11-24 PROCEDURE — 6370000000 HC RX 637 (ALT 250 FOR IP): Performed by: NURSE ANESTHETIST, CERTIFIED REGISTERED

## 2023-11-24 PROCEDURE — 44143 PARTIAL REMOVAL OF COLON: CPT | Performed by: SURGERY

## 2023-11-24 PROCEDURE — 1200000000 HC SEMI PRIVATE

## 2023-11-24 PROCEDURE — 74183 MRI ABD W/O CNTR FLWD CNTR: CPT

## 2023-11-24 PROCEDURE — 3700000001 HC ADD 15 MINUTES (ANESTHESIA): Performed by: SURGERY

## 2023-11-24 PROCEDURE — 2580000003 HC RX 258: Performed by: NURSE PRACTITIONER

## 2023-11-24 PROCEDURE — 2720000010 HC SURG SUPPLY STERILE: Performed by: SURGERY

## 2023-11-24 PROCEDURE — 3700000000 HC ANESTHESIA ATTENDED CARE: Performed by: SURGERY

## 2023-11-24 RX ORDER — HYDROMORPHONE HYDROCHLORIDE 2 MG/ML
0.5 INJECTION, SOLUTION INTRAMUSCULAR; INTRAVENOUS; SUBCUTANEOUS EVERY 5 MIN PRN
Status: DISCONTINUED | OUTPATIENT
Start: 2023-11-24 | End: 2023-11-24 | Stop reason: HOSPADM

## 2023-11-24 RX ORDER — PROPOFOL 10 MG/ML
INJECTION, EMULSION INTRAVENOUS PRN
Status: DISCONTINUED | OUTPATIENT
Start: 2023-11-24 | End: 2023-11-24 | Stop reason: SDUPTHER

## 2023-11-24 RX ORDER — POTASSIUM CHLORIDE 29.8 MG/ML
20 INJECTION INTRAVENOUS
Status: COMPLETED | OUTPATIENT
Start: 2023-11-24 | End: 2023-11-24

## 2023-11-24 RX ORDER — DEXAMETHASONE SODIUM PHOSPHATE 4 MG/ML
INJECTION, SOLUTION INTRA-ARTICULAR; INTRALESIONAL; INTRAMUSCULAR; INTRAVENOUS; SOFT TISSUE PRN
Status: DISCONTINUED | OUTPATIENT
Start: 2023-11-24 | End: 2023-11-24 | Stop reason: SDUPTHER

## 2023-11-24 RX ORDER — HYDRALAZINE HYDROCHLORIDE 20 MG/ML
10 INJECTION INTRAMUSCULAR; INTRAVENOUS
Status: DISCONTINUED | OUTPATIENT
Start: 2023-11-24 | End: 2023-11-24 | Stop reason: HOSPADM

## 2023-11-24 RX ORDER — OXYCODONE HYDROCHLORIDE AND ACETAMINOPHEN 5; 325 MG/1; MG/1
1 TABLET ORAL EVERY 4 HOURS PRN
Status: DISCONTINUED | OUTPATIENT
Start: 2023-11-24 | End: 2023-11-26

## 2023-11-24 RX ORDER — LIDOCAINE HYDROCHLORIDE 40 MG/ML
SOLUTION TOPICAL PRN
Status: DISCONTINUED | OUTPATIENT
Start: 2023-11-24 | End: 2023-11-24 | Stop reason: SDUPTHER

## 2023-11-24 RX ORDER — PHENYLEPHRINE HCL IN 0.9% NACL 1 MG/10 ML
SYRINGE (ML) INTRAVENOUS PRN
Status: DISCONTINUED | OUTPATIENT
Start: 2023-11-24 | End: 2023-11-24 | Stop reason: SDUPTHER

## 2023-11-24 RX ORDER — ROCURONIUM BROMIDE 10 MG/ML
INJECTION, SOLUTION INTRAVENOUS PRN
Status: DISCONTINUED | OUTPATIENT
Start: 2023-11-24 | End: 2023-11-24 | Stop reason: SDUPTHER

## 2023-11-24 RX ORDER — ALBUMIN, HUMAN INJ 5% 5 %
SOLUTION INTRAVENOUS PRN
Status: DISCONTINUED | OUTPATIENT
Start: 2023-11-24 | End: 2023-11-24 | Stop reason: SDUPTHER

## 2023-11-24 RX ORDER — ONDANSETRON 2 MG/ML
4 INJECTION INTRAMUSCULAR; INTRAVENOUS
Status: DISCONTINUED | OUTPATIENT
Start: 2023-11-24 | End: 2023-11-24 | Stop reason: HOSPADM

## 2023-11-24 RX ORDER — ACETAMINOPHEN 500 MG
1000 TABLET ORAL EVERY 8 HOURS SCHEDULED
Status: DISPENSED | OUTPATIENT
Start: 2023-11-24 | End: 2023-11-27

## 2023-11-24 RX ORDER — BUPIVACAINE HYDROCHLORIDE 5 MG/ML
INJECTION, SOLUTION EPIDURAL; INTRACAUDAL
Status: COMPLETED | OUTPATIENT
Start: 2023-11-24 | End: 2023-11-24

## 2023-11-24 RX ORDER — MORPHINE SULFATE 2 MG/ML
2 INJECTION, SOLUTION INTRAMUSCULAR; INTRAVENOUS
Status: DISCONTINUED | OUTPATIENT
Start: 2023-11-24 | End: 2023-11-28

## 2023-11-24 RX ORDER — MAGNESIUM HYDROXIDE 1200 MG/15ML
LIQUID ORAL CONTINUOUS PRN
Status: COMPLETED | OUTPATIENT
Start: 2023-11-24 | End: 2023-11-24

## 2023-11-24 RX ORDER — FENTANYL CITRATE 50 UG/ML
INJECTION, SOLUTION INTRAMUSCULAR; INTRAVENOUS PRN
Status: DISCONTINUED | OUTPATIENT
Start: 2023-11-24 | End: 2023-11-24 | Stop reason: SDUPTHER

## 2023-11-24 RX ORDER — LIDOCAINE HYDROCHLORIDE 20 MG/ML
INJECTION, SOLUTION EPIDURAL; INFILTRATION; INTRACAUDAL; PERINEURAL PRN
Status: DISCONTINUED | OUTPATIENT
Start: 2023-11-24 | End: 2023-11-24 | Stop reason: SDUPTHER

## 2023-11-24 RX ORDER — HYDROMORPHONE HYDROCHLORIDE 2 MG/ML
INJECTION, SOLUTION INTRAMUSCULAR; INTRAVENOUS; SUBCUTANEOUS PRN
Status: DISCONTINUED | OUTPATIENT
Start: 2023-11-24 | End: 2023-11-24 | Stop reason: SDUPTHER

## 2023-11-24 RX ORDER — LABETALOL HYDROCHLORIDE 5 MG/ML
10 INJECTION, SOLUTION INTRAVENOUS
Status: DISCONTINUED | OUTPATIENT
Start: 2023-11-24 | End: 2023-11-24 | Stop reason: HOSPADM

## 2023-11-24 RX ORDER — MORPHINE SULFATE 4 MG/ML
4 INJECTION, SOLUTION INTRAMUSCULAR; INTRAVENOUS
Status: DISCONTINUED | OUTPATIENT
Start: 2023-11-24 | End: 2023-11-26

## 2023-11-24 RX ORDER — ACETAMINOPHEN 650 MG
TABLET, EXTENDED RELEASE ORAL
Status: COMPLETED | OUTPATIENT
Start: 2023-11-24 | End: 2023-11-24

## 2023-11-24 RX ORDER — MAGNESIUM SULFATE HEPTAHYDRATE 500 MG/ML
INJECTION, SOLUTION INTRAMUSCULAR; INTRAVENOUS PRN
Status: DISCONTINUED | OUTPATIENT
Start: 2023-11-24 | End: 2023-11-24 | Stop reason: SDUPTHER

## 2023-11-24 RX ORDER — METOCLOPRAMIDE HYDROCHLORIDE 5 MG/ML
10 INJECTION INTRAMUSCULAR; INTRAVENOUS EVERY 6 HOURS
Status: DISCONTINUED | OUTPATIENT
Start: 2023-11-24 | End: 2023-11-29 | Stop reason: HOSPADM

## 2023-11-24 RX ORDER — ONDANSETRON 2 MG/ML
INJECTION INTRAMUSCULAR; INTRAVENOUS PRN
Status: DISCONTINUED | OUTPATIENT
Start: 2023-11-24 | End: 2023-11-24 | Stop reason: SDUPTHER

## 2023-11-24 RX ADMIN — FENTANYL CITRATE 50 MCG: 50 INJECTION, SOLUTION INTRAMUSCULAR; INTRAVENOUS at 12:47

## 2023-11-24 RX ADMIN — MAGNESIUM SULFATE HEPTAHYDRATE 1 G: 500 INJECTION, SOLUTION INTRAMUSCULAR; INTRAVENOUS at 12:46

## 2023-11-24 RX ADMIN — Medication 20 MEQ: at 10:33

## 2023-11-24 RX ADMIN — METRONIDAZOLE 500 MG: 500 INJECTION, SOLUTION INTRAVENOUS at 09:27

## 2023-11-24 RX ADMIN — Medication 200 MCG: at 13:11

## 2023-11-24 RX ADMIN — ROCURONIUM BROMIDE 50 MG: 10 INJECTION, SOLUTION INTRAVENOUS at 12:53

## 2023-11-24 RX ADMIN — LIDOCAINE HYDROCHLORIDE 100 MG: 20 INJECTION, SOLUTION EPIDURAL; INFILTRATION; INTRACAUDAL; PERINEURAL at 12:53

## 2023-11-24 RX ADMIN — Medication 200 MCG: at 12:57

## 2023-11-24 RX ADMIN — Medication 10 ML: at 22:47

## 2023-11-24 RX ADMIN — SODIUM CHLORIDE, POTASSIUM CHLORIDE, SODIUM LACTATE AND CALCIUM CHLORIDE: 600; 310; 30; 20 INJECTION, SOLUTION INTRAVENOUS at 03:14

## 2023-11-24 RX ADMIN — FENTANYL CITRATE 25 MCG: 50 INJECTION, SOLUTION INTRAMUSCULAR; INTRAVENOUS at 13:33

## 2023-11-24 RX ADMIN — PROPOFOL 100 MG: 10 INJECTION, EMULSION INTRAVENOUS at 12:53

## 2023-11-24 RX ADMIN — OXYCODONE AND ACETAMINOPHEN 1 TABLET: 5; 325 TABLET ORAL at 22:27

## 2023-11-24 RX ADMIN — METOCLOPRAMIDE 10 MG: 5 INJECTION, SOLUTION INTRAMUSCULAR; INTRAVENOUS at 22:27

## 2023-11-24 RX ADMIN — FENTANYL CITRATE 25 MCG: 50 INJECTION, SOLUTION INTRAMUSCULAR; INTRAVENOUS at 14:07

## 2023-11-24 RX ADMIN — Medication 10 ML: at 09:28

## 2023-11-24 RX ADMIN — ROCURONIUM BROMIDE 20 MG: 10 INJECTION, SOLUTION INTRAVENOUS at 13:25

## 2023-11-24 RX ADMIN — Medication 20 MEQ: at 15:50

## 2023-11-24 RX ADMIN — PIPERACILLIN AND TAZOBACTAM 3375 MG: 3; .375 INJECTION, POWDER, LYOPHILIZED, FOR SOLUTION INTRAVENOUS at 16:45

## 2023-11-24 RX ADMIN — METOCLOPRAMIDE 10 MG: 5 INJECTION, SOLUTION INTRAMUSCULAR; INTRAVENOUS at 16:48

## 2023-11-24 RX ADMIN — GADOBENATE DIMEGLUMINE 18 ML: 529 INJECTION, SOLUTION INTRAVENOUS at 07:44

## 2023-11-24 RX ADMIN — DEXAMETHASONE SODIUM PHOSPHATE 10 MG: 4 INJECTION, SOLUTION INTRAMUSCULAR; INTRAVENOUS at 12:53

## 2023-11-24 RX ADMIN — Medication 200 MCG: at 13:22

## 2023-11-24 RX ADMIN — Medication 20 MEQ: at 09:23

## 2023-11-24 RX ADMIN — LIDOCAINE HYDROCHLORIDE 4 ML: 40 SOLUTION TOPICAL at 12:55

## 2023-11-24 RX ADMIN — OXYCODONE AND ACETAMINOPHEN 1 TABLET: 5; 325 TABLET ORAL at 16:44

## 2023-11-24 RX ADMIN — Medication 200 MCG: at 13:39

## 2023-11-24 RX ADMIN — ONDANSETRON 4 MG: 2 INJECTION INTRAMUSCULAR; INTRAVENOUS at 14:03

## 2023-11-24 RX ADMIN — CEFTRIAXONE SODIUM 1000 MG: 1 INJECTION, POWDER, FOR SOLUTION INTRAMUSCULAR; INTRAVENOUS at 02:28

## 2023-11-24 RX ADMIN — Medication 10 ML: at 22:46

## 2023-11-24 RX ADMIN — Medication 200 MCG: at 14:13

## 2023-11-24 RX ADMIN — SODIUM CHLORIDE, PRESERVATIVE FREE 10 ML: 5 INJECTION INTRAVENOUS at 09:28

## 2023-11-24 RX ADMIN — SODIUM CHLORIDE 25 ML: 9 INJECTION, SOLUTION INTRAVENOUS at 02:25

## 2023-11-24 RX ADMIN — ALBUMIN (HUMAN) 12.5 G: 12.5 INJECTION, SOLUTION INTRAVENOUS at 13:06

## 2023-11-24 RX ADMIN — Medication 200 MCG: at 13:04

## 2023-11-24 RX ADMIN — SUGAMMADEX 200 MG: 100 INJECTION, SOLUTION INTRAVENOUS at 14:27

## 2023-11-24 RX ADMIN — HYDROMORPHONE HYDROCHLORIDE 0.5 MG: 2 INJECTION, SOLUTION INTRAMUSCULAR; INTRAVENOUS; SUBCUTANEOUS at 14:54

## 2023-11-24 RX ADMIN — METRONIDAZOLE 500 MG: 500 INJECTION, SOLUTION INTRAVENOUS at 16:14

## 2023-11-24 RX ADMIN — HYDROMORPHONE HYDROCHLORIDE 0.2 MG: 2 INJECTION, SOLUTION INTRAMUSCULAR; INTRAVENOUS; SUBCUTANEOUS at 14:00

## 2023-11-24 RX ADMIN — Medication 20 MEQ: at 11:38

## 2023-11-24 ASSESSMENT — PAIN SCALES - GENERAL
PAINLEVEL_OUTOF10: 5
PAINLEVEL_OUTOF10: 6
PAINLEVEL_OUTOF10: 3
PAINLEVEL_OUTOF10: 0
PAINLEVEL_OUTOF10: 7
PAINLEVEL_OUTOF10: 7

## 2023-11-24 ASSESSMENT — PAIN DESCRIPTION - LOCATION
LOCATION: ABDOMEN

## 2023-11-24 ASSESSMENT — PAIN DESCRIPTION - DESCRIPTORS
DESCRIPTORS: DISCOMFORT

## 2023-11-24 ASSESSMENT — LIFESTYLE VARIABLES: SMOKING_STATUS: 0

## 2023-11-24 ASSESSMENT — PAIN - FUNCTIONAL ASSESSMENT
PAIN_FUNCTIONAL_ASSESSMENT: PREVENTS OR INTERFERES SOME ACTIVE ACTIVITIES AND ADLS

## 2023-11-24 NOTE — ANESTHESIA POSTPROCEDURE EVALUATION
Department of Anesthesiology  Postprocedure Note    Patient: Lindsay Carbajal  MRN: [id number]  YOB: 1940  Date of evaluation: 11/24/2023      Procedure Summary       Date: 11/24/23 Room / Location: [address]    Anesthesia Start: 9489 Anesthesia Stop: 9819    Procedure: SIGMOID COLECTOMY, END SIGMOID COLOSTOMY (Abdomen) Diagnosis:       Colon obstruction ([address])      (Colon obstruction ([address]) [C17.491])    Surgeons: Zeina Leung MD Responsible Provider: Sonido Chapa MD    Anesthesia Type: general ASA Status: 4            Anesthesia Type: No value filed.     Ole Phase I: Ole Score: 8    Ole Phase II:        Anesthesia Post Evaluation    Patient location during evaluation: PACU  Patient participation: complete - patient participated  Level of consciousness: awake and alert  Airway patency: patent  Nausea & Vomiting: no vomiting and no nausea  Complications: no  Cardiovascular status: hemodynamically stable  Respiratory status: acceptable  Hydration status: stable  Pain management: adequate

## 2023-11-24 NOTE — CARE COORDINATION
Case Management Assessment  Initial Evaluation    Date/Time of Evaluation: 11/24/2023 12:51 PM  Assessment Completed by: Phoenix Ramos RN    If patient is discharged prior to next notation, then this note serves as note for discharge by case management. Patient Name: Lindsay Carbajal                   YOB: 1940  Diagnosis: SBO (small bowel obstruction) ([address]) [K56.609]  Large bowel obstruction ([address]) [K56.609]  Constipation, unspecified constipation type [K59.00]                   Date / Time: 11/22/2023  1:13 PM    Patient Admission Status: Inpatient   Readmission Risk (Low < 19, Mod (19-27), High > 27): Readmission Risk Score: 17.9    Current PCP: William Johansen, DO  PCP verified by CM? Yes (facility attending)    Chart Reviewed: Yes      History Provided by: Patient, Medical Record, Other (see comment)  Patient Orientation: Alert and Oriented, Person, Situation, Place    Patient Cognition: Alert    Hospitalization in the last 30 days (Readmission):  No    If yes, Readmission Assessment in  Navigator will be completed. Advance Directives:      Code Status: DNR-CCA   Patient's Primary Decision Maker is: Legal Next of Kin      Discharge Planning:    Patient expects to discharge to: Long-term care  Plan for transportation at discharge: Family    Financial    Payor: [employer] / Plan: Kyleigh Fast / Product Type: *No Product type* /         Current Nursing Home Information:  Patient admitted from:  [address], [address]  Phone: [phone]  Fax: [phone]      Call to [facility], admissions,  who confirmed the patient is: [address] [place] [address], no Precert required for return.       Patient Covid vaccination status:    Internal Administration   First Dose COVID-19, PFIZER PURPLE top, DILUTE for use, (age 15 y+), 30mcg/0.3mL  02/01/2021   Second Dose COVID-19, PFIZER PURPLE top, DILUTE for use, (age 15 y+), 30mcg/0.3mL   02/22/2021       Last COVID Lab

## 2023-11-24 NOTE — BRIEF OP NOTE
Brief Postoperative Note      Patient: Terell Schofield  YOB: 1940  MRN: [id number]    Date of Procedure: 11/24/2023    Pre-Op Diagnosis Codes:     * Colon obstruction ([address]) [W93.181]    Post-Op Diagnosis: Same       Procedure(s):  SIGMOID COLECTOMY, END SIGMOID COLOSTOMY    Surgeon(s):  Benton Cooks, MD    Assistant:  Surgical Assistant: Cheryl Vaughan    Anesthesia: General    Estimated Blood Loss (mL): Minimal    Complications: None    Specimens:   ID Type Source Tests Collected by Time Destination   A : A) Sigmoid Colon Tissue Tissue SURGICAL PATHOLOGY Benton Cooks, MD 11/24/2023 1426        Implants:  * No implants in log *      Drains:   NG/OG/NJ/NE Tube Nasogastric 16 fr Right nostril (Active)   Surrounding Skin Clean, dry & intact 11/24/23 0856   Securement device Adhesive based bradford 11/24/23 0856   Status Clamped 11/24/23 0856   NG/OG/NJ/NE External Measurement (cm) 50 cm 11/24/23 0856   Drainage Appearance Thick; Other (Comment) 11/22/23 2149   Action Taken Repositioned 11/22/23 2149       Colostomy LLQ (Active)       Rectal Tube (Active)   Site Assessment Clean, dry & intact 11/24/23 0856   Rectal Tube Output (mL) 0 ml 11/24/23 0624       Urinary Catheter 11/22/23 (Active)   Catheter Indications Assist in healing of open sacral or perineal wounds (Stage III, IV, or unstageable documented by a provider or enterostomal therapy) and/or full thickness perineal and lower extremity burns in incontinent patients 11/24/23 0856   Site Assessment No urethral drainage 11/24/23 0856   Urine Color Yellow 11/24/23 0856   Urine Appearance Clear 11/24/23 0856   Urine Odor Malodorous 11/24/23 0856   Collection Container Standard 11/24/23 0856   Securement Method Securing device (Describe) 11/24/23 0856   Catheter Care  Perineal wipes 11/24/23 0856   Catheter Best Practices  Drainage tube clipped to bed 11/24/23 0856   Status Draining;Patent 11/24/23 0856   Output (mL) 225 mL 11/24/23

## 2023-11-24 NOTE — OP NOTE
[facility]                     [address], [address]                                OPERATIVE REPORT    PATIENT NAME: Margarito Lewis                   :        1940  MED REC NO:   [id number]                          ROOM:       4458  ACCOUNT NO:   [de-identified]                           ADMIT DATE: 2023  PROVIDER:     Bang Cisneros MD    DATE OF PROCEDURE:  2023    PREOPERATIVE DIAGNOSES:  1. Sigmoid colon obstruction. 2.  Paraplegia with stage IV decubitus ulcerization. POSTOPERATIVE DIAGNOSES:  1. Sigmoid colon obstruction. 2.  Paraplegia with stage IV decubitus ulcerization. OPERATION PERFORMED:  Exploratory laparotomy, sigmoid colectomy, sigmoid  colostomy, and closure of rectal stump. SURGEON:  Bang Cisneros MD    ANESTHESIA:  General plus local.    ESTIMATED BLOOD LOSS:  Minimal.    COMPLICATIONS:  None. SPECIMENS:  Sigmoid colon. DETAILS OF THE SURGERY:  The patient is an 59-year-old female presenting  for two reasons. She is in need of colostomy due her debility from  paraplegia secondary to the spinal disease and spinal surgery and she  has a large decubitus ulcer with chronic contamination from her fecal  soilage. Colostomy has been entertained for diversion to help heal and  maintain healing of this area. In addition, the patient has had marked  abdominal distention and concern of sigmoid colon obstruction. Decompressive colonoscopy tube has been placed; however, the patient is  quite distended with lower abdominal tenderness. FINDINGS:  In terms of findings at surgery, the sigmoid colon was  mobilized and had a perforation present at the area of the adhesions and  torsion. Sigmoid colon resection including this area was performed. End colostomy was performed bringing the more proximal/left colon area  out as an end colostomy in the left abdomen. The rectal stump was  closed.   There

## 2023-11-24 NOTE — PLAN OF CARE
Problem: Discharge Planning  Goal: Discharge to home or other facility with appropriate resources  Outcome: Progressing  Flowsheets (Taken 11/23/2023 2100)  Discharge to home or other facility with appropriate resources: Identify barriers to discharge with patient and caregiver     Problem: Pain  Goal: Verbalizes/displays adequate comfort level or baseline comfort level  Outcome: Progressing     Problem: Safety - Adult  Goal: Free from fall injury  Outcome: Progressing     Problem: Skin/Tissue Integrity  Goal: Absence of new skin breakdown  Description: 1. Monitor for areas of redness and/or skin breakdown  2. Assess vascular access sites hourly  3. Every 4-6 hours minimum:  Change oxygen saturation probe site  4. Every 4-6 hours:  If on nasal continuous positive airway pressure, respiratory therapy assess nares and determine need for appliance change or resting period.   Outcome: Progressing

## 2023-11-25 LAB
ANION GAP SERPL CALCULATED.3IONS-SCNC: 15 MMOL/L (ref 3–16)
BUN SERPL-MCNC: 19 MG/DL (ref 7–20)
CALCIUM SERPL-MCNC: 8.8 MG/DL (ref 8.3–10.6)
CHLORIDE SERPL-SCNC: 111 MMOL/L (ref 99–110)
CO2 SERPL-SCNC: 19 MMOL/L (ref 21–32)
CREAT SERPL-MCNC: 0.7 MG/DL (ref 0.6–1.2)
DEPRECATED RDW RBC AUTO: 17.4 % (ref 12.4–15.4)
GFR SERPLBLD CREATININE-BSD FMLA CKD-EPI: >60 ML/MIN/{1.73_M2}
GLUCOSE SERPL-MCNC: 70 MG/DL (ref 70–99)
HCT VFR BLD AUTO: 31.4 % (ref 36–48)
HGB BLD-MCNC: 9.7 G/DL (ref 12–16)
MAGNESIUM SERPL-MCNC: 2.1 MG/DL (ref 1.8–2.4)
MCH RBC QN AUTO: 24.6 PG (ref 26–34)
MCHC RBC AUTO-ENTMCNC: 30.8 G/DL (ref 31–36)
MCV RBC AUTO: 79.9 FL (ref 80–100)
PLATELET # BLD AUTO: 241 K/UL (ref 135–450)
PMV BLD AUTO: 9.1 FL (ref 5–10.5)
POTASSIUM SERPL-SCNC: 3.3 MMOL/L (ref 3.5–5.1)
RBC # BLD AUTO: 3.93 M/UL (ref 4–5.2)
SODIUM SERPL-SCNC: 145 MMOL/L (ref 136–145)
WBC # BLD AUTO: 17.1 K/UL (ref 4–11)

## 2023-11-25 PROCEDURE — 2580000003 HC RX 258: Performed by: SURGERY

## 2023-11-25 PROCEDURE — 6370000000 HC RX 637 (ALT 250 FOR IP): Performed by: SURGERY

## 2023-11-25 PROCEDURE — 99024 POSTOP FOLLOW-UP VISIT: CPT | Performed by: SURGERY

## 2023-11-25 PROCEDURE — 94760 N-INVAS EAR/PLS OXIMETRY 1: CPT

## 2023-11-25 PROCEDURE — 6360000002 HC RX W HCPCS: Performed by: INTERNAL MEDICINE

## 2023-11-25 PROCEDURE — 85027 COMPLETE CBC AUTOMATED: CPT

## 2023-11-25 PROCEDURE — 6360000002 HC RX W HCPCS: Performed by: SURGERY

## 2023-11-25 PROCEDURE — 2700000000 HC OXYGEN THERAPY PER DAY

## 2023-11-25 PROCEDURE — 80048 BASIC METABOLIC PNL TOTAL CA: CPT

## 2023-11-25 PROCEDURE — 83735 ASSAY OF MAGNESIUM: CPT

## 2023-11-25 PROCEDURE — 1200000000 HC SEMI PRIVATE

## 2023-11-25 RX ORDER — POTASSIUM CHLORIDE 29.8 MG/ML
20 INJECTION INTRAVENOUS
Status: COMPLETED | OUTPATIENT
Start: 2023-11-25 | End: 2023-11-25

## 2023-11-25 RX ADMIN — PIPERACILLIN AND TAZOBACTAM 3375 MG: 3; .375 INJECTION, POWDER, LYOPHILIZED, FOR SOLUTION INTRAVENOUS at 17:19

## 2023-11-25 RX ADMIN — METOCLOPRAMIDE 10 MG: 5 INJECTION, SOLUTION INTRAMUSCULAR; INTRAVENOUS at 11:18

## 2023-11-25 RX ADMIN — ACETAMINOPHEN 1000 MG: 500 TABLET ORAL at 05:09

## 2023-11-25 RX ADMIN — ENOXAPARIN SODIUM 40 MG: 100 INJECTION SUBCUTANEOUS at 08:29

## 2023-11-25 RX ADMIN — MORPHINE SULFATE 2 MG: 2 INJECTION, SOLUTION INTRAMUSCULAR; INTRAVENOUS at 02:22

## 2023-11-25 RX ADMIN — PIPERACILLIN AND TAZOBACTAM 3375 MG: 3; .375 INJECTION, POWDER, LYOPHILIZED, FOR SOLUTION INTRAVENOUS at 00:13

## 2023-11-25 RX ADMIN — PIPERACILLIN AND TAZOBACTAM 3375 MG: 3; .375 INJECTION, POWDER, LYOPHILIZED, FOR SOLUTION INTRAVENOUS at 08:32

## 2023-11-25 RX ADMIN — METOCLOPRAMIDE 10 MG: 5 INJECTION, SOLUTION INTRAMUSCULAR; INTRAVENOUS at 05:09

## 2023-11-25 RX ADMIN — OXYCODONE AND ACETAMINOPHEN 1 TABLET: 5; 325 TABLET ORAL at 11:24

## 2023-11-25 RX ADMIN — METOCLOPRAMIDE 10 MG: 5 INJECTION, SOLUTION INTRAMUSCULAR; INTRAVENOUS at 17:13

## 2023-11-25 RX ADMIN — ACETAMINOPHEN 1000 MG: 500 TABLET ORAL at 15:21

## 2023-11-25 RX ADMIN — SODIUM CHLORIDE, POTASSIUM CHLORIDE, SODIUM LACTATE AND CALCIUM CHLORIDE: 600; 310; 30; 20 INJECTION, SOLUTION INTRAVENOUS at 17:23

## 2023-11-25 RX ADMIN — POTASSIUM CHLORIDE 20 MEQ: 29.8 INJECTION, SOLUTION INTRAVENOUS at 14:23

## 2023-11-25 RX ADMIN — POTASSIUM CHLORIDE 20 MEQ: 29.8 INJECTION, SOLUTION INTRAVENOUS at 13:16

## 2023-11-25 RX ADMIN — ACETAMINOPHEN 1000 MG: 500 TABLET ORAL at 22:30

## 2023-11-25 RX ADMIN — METOCLOPRAMIDE 10 MG: 5 INJECTION, SOLUTION INTRAMUSCULAR; INTRAVENOUS at 22:30

## 2023-11-25 RX ADMIN — POTASSIUM CHLORIDE 20 MEQ: 29.8 INJECTION, SOLUTION INTRAVENOUS at 15:26

## 2023-11-25 ASSESSMENT — PAIN SCALES - GENERAL
PAINLEVEL_OUTOF10: 4
PAINLEVEL_OUTOF10: 3
PAINLEVEL_OUTOF10: 6
PAINLEVEL_OUTOF10: 5
PAINLEVEL_OUTOF10: 3

## 2023-11-25 ASSESSMENT — PAIN DESCRIPTION - LOCATION
LOCATION: ABDOMEN
LOCATION: ABDOMEN

## 2023-11-25 ASSESSMENT — PAIN DESCRIPTION - ORIENTATION: ORIENTATION: MID

## 2023-11-25 NOTE — ACP (ADVANCE CARE PLANNING)
Advanced Care Planning Note. Purpose of Encounter: Advanced care planning in light of paraplegia  Parties In Attendance: Patient, daughter  Decisional Capacity: Yes  Subjective: Patient with no flatus  Objective: Cr 0.6  Goals of Care Determination: Patient wants limited support (No CPR, ok for vent/surgery/HD, no trach, consider PEG)  Plan:  IV Abx, OR for sigmoidectomy and colostomy, Surgery and GI consults, PT/OT  Code Status: DNR CCA   Time spent on Advanced care Plannin minutes  Advanced Care Planning Documents: Completed advanced directives on chart, daughter is the POA.     Mary Kay Monge MD  2023 7:48 PM

## 2023-11-25 NOTE — PLAN OF CARE
Problem: Discharge Planning  Goal: Discharge to home or other facility with appropriate resources  11/25/2023 1053 by Dimple Olivier RN  Outcome: Progressing  11/24/2023 2255 by Ernestina Archer RN  Outcome: Progressing     Problem: Pain  Goal: Verbalizes/displays adequate comfort level or baseline comfort level  11/25/2023 1053 by Dimple Olivier RN  Outcome: Progressing  11/24/2023 2255 by Ernestina Archer RN  Outcome: Progressing     Problem: Safety - Adult  Goal: Free from fall injury  11/25/2023 1053 by Dimple Olivier RN  Outcome: Progressing  11/24/2023 2255 by Ernestina Archer RN  Outcome: Progressing     Problem: Skin/Tissue Integrity  Goal: Absence of new skin breakdown  Description: 1. Monitor for areas of redness and/or skin breakdown  2. Assess vascular access sites hourly  3. Every 4-6 hours minimum:  Change oxygen saturation probe site  4. Every 4-6 hours:  If on nasal continuous positive airway pressure, respiratory therapy assess nares and determine need for appliance change or resting period.   11/25/2023 1053 by Dimple Olivier RN  Outcome: Progressing  11/24/2023 2255 by Ernestina Archer RN  Outcome: Progressing

## 2023-11-26 PROBLEM — A49.9 ESBL (EXTENDED SPECTRUM BETA-LACTAMASE) PRODUCING BACTERIA INFECTION: Status: ACTIVE | Noted: 2023-11-26

## 2023-11-26 PROBLEM — Z16.12 ESBL (EXTENDED SPECTRUM BETA-LACTAMASE) PRODUCING BACTERIA INFECTION: Status: ACTIVE | Noted: 2023-11-26

## 2023-11-26 PROBLEM — Z97.8 CHRONIC INDWELLING FOLEY CATHETER: Status: ACTIVE | Noted: 2023-11-26

## 2023-11-26 PROBLEM — K63.1 PERFORATED SIGMOID COLON (HCC): Status: ACTIVE | Noted: 2023-11-26

## 2023-11-26 PROBLEM — G82.20 PARAPLEGIA (HCC): Status: ACTIVE | Noted: 2023-11-26

## 2023-11-26 PROBLEM — K56.609 LARGE BOWEL OBSTRUCTION (HCC): Status: ACTIVE | Noted: 2023-11-26

## 2023-11-26 PROBLEM — L89.154 SACRAL DECUBITUS ULCER, STAGE IV (HCC): Status: ACTIVE | Noted: 2023-11-26

## 2023-11-26 LAB
ANION GAP SERPL CALCULATED.3IONS-SCNC: 10 MMOL/L (ref 3–16)
BACTERIA BLD CULT ORG #2: NORMAL
BACTERIA BLD CULT: NORMAL
BUN SERPL-MCNC: 15 MG/DL (ref 7–20)
CALCIUM SERPL-MCNC: 8.6 MG/DL (ref 8.3–10.6)
CHLORIDE SERPL-SCNC: 110 MMOL/L (ref 99–110)
CO2 SERPL-SCNC: 21 MMOL/L (ref 21–32)
CREAT SERPL-MCNC: 0.6 MG/DL (ref 0.6–1.2)
DEPRECATED RDW RBC AUTO: 18.1 % (ref 12.4–15.4)
GFR SERPLBLD CREATININE-BSD FMLA CKD-EPI: >60 ML/MIN/{1.73_M2}
GLUCOSE SERPL-MCNC: 57 MG/DL (ref 70–99)
HCT VFR BLD AUTO: 29.7 % (ref 36–48)
HGB BLD-MCNC: 9.2 G/DL (ref 12–16)
MAGNESIUM SERPL-MCNC: 1.9 MG/DL (ref 1.8–2.4)
MCH RBC QN AUTO: 24.8 PG (ref 26–34)
MCHC RBC AUTO-ENTMCNC: 31.1 G/DL (ref 31–36)
MCV RBC AUTO: 79.7 FL (ref 80–100)
PLATELET # BLD AUTO: 219 K/UL (ref 135–450)
PMV BLD AUTO: 9.4 FL (ref 5–10.5)
POTASSIUM SERPL-SCNC: 3.1 MMOL/L (ref 3.5–5.1)
RBC # BLD AUTO: 3.73 M/UL (ref 4–5.2)
SODIUM SERPL-SCNC: 141 MMOL/L (ref 136–145)
WBC # BLD AUTO: 12.3 K/UL (ref 4–11)

## 2023-11-26 PROCEDURE — 2580000003 HC RX 258: Performed by: SURGERY

## 2023-11-26 PROCEDURE — 6370000000 HC RX 637 (ALT 250 FOR IP): Performed by: INTERNAL MEDICINE

## 2023-11-26 PROCEDURE — 6370000000 HC RX 637 (ALT 250 FOR IP): Performed by: SURGERY

## 2023-11-26 PROCEDURE — 97530 THERAPEUTIC ACTIVITIES: CPT

## 2023-11-26 PROCEDURE — 1200000000 HC SEMI PRIVATE

## 2023-11-26 PROCEDURE — 97166 OT EVAL MOD COMPLEX 45 MIN: CPT

## 2023-11-26 PROCEDURE — 97162 PT EVAL MOD COMPLEX 30 MIN: CPT

## 2023-11-26 PROCEDURE — 6360000002 HC RX W HCPCS: Performed by: SURGERY

## 2023-11-26 PROCEDURE — 99024 POSTOP FOLLOW-UP VISIT: CPT | Performed by: SURGERY

## 2023-11-26 PROCEDURE — 83735 ASSAY OF MAGNESIUM: CPT

## 2023-11-26 PROCEDURE — 99223 1ST HOSP IP/OBS HIGH 75: CPT | Performed by: INTERNAL MEDICINE

## 2023-11-26 PROCEDURE — 80048 BASIC METABOLIC PNL TOTAL CA: CPT

## 2023-11-26 PROCEDURE — 2580000003 HC RX 258: Performed by: INTERNAL MEDICINE

## 2023-11-26 PROCEDURE — 6360000002 HC RX W HCPCS: Performed by: INTERNAL MEDICINE

## 2023-11-26 PROCEDURE — 85027 COMPLETE CBC AUTOMATED: CPT

## 2023-11-26 PROCEDURE — 97535 SELF CARE MNGMENT TRAINING: CPT

## 2023-11-26 RX ORDER — FLUCONAZOLE 100 MG/1
200 TABLET ORAL DAILY
Status: DISCONTINUED | OUTPATIENT
Start: 2023-11-26 | End: 2023-11-29 | Stop reason: HOSPADM

## 2023-11-26 RX ORDER — OXYCODONE HYDROCHLORIDE AND ACETAMINOPHEN 5; 325 MG/1; MG/1
1 TABLET ORAL EVERY 6 HOURS PRN
Status: DISCONTINUED | OUTPATIENT
Start: 2023-11-26 | End: 2023-11-29 | Stop reason: HOSPADM

## 2023-11-26 RX ADMIN — PIPERACILLIN AND TAZOBACTAM 3375 MG: 3; .375 INJECTION, POWDER, LYOPHILIZED, FOR SOLUTION INTRAVENOUS at 00:08

## 2023-11-26 RX ADMIN — ENOXAPARIN SODIUM 40 MG: 100 INJECTION SUBCUTANEOUS at 10:05

## 2023-11-26 RX ADMIN — FLUCONAZOLE 200 MG: 100 TABLET ORAL at 21:15

## 2023-11-26 RX ADMIN — ACETAMINOPHEN 1000 MG: 500 TABLET ORAL at 06:05

## 2023-11-26 RX ADMIN — METOCLOPRAMIDE 10 MG: 5 INJECTION, SOLUTION INTRAMUSCULAR; INTRAVENOUS at 11:25

## 2023-11-26 RX ADMIN — Medication 10 ML: at 21:16

## 2023-11-26 RX ADMIN — OXYCODONE AND ACETAMINOPHEN 1 TABLET: 5; 325 TABLET ORAL at 00:25

## 2023-11-26 RX ADMIN — MEROPENEM 1000 MG: 1 INJECTION, POWDER, FOR SOLUTION INTRAVENOUS at 21:25

## 2023-11-26 RX ADMIN — OXYCODONE AND ACETAMINOPHEN 1 TABLET: 5; 325 TABLET ORAL at 13:23

## 2023-11-26 RX ADMIN — METOCLOPRAMIDE 10 MG: 5 INJECTION, SOLUTION INTRAMUSCULAR; INTRAVENOUS at 16:29

## 2023-11-26 RX ADMIN — POTASSIUM BICARBONATE 50 MEQ: 978 TABLET, EFFERVESCENT ORAL at 15:11

## 2023-11-26 RX ADMIN — OXYCODONE AND ACETAMINOPHEN 1 TABLET: 5; 325 TABLET ORAL at 21:16

## 2023-11-26 RX ADMIN — PIPERACILLIN AND TAZOBACTAM 3375 MG: 3; .375 INJECTION, POWDER, LYOPHILIZED, FOR SOLUTION INTRAVENOUS at 10:05

## 2023-11-26 RX ADMIN — POTASSIUM BICARBONATE 50 MEQ: 978 TABLET, EFFERVESCENT ORAL at 16:17

## 2023-11-26 RX ADMIN — MEROPENEM 1000 MG: 1 INJECTION, POWDER, FOR SOLUTION INTRAVENOUS at 15:15

## 2023-11-26 RX ADMIN — METOCLOPRAMIDE 10 MG: 5 INJECTION, SOLUTION INTRAMUSCULAR; INTRAVENOUS at 21:18

## 2023-11-26 RX ADMIN — POTASSIUM CHLORIDE 40 MEQ: 1500 TABLET, EXTENDED RELEASE ORAL at 11:25

## 2023-11-26 RX ADMIN — METOCLOPRAMIDE 10 MG: 5 INJECTION, SOLUTION INTRAMUSCULAR; INTRAVENOUS at 04:46

## 2023-11-26 ASSESSMENT — PAIN DESCRIPTION - PAIN TYPE: TYPE: SURGICAL PAIN

## 2023-11-26 ASSESSMENT — PAIN DESCRIPTION - ORIENTATION
ORIENTATION: LOWER
ORIENTATION: LOWER

## 2023-11-26 ASSESSMENT — PAIN SCALES - GENERAL
PAINLEVEL_OUTOF10: 5
PAINLEVEL_OUTOF10: 5
PAINLEVEL_OUTOF10: 2
PAINLEVEL_OUTOF10: 5
PAINLEVEL_OUTOF10: 0
PAINLEVEL_OUTOF10: 3

## 2023-11-26 ASSESSMENT — PAIN DESCRIPTION - DESCRIPTORS
DESCRIPTORS: ACHING
DESCRIPTORS: DISCOMFORT

## 2023-11-26 ASSESSMENT — PAIN DESCRIPTION - ONSET: ONSET: ON-GOING

## 2023-11-26 ASSESSMENT — PAIN DESCRIPTION - LOCATION
LOCATION: ABDOMEN

## 2023-11-26 ASSESSMENT — PAIN DESCRIPTION - FREQUENCY: FREQUENCY: CONTINUOUS

## 2023-11-26 NOTE — CONSULTS
Delgado Loyd     Chief complaint-increased abdominal distention    HPI: 79-year-old female who is now paraplegic after undergoing a spinal procedure in March of this year. She was having incontinence of diarrheal stools and was started on Imodium. She subsequently developed progressive abdominal distention. The incontinence and distention has been an issue for several months. No complaints of abdominal pain. No reported history of nausea, vomiting, fevers, chills or urinary symptoms. Past Medical History:   Diagnosis Date    Arthritis     At high risk for falls     hx of falls    Constipation     Environmental allergies     Fall at home 01/25/2015    fell in garage, called 911 for assist to get up    GERD (gastroesophageal reflux disease)     History of anemia     Hyperlipidemia     Neuropathy of leg     left leg foot drop wears brace    OAB (overactive bladder)     UTI (lower urinary tract infection)        Past Surgical History:   Procedure Laterality Date    BACK SURGERY  12/16/10    L4-5 Left complete, radical facetectomy, L3-4-5 nerve root exploration and foraminotomy, pedicle screws    BREAST BIOPSY Right     x2    CARPAL TUNNEL RELEASE Left 12/13/2018    LEFT CARPAL TUNNEL RELEASE performed by Jonas Merchant MD at [facility] Right 8/22/2019    RIGHT CARPAL TUNNEL RELEASE performed by Jonas Merchant MD at [facility]  06/23/2011    HOT SNARE CECAL POLYPECTOMY    COLONOSCOPY  9-8-14    Colonoscopy. No biopsies.   No polypectomies    CYSTOSCOPY N/A 7/1/2019    CYSTOSCOPY WITH INJECTION OF URETHRAL BULKING AGENT COAPTITE performed by Tommy Walters MD at [address] Bilateral     cataract removal with lens implants    HYSTERECTOMY, VAGINAL      TVT    JOINT REPLACEMENT Bilateral     LAMINECTOMY  2015    L3-4-5 decompression of nerve roots      PILONIDAL CYST EXCISION      SALPINGO-OOPHORECTOMY      bilateral    SHOULDER ARTHROPLASTY Right 6/11/14
Infectious Diseases Inpatient Consult Note      Reason for Consult: ESBL E coli UTI, Sigmoid volvulus and perforation    Requesting Physician:  Lura Cabot     Primary Care Physician:  Luiz Trammell DO    History Obtained From:  Epic and pt, family     CHIEF COMPLAINT:     Chief Complaint   Patient presents with    Bloated     [facility] for diarrhea and C diff, pt hasn't had BM in days, very distended, denies pain or nausea         HISTORY OF PRESENT ILLNESS:  79 y. o. woman with a history of paraplegia, history of pulm embolism, history of lumbar surgery, admitted to hospital from nursing facility secondary to abdominal pain and distention unfortunately symptoms progressed during this hospitalization eventually was taken for surgery, moderate; obstruction with volvulus and perforation she underwent sigmoid colectomy with end colostomy. She also has stage IV sacral decubitus ulcer secondary mobility. Patient also has a chronic Antunez catheter secondary to neurogenic bladder. 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  Postsurgery WBC elevated at 17.1 hemoglobin 9.7 creatinine stable. Urinalysis abnormal urine culture positive for ESBL E. Coli. Given the need for IV antibiotics we are consulted for recommendations. She has PICC in place currently and daughter at the bedside provided additional details.       Past Medical History:    Past Medical History:   Diagnosis Date    Arthritis     At high risk for falls     hx of falls    Constipation     Environmental allergies     Fall at home 01/25/2015    fell in garage, called 911 for assist to get up    GERD (gastroesophageal reflux disease)     History of anemia     Hyperlipidemia     Neuropathy of leg     left leg foot drop wears brace    OAB (overactive bladder)     UTI (lower urinary tract infection)        Past Surgical History:    Past Surgical History:   Procedure Laterality Date    BACK SURGERY  12/16/10
PICC line education:    -Risks  -Benefits  -Alternatives  -Procedure    Discussed the above with patient, verbalized understanding, answered all questions. Provided with information on PICC care to review. PICC tip verified via [employer] (Ok to use). Reported off to patient's  Nurse Omid Kirkpatrick.
Imodium, nonspecified amount. Over the last few days, her abdomen has become much more distended. She is effectively a paraplegic and has no feeling below the chest because of her back injury and wounds. -CT AP with marked diffuse colonic distention to the approximate sigmoid. There is liquid stool within the area proximal to and distal to the sigmoid colon. Large hiatal hernia. There isn't a classic kidney bean appearance to suggest sigmoid volvulus. Much of the proximal colon is up to 10-11 cm.    -Sodium 132, potassium 3.4. Creatinine 1.1 with a BUN of 49. Calcium normal.  LFT normal.  WBC 10.5 with a hemoglobin of 12. Most history comes form the daughter    Review of Systems:    Pt is limited historian    Past Medical History:   Diagnosis Date    Arthritis     At high risk for falls     hx of falls    Constipation     Environmental allergies     Fall at home 01/25/2015    fell in garage, called 911 for assist to get up    GERD (gastroesophageal reflux disease)     History of anemia     Hyperlipidemia     Neuropathy of leg     left leg foot drop wears brace    OAB (overactive bladder)     UTI (lower urinary tract infection)      Past Surgical History:   Procedure Laterality Date    BACK SURGERY  12/16/10    L4-5 Left complete, radical facetectomy, L3-4-5 nerve root exploration and foraminotomy, pedicle screws    BREAST BIOPSY Right     x2    CARPAL TUNNEL RELEASE Left 12/13/2018    LEFT CARPAL TUNNEL RELEASE performed by Angela Rivera MD at [facility] Right 8/22/2019    RIGHT CARPAL TUNNEL RELEASE performed by Angela Rivera MD at [facility]  06/23/2011    HOT SNARE CECAL POLYPECTOMY    COLONOSCOPY  9-8-14    Colonoscopy. No biopsies.   No polypectomies    CYSTOSCOPY N/A 7/1/2019    CYSTOSCOPY WITH INJECTION OF URETHRAL BULKING AGENT COAPTITE performed by Elijah Reese MD at [address] Bilateral     cataract removal with lens implants
need for colostomy today. Emotional support provided. Consult for  spiritual support placed. Will continue to follow as needed. Contact information placed on patient communication board.      Electronically signed by Mila Zapata RN, BSN, CHPN on 11/24/23 at 10:23 AM EST

## 2023-11-27 LAB
ANION GAP SERPL CALCULATED.3IONS-SCNC: 11 MMOL/L (ref 3–16)
BACTERIA UR CULT: ABNORMAL
BUN SERPL-MCNC: 9 MG/DL (ref 7–20)
CALCIUM SERPL-MCNC: 7.9 MG/DL (ref 8.3–10.6)
CHLORIDE SERPL-SCNC: 114 MMOL/L (ref 99–110)
CO2 SERPL-SCNC: 21 MMOL/L (ref 21–32)
CREAT SERPL-MCNC: <0.5 MG/DL (ref 0.6–1.2)
CRP SERPL-MCNC: 107.2 MG/L (ref 0–5.1)
DEPRECATED RDW RBC AUTO: 18.1 % (ref 12.4–15.4)
ERYTHROCYTE [SEDIMENTATION RATE] IN BLOOD BY WESTERGREN METHOD: 26 MM/HR (ref 0–30)
GFR SERPLBLD CREATININE-BSD FMLA CKD-EPI: >60 ML/MIN/{1.73_M2}
GLUCOSE SERPL-MCNC: 60 MG/DL (ref 70–99)
HCT VFR BLD AUTO: 25.9 % (ref 36–48)
HGB BLD-MCNC: 8.2 G/DL (ref 12–16)
MCH RBC QN AUTO: 25.2 PG (ref 26–34)
MCHC RBC AUTO-ENTMCNC: 31.8 G/DL (ref 31–36)
MCV RBC AUTO: 79.4 FL (ref 80–100)
ORGANISM: ABNORMAL
PLATELET # BLD AUTO: 190 K/UL (ref 135–450)
PMV BLD AUTO: 8.9 FL (ref 5–10.5)
POTASSIUM SERPL-SCNC: 3.6 MMOL/L (ref 3.5–5.1)
RBC # BLD AUTO: 3.26 M/UL (ref 4–5.2)
SODIUM SERPL-SCNC: 146 MMOL/L (ref 136–145)
WBC # BLD AUTO: 9 K/UL (ref 4–11)

## 2023-11-27 PROCEDURE — 2580000003 HC RX 258: Performed by: INTERNAL MEDICINE

## 2023-11-27 PROCEDURE — 6360000002 HC RX W HCPCS: Performed by: SURGERY

## 2023-11-27 PROCEDURE — 86140 C-REACTIVE PROTEIN: CPT

## 2023-11-27 PROCEDURE — 99233 SBSQ HOSP IP/OBS HIGH 50: CPT | Performed by: INTERNAL MEDICINE

## 2023-11-27 PROCEDURE — 6370000000 HC RX 637 (ALT 250 FOR IP): Performed by: INTERNAL MEDICINE

## 2023-11-27 PROCEDURE — 6370000000 HC RX 637 (ALT 250 FOR IP): Performed by: SURGERY

## 2023-11-27 PROCEDURE — 80048 BASIC METABOLIC PNL TOTAL CA: CPT

## 2023-11-27 PROCEDURE — 51702 INSERT TEMP BLADDER CATH: CPT

## 2023-11-27 PROCEDURE — 99024 POSTOP FOLLOW-UP VISIT: CPT | Performed by: SURGERY

## 2023-11-27 PROCEDURE — 1200000000 HC SEMI PRIVATE

## 2023-11-27 PROCEDURE — 36592 COLLECT BLOOD FROM PICC: CPT

## 2023-11-27 PROCEDURE — 85027 COMPLETE CBC AUTOMATED: CPT

## 2023-11-27 PROCEDURE — 85652 RBC SED RATE AUTOMATED: CPT

## 2023-11-27 PROCEDURE — 6360000002 HC RX W HCPCS: Performed by: INTERNAL MEDICINE

## 2023-11-27 RX ADMIN — FLUCONAZOLE 200 MG: 100 TABLET ORAL at 08:26

## 2023-11-27 RX ADMIN — MEROPENEM 1000 MG: 1 INJECTION, POWDER, FOR SOLUTION INTRAVENOUS at 05:16

## 2023-11-27 RX ADMIN — ACETAMINOPHEN 1000 MG: 500 TABLET ORAL at 05:15

## 2023-11-27 RX ADMIN — METOCLOPRAMIDE 10 MG: 5 INJECTION, SOLUTION INTRAMUSCULAR; INTRAVENOUS at 16:41

## 2023-11-27 RX ADMIN — METOCLOPRAMIDE 10 MG: 5 INJECTION, SOLUTION INTRAMUSCULAR; INTRAVENOUS at 11:01

## 2023-11-27 RX ADMIN — METOCLOPRAMIDE 10 MG: 5 INJECTION, SOLUTION INTRAMUSCULAR; INTRAVENOUS at 22:23

## 2023-11-27 RX ADMIN — METOCLOPRAMIDE 10 MG: 5 INJECTION, SOLUTION INTRAMUSCULAR; INTRAVENOUS at 05:15

## 2023-11-27 RX ADMIN — MEROPENEM 1000 MG: 1 INJECTION, POWDER, FOR SOLUTION INTRAVENOUS at 14:49

## 2023-11-27 RX ADMIN — MEROPENEM 1000 MG: 1 INJECTION, POWDER, FOR SOLUTION INTRAVENOUS at 22:22

## 2023-11-27 RX ADMIN — ENOXAPARIN SODIUM 40 MG: 100 INJECTION SUBCUTANEOUS at 08:26

## 2023-11-27 ASSESSMENT — PAIN SCALES - GENERAL
PAINLEVEL_OUTOF10: 0
PAINLEVEL_OUTOF10: 0

## 2023-11-27 NOTE — CARE COORDINATION
Patient with stool under ele dressing on mid abdominal incision. Dressing removed, skin cleansed with saline, then betadine. Applied new ele dressing. Good seal obtained.  [address], BSN, RN, [facility]  [phone]

## 2023-11-27 NOTE — DISCHARGE INSTRUCTIONS
incision healed). Cut dressing to fit stoma size. (Trim small amount of side opposite tab to leave room for midline abdominal incision until incision is healed.)  7. Remove backing of new dressing, stretch stoma ring to fit around border of stoma hole and apply, replace dressing backing. 8. Warm new dressing for 30 seconds by rubbing between hands or placing in warm blanket. 9. Re-clean & dry stoma & surrounding skin, if needed. 10. Apply new dressing with dressing tab facing outer abdomen & trimmed edge  of dressing toward midline abdominal incision. (Save back of dressing for template for next stoma measurement.)   11. Line white adhesive of drainage pouch up with blue [place] on dressing Landing Pad, apply & check for secure attachment with fingers like checking ziplock bags or tupperware. 12. Place warm blanket over new dressing on abdomen for 3 to 5 minutes to promote adherence.

## 2023-11-27 NOTE — FLOWSHEET NOTE
Phase 1 complete, pt seen by anesthesiologist. VSS, pt resting comfortably. Mid abd incision site is CDI, ice applied and TERESITA dressing functioning properly. Ostomy site unchanged. Antunez catheter notes luh, clear urine. Will transfer to [address] [facility], family updated.
Diet: ADULT DIET; Clear Liquid  ADULT ORAL NUTRITION SUPPLEMENT; Lunch, Dinner;  Wound Healing Oral Supplement  Dietician consult:  Yes    Discharge Plan:  Placement for patient upon discharge: skilled nursing    Patient appropriate for Outpatient [address]: Yes    Referrals:  [x]   [] [facility]  [] Supplies  [] Other    Patient/Caregiver Teaching:  Level of patient/caregiver understanding able to:   [x] Indicates understanding       [x] Needs reinforcement  [] Unsuccessful      [] Verbal Understanding  [] Demonstrated understanding       [] No evidence of learning  [] Refused teaching         [] N/A       Electronically signed by MARIMAR Aguirre, RN, [facility]  [phone] on 11/27/2023 at 12:59 PM

## 2023-11-27 NOTE — DISCHARGE INSTR - COC
Orders only valid for current disposition, NOT transferable upon discharge from facility or new admission to another facility.  - No f/u in outpatient ID office necessary    Physician Certification: I certify the above information and transfer of Ashvin Andrade  is necessary for the continuing treatment of the diagnosis listed and that she requires [address] for less 30 days.      Update Admission H&P: No change in H&P    PHYSICIAN SIGNATURE:  Electronically signed by Bobbye Lefort, MD on 11/29/23 at 9:21 AM EST

## 2023-11-28 LAB
ANION GAP SERPL CALCULATED.3IONS-SCNC: 10 MMOL/L (ref 3–16)
BUN SERPL-MCNC: 11 MG/DL (ref 7–20)
CALCIUM SERPL-MCNC: 8.4 MG/DL (ref 8.3–10.6)
CHLORIDE SERPL-SCNC: 111 MMOL/L (ref 99–110)
CO2 SERPL-SCNC: 23 MMOL/L (ref 21–32)
CREAT SERPL-MCNC: <0.5 MG/DL (ref 0.6–1.2)
DEPRECATED RDW RBC AUTO: 17.7 % (ref 12.4–15.4)
GFR SERPLBLD CREATININE-BSD FMLA CKD-EPI: >60 ML/MIN/{1.73_M2}
GLUCOSE SERPL-MCNC: 80 MG/DL (ref 70–99)
HCT VFR BLD AUTO: 28.6 % (ref 36–48)
HGB BLD-MCNC: 9 G/DL (ref 12–16)
MAGNESIUM SERPL-MCNC: 1.5 MG/DL (ref 1.8–2.4)
MCH RBC QN AUTO: 24.9 PG (ref 26–34)
MCHC RBC AUTO-ENTMCNC: 31.4 G/DL (ref 31–36)
MCV RBC AUTO: 79.5 FL (ref 80–100)
PLATELET # BLD AUTO: 202 K/UL (ref 135–450)
PMV BLD AUTO: 8.9 FL (ref 5–10.5)
POTASSIUM SERPL-SCNC: 2.5 MMOL/L (ref 3.5–5.1)
POTASSIUM SERPL-SCNC: 3 MMOL/L (ref 3.5–5.1)
RBC # BLD AUTO: 3.6 M/UL (ref 4–5.2)
SODIUM SERPL-SCNC: 144 MMOL/L (ref 136–145)
WBC # BLD AUTO: 7 K/UL (ref 4–11)

## 2023-11-28 PROCEDURE — APPSS15 APP SPLIT SHARED TIME 0-15 MINUTES: Performed by: NURSE PRACTITIONER

## 2023-11-28 PROCEDURE — 6360000002 HC RX W HCPCS: Performed by: SURGERY

## 2023-11-28 PROCEDURE — 97535 SELF CARE MNGMENT TRAINING: CPT

## 2023-11-28 PROCEDURE — 85027 COMPLETE CBC AUTOMATED: CPT

## 2023-11-28 PROCEDURE — 51702 INSERT TEMP BLADDER CATH: CPT

## 2023-11-28 PROCEDURE — 83735 ASSAY OF MAGNESIUM: CPT

## 2023-11-28 PROCEDURE — 99024 POSTOP FOLLOW-UP VISIT: CPT | Performed by: SURGERY

## 2023-11-28 PROCEDURE — 80048 BASIC METABOLIC PNL TOTAL CA: CPT

## 2023-11-28 PROCEDURE — 84132 ASSAY OF SERUM POTASSIUM: CPT

## 2023-11-28 PROCEDURE — 6360000002 HC RX W HCPCS: Performed by: NURSE PRACTITIONER

## 2023-11-28 PROCEDURE — 97530 THERAPEUTIC ACTIVITIES: CPT

## 2023-11-28 PROCEDURE — 2580000003 HC RX 258: Performed by: INTERNAL MEDICINE

## 2023-11-28 PROCEDURE — 99232 SBSQ HOSP IP/OBS MODERATE 35: CPT | Performed by: INTERNAL MEDICINE

## 2023-11-28 PROCEDURE — 6370000000 HC RX 637 (ALT 250 FOR IP): Performed by: INTERNAL MEDICINE

## 2023-11-28 PROCEDURE — 6370000000 HC RX 637 (ALT 250 FOR IP): Performed by: SURGERY

## 2023-11-28 PROCEDURE — 2580000003 HC RX 258: Performed by: SURGERY

## 2023-11-28 PROCEDURE — APPNB30 APP NON BILLABLE TIME 0-30 MINS: Performed by: NURSE PRACTITIONER

## 2023-11-28 PROCEDURE — 6360000002 HC RX W HCPCS: Performed by: INTERNAL MEDICINE

## 2023-11-28 PROCEDURE — 1200000000 HC SEMI PRIVATE

## 2023-11-28 RX ORDER — POTASSIUM CHLORIDE 20 MEQ/1
40 TABLET, EXTENDED RELEASE ORAL
Status: COMPLETED | OUTPATIENT
Start: 2023-11-28 | End: 2023-11-28

## 2023-11-28 RX ORDER — MAGNESIUM SULFATE IN WATER 40 MG/ML
2000 INJECTION, SOLUTION INTRAVENOUS ONCE
Status: COMPLETED | OUTPATIENT
Start: 2023-11-28 | End: 2023-11-28

## 2023-11-28 RX ADMIN — METOCLOPRAMIDE 10 MG: 5 INJECTION, SOLUTION INTRAMUSCULAR; INTRAVENOUS at 17:57

## 2023-11-28 RX ADMIN — MAGNESIUM SULFATE HEPTAHYDRATE 2000 MG: 40 INJECTION, SOLUTION INTRAVENOUS at 11:10

## 2023-11-28 RX ADMIN — METOCLOPRAMIDE 10 MG: 5 INJECTION, SOLUTION INTRAMUSCULAR; INTRAVENOUS at 11:03

## 2023-11-28 RX ADMIN — POTASSIUM CHLORIDE 40 MEQ: 1500 TABLET, EXTENDED RELEASE ORAL at 16:18

## 2023-11-28 RX ADMIN — POTASSIUM CHLORIDE 40 MEQ: 1500 TABLET, EXTENDED RELEASE ORAL at 08:40

## 2023-11-28 RX ADMIN — METOCLOPRAMIDE 10 MG: 5 INJECTION, SOLUTION INTRAMUSCULAR; INTRAVENOUS at 23:14

## 2023-11-28 RX ADMIN — POTASSIUM CHLORIDE 40 MEQ: 1500 TABLET, EXTENDED RELEASE ORAL at 11:04

## 2023-11-28 RX ADMIN — MEROPENEM 1000 MG: 1 INJECTION, POWDER, FOR SOLUTION INTRAVENOUS at 21:55

## 2023-11-28 RX ADMIN — FLUCONAZOLE 200 MG: 100 TABLET ORAL at 08:40

## 2023-11-28 RX ADMIN — Medication 10 ML: at 21:55

## 2023-11-28 RX ADMIN — METOCLOPRAMIDE 10 MG: 5 INJECTION, SOLUTION INTRAMUSCULAR; INTRAVENOUS at 06:25

## 2023-11-28 RX ADMIN — MEROPENEM 1000 MG: 1 INJECTION, POWDER, FOR SOLUTION INTRAVENOUS at 06:30

## 2023-11-28 RX ADMIN — POTASSIUM CHLORIDE 40 MEQ: 1500 TABLET, EXTENDED RELEASE ORAL at 09:38

## 2023-11-28 RX ADMIN — ENOXAPARIN SODIUM 40 MG: 100 INJECTION SUBCUTANEOUS at 08:40

## 2023-11-28 RX ADMIN — MEROPENEM 1000 MG: 1 INJECTION, POWDER, FOR SOLUTION INTRAVENOUS at 14:48

## 2023-11-28 RX ADMIN — POTASSIUM CHLORIDE 40 MEQ: 1500 TABLET, EXTENDED RELEASE ORAL at 14:48

## 2023-11-28 NOTE — PLAN OF CARE
Problem: Discharge Planning  Goal: Discharge to home or other facility with appropriate resources  11/28/2023 1005 by Chapito Olivares RN  Outcome: Progressing  11/27/2023 2339 by Carl Lennox, RN  Outcome: Progressing     Problem: Pain  Goal: Verbalizes/displays adequate comfort level or baseline comfort level  11/28/2023 1005 by Chapito Olivares RN  Outcome: Progressing  11/27/2023 2339 by Carl Lennox, RN  Outcome: Progressing     Problem: Safety - Adult  Goal: Free from fall injury  11/28/2023 1005 by Chapito Olivares RN  Outcome: Progressing  11/27/2023 2339 by Carl Lennox, RN  Outcome: Progressing     Problem: Skin/Tissue Integrity  Goal: Absence of new skin breakdown  Description: 1. Monitor for areas of redness and/or skin breakdown  2. Assess vascular access sites hourly  3. Every 4-6 hours minimum:  Change oxygen saturation probe site  4. Every 4-6 hours:  If on nasal continuous positive airway pressure, respiratory therapy assess nares and determine need for appliance change or resting period.   Outcome: Progressing     Problem: Nutrition Deficit:  Goal: Optimize nutritional status  Outcome: Progressing

## 2023-11-28 NOTE — PLAN OF CARE
Problem: Discharge Planning  Goal: Discharge to home or other facility with appropriate resources  11/27/2023 2339 by Bess Rodriguez RN  Outcome: Progressing  11/27/2023 1252 by Myra Ochoa RN  Outcome: Progressing     Problem: Pain  Goal: Verbalizes/displays adequate comfort level or baseline comfort level  11/27/2023 2339 by Bess Rodriguez RN  Outcome: Progressing  11/27/2023 1252 by Myra Ochoa RN  Outcome: Progressing     Problem: Safety - Adult  Goal: Free from fall injury  11/27/2023 2339 by Bess Rodriguez RN  Outcome: Progressing  11/27/2023 1252 by Myra Ochoa RN  Outcome: Progressing     Problem: Skin/Tissue Integrity  Goal: Absence of new skin breakdown  Description: 1. Monitor for areas of redness and/or skin breakdown  2. Assess vascular access sites hourly  3. Every 4-6 hours minimum:  Change oxygen saturation probe site  4. Every 4-6 hours:  If on nasal continuous positive airway pressure, respiratory therapy assess nares and determine need for appliance change or resting period.   11/27/2023 1252 by Myra Ochoa RN  Outcome: Progressing     Problem: Nutrition Deficit:  Goal: Optimize nutritional status  11/27/2023 1252 by Myra Ochoa RN  Outcome: Progressing

## 2023-11-29 VITALS
OXYGEN SATURATION: 96 % | WEIGHT: 194.8 LBS | TEMPERATURE: 97.7 F | SYSTOLIC BLOOD PRESSURE: 143 MMHG | HEART RATE: 74 BPM | HEIGHT: 64 IN | BODY MASS INDEX: 33.26 KG/M2 | RESPIRATION RATE: 18 BRPM | DIASTOLIC BLOOD PRESSURE: 68 MMHG

## 2023-11-29 LAB
ANION GAP SERPL CALCULATED.3IONS-SCNC: 8 MMOL/L (ref 3–16)
BUN SERPL-MCNC: 8 MG/DL (ref 7–20)
CALCIUM SERPL-MCNC: 8.3 MG/DL (ref 8.3–10.6)
CHLORIDE SERPL-SCNC: 110 MMOL/L (ref 99–110)
CO2 SERPL-SCNC: 22 MMOL/L (ref 21–32)
CREAT SERPL-MCNC: <0.5 MG/DL (ref 0.6–1.2)
DEPRECATED RDW RBC AUTO: 17.7 % (ref 12.4–15.4)
GFR SERPLBLD CREATININE-BSD FMLA CKD-EPI: >60 ML/MIN/{1.73_M2}
GLUCOSE SERPL-MCNC: 99 MG/DL (ref 70–99)
HCT VFR BLD AUTO: 28.2 % (ref 36–48)
HGB BLD-MCNC: 8.9 G/DL (ref 12–16)
MAGNESIUM SERPL-MCNC: 1.6 MG/DL (ref 1.8–2.4)
MCH RBC QN AUTO: 25 PG (ref 26–34)
MCHC RBC AUTO-ENTMCNC: 31.6 G/DL (ref 31–36)
MCV RBC AUTO: 78.9 FL (ref 80–100)
PLATELET # BLD AUTO: 212 K/UL (ref 135–450)
PMV BLD AUTO: 8.7 FL (ref 5–10.5)
POTASSIUM SERPL-SCNC: 3.1 MMOL/L (ref 3.5–5.1)
RBC # BLD AUTO: 3.57 M/UL (ref 4–5.2)
SODIUM SERPL-SCNC: 140 MMOL/L (ref 136–145)
WBC # BLD AUTO: 6.4 K/UL (ref 4–11)

## 2023-11-29 PROCEDURE — 6370000000 HC RX 637 (ALT 250 FOR IP): Performed by: INTERNAL MEDICINE

## 2023-11-29 PROCEDURE — 99232 SBSQ HOSP IP/OBS MODERATE 35: CPT | Performed by: INTERNAL MEDICINE

## 2023-11-29 PROCEDURE — APPNB30 APP NON BILLABLE TIME 0-30 MINS: Performed by: NURSE PRACTITIONER

## 2023-11-29 PROCEDURE — 80048 BASIC METABOLIC PNL TOTAL CA: CPT

## 2023-11-29 PROCEDURE — 97530 THERAPEUTIC ACTIVITIES: CPT

## 2023-11-29 PROCEDURE — 97535 SELF CARE MNGMENT TRAINING: CPT

## 2023-11-29 PROCEDURE — 97110 THERAPEUTIC EXERCISES: CPT

## 2023-11-29 PROCEDURE — 6360000002 HC RX W HCPCS: Performed by: SURGERY

## 2023-11-29 PROCEDURE — 85027 COMPLETE CBC AUTOMATED: CPT

## 2023-11-29 PROCEDURE — 2580000003 HC RX 258: Performed by: INTERNAL MEDICINE

## 2023-11-29 PROCEDURE — 6370000000 HC RX 637 (ALT 250 FOR IP): Performed by: SURGERY

## 2023-11-29 PROCEDURE — 83735 ASSAY OF MAGNESIUM: CPT

## 2023-11-29 PROCEDURE — 51702 INSERT TEMP BLADDER CATH: CPT

## 2023-11-29 PROCEDURE — APPSS15 APP SPLIT SHARED TIME 0-15 MINUTES: Performed by: NURSE PRACTITIONER

## 2023-11-29 PROCEDURE — 6360000002 HC RX W HCPCS: Performed by: INTERNAL MEDICINE

## 2023-11-29 PROCEDURE — 99024 POSTOP FOLLOW-UP VISIT: CPT | Performed by: SURGERY

## 2023-11-29 RX ORDER — FLUCONAZOLE 200 MG/1
200 TABLET ORAL DAILY
Qty: 7 TABLET | Refills: 0
Start: 2023-11-30 | End: 2023-12-07

## 2023-11-29 RX ORDER — POTASSIUM CHLORIDE 20 MEQ/1
40 TABLET, EXTENDED RELEASE ORAL
Status: DISPENSED | OUTPATIENT
Start: 2023-11-29 | End: 2023-11-29

## 2023-11-29 RX ORDER — ASPIRIN 81 MG/1
81 TABLET, CHEWABLE ORAL DAILY
Qty: 30 TABLET | Refills: 0
Start: 2023-11-29

## 2023-11-29 RX ORDER — MAGNESIUM SULFATE IN WATER 40 MG/ML
2000 INJECTION, SOLUTION INTRAVENOUS ONCE
Status: COMPLETED | OUTPATIENT
Start: 2023-11-29 | End: 2023-11-29

## 2023-11-29 RX ADMIN — MEROPENEM 1000 MG: 1 INJECTION, POWDER, FOR SOLUTION INTRAVENOUS at 05:56

## 2023-11-29 RX ADMIN — POTASSIUM CHLORIDE 40 MEQ: 1500 TABLET, EXTENDED RELEASE ORAL at 09:39

## 2023-11-29 RX ADMIN — MAGNESIUM SULFATE HEPTAHYDRATE 2000 MG: 40 INJECTION, SOLUTION INTRAVENOUS at 08:44

## 2023-11-29 RX ADMIN — POTASSIUM CHLORIDE 40 MEQ: 1500 TABLET, EXTENDED RELEASE ORAL at 08:34

## 2023-11-29 RX ADMIN — FLUCONAZOLE 200 MG: 100 TABLET ORAL at 08:34

## 2023-11-29 RX ADMIN — METOCLOPRAMIDE 10 MG: 5 INJECTION, SOLUTION INTRAMUSCULAR; INTRAVENOUS at 04:49

## 2023-11-29 RX ADMIN — ENOXAPARIN SODIUM 40 MG: 100 INJECTION SUBCUTANEOUS at 09:42

## 2023-11-29 RX ADMIN — POTASSIUM CHLORIDE 40 MEQ: 1500 TABLET, EXTENDED RELEASE ORAL at 11:00

## 2023-11-29 NOTE — PLAN OF CARE
Problem: Discharge Planning  Goal: Discharge to home or other facility with appropriate resources  11/28/2023 2310 by Rah Torrez RN  Outcome: Progressing  11/28/2023 1005 by Nova Rosas RN  Outcome: Progressing     Problem: Pain  Goal: Verbalizes/displays adequate comfort level or baseline comfort level  11/28/2023 2310 by Rah Torrez RN  Outcome: Progressing  11/28/2023 1005 by Nova Rosas RN  Outcome: Progressing     Problem: Safety - Adult  Goal: Free from fall injury  11/28/2023 2310 by Rah Torrez RN  Outcome: Progressing  11/28/2023 1005 by Nova Rosas RN  Outcome: Progressing     Problem: Skin/Tissue Integrity  Goal: Absence of new skin breakdown  Description: 1. Monitor for areas of redness and/or skin breakdown  2. Assess vascular access sites hourly  3. Every 4-6 hours minimum:  Change oxygen saturation probe site  4. Every 4-6 hours:  If on nasal continuous positive airway pressure, respiratory therapy assess nares and determine need for appliance change or resting period.   11/28/2023 1005 by Nova Rosas RN  Outcome: Progressing     Problem: Nutrition Deficit:  Goal: Optimize nutritional status  11/28/2023 1005 by Nova Rosas RN  Outcome: Progressing

## 2023-11-29 NOTE — DISCHARGE SUMMARY
from 03/19/2023 but recommend further evaluation with nonemergent adrenal mass protocol MRI with and without contrast. Large hiatal hernia, increased in size from prior. The patient was seen and examined on day of discharge and this discharge summary is in conjunction with any daily progress note from day of discharge. Time Spent on discharge is 35 minutes in the examination, evaluation, counseling and review of medications and discharge plan. Note that more than 30 minutes was spent in preparing discharge papers, discussing discharge with patient, medication review, etc.       Signed:    Jordan Arcos MD   11/29/2023      Thank you Vickey Navarrete DO for the opportunity to be involved in this patient's care.  If you have any questions or concerns please feel free to contact me at [address], [facility]

## 2023-11-29 NOTE — CARE COORDINATION
Discharge Plan:     Patient discharged to:    [facility]  [address] [facility]. [place], [address] [facility]    SW/DC Planner faxed, [address] and AVS to: [phone]    Narcotic Prescriptions faxed were: N/A    RN: will call report to:  [phone] with: [facility] [phone]     time: 1630    Family advised of discharge?: yes, Diogo Mims, daughter    LEONIDAS Submitted?:  N/A    All discharge needs met per case management.     HIGINIO BonillaN RN    [facility]  Phone: [phone]

## 2023-11-30 ENCOUNTER — TELEPHONE (OUTPATIENT)
Dept: INFECTIOUS DISEASES | Age: 83
End: 2023-11-30

## 2023-11-30 ENCOUNTER — CLINICAL DOCUMENTATION (OUTPATIENT)
Dept: INFECTIOUS DISEASES | Age: 83
End: 2023-11-30

## 2023-11-30 DIAGNOSIS — N39.0 COMPLICATED UTI (URINARY TRACT INFECTION): Primary | ICD-10-CM

## 2023-12-01 NOTE — TELEPHONE ENCOUNTER
LVM with  for [facility]  My name, title, Dr Lazaro Howard name, specialty and affiliation  Pt name and +  My direct number [phone]  Need to verify the IV ATB   I faxed orders yesterday and specifically asked for a call once received and I did not receive that call      While placing this note, Lianna Bazan, returned my call  Ashley Carbone verified OPAT orders  Invanz 1 gm iv daily through    Check CBC w diff, CMP, ESR, CRP weekly  Orders are nontransferrable if transferred or Discharged  If pt leaves AMA PICC is to be removed  Ashley Carbone repeated back all labs correctly

## 2023-12-05 ENCOUNTER — TELEPHONE (OUTPATIENT)
Dept: INFECTIOUS DISEASES | Age: 83
End: 2023-12-05

## 2023-12-05 NOTE — TELEPHONE ENCOUNTER
Reviewed case and called SNF RN-     Doing well at SNF. Wound on abdomen still has staples but is clean/no drainage. Colostomy and stoma are looking good. Sees surgery on 12/14. No bloating or abdominal pain. No fevers or chills, VSS. Will still await labs but as long as labs are stable, will term as planned.     Ed Duenas, PharmD, [facility]  Phone: [phone] (also available on [employer])  Fax: [phone]    For Pharmacy [address] [facility] Only    Program: Medical Group  CPA in place: Yes  Time Spent (min): 10

## 2023-12-07 ENCOUNTER — TELEPHONE (OUTPATIENT)
Dept: INFECTIOUS DISEASES | Age: 83
End: 2023-12-07

## 2023-12-07 NOTE — TELEPHONE ENCOUNTER
Spoke with nurse caring for pt, Groves & Jeyson were not done this week  Notified Jaquan Escamilla, BaileyD, during call and Alexandra Borrego gave orders to end IV ATB as scheduled as pt has received adequate coverage from IV ATB.   Picc should be pulled  I gave these verbal orders to nurse caring for patient  Nurse verbalized understanding  Will contact facility on Thursday 12/7 to verify PICC has been pulled

## 2023-12-07 NOTE — TELEPHONE ENCOUNTER
Nurse caring for pt at [facility] called to ask if we can end IV ATB and pull PICC     I stated I spoke with nurse yesterday and gave those orders  Nurse stated she spoke with Librado earlier this week and she also wanted to know if the labs looked \"okay\"  I replied we did not received lab results as this RN was told yesterday that labs had not been collected this week.   I stated I was in the office with Librado at time of call and Librado gave the end IV ATB and pull PICC orders at that time     Nurse stated she will pull PICC today     Will need to call on Friday to verify picc has been pulled

## 2023-12-07 NOTE — TELEPHONE ENCOUNTER
Nurse caring for pt at [facility] called to ask if we can end IV ATB and pull PICC    I stated I spoke with nurse yesterday and gave those orders  Nurse stated she spoke with Sukh Couch earlier this week and she also wanted to know if the labs looked \"okay\"  I replied we did not received lab results as this RN was told yesterday that labs had not been collected this week.   I stated I was in the office with Sukh Couch at time of call and Sukh Couch gave the end IV ATB and pull PICC orders at that time    Nurse stated she will pull PICC today    Will need to call on Friday to verify picc has been pulled

## 2023-12-07 NOTE — TELEPHONE ENCOUNTER
Spoke with [facility] caring for pt  Labs were not drawn this week  Spoke with Parvin Wilder, BaileyD.   Chester Albert states pt has received adequate coverage from IV ATB and it is safe to end IV ATB as planned and Pull PICC  These verbal orders were given to nurse caring for pt   I will follow up on Thursday 12/7 to verify orders have been followed

## 2023-12-08 ENCOUNTER — TELEPHONE (OUTPATIENT)
Dept: INFECTIOUS DISEASES | Age: 83
End: 2023-12-08

## 2023-12-08 NOTE — TELEPHONE ENCOUNTER
Spoke with Michael Amin RN at [facility] and she verified PICC line was pulled.

## 2023-12-14 ENCOUNTER — HOSPITAL ENCOUNTER (OUTPATIENT)
Dept: WOUND CARE | Age: 83
Discharge: HOME | End: 2023-12-14
Attending: EMERGENCY MEDICINE
Payer: COMMERCIAL

## 2023-12-14 VITALS — RESPIRATION RATE: 16 BRPM | HEART RATE: 80 BPM | SYSTOLIC BLOOD PRESSURE: 106 MMHG | DIASTOLIC BLOOD PRESSURE: 64 MMHG

## 2023-12-14 DIAGNOSIS — Z74.01 BEDRIDDEN: ICD-10-CM

## 2023-12-14 DIAGNOSIS — L25.8 DERMATITIS ASSOCIATED WITH MOISTURE FROM STOOL INCONTINENCE: ICD-10-CM

## 2023-12-14 DIAGNOSIS — L89.154 DECUBITUS ULCER OF SACRAL REGION, STAGE 4 (HCC): Primary | ICD-10-CM

## 2023-12-14 DIAGNOSIS — R15.9 DERMATITIS ASSOCIATED WITH MOISTURE FROM STOOL INCONTINENCE: ICD-10-CM

## 2023-12-14 PROCEDURE — 11046 DBRDMT MUSC&/FSCA EA ADDL: CPT

## 2023-12-14 PROCEDURE — 11046 DBRDMT MUSC&/FSCA EA ADDL: CPT | Performed by: EMERGENCY MEDICINE

## 2023-12-14 PROCEDURE — 11043 DBRDMT MUSC&/FSCA 1ST 20/<: CPT

## 2023-12-14 PROCEDURE — 11043 DBRDMT MUSC&/FSCA 1ST 20/<: CPT | Performed by: EMERGENCY MEDICINE

## 2023-12-14 RX ORDER — SODIUM CHLOR/HYPOCHLOROUS ACID 0.033 %
SOLUTION, IRRIGATION IRRIGATION ONCE
OUTPATIENT
Start: 2023-12-14 | End: 2023-12-14

## 2023-12-14 RX ORDER — IBUPROFEN 200 MG
TABLET ORAL ONCE
OUTPATIENT
Start: 2023-12-14 | End: 2023-12-14

## 2023-12-14 RX ORDER — GINSENG 100 MG
CAPSULE ORAL ONCE
OUTPATIENT
Start: 2023-12-14 | End: 2023-12-14

## 2023-12-14 RX ORDER — CLOBETASOL PROPIONATE 0.5 MG/G
OINTMENT TOPICAL ONCE
OUTPATIENT
Start: 2023-12-14 | End: 2023-12-14

## 2023-12-14 RX ORDER — LIDOCAINE HYDROCHLORIDE 20 MG/ML
JELLY TOPICAL ONCE
OUTPATIENT
Start: 2023-12-14 | End: 2023-12-14

## 2023-12-14 RX ORDER — LIDOCAINE HYDROCHLORIDE 40 MG/ML
SOLUTION TOPICAL ONCE
OUTPATIENT
Start: 2023-12-14 | End: 2023-12-14

## 2023-12-14 RX ORDER — BETAMETHASONE DIPROPIONATE 0.05 %
OINTMENT (GRAM) TOPICAL ONCE
OUTPATIENT
Start: 2023-12-14 | End: 2023-12-14

## 2023-12-14 RX ORDER — BACITRACIN ZINC AND POLYMYXIN B SULFATE 500; 1000 [USP'U]/G; [USP'U]/G
OINTMENT TOPICAL ONCE
OUTPATIENT
Start: 2023-12-14 | End: 2023-12-14

## 2023-12-14 RX ORDER — LIDOCAINE 40 MG/G
CREAM TOPICAL ONCE
OUTPATIENT
Start: 2023-12-14 | End: 2023-12-14

## 2023-12-14 RX ORDER — LIDOCAINE 50 MG/G
OINTMENT TOPICAL ONCE
OUTPATIENT
Start: 2023-12-14 | End: 2023-12-14

## 2023-12-14 RX ORDER — GENTAMICIN SULFATE 1 MG/G
OINTMENT TOPICAL ONCE
OUTPATIENT
Start: 2023-12-14 | End: 2023-12-14

## 2023-12-14 RX ORDER — LIDOCAINE 40 MG/G
CREAM TOPICAL ONCE
Status: DISCONTINUED | OUTPATIENT
Start: 2023-12-14 | End: 2023-12-15 | Stop reason: HOSPADM

## 2023-12-14 RX ORDER — TRIAMCINOLONE ACETONIDE 1 MG/G
OINTMENT TOPICAL ONCE
OUTPATIENT
Start: 2023-12-14 | End: 2023-12-14

## 2023-12-14 NOTE — PROGRESS NOTES
[address] [place], [address] [facility]  Telephone: [phone]            [facility]    Patient [address] [place], [address] [facility]  Telephone: [phone]     Discharge Instructions     Important reminders:     **If you have any signs and symptoms of illness (Cough, fever, congestion, nausea, vomiting, diarrhea, etc.) please call the wound care center prior to your appointment. 1. Increase Protein intake for optimal wound healing  2. No added salt to reduce any swelling  3. If diabetic, maintain good glucose control  4. If you smoke, smoking prohibits wound healing, we ask that you refrain from smoking. 5. When taking antibiotics take the entire prescription as ordered. Do not stop taking until medication is all gone unless otherwise instructed. 6. Exercise as tolerated. 7. Keep weight off wounds and reposition every 2 hours if applicable. 8. If wound(s) is on your lower extremity, elevate legs to the level of the heart or above for 30 minutes 4-5 times a day and/or when sitting. Avoid standing for long periods of time. 9. Do not get wounds wet in bath or shower unless otherwise instructed by your physician. If your wound is on your foot or leg, you may purchase a cast bag. Please ask at the pharmacy. If Vascular testing is ordered, please call [address] [place] [address] [phone]) to schedule. Vascular tests ordered by Wound Care Physicians may take up to 2 hours to complete. Please keep that in mind when scheduling. If Vascular testing is scheduled, please bring supplies to replace your dressing after testing is done. The vascular department does not stock supplies. Wound: Sacrum, Back     With each dressing change, rinse wounds with 0.9% Saline. (May use wound wash or soft contact solution. Both can be purchased at a local drug store).  If unable to obtain
performed by Verner Coppersmith, MD at [facility]  2023    COLONOSCOPY SUBMUCOSAL INJECTION performed by Verner Coppersmith, MD at [facility] N/A 2023    SIGMOID COLECTOMY, END SIGMOID COLOSTOMY performed by Jesi De La O MD at [address] [facility] N/A 2019    CYSTOSCOPY WITH INJECTION OF URETHRAL BULKING AGENT COAPTITE performed by Estevan Baltazar MD at [address] Bilateral     cataract removal with lens implants    HYSTERECTOMY, VAGINAL      TVT    JOINT REPLACEMENT Bilateral     LAMINECTOMY      L3-4-5 decompression of nerve roots      PILONIDAL CYST EXCISION      SALPINGO-OOPHORECTOMY      bilateral    SHOULDER ARTHROPLASTY Right 14    SHOULDER SURGERY Left 10/9/2019    LEFT REVERSE TOTAL SHOULDER REPLACEMENT performed by Dre Rome MD at [address]      right x2--mass in right sinus removed    SPINAL FUSION  12/16/10    posterior fusion, local bone graft    TOTAL KNEE ARTHROPLASTY  2010    left knee    TOTAL KNEE ARTHROPLASTY Right 2013    TUBAL LIGATION      UPPER GASTROINTESTINAL ENDOSCOPY  14    Esophagogastroduodenoscopy, with biopsy of the antrum       FAMILY HISTORY  Family History   Problem Relation Age of Onset    Heart Disease Mother     High Blood Pressure Mother     Stroke Mother     Heart Disease Father     High Blood Pressure Father     Stroke Father        SOCIAL HISTORY  Social History     Tobacco Use    Smoking status: Former     Types: Cigarettes     Quit date: 3/11/1963     Years since quittin.8    Smokeless tobacco: Never    Tobacco comments:     quite age 20   Vaping Use    Vaping Use: Never used   Substance Use Topics    Alcohol use: Not Currently     Comment: social--rare    Drug use: Never       ALLERGIES  Allergies   Allergen Reactions    Sulfa Antibiotics Hives    Tape [Adhesive Tape] Itching     Foam back tape, and cover roll/ tolerates adhesive tape

## 2023-12-14 NOTE — PLAN OF CARE
Discharge instructions given. Patient verbalized understanding. Return to [address] [place] [address] in 1 week(s). Called/faxed orders to [facility].

## 2023-12-14 NOTE — PATIENT INSTRUCTIONS
Wound Care Center Information: Should you experience any significant changes in your wound(s) or have questions about your wound care, please contact the [address] [place] [address] at [phone] Monday  - Thursday 8:00 am - 4:00 pm and Friday 8:00 am - 1:00pm. If you need help with your wound outside these hours and cannot wait until we are again available, contact your PCP or go to the hospital emergency room. PLEASE NOTE: IF YOU ARE UNABLE TO OBTAIN WOUND SUPPLIES, CONTINUE TO USE THE SUPPLIES YOU HAVE AVAILABLE UNTIL YOU ARE ABLE TO REACH US. IT IS MOST IMPORTANT TO KEEP THE WOUND COVERED AT ALL TIMES. Patient Experience     Thank you for trusting us with your care. You may receive a survey from a company called [employer] asking for your feedback. We would appreciate it if you took a few minutes to share your experience. Your input is very valuable to us.

## 2024-01-03 NOTE — PATIENT INSTRUCTIONS
[facility]  [address]   [place], [place] [address]  Telephone: [phone]     FAX [phone]     Discharge Instructions     Important reminders:     **If you have any signs and symptoms of illness (Cough, fever, congestion, nausea, vomiting, diarrhea, etc.) please call the wound care center prior to your appointment.     1. Increase Protein intake for optimal wound healing  2. No added salt to reduce any swelling  3. If diabetic, maintain good glucose control  4. If you smoke, smoking prohibits wound healing, we ask that you refrain from smoking.  5. When taking antibiotics take the entire prescription as ordered. Do not stop taking until medication is all gone unless otherwise instructed.   6. Exercise as tolerated.   7. Keep weight off wounds and reposition every 2 hours if applicable.  8. If wound(s) is on your lower extremity, elevate legs to the level of the heart or above for 30 minutes 4-5 times a day and/or when sitting. Avoid standing for long periods of time.   9. Do not get wounds wet in bath or shower unless otherwise instructed by your physician. If your wound is on your foot or leg, you may purchase a cast bag. Please ask at the pharmacy.        If Vascular testing is ordered, please call [address] [place] [address] [phone]) to schedule.     Vascular tests ordered by Wound Care Physicians may take up to 2 hours to complete. Please keep that in mind when scheduling.      If Vascular testing is scheduled, please bring supplies to replace your dressing after testing is done. The vascular department does not stock supplies.      Wound: Sacrum, Back     With each dressing change, rinse wounds with 0.9% Saline. (May use wound wash or soft contact solution. Both can be purchased at a local drug store). If unable to obtain saline, may use a gentle soap and water.     Dressing care: Please check and maintain banegas catheter daily. Back and irritated skin- apply zinc oxide(Triad) 2x daily.  Sacrum- In

## 2024-01-04 ENCOUNTER — HOSPITAL ENCOUNTER (OUTPATIENT)
Dept: WOUND CARE | Age: 84
Discharge: HOME | End: 2024-01-04
Attending: EMERGENCY MEDICINE
Payer: COMMERCIAL

## 2024-01-04 VITALS — TEMPERATURE: 95.7 F

## 2024-01-04 DIAGNOSIS — L89.154 DECUBITUS ULCER OF SACRAL REGION, STAGE 4 (HCC): Primary | ICD-10-CM

## 2024-01-04 PROCEDURE — 11043 DBRDMT MUSC&/FSCA 1ST 20/<: CPT | Performed by: EMERGENCY MEDICINE

## 2024-01-04 RX ORDER — LIDOCAINE HYDROCHLORIDE 20 MG/ML
JELLY TOPICAL ONCE
OUTPATIENT
Start: 2024-01-04 | End: 2024-01-04

## 2024-01-04 RX ORDER — GENTAMICIN SULFATE 1 MG/G
OINTMENT TOPICAL ONCE
OUTPATIENT
Start: 2024-01-04 | End: 2024-01-04

## 2024-01-04 RX ORDER — GINSENG 100 MG
CAPSULE ORAL ONCE
OUTPATIENT
Start: 2024-01-04 | End: 2024-01-04

## 2024-01-04 RX ORDER — BETAMETHASONE DIPROPIONATE 0.05 %
OINTMENT (GRAM) TOPICAL ONCE
OUTPATIENT
Start: 2024-01-04 | End: 2024-01-04

## 2024-01-04 RX ORDER — LIDOCAINE 50 MG/G
OINTMENT TOPICAL ONCE
OUTPATIENT
Start: 2024-01-04 | End: 2024-01-04

## 2024-01-04 RX ORDER — TRIAMCINOLONE ACETONIDE 1 MG/G
OINTMENT TOPICAL ONCE
OUTPATIENT
Start: 2024-01-04 | End: 2024-01-04

## 2024-01-04 RX ORDER — SODIUM CHLOR/HYPOCHLOROUS ACID 0.033 %
SOLUTION, IRRIGATION IRRIGATION ONCE
OUTPATIENT
Start: 2024-01-04 | End: 2024-01-04

## 2024-01-04 RX ORDER — LIDOCAINE 40 MG/G
CREAM TOPICAL ONCE
OUTPATIENT
Start: 2024-01-04 | End: 2024-01-04

## 2024-01-04 RX ORDER — IBUPROFEN 200 MG
TABLET ORAL ONCE
OUTPATIENT
Start: 2024-01-04 | End: 2024-01-04

## 2024-01-04 RX ORDER — LIDOCAINE 40 MG/G
CREAM TOPICAL ONCE
Status: DISCONTINUED | OUTPATIENT
Start: 2024-01-04 | End: 2024-01-05 | Stop reason: HOSPADM

## 2024-01-04 RX ORDER — BACITRACIN ZINC AND POLYMYXIN B SULFATE 500; 1000 [USP'U]/G; [USP'U]/G
OINTMENT TOPICAL ONCE
OUTPATIENT
Start: 2024-01-04 | End: 2024-01-04

## 2024-01-04 RX ORDER — LIDOCAINE HYDROCHLORIDE 40 MG/ML
SOLUTION TOPICAL ONCE
OUTPATIENT
Start: 2024-01-04 | End: 2024-01-04

## 2024-01-04 RX ORDER — CLOBETASOL PROPIONATE 0.5 MG/G
OINTMENT TOPICAL ONCE
OUTPATIENT
Start: 2024-01-04 | End: 2024-01-04

## 2024-01-04 NOTE — PLAN OF CARE
[facility]  [address]   [place], [place] [address]  Telephone: [phone]     FAX [phone]            Octavio Plascencia    Discharge Instructions     Important reminders:     **If you have any signs and symptoms of illness (Cough, fever, congestion, nausea, vomiting, diarrhea, etc.) please call the wound care center prior to your appointment.     1. Increase Protein intake for optimal wound healing  2. No added salt to reduce any swelling  3. If diabetic, maintain good glucose control  4. If you smoke, smoking prohibits wound healing, we ask that you refrain from smoking.  5. When taking antibiotics take the entire prescription as ordered. Do not stop taking until medication is all gone unless otherwise instructed.   6. Exercise as tolerated.   7. Keep weight off wounds and reposition every 2 hours if applicable.  8. If wound(s) is on your lower extremity, elevate legs to the level of the heart or above for 30 minutes 4-5 times a day and/or when sitting. Avoid standing for long periods of time.   9. Do not get wounds wet in bath or shower unless otherwise instructed by your physician. If your wound is on your foot or leg, you may purchase a cast bag. Please ask at the pharmacy.        If Vascular testing is ordered, please call [address] [place] [address] [phone]) to schedule.     Vascular tests ordered by Wound Care Physicians may take up to 2 hours to complete. Please keep that in mind when scheduling.      If Vascular testing is scheduled, please bring supplies to replace your dressing after testing is done. The vascular department does not stock supplies.      Wound: Sacrum, Back     With each dressing change, rinse wounds with 0.9% Saline. (May use wound wash or soft contact solution. Both can be purchased at a local drug store). If unable to obtain saline, may use a gentle soap and water.     Dressing care: Please check and maintain banegas catheter daily. Back and irritated skin- apply zinc

## 2024-01-04 NOTE — PROGRESS NOTES
[facility]     H&P or Progress Note: Medical Staff Progress Note    Regina Lawson  MEDICAL RECORD NUMBER:  [id number]  AGE: 83 y.o.   GENDER: female  : 1940  EPISODE DATE:  2024    Chief complaint and reason for visit:     Chief Complaint   Patient presents with    Wound Check     Sacral wound F/U        HPI/Wound Narrative:      Regina Lawson is a 83 y.o. female who presents today for an evaluation of a wound/ulcer. Wound duration:  2023 .    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 Here for follow up.  Pertinent associated symptoms: drainage , redness, swelling, skin discoloration, and impaired mobility    23: not getting turn well the last 2 weeks at [facility]    23: having a lot of stool incontinence, mostly mucinous. Contaminating wound. Dressings not being done on time and correctly per daughter.    23: patient has been admitted since last office visit.  She presented with bloating and found to have bowel obstruction.  Followed by GI, surgery, infectious diseases.  Patient underwent exploratory laparotomy, sigmoid colectomy, sigmoid colostomy and closure of rectal stump on 2023.  She is returning to our care related to a chronic sacral pressure ulcer.    23: doing well. No new issues. But npwt still hasn't been done yet    Medical Decision Making:     Historian(s): patient  and daughter .     Comorbid conditions affecting wound healing: As noted in PMH and PSH which was reviewed.  Pertinent labs reviewed.   Review of medical records and external note (s) from other providers was done for this visit.    Problem List Items Addressed This Visit          Other    Decubitus ulcer of sacral region, stage 4 (HCC) - Primary    Relevant Medications    lidocaine (LMX) 4 % cream (Start on 2024  8:30 AM)    Other Relevant Orders    Initiate Outpatient Wound Care Protocol       Wounds/Problems Addressed and Treatment

## 2024-01-04 NOTE — PLAN OF CARE
Discharge instructions given.  Patient verbalized understanding.  Return to [facility] in 1 week(s).  Called/faxed orders to [facility].

## 2024-01-15 NOTE — PATIENT INSTRUCTIONS
[facility]  [address]   [place], [place] [address]  Telephone: [phone]     FAX [phone]     Discharge Instructions     Important reminders:     **If you have any signs and symptoms of illness (Cough, fever, congestion, nausea, vomiting, diarrhea, etc.) please call the wound care center prior to your appointment.     1. Increase Protein intake for optimal wound healing  2. No added salt to reduce any swelling  3. If diabetic, maintain good glucose control  4. If you smoke, smoking prohibits wound healing, we ask that you refrain from smoking.  5. When taking antibiotics take the entire prescription as ordered. Do not stop taking until medication is all gone unless otherwise instructed.   6. Exercise as tolerated.   7. Keep weight off wounds and reposition every 2 hours if applicable.  8. If wound(s) is on your lower extremity, elevate legs to the level of the heart or above for 30 minutes 4-5 times a day and/or when sitting. Avoid standing for long periods of time.   9. Do not get wounds wet in bath or shower unless otherwise instructed by your physician. If your wound is on your foot or leg, you may purchase a cast bag. Please ask at the pharmacy.        If Vascular testing is ordered, please call [address] [place] [address] [phone]) to schedule.     Vascular tests ordered by Wound Care Physicians may take up to 2 hours to complete. Please keep that in mind when scheduling.      If Vascular testing is scheduled, please bring supplies to replace your dressing after testing is done. The vascular department does not stock supplies.      Wound: Sacrum, Back     With each dressing change, rinse wounds with 0.9% Saline. (May use wound wash or soft contact solution. Both can be purchased at a local drug store). If unable to obtain saline, may use a gentle soap and water.     Dressing care: Please check and maintain banegas catheter and flush as needed to prevent leakage daily. Back and irritated skin- apply

## 2024-01-17 NOTE — PROGRESS NOTES
[facility]     H&P or Progress Note: Medical Staff Progress Note    Regina Lawson  MEDICAL RECORD NUMBER:  [id number]  AGE: 83 y.o.   GENDER: female  : 1940  EPISODE DATE:  2024    Chief complaint and reason for visit:     Chief Complaint   Patient presents with    Wound Check     Sacrum F/U        HPI/Wound Narrative:      Regina Lawson is a 83 y.o. female who presents today for an evaluation of a wound/ulcer. Wound duration:  2023 .    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 Here for follow up.  Pertinent associated symptoms: drainage , redness, swelling, skin discoloration, and impaired mobility    23: not getting turn well the last 2 weeks at [facility]    23: having a lot of stool incontinence, mostly mucinous. Contaminating wound. Dressings not being done on time and correctly per daughter.    23: patient has been admitted since last office visit.  She presented with bloating and found to have bowel obstruction.  Followed by GI, surgery, infectious diseases.  Patient underwent exploratory laparotomy, sigmoid colectomy, sigmoid colostomy and closure of rectal stump on 2023.  She is returning to our care related to a chronic sacral pressure ulcer.    23: doing well. No new issues. But npwt still hasn't been done yet    24: Patient had vera flow negative pressure wound therapy attempted but having issues with staying in place.  A lot of leakage her [facility] nursing report.     Medical Decision Making:     Historian(s): patient  and daughter .     Comorbid conditions affecting wound healing: As noted in PMH and PSH which was reviewed.  Pertinent labs reviewed.   Review of medical records and external note (s) from other providers was done for this visit.    Problem List Items Addressed This Visit          Other    Decubitus ulcer of sacral region, stage 4 (HCC)    Relevant Medications    lidocaine (LMX) 4 % cream

## 2024-01-18 ENCOUNTER — HOSPITAL ENCOUNTER (OUTPATIENT)
Dept: WOUND CARE | Age: 84
Discharge: HOME | End: 2024-01-18
Attending: EMERGENCY MEDICINE
Payer: COMMERCIAL

## 2024-01-18 VITALS — HEART RATE: 73 BPM | DIASTOLIC BLOOD PRESSURE: 63 MMHG | TEMPERATURE: 96.2 F | SYSTOLIC BLOOD PRESSURE: 104 MMHG

## 2024-01-18 DIAGNOSIS — Z97.8 CHRONIC INDWELLING FOLEY CATHETER: Primary | ICD-10-CM

## 2024-01-18 DIAGNOSIS — G82.20 PARAPLEGIA (HCC): ICD-10-CM

## 2024-01-18 DIAGNOSIS — L25.8 DERMATITIS ASSOCIATED WITH MOISTURE FROM STOOL INCONTINENCE: ICD-10-CM

## 2024-01-18 DIAGNOSIS — R15.9 DERMATITIS ASSOCIATED WITH MOISTURE FROM STOOL INCONTINENCE: ICD-10-CM

## 2024-01-18 DIAGNOSIS — L89.154 DECUBITUS ULCER OF SACRAL REGION, STAGE 4 (HCC): ICD-10-CM

## 2024-01-18 DIAGNOSIS — Z74.01 BEDRIDDEN: ICD-10-CM

## 2024-01-18 PROCEDURE — 11043 DBRDMT MUSC&/FSCA 1ST 20/<: CPT

## 2024-01-18 PROCEDURE — 11043 DBRDMT MUSC&/FSCA 1ST 20/<: CPT | Performed by: EMERGENCY MEDICINE

## 2024-01-18 RX ORDER — LIDOCAINE 40 MG/G
CREAM TOPICAL ONCE
Status: DISCONTINUED | OUTPATIENT
Start: 2024-01-18 | End: 2024-01-19 | Stop reason: HOSPADM

## 2024-01-18 RX ORDER — LIDOCAINE HYDROCHLORIDE 20 MG/ML
JELLY TOPICAL ONCE
OUTPATIENT
Start: 2024-01-18 | End: 2024-01-18

## 2024-01-18 RX ORDER — LIDOCAINE 40 MG/G
CREAM TOPICAL ONCE
OUTPATIENT
Start: 2024-01-18 | End: 2024-01-18

## 2024-01-18 RX ORDER — GINSENG 100 MG
CAPSULE ORAL ONCE
OUTPATIENT
Start: 2024-01-18 | End: 2024-01-18

## 2024-01-18 RX ORDER — CLOBETASOL PROPIONATE 0.5 MG/G
OINTMENT TOPICAL ONCE
OUTPATIENT
Start: 2024-01-18 | End: 2024-01-18

## 2024-01-18 RX ORDER — BACITRACIN ZINC AND POLYMYXIN B SULFATE 500; 1000 [USP'U]/G; [USP'U]/G
OINTMENT TOPICAL ONCE
OUTPATIENT
Start: 2024-01-18 | End: 2024-01-18

## 2024-01-18 RX ORDER — GENTAMICIN SULFATE 1 MG/G
OINTMENT TOPICAL ONCE
OUTPATIENT
Start: 2024-01-18 | End: 2024-01-18

## 2024-01-18 RX ORDER — LIDOCAINE HYDROCHLORIDE 40 MG/ML
SOLUTION TOPICAL ONCE
OUTPATIENT
Start: 2024-01-18 | End: 2024-01-18

## 2024-01-18 RX ORDER — TRIAMCINOLONE ACETONIDE 1 MG/G
OINTMENT TOPICAL ONCE
OUTPATIENT
Start: 2024-01-18 | End: 2024-01-18

## 2024-01-18 RX ORDER — SODIUM CHLOR/HYPOCHLOROUS ACID 0.033 %
SOLUTION, IRRIGATION IRRIGATION ONCE
OUTPATIENT
Start: 2024-01-18 | End: 2024-01-18

## 2024-01-18 RX ORDER — BETAMETHASONE DIPROPIONATE 0.05 %
OINTMENT (GRAM) TOPICAL ONCE
OUTPATIENT
Start: 2024-01-18 | End: 2024-01-18

## 2024-01-18 RX ORDER — LIDOCAINE 50 MG/G
OINTMENT TOPICAL ONCE
OUTPATIENT
Start: 2024-01-18 | End: 2024-01-18

## 2024-01-18 RX ORDER — IBUPROFEN 200 MG
TABLET ORAL ONCE
OUTPATIENT
Start: 2024-01-18 | End: 2024-01-18

## 2024-01-18 NOTE — PLAN OF CARE
[facility]  [address]   [place], [place] [address]  Telephone: [phone]     FAX [phone]            Octavio Plascencia    Patient Instructions   [facility]  [address]   [place], [place] [address]  Telephone: [phone]     FAX [phone]     Discharge Instructions     Important reminders:     **If you have any signs and symptoms of illness (Cough, fever, congestion, nausea, vomiting, diarrhea, etc.) please call the wound care center prior to your appointment.     1. Increase Protein intake for optimal wound healing  2. No added salt to reduce any swelling  3. If diabetic, maintain good glucose control  4. If you smoke, smoking prohibits wound healing, we ask that you refrain from smoking.  5. When taking antibiotics take the entire prescription as ordered. Do not stop taking until medication is all gone unless otherwise instructed.   6. Exercise as tolerated.   7. Keep weight off wounds and reposition every 2 hours if applicable.  8. If wound(s) is on your lower extremity, elevate legs to the level of the heart or above for 30 minutes 4-5 times a day and/or when sitting. Avoid standing for long periods of time.   9. Do not get wounds wet in bath or shower unless otherwise instructed by your physician. If your wound is on your foot or leg, you may purchase a cast bag. Please ask at the pharmacy.        If Vascular testing is ordered, please call [address] [place] [address] [phone]) to schedule.     Vascular tests ordered by Wound Care Physicians may take up to 2 hours to complete. Please keep that in mind when scheduling.      If Vascular testing is scheduled, please bring supplies to replace your dressing after testing is done. The vascular department does not stock supplies.      Wound: Sacrum, Back     With each dressing change, rinse wounds with 0.9% Saline. (May use wound wash or soft contact solution. Both can be purchased at a local drug store). If unable to obtain

## 2024-01-18 NOTE — PLAN OF CARE
Discharge instructions given.  Patient verbalized understanding.  Return to [facility] in 1 week(s).  Called/faxed orders to  [facility].

## 2024-01-29 NOTE — PATIENT INSTRUCTIONS
[facility]  [address]   [place], [place] [address]  Telephone: [phone]     FAX [phone]     Discharge Instructions     Important reminders:     **If you have any signs and symptoms of illness (Cough, fever, congestion, nausea, vomiting, diarrhea, etc.) please call the wound care center prior to your appointment.     1. Increase Protein intake for optimal wound healing  2. No added salt to reduce any swelling  3. If diabetic, maintain good glucose control  4. If you smoke, smoking prohibits wound healing, we ask that you refrain from smoking.  5. When taking antibiotics take the entire prescription as ordered. Do not stop taking until medication is all gone unless otherwise instructed.   6. Exercise as tolerated.   7. Keep weight off wounds and reposition every 2 hours if applicable.  8. If wound(s) is on your lower extremity, elevate legs to the level of the heart or above for 30 minutes 4-5 times a day and/or when sitting. Avoid standing for long periods of time.   9. Do not get wounds wet in bath or shower unless otherwise instructed by your physician. If your wound is on your foot or leg, you may purchase a cast bag. Please ask at the pharmacy.        If Vascular testing is ordered, please call [address] [place] [address] [phone]) to schedule.     Vascular tests ordered by Wound Care Physicians may take up to 2 hours to complete. Please keep that in mind when scheduling.      If Vascular testing is scheduled, please bring supplies to replace your dressing after testing is done. The vascular department does not stock supplies.      Wound: Sacrum, Back     With each dressing change, rinse wounds with 0.9% Saline. (May use wound wash or soft contact solution. Both can be purchased at a local drug store). If unable to obtain saline, may use a gentle soap and water.     Dressing care: Please check and maintain banegas catheter and flush as needed to prevent leakage daily.   Stop VAC.  Sacrum-

## 2024-02-01 ENCOUNTER — HOSPITAL ENCOUNTER (OUTPATIENT)
Dept: WOUND CARE | Age: 84
Discharge: HOME | End: 2024-02-01
Attending: EMERGENCY MEDICINE
Payer: COMMERCIAL

## 2024-02-01 VITALS — DIASTOLIC BLOOD PRESSURE: 63 MMHG | HEART RATE: 67 BPM | TEMPERATURE: 95.5 F | SYSTOLIC BLOOD PRESSURE: 110 MMHG

## 2024-02-01 DIAGNOSIS — R15.9 DERMATITIS ASSOCIATED WITH MOISTURE FROM STOOL INCONTINENCE: ICD-10-CM

## 2024-02-01 DIAGNOSIS — Z74.01 BEDRIDDEN: Primary | ICD-10-CM

## 2024-02-01 DIAGNOSIS — Z97.8 CHRONIC INDWELLING FOLEY CATHETER: ICD-10-CM

## 2024-02-01 DIAGNOSIS — L25.8 DERMATITIS ASSOCIATED WITH MOISTURE FROM STOOL INCONTINENCE: ICD-10-CM

## 2024-02-01 DIAGNOSIS — L89.154 DECUBITUS ULCER OF SACRAL REGION, STAGE 4 (HCC): ICD-10-CM

## 2024-02-01 DIAGNOSIS — G82.20 PARAPLEGIA (HCC): ICD-10-CM

## 2024-02-01 PROCEDURE — 11042 DBRDMT SUBQ TIS 1ST 20SQCM/<: CPT

## 2024-02-01 RX ORDER — GINSENG 100 MG
CAPSULE ORAL ONCE
OUTPATIENT
Start: 2024-02-01 | End: 2024-02-01

## 2024-02-01 RX ORDER — BACITRACIN ZINC AND POLYMYXIN B SULFATE 500; 1000 [USP'U]/G; [USP'U]/G
OINTMENT TOPICAL ONCE
OUTPATIENT
Start: 2024-02-01 | End: 2024-02-01

## 2024-02-01 RX ORDER — LIDOCAINE 40 MG/G
CREAM TOPICAL ONCE
Status: DISCONTINUED | OUTPATIENT
Start: 2024-02-01 | End: 2024-02-02 | Stop reason: HOSPADM

## 2024-02-01 RX ORDER — IBUPROFEN 200 MG
TABLET ORAL ONCE
OUTPATIENT
Start: 2024-02-01 | End: 2024-02-01

## 2024-02-01 RX ORDER — LIDOCAINE HYDROCHLORIDE 40 MG/ML
SOLUTION TOPICAL ONCE
OUTPATIENT
Start: 2024-02-01 | End: 2024-02-01

## 2024-02-01 RX ORDER — TRIAMCINOLONE ACETONIDE 1 MG/G
OINTMENT TOPICAL ONCE
OUTPATIENT
Start: 2024-02-01 | End: 2024-02-01

## 2024-02-01 RX ORDER — CLOBETASOL PROPIONATE 0.5 MG/G
OINTMENT TOPICAL ONCE
OUTPATIENT
Start: 2024-02-01 | End: 2024-02-01

## 2024-02-01 RX ORDER — LIDOCAINE 50 MG/G
OINTMENT TOPICAL ONCE
OUTPATIENT
Start: 2024-02-01 | End: 2024-02-01

## 2024-02-01 RX ORDER — LIDOCAINE 40 MG/G
CREAM TOPICAL ONCE
OUTPATIENT
Start: 2024-02-01 | End: 2024-02-01

## 2024-02-01 RX ORDER — LIDOCAINE HYDROCHLORIDE 20 MG/ML
JELLY TOPICAL ONCE
OUTPATIENT
Start: 2024-02-01 | End: 2024-02-01

## 2024-02-01 RX ORDER — BETAMETHASONE DIPROPIONATE 0.05 %
OINTMENT (GRAM) TOPICAL ONCE
OUTPATIENT
Start: 2024-02-01 | End: 2024-02-01

## 2024-02-01 RX ORDER — GENTAMICIN SULFATE 1 MG/G
OINTMENT TOPICAL ONCE
OUTPATIENT
Start: 2024-02-01 | End: 2024-02-01

## 2024-02-01 RX ORDER — SODIUM CHLOR/HYPOCHLOROUS ACID 0.033 %
SOLUTION, IRRIGATION IRRIGATION ONCE
OUTPATIENT
Start: 2024-02-01 | End: 2024-02-01

## 2024-02-01 NOTE — PLAN OF CARE
Stop VeraFlo VAC.  Discharge instructions given.  Patient verbalized understanding.  Return to [facility] in 1 week(s).  Called/faxed orders to [facility].

## 2024-02-01 NOTE — PROGRESS NOTES
[facility]     H&P or Progress Note: Medical Staff Progress Note    Regina Lawson  MEDICAL RECORD NUMBER:  [id number]  AGE: 83 y.o.   GENDER: female  : 1940  EPISODE DATE:  2024    Chief complaint and reason for visit:     Chief Complaint   Patient presents with    Wound Check     Sacrum F/U        HPI/Wound Narrative:      Regina Lawson is a 83 y.o. female who presents today for an evaluation of a wound/ulcer. Wound duration:  2023 .    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 Here for follow up.  Pertinent associated symptoms: drainage , redness, swelling, skin discoloration, and impaired mobility    23: not getting turn well the last 2 weeks at [facility]  23: having a lot of stool incontinence, mostly mucinous. Contaminating wound. Dressings not being done on time and correctly per daughter.  23: patient has been admitted since last office visit.  She presented with bloating and found to have bowel obstruction.  Followed by GI, surgery, infectious diseases.  Patient underwent exploratory laparotomy, sigmoid colectomy, sigmoid colostomy and closure of rectal stump on 2023.  She is returning to our care related to a chronic sacral pressure ulcer.  23: doing well. No new issues. But npwt still hasn't been done yet  24: Patient had vera flow negative pressure wound therapy attempted but having issues with staying in place.  A lot of leakage her [facility] nursing report.     24: lisha currently.    : Medical Decision Making:     Historian(s): patient  and daughter .     Comorbid conditions affecting wound healing: As noted in PMH and PSH which was reviewed.  Pertinent labs reviewed.   Review of medical records and external note (s) from other providers was done for this visit.    Problem List Items Addressed This Visit          Other    Decubitus ulcer of sacral region, stage 4 (HCC)    Relevant Medications

## 2024-02-01 NOTE — PLAN OF CARE
[facility]  [address]   [place], [place] [address]  Telephone: [phone]     FAX [phone]            Octavio Plascencia    Patient Instructions   [facility]  [address]   [place], [place] [address]  Telephone: [phone]     FAX [phone]     Discharge Instructions     Important reminders:     **If you have any signs and symptoms of illness (Cough, fever, congestion, nausea, vomiting, diarrhea, etc.) please call the wound care center prior to your appointment.     1. Increase Protein intake for optimal wound healing  2. No added salt to reduce any swelling  3. If diabetic, maintain good glucose control  4. If you smoke, smoking prohibits wound healing, we ask that you refrain from smoking.  5. When taking antibiotics take the entire prescription as ordered. Do not stop taking until medication is all gone unless otherwise instructed.   6. Exercise as tolerated.   7. Keep weight off wounds and reposition every 2 hours if applicable.  8. If wound(s) is on your lower extremity, elevate legs to the level of the heart or above for 30 minutes 4-5 times a day and/or when sitting. Avoid standing for long periods of time.   9. Do not get wounds wet in bath or shower unless otherwise instructed by your physician. If your wound is on your foot or leg, you may purchase a cast bag. Please ask at the pharmacy.        If Vascular testing is ordered, please call [address] [place] [address] [phone]) to schedule.     Vascular tests ordered by Wound Care Physicians may take up to 2 hours to complete. Please keep that in mind when scheduling.      If Vascular testing is scheduled, please bring supplies to replace your dressing after testing is done. The vascular department does not stock supplies.      Wound: Sacrum, Back     With each dressing change, rinse wounds with 0.9% Saline. (May use wound wash or soft contact solution. Both can be purchased at a local drug store). If unable to obtain

## 2024-02-20 NOTE — PATIENT INSTRUCTIONS
[facility]  [address]   [place], [place] [address]  Telephone: [phone]     FAX [phone]     Discharge Instructions     Important reminders:     **If you have any signs and symptoms of illness (Cough, fever, congestion, nausea, vomiting, diarrhea, etc.) please call the wound care center prior to your appointment.     1. Increase Protein intake for optimal wound healing  2. No added salt to reduce any swelling  3. If diabetic, maintain good glucose control  4. If you smoke, smoking prohibits wound healing, we ask that you refrain from smoking.  5. When taking antibiotics take the entire prescription as ordered. Do not stop taking until medication is all gone unless otherwise instructed.   6. Exercise as tolerated.   7. Keep weight off wounds and reposition every 2 hours if applicable.  8. If wound(s) is on your lower extremity, elevate legs to the level of the heart or above for 30 minutes 4-5 times a day and/or when sitting. Avoid standing for long periods of time.   9. Do not get wounds wet in bath or shower unless otherwise instructed by your physician. If your wound is on your foot or leg, you may purchase a cast bag. Please ask at the pharmacy.        If Vascular testing is ordered, please call [address] [place] [address] [phone]) to schedule.     Vascular tests ordered by Wound Care Physicians may take up to 2 hours to complete. Please keep that in mind when scheduling.      If Vascular testing is scheduled, please bring supplies to replace your dressing after testing is done. The vascular department does not stock supplies.      Wound: Sacrum, Back     With each dressing change, rinse wounds with 0.9% Saline. (May use wound wash or soft contact solution. Both can be purchased at a local drug store). If unable to obtain saline, may use a gentle soap and water.     Dressing care: Please check and maintain banegas catheter and flush as needed to prevent leakage daily.   Stop VAC.  Sacrum-

## 2024-02-29 ENCOUNTER — HOSPITAL ENCOUNTER (OUTPATIENT)
Dept: WOUND CARE | Age: 84
Discharge: HOME | End: 2024-02-29
Attending: EMERGENCY MEDICINE
Payer: COMMERCIAL

## 2024-02-29 VITALS — HEART RATE: 68 BPM | TEMPERATURE: 96 F | SYSTOLIC BLOOD PRESSURE: 100 MMHG | DIASTOLIC BLOOD PRESSURE: 59 MMHG

## 2024-02-29 DIAGNOSIS — L89.154 DECUBITUS ULCER OF SACRAL REGION, STAGE 4 (HCC): Primary | ICD-10-CM

## 2024-02-29 PROCEDURE — 11043 DBRDMT MUSC&/FSCA 1ST 20/<: CPT | Performed by: EMERGENCY MEDICINE

## 2024-02-29 PROCEDURE — 11043 DBRDMT MUSC&/FSCA 1ST 20/<: CPT

## 2024-02-29 RX ORDER — LIDOCAINE HYDROCHLORIDE 20 MG/ML
JELLY TOPICAL ONCE
OUTPATIENT
Start: 2024-02-29 | End: 2024-02-29

## 2024-02-29 RX ORDER — LIDOCAINE 50 MG/G
OINTMENT TOPICAL ONCE
OUTPATIENT
Start: 2024-02-29 | End: 2024-02-29

## 2024-02-29 RX ORDER — LIDOCAINE 40 MG/G
CREAM TOPICAL ONCE
OUTPATIENT
Start: 2024-02-29 | End: 2024-02-29

## 2024-02-29 RX ORDER — GINSENG 100 MG
CAPSULE ORAL ONCE
OUTPATIENT
Start: 2024-02-29 | End: 2024-02-29

## 2024-02-29 RX ORDER — BACITRACIN ZINC AND POLYMYXIN B SULFATE 500; 1000 [USP'U]/G; [USP'U]/G
OINTMENT TOPICAL ONCE
OUTPATIENT
Start: 2024-02-29 | End: 2024-02-29

## 2024-02-29 RX ORDER — BETAMETHASONE DIPROPIONATE 0.05 %
OINTMENT (GRAM) TOPICAL ONCE
OUTPATIENT
Start: 2024-02-29 | End: 2024-02-29

## 2024-02-29 RX ORDER — IBUPROFEN 200 MG
TABLET ORAL ONCE
OUTPATIENT
Start: 2024-02-29 | End: 2024-02-29

## 2024-02-29 RX ORDER — LIDOCAINE HYDROCHLORIDE 40 MG/ML
SOLUTION TOPICAL ONCE
OUTPATIENT
Start: 2024-02-29 | End: 2024-02-29

## 2024-02-29 RX ORDER — TRIAMCINOLONE ACETONIDE 1 MG/G
OINTMENT TOPICAL ONCE
OUTPATIENT
Start: 2024-02-29 | End: 2024-02-29

## 2024-02-29 RX ORDER — CLOBETASOL PROPIONATE 0.5 MG/G
OINTMENT TOPICAL ONCE
OUTPATIENT
Start: 2024-02-29 | End: 2024-02-29

## 2024-02-29 RX ORDER — GENTAMICIN SULFATE 1 MG/G
OINTMENT TOPICAL ONCE
OUTPATIENT
Start: 2024-02-29 | End: 2024-02-29

## 2024-02-29 RX ORDER — LIDOCAINE 40 MG/G
CREAM TOPICAL ONCE
Status: DISCONTINUED | OUTPATIENT
Start: 2024-02-29 | End: 2024-03-01 | Stop reason: HOSPADM

## 2024-02-29 RX ORDER — SODIUM CHLOR/HYPOCHLOROUS ACID 0.033 %
SOLUTION, IRRIGATION IRRIGATION ONCE
OUTPATIENT
Start: 2024-02-29 | End: 2024-02-29

## 2024-02-29 NOTE — PROGRESS NOTES
[facility]     H&P or Progress Note: Medical Staff Progress Note    Regina Lawson  MEDICAL RECORD NUMBER:  [id number]  AGE: 83 y.o.   GENDER: female  : 1940  EPISODE DATE:  2024    Chief complaint and reason for visit:     Chief Complaint   Patient presents with    Wound Check        HPI/Wound Narrative:      Regina Lawson is a 83 y.o. female who presents today for an evaluation of a wound/ulcer. Wound duration:  2023 .    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 Here for follow up.  Pertinent associated symptoms: drainage , redness, swelling, skin discoloration, and impaired mobility    23: not getting turn well the last 2 weeks at [facility]  23: having a lot of stool incontinence, mostly mucinous. Contaminating wound. Dressings not being done on time and correctly per daughter.  23: patient has been admitted since last office visit.  She presented with bloating and found to have bowel obstruction.  Followed by GI, surgery, infectious diseases.  Patient underwent exploratory laparotomy, sigmoid colectomy, sigmoid colostomy and closure of rectal stump on 2023.  She is returning to our care related to a chronic sacral pressure ulcer.  23: doing well. No new issues. But npwt still hasn't been done yet  24: Patient had vera flow negative pressure wound therapy attempted but having issues with staying in place.  A lot of leakage her [facility] nursing report.     24: veraflo currently.    24: no new wound care issues. Tolerating collagen dressing changes. Vac was stopped last visit.    : Medical Decision Making:     Historian(s): patient  and daughter .     Comorbid conditions affecting wound healing: As noted in PMH and PSH which was reviewed.  Pertinent labs reviewed.   Review of medical records and external note (s) from other providers was done for this visit.    Problem List Items Addressed This Visit          
[facility] at [phone] Monday  - Thursday 8:00 am - 4:00 pm and Friday 8:00 am - 1:00pm. If you need help with your wound outside these hours and cannot wait until we are again available, contact your PCP or go to the hospital emergency room.      PLEASE NOTE: IF YOU ARE UNABLE TO OBTAIN WOUND SUPPLIES, CONTINUE TO USE THE SUPPLIES YOU HAVE AVAILABLE UNTIL YOU ARE ABLE TO REACH US. IT IS MOST IMPORTANT TO KEEP THE WOUND COVERED AT ALL TIMES.     Patient Experience     Thank you for trusting us with your care.  You may receive a survey from a company called [employer] asking for your feedback.  We would appreciate it if you took a few minutes to share your experience.  Your input is very valuable to us.      Skilled nurse to evaluate and treat for wound care. Change dressing as ordered  once a day on Monday, Tuesday, Wednesday, Thursday, Friday, Saturday, and Sunday using clean technique. Patient/Family/caregiver may change dressings as needed as instructed when Home Care unavailable.    WOUNDS REQUIRING DRESSING Changes:     Wound 09/21/23 Sacrum #1 (Active)   Wound Image   12/14/23 0808   Wound Etiology Pressure Stage 4 02/29/24 0810   Dressing Status Clean;Dry;Intact 11/29/23 0830   Wound Cleansed Cleansed with saline 02/29/24 0810   Dressing/Treatment ABD;Gauze dressing/dressing sponge;Moist to dry 11/29/23 0830   Dressing Change Due 11/23/23 11/22/23 2149   Wound Length (cm) 3.5 cm 02/29/24 0810   Wound Width (cm) 2 cm 02/29/24 0810   Wound Depth (cm) 2.8 cm 02/29/24 0810   Wound Surface Area (cm^2) 7 cm^2 02/29/24 0810   Change in Wound Size % (l*w) 76.67 02/29/24 0810   Wound Volume (cm^3) 19.6 cm^3 02/29/24 0810   Wound Healing % 84 02/29/24 0810   Post-Procedure Length (cm) 3.6 cm 02/29/24 0832   Post-Procedure Width (cm) 2.1 cm 02/29/24 0832   Post-Procedure Depth (cm) 2.9 cm 02/29/24 0832   Post-Procedure Surface Area (cm^2) 7.56 cm^2 02/29/24 0832   Post-Procedure Volume (cm^3) 21.924

## 2024-02-29 NOTE — PLAN OF CARE
Discharge instructions given.  Patient verbalized understanding.  Return to [facility] in 1 week(s).  Called/faxed orders to  [facility].

## 2024-03-13 NOTE — PATIENT INSTRUCTIONS
these hours and cannot wait until we are again available, contact your PCP or go to the hospital emergency room.      PLEASE NOTE: IF YOU ARE UNABLE TO OBTAIN WOUND SUPPLIES, CONTINUE TO USE THE SUPPLIES YOU HAVE AVAILABLE UNTIL YOU ARE ABLE TO REACH US. IT IS MOST IMPORTANT TO KEEP THE WOUND COVERED AT ALL TIMES.     Patient Experience     Thank you for trusting us with your care.  You may receive a survey from a company called [employer] asking for your feedback.  We would appreciate it if you took a few minutes to share your experience.  Your input is very valuable to us.

## 2024-03-14 ENCOUNTER — HOSPITAL ENCOUNTER (OUTPATIENT)
Dept: WOUND CARE | Age: 84
Discharge: HOME | End: 2024-03-14
Attending: EMERGENCY MEDICINE
Payer: MEDICARE

## 2024-03-14 ENCOUNTER — HOSPITAL ENCOUNTER (EMERGENCY)
Age: 84
Discharge: HOME | End: 2024-03-14
Attending: EMERGENCY MEDICINE
Payer: MEDICARE

## 2024-03-14 VITALS
RESPIRATION RATE: 18 BRPM | DIASTOLIC BLOOD PRESSURE: 58 MMHG | OXYGEN SATURATION: 99 % | HEIGHT: 64 IN | SYSTOLIC BLOOD PRESSURE: 115 MMHG | HEART RATE: 72 BPM | TEMPERATURE: 98.5 F | BODY MASS INDEX: 33.44 KG/M2

## 2024-03-14 VITALS — HEART RATE: 76 BPM | SYSTOLIC BLOOD PRESSURE: 112 MMHG | DIASTOLIC BLOOD PRESSURE: 66 MMHG

## 2024-03-14 DIAGNOSIS — L89.154 DECUBITUS ULCER OF SACRAL REGION, STAGE 4 (HCC): Primary | ICD-10-CM

## 2024-03-14 DIAGNOSIS — N39.0 URINARY TRACT INFECTION ASSOCIATED WITH INDWELLING URETHRAL CATHETER, INITIAL ENCOUNTER (HCC): Primary | ICD-10-CM

## 2024-03-14 DIAGNOSIS — T83.511A URINARY TRACT INFECTION ASSOCIATED WITH INDWELLING URETHRAL CATHETER, INITIAL ENCOUNTER (HCC): Primary | ICD-10-CM

## 2024-03-14 DIAGNOSIS — N17.9 AKI (ACUTE KIDNEY INJURY) (HCC): ICD-10-CM

## 2024-03-14 LAB
ALBUMIN SERPL-MCNC: 3.4 G/DL (ref 3.4–5)
ALBUMIN/GLOB SERPL: 0.9 {RATIO} (ref 1.1–2.2)
ALP SERPL-CCNC: 115 U/L (ref 40–129)
ALT SERPL-CCNC: 22 U/L (ref 10–40)
ANION GAP SERPL CALCULATED.3IONS-SCNC: 7 MMOL/L (ref 3–16)
AST SERPL-CCNC: 23 U/L (ref 15–37)
BACTERIA URNS QL MICRO: ABNORMAL /HPF
BASOPHILS # BLD: 0.1 K/UL (ref 0–0.2)
BASOPHILS NFR BLD: 1.1 %
BILIRUB SERPL-MCNC: 0.3 MG/DL (ref 0–1)
BILIRUB UR QL STRIP.AUTO: NEGATIVE
BUN SERPL-MCNC: 36 MG/DL (ref 7–20)
CALCIUM SERPL-MCNC: 9.9 MG/DL (ref 8.3–10.6)
CHLORIDE SERPL-SCNC: 100 MMOL/L (ref 99–110)
CLARITY UR: ABNORMAL
CO2 SERPL-SCNC: 29 MMOL/L (ref 21–32)
COLOR UR: YELLOW
CREAT SERPL-MCNC: 1.1 MG/DL (ref 0.6–1.2)
DEPRECATED RDW RBC AUTO: 16.1 % (ref 12.4–15.4)
EOSINOPHIL # BLD: 0.2 K/UL (ref 0–0.6)
EOSINOPHIL NFR BLD: 4.3 %
EPI CELLS #/AREA URNS AUTO: 0 /HPF (ref 0–5)
GFR SERPLBLD CREATININE-BSD FMLA CKD-EPI: 49 ML/MIN/{1.73_M2}
GLUCOSE SERPL-MCNC: 121 MG/DL (ref 70–99)
GLUCOSE UR STRIP.AUTO-MCNC: NEGATIVE MG/DL
HCT VFR BLD AUTO: 37.7 % (ref 36–48)
HGB BLD-MCNC: 12.2 G/DL (ref 12–16)
HGB UR QL STRIP.AUTO: ABNORMAL
HYALINE CASTS #/AREA URNS AUTO: 2 /LPF (ref 0–8)
KETONES UR STRIP.AUTO-MCNC: NEGATIVE MG/DL
LEUKOCYTE ESTERASE UR QL STRIP.AUTO: ABNORMAL
LIPASE SERPL-CCNC: 27 U/L (ref 13–60)
LYMPHOCYTES # BLD: 1 K/UL (ref 1–5.1)
LYMPHOCYTES NFR BLD: 16.9 %
MCH RBC QN AUTO: 27.6 PG (ref 26–34)
MCHC RBC AUTO-ENTMCNC: 32.3 G/DL (ref 31–36)
MCV RBC AUTO: 85.5 FL (ref 80–100)
MONOCYTES # BLD: 0.7 K/UL (ref 0–1.3)
MONOCYTES NFR BLD: 11.3 %
NEUTROPHILS # BLD: 3.9 K/UL (ref 1.7–7.7)
NEUTROPHILS NFR BLD: 66.4 %
NITRITE UR QL STRIP.AUTO: POSITIVE
PH UR STRIP.AUTO: 7 [PH] (ref 5–8)
PLATELET # BLD AUTO: 277 K/UL (ref 135–450)
PMV BLD AUTO: 8.8 FL (ref 5–10.5)
POTASSIUM SERPL-SCNC: 5 MMOL/L (ref 3.5–5.1)
PROT SERPL-MCNC: 7.2 G/DL (ref 6.4–8.2)
PROT UR STRIP.AUTO-MCNC: NEGATIVE MG/DL
RBC # BLD AUTO: 4.41 M/UL (ref 4–5.2)
RBC CLUMPS #/AREA URNS AUTO: 1 /HPF (ref 0–4)
SODIUM SERPL-SCNC: 136 MMOL/L (ref 136–145)
SP GR UR STRIP.AUTO: 1.01 (ref 1–1.03)
UA COMPLETE W REFLEX CULTURE PNL UR: YES
UA DIPSTICK W REFLEX MICRO PNL UR: YES
URN SPEC COLLECT METH UR: ABNORMAL
UROBILINOGEN UR STRIP-ACNC: 0.2 E.U./DL
WBC # BLD AUTO: 5.8 K/UL (ref 4–11)
WBC #/AREA URNS AUTO: 406 /HPF (ref 0–5)

## 2024-03-14 PROCEDURE — 80053 COMPREHEN METABOLIC PANEL: CPT

## 2024-03-14 PROCEDURE — 81001 URINALYSIS AUTO W/SCOPE: CPT

## 2024-03-14 PROCEDURE — 87086 URINE CULTURE/COLONY COUNT: CPT

## 2024-03-14 PROCEDURE — 87077 CULTURE AEROBIC IDENTIFY: CPT

## 2024-03-14 PROCEDURE — 83690 ASSAY OF LIPASE: CPT

## 2024-03-14 PROCEDURE — 6370000000 HC RX 637 (ALT 250 FOR IP): Performed by: NURSE PRACTITIONER

## 2024-03-14 PROCEDURE — 11043 DBRDMT MUSC&/FSCA 1ST 20/<: CPT | Performed by: EMERGENCY MEDICINE

## 2024-03-14 PROCEDURE — 87186 SC STD MICRODIL/AGAR DIL: CPT

## 2024-03-14 PROCEDURE — 99283 EMERGENCY DEPT VISIT LOW MDM: CPT

## 2024-03-14 PROCEDURE — 85025 COMPLETE CBC W/AUTO DIFF WBC: CPT

## 2024-03-14 PROCEDURE — 51702 INSERT TEMP BLADDER CATH: CPT

## 2024-03-14 PROCEDURE — 11043 DBRDMT MUSC&/FSCA 1ST 20/<: CPT

## 2024-03-14 RX ORDER — LIDOCAINE HYDROCHLORIDE 20 MG/ML
JELLY TOPICAL ONCE
OUTPATIENT
Start: 2024-03-14 | End: 2024-03-14

## 2024-03-14 RX ORDER — SODIUM CHLOR/HYPOCHLOROUS ACID 0.033 %
SOLUTION, IRRIGATION IRRIGATION ONCE
OUTPATIENT
Start: 2024-03-14 | End: 2024-03-14

## 2024-03-14 RX ORDER — BACITRACIN ZINC AND POLYMYXIN B SULFATE 500; 1000 [USP'U]/G; [USP'U]/G
OINTMENT TOPICAL ONCE
OUTPATIENT
Start: 2024-03-14 | End: 2024-03-14

## 2024-03-14 RX ORDER — GINSENG 100 MG
CAPSULE ORAL ONCE
OUTPATIENT
Start: 2024-03-14 | End: 2024-03-14

## 2024-03-14 RX ORDER — LIDOCAINE 40 MG/G
CREAM TOPICAL ONCE
OUTPATIENT
Start: 2024-03-14 | End: 2024-03-14

## 2024-03-14 RX ORDER — LIDOCAINE HYDROCHLORIDE 40 MG/ML
SOLUTION TOPICAL ONCE
OUTPATIENT
Start: 2024-03-14 | End: 2024-03-14

## 2024-03-14 RX ORDER — NITROFURANTOIN 25; 75 MG/1; MG/1
100 CAPSULE ORAL ONCE
Status: COMPLETED | OUTPATIENT
Start: 2024-03-14 | End: 2024-03-14

## 2024-03-14 RX ORDER — TRIAMCINOLONE ACETONIDE 1 MG/G
OINTMENT TOPICAL ONCE
OUTPATIENT
Start: 2024-03-14 | End: 2024-03-14

## 2024-03-14 RX ORDER — LIDOCAINE 50 MG/G
OINTMENT TOPICAL ONCE
OUTPATIENT
Start: 2024-03-14 | End: 2024-03-14

## 2024-03-14 RX ORDER — GENTAMICIN SULFATE 1 MG/G
OINTMENT TOPICAL ONCE
OUTPATIENT
Start: 2024-03-14 | End: 2024-03-14

## 2024-03-14 RX ORDER — BETAMETHASONE DIPROPIONATE 0.05 %
OINTMENT (GRAM) TOPICAL ONCE
OUTPATIENT
Start: 2024-03-14 | End: 2024-03-14

## 2024-03-14 RX ORDER — CLOBETASOL PROPIONATE 0.5 MG/G
OINTMENT TOPICAL ONCE
OUTPATIENT
Start: 2024-03-14 | End: 2024-03-14

## 2024-03-14 RX ORDER — LIDOCAINE 40 MG/G
CREAM TOPICAL ONCE
Status: DISCONTINUED | OUTPATIENT
Start: 2024-03-14 | End: 2024-03-15 | Stop reason: HOSPADM

## 2024-03-14 RX ORDER — IBUPROFEN 200 MG
TABLET ORAL ONCE
OUTPATIENT
Start: 2024-03-14 | End: 2024-03-14

## 2024-03-14 RX ORDER — NITROFURANTOIN 25; 75 MG/1; MG/1
100 CAPSULE ORAL 2 TIMES DAILY
Qty: 20 CAPSULE | Refills: 0 | Status: SHIPPED | OUTPATIENT
Start: 2024-03-14 | End: 2024-03-24

## 2024-03-14 RX ADMIN — NITROFURANTOIN MONOHYDRATE/MACROCRYSTALS 100 MG: 75; 25 CAPSULE ORAL at 20:46

## 2024-03-14 ASSESSMENT — PAIN - FUNCTIONAL ASSESSMENT: PAIN_FUNCTIONAL_ASSESSMENT: 0-10

## 2024-03-14 ASSESSMENT — ENCOUNTER SYMPTOMS
NAUSEA: 0
DIARRHEA: 0
ABDOMINAL PAIN: 0
CHEST TIGHTNESS: 0
VOMITING: 0
SHORTNESS OF BREATH: 0

## 2024-03-14 ASSESSMENT — PAIN SCALES - GENERAL: PAINLEVEL_OUTOF10: 0

## 2024-03-14 NOTE — PROGRESS NOTES
[facility]  [address]   [place], [place] [address]  Telephone: [phone]     FAX [phone]            Octavio Plascencia    Patient Instructions   [facility]  [address]   [place], [place] [address]  Telephone: [phone]     FAX [phone]     Discharge Instructions     Important reminders:     **If you have any signs and symptoms of illness (Cough, fever, congestion, nausea, vomiting, diarrhea, etc.) please call the wound care center prior to your appointment.     1. Increase Protein intake for optimal wound healing  2. No added salt to reduce any swelling  3. If diabetic, maintain good glucose control  4. If you smoke, smoking prohibits wound healing, we ask that you refrain from smoking.  5. When taking antibiotics take the entire prescription as ordered. Do not stop taking until medication is all gone unless otherwise instructed.   6. Exercise as tolerated.   7. Keep weight off wounds and reposition every 2 hours if applicable.  8. If wound(s) is on your lower extremity, elevate legs to the level of the heart or above for 30 minutes 4-5 times a day and/or when sitting. Avoid standing for long periods of time.   9. Do not get wounds wet in bath or shower unless otherwise instructed by your physician. If your wound is on your foot or leg, you may purchase a cast bag. Please ask at the pharmacy.        If Vascular testing is ordered, please call [address] [place] [address] [phone] to schedule.     Vascular tests ordered by Wound Care Physicians may take up to 2 hours to complete. Please keep that in mind when scheduling.      If Vascular testing is scheduled, please bring supplies to replace your dressing after testing is done. The vascular department does not stock supplies.      Wound: Sacrum, Back     With each dressing change, rinse wounds with 0.9% Saline. (May use wound wash or soft contact solution. Both can be purchased at a local drug store). If unable to obtain 
Heart Disease Mother     High Blood Pressure Mother     Stroke Mother     Heart Disease Father     High Blood Pressure Father     Stroke Father        SOCIAL HISTORY  Social History     Tobacco Use    Smoking status: Former     Current packs/day: 0.00     Types: Cigarettes     Quit date: 3/11/1963     Years since quittin.0    Smokeless tobacco: Never    Tobacco comments:     quite age 22   Vaping Use    Vaping Use: Never used   Substance Use Topics    Alcohol use: Not Currently     Comment: social--rare    Drug use: Never       ALLERGIES  Allergies   Allergen Reactions    Sulfa Antibiotics Hives    Tape [Adhesive Tape] Itching     Foam back tape, and cover roll/ tolerates adhesive tape    Thimerosal (Thiomersal) Swelling     In contact solution eyes swollen       MEDICATIONS  Current Outpatient Medications on File Prior to Encounter   Medication Sig Dispense Refill    aspirin (ASPIRIN CHILDRENS) 81 MG chewable tablet Take 1 tablet by mouth daily 30 tablet 0    amiodarone (CORDARONE) 200 MG tablet Take 1 tablet by mouth daily 30 tablet 0    furosemide (LASIX) 20 MG tablet Take 1 tablet by mouth daily      Lactobacillus (ACIDOPHILUS/PECTIN) 100 MG CAPS Take 1 capsule by mouth daily      atorvastatin (LIPITOR) 10 MG tablet Take 1 tablet by mouth at bedtime      ferrous sulfate (FE TABS 325) 325 (65 Fe) MG EC tablet Take 1 tablet by mouth with breakfast and with evening meal      miconazole (MICOTIN) 2 % powder Apply 1 application topically 2 times daily      potassium chloride (KLOR-CON M) 20 MEQ extended release tablet Take 1 tablet by mouth daily      Ascorbic Acid (VITAMIN C) 1000 MG tablet Take 1 tablet by mouth daily      alendronate (FOSAMAX) 70 MG tablet Take 1 tablet by mouth every 7 days      Calcium Carb-Cholecalciferol (CALCIUM + D3) 600-200 MG-UNIT TABS Take 1 capsule by mouth daily      Vitamin D (CHOLECALCIFEROL) 1000 UNITS CAPS capsule Take 1 capsule by mouth daily       No current

## 2024-03-14 NOTE — PLAN OF CARE
Discharge instructions given.  Patient verbalized understanding.  Return to [facility] in 1 week(s).  Called/faxed orders to [facility].

## 2024-03-14 NOTE — ED PROVIDER NOTES
Urinary tract infection associated with indwelling urethral catheter, initial encounter ([facility])    2. IMELDA (acute kidney injury) ([facility])        Blood pressure (!) 115/58, pulse 72, temperature 98.5 °F (36.9 °C), resp. rate 18, height 1.626 m (5' 4\"), SpO2 99 %.    DISPOSITION  Regina Lawson was discharged in stable condition.    I have discussed the findings of today's workup with the patient and addressed the patient's questions and concerns.  Important warning signs as well as new or worsening symptoms which would necessitate immediate return to the ED were discussed.  The plan is to discharge from the ED at this time, and the patient is in stable condition.  The patient acknowledged understanding is agreeable with this plan.    Patient was given scripts for the following medications. I counseled patient how to take these medications.   New Prescriptions    NITROFURANTOIN, MACROCRYSTAL-MONOHYDRATE, (MACROBID) 100 MG CAPSULE    Take 1 capsule by mouth 2 times daily for 10 days       Follow-up with:  Bailee Sharp, DO  [id number] [facility] [place] [address] [phone]    Schedule an appointment as soon as possible for a visit in 3 days  call tomorrow for follow up monday to check urine culture and recheck kidney function      Critical care time:  None    DISCLAIMER: This chart was created using Dragon dictation software.  Efforts were made by me to ensure accuracy, however some errors may be present due to limitations of this technology and occasionally words are not transcribed correctly.       Lefty Carrion MD  03/14/24 [id number]    
    Tobacco Use    Smoking status: Former     Current packs/day: 0.00     Types: Cigarettes     Quit date: 3/11/1963     Years since quittin.0    Smokeless tobacco: Never    Tobacco comments:     quite age 22   Vaping Use    Vaping Use: Never used   Substance Use Topics    Alcohol use: Not Currently     Comment: social--rare    Drug use: Never       SCREENINGS        [place] Coma Scale  Eye Opening: Spontaneous  Best Verbal Response: Oriented  Best Motor Response: Obeys commands  [place] Coma Scale Score: 15                CIWA Assessment  BP: (!) 115/58  Pulse: 72           PHYSICAL EXAM  1 or more Elements     ED Triage Vitals [24]   BP Temp Temp src Pulse Respirations SpO2 Height Weight   (!) 140/69 98.5 °F (36.9 °C) -- 77 18 100 % 1.626 m (5' 4\") --       Physical Exam  Vitals and nursing note reviewed.   Constitutional:       Appearance: She is well-developed. She is not diaphoretic.   HENT:      Head: Normocephalic and atraumatic.      Right Ear: External ear normal.      Left Ear: External ear normal.   Eyes:      General:         Right eye: No discharge.         Left eye: No discharge.   Neck:      Vascular: No JVD.   Cardiovascular:      Rate and Rhythm: Normal rate.      Pulses: Normal pulses.   Pulmonary:      Effort: Pulmonary effort is normal. No respiratory distress.      Breath sounds: Normal breath sounds.   Abdominal:      Palpations: Abdomen is soft.      Tenderness: There is no abdominal tenderness.   Musculoskeletal:         General: Normal range of motion.   Skin:     General: Skin is warm and dry.      Coloration: Skin is not pale.   Neurological:      Mental Status: She is alert.   Psychiatric:         Behavior: Behavior normal.             DIAGNOSTIC RESULTS   LABS:    Labs Reviewed   URINALYSIS WITH REFLEX TO CULTURE - Abnormal; Notable for the following components:       Result Value    Clarity, UA TURBID (*)     Blood, Urine TRACE (*)     Nitrite, Urine POSITIVE (*)

## 2024-03-15 NOTE — ED NOTES
Report called to rosita pinto pt transported by [facility] transport with chronic banegas still in place.

## 2024-03-17 LAB
BACTERIA UR CULT: ABNORMAL
BACTERIA UR CULT: ABNORMAL
ORGANISM: ABNORMAL
ORGANISM: ABNORMAL

## 2024-03-18 LAB
BACTERIA UR CULT: ABNORMAL
BACTERIA UR CULT: ABNORMAL
ORGANISM: ABNORMAL
ORGANISM: ABNORMAL

## 2024-04-10 NOTE — PATIENT INSTRUCTIONS
these hours and cannot wait until we are again available, contact your PCP or go to the hospital emergency room.      PLEASE NOTE: IF YOU ARE UNABLE TO OBTAIN WOUND SUPPLIES, CONTINUE TO USE THE SUPPLIES YOU HAVE AVAILABLE UNTIL YOU ARE ABLE TO REACH US. IT IS MOST IMPORTANT TO KEEP THE WOUND COVERED AT ALL TIMES.     Patient Experience     Thank you for trusting us with your care.  You may receive a survey from a company called [employer] asking for your feedback.  We would appreciate it if you took a few minutes to share your experience.  Your input is very valuable to us.

## 2024-04-11 ENCOUNTER — HOSPITAL ENCOUNTER (OUTPATIENT)
Dept: WOUND CARE | Age: 84
Discharge: HOME | End: 2024-04-11
Attending: EMERGENCY MEDICINE
Payer: MEDICARE

## 2024-04-11 VITALS — DIASTOLIC BLOOD PRESSURE: 62 MMHG | RESPIRATION RATE: 16 BRPM | HEART RATE: 60 BPM | SYSTOLIC BLOOD PRESSURE: 93 MMHG

## 2024-04-11 DIAGNOSIS — L89.154 DECUBITUS ULCER OF SACRAL REGION, STAGE 4 (HCC): Primary | ICD-10-CM

## 2024-04-11 PROCEDURE — 11043 DBRDMT MUSC&/FSCA 1ST 20/<: CPT | Performed by: EMERGENCY MEDICINE

## 2024-04-11 PROCEDURE — 11043 DBRDMT MUSC&/FSCA 1ST 20/<: CPT

## 2024-04-11 RX ORDER — LIDOCAINE 40 MG/G
CREAM TOPICAL ONCE
OUTPATIENT
Start: 2024-04-11 | End: 2024-04-11

## 2024-04-11 RX ORDER — LIDOCAINE 40 MG/G
CREAM TOPICAL ONCE
Status: DISCONTINUED | OUTPATIENT
Start: 2024-04-11 | End: 2024-04-12 | Stop reason: HOSPADM

## 2024-04-11 RX ORDER — LIDOCAINE HYDROCHLORIDE 20 MG/ML
JELLY TOPICAL ONCE
OUTPATIENT
Start: 2024-04-11 | End: 2024-04-11

## 2024-04-11 RX ORDER — LIDOCAINE 50 MG/G
OINTMENT TOPICAL ONCE
OUTPATIENT
Start: 2024-04-11 | End: 2024-04-11

## 2024-04-11 RX ORDER — GINSENG 100 MG
CAPSULE ORAL ONCE
OUTPATIENT
Start: 2024-04-11 | End: 2024-04-11

## 2024-04-11 RX ORDER — IBUPROFEN 200 MG
TABLET ORAL ONCE
OUTPATIENT
Start: 2024-04-11 | End: 2024-04-11

## 2024-04-11 RX ORDER — CLOBETASOL PROPIONATE 0.5 MG/G
OINTMENT TOPICAL ONCE
OUTPATIENT
Start: 2024-04-11 | End: 2024-04-11

## 2024-04-11 RX ORDER — SODIUM CHLOR/HYPOCHLOROUS ACID 0.033 %
SOLUTION, IRRIGATION IRRIGATION ONCE
OUTPATIENT
Start: 2024-04-11 | End: 2024-04-11

## 2024-04-11 RX ORDER — TRIAMCINOLONE ACETONIDE 1 MG/G
OINTMENT TOPICAL ONCE
OUTPATIENT
Start: 2024-04-11 | End: 2024-04-11

## 2024-04-11 RX ORDER — LIDOCAINE HYDROCHLORIDE 40 MG/ML
SOLUTION TOPICAL ONCE
OUTPATIENT
Start: 2024-04-11 | End: 2024-04-11

## 2024-04-11 RX ORDER — BETAMETHASONE DIPROPIONATE 0.05 %
OINTMENT (GRAM) TOPICAL ONCE
OUTPATIENT
Start: 2024-04-11 | End: 2024-04-11

## 2024-04-11 RX ORDER — BACITRACIN ZINC AND POLYMYXIN B SULFATE 500; 1000 [USP'U]/G; [USP'U]/G
OINTMENT TOPICAL ONCE
OUTPATIENT
Start: 2024-04-11 | End: 2024-04-11

## 2024-04-11 RX ORDER — GENTAMICIN SULFATE 1 MG/G
OINTMENT TOPICAL ONCE
OUTPATIENT
Start: 2024-04-11 | End: 2024-04-11

## 2024-04-11 NOTE — PROGRESS NOTES
[facility]     H&P or Progress Note: Medical Staff Progress Note    Regina Lawson  MEDICAL RECORD NUMBER:  [id number]  AGE: 83 y.o.   GENDER: female  : 1940  EPISODE DATE:  2024    Chief complaint and reason for visit:     Chief Complaint   Patient presents with    Wound Check     Sacrum        HPI/Wound Narrative:      Regina Lawson is a 83 y.o. female who presents today for an evaluation of a wound/ulcer. Wound duration:  2023 .    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 Here for follow up.  Pertinent associated symptoms: drainage , redness, swelling, skin discoloration, and impaired mobility    23: not getting turn well the last 2 weeks at [facility]  23: having a lot of stool incontinence, mostly mucinous. Contaminating wound. Dressings not being done on time and correctly per daughter.  23: patient has been admitted since last office visit.  She presented with bloating and found to have bowel obstruction.  Followed by GI, surgery, infectious diseases.  Patient underwent exploratory laparotomy, sigmoid colectomy, sigmoid colostomy and closure of rectal stump on 2023.  She is returning to our care related to a chronic sacral pressure ulcer.  23: doing well. No new issues. But npwt still hasn't been done yet  24: Patient had vera flow negative pressure wound therapy attempted but having issues with staying in place.  A lot of leakage her [facility]CF nursing report.   24: veraflo currently.  24: no new wound care issues. Tolerating collagen dressing changes. Vac was stopped last visit.    3/14/24: question about banegas leaking. States she thinks ECF tried to fix it by inflating the balloon.    24: recent UTI and getting suprapubic catheter soon    : Medical Decision Making:     Historian(s): patient  and daughter .     Comorbid conditions affecting wound healing: As noted in PMH and PSH which was 
04/11/24 0822   Undermining Starts ___ O'Clock 12 02/01/24 0813   Undermining Ends___ O'Clock 1800 02/01/24 0813   Undermining Maxium Distance (cm) 4 02/01/24 0813   Wound Assessment Bleeding 04/11/24 0822   Drainage Amount Moderate (25-50%) 04/11/24 0822   Drainage Description Serosanguinous 04/11/24 0822   Odor None 04/11/24 0812   Alysha-wound Assessment Intact 04/11/24 0812   Margins Attached edges 04/11/24 0812   Wound Thickness Description not for Pressure Injury Full thickness 03/14/24 0811   Number of days: 203          Patient seen and treated on 4/11/2024    By Mary Echevarria,[facility]  [id number]   (provider/NPI)

## 2024-04-11 NOTE — PLAN OF CARE
Discharge instructions given.  Patient verbalized understanding.  Return to [facility] in 3 week(s).  Called/faxed orders to  [facility]

## 2024-04-17 ENCOUNTER — HOSPITAL ENCOUNTER (OUTPATIENT)
Dept: CT IMAGING | Age: 84
Discharge: HOME | End: 2024-04-17
Attending: UROLOGY
Payer: MEDICARE

## 2024-04-17 VITALS
TEMPERATURE: 98 F | SYSTOLIC BLOOD PRESSURE: 103 MMHG | HEIGHT: 64 IN | WEIGHT: 195 LBS | BODY MASS INDEX: 33.29 KG/M2 | RESPIRATION RATE: 16 BRPM | HEART RATE: 88 BPM | OXYGEN SATURATION: 95 % | DIASTOLIC BLOOD PRESSURE: 65 MMHG

## 2024-04-17 DIAGNOSIS — R33.9 RETENTION, URINE: ICD-10-CM

## 2024-04-17 LAB
INR PPP: 1.01 (ref 0.85–1.15)
PLATELET # BLD AUTO: 213 K/UL (ref 135–450)
PROTHROMBIN TIME: 13.5 SEC (ref 11.9–14.9)

## 2024-04-17 PROCEDURE — 7100000011 HC PHASE II RECOVERY - ADDTL 15 MIN: Performed by: RADIOLOGY

## 2024-04-17 PROCEDURE — 7100000010 HC PHASE II RECOVERY - FIRST 15 MIN: Performed by: RADIOLOGY

## 2024-04-17 PROCEDURE — 49405 IMAGE CATH FLUID COLXN VISC: CPT

## 2024-04-17 PROCEDURE — 85610 PROTHROMBIN TIME: CPT

## 2024-04-17 PROCEDURE — 85049 AUTOMATED PLATELET COUNT: CPT

## 2024-04-17 PROCEDURE — 36415 COLL VENOUS BLD VENIPUNCTURE: CPT

## 2024-04-17 ASSESSMENT — PAIN - FUNCTIONAL ASSESSMENT: PAIN_FUNCTIONAL_ASSESSMENT: 0-10

## 2024-04-17 NOTE — FLOWSHEET NOTE
Banegas cath removed without difficulty.  Pt's depends removed, saturated.  Pt. Said that they were \"squirting a lot of liquid in my banegas cath.:  Has sacral wound with dressing.  This is also saturated.  Mepilex and gauze ordered to replace this.

## 2024-04-17 NOTE — BRIEF OP NOTE
Brief Postoperative Note    Regina Lawson  YOB: 1940  [id number]    Pre-operative Diagnosis: Urine retention     Post-operative Diagnosis: Same    Procedure: Successful placement of 10F suprapubic catheter using CT guidance    Anesthesia: Local    Surgeons: Chris Cordoba MD    Estimated Blood Loss: less than 50     Complications: None    Specimens: Was Not Obtained    Findings: See above    Electronically signed by Chris Cordoba MD on 4/17/2024 at 1:02 PM

## 2024-04-17 NOTE — FLOWSHEET NOTE
Patient and responsible adult verbalized understanding of discharge instructions, and potential complications including pain. Patient instructed to call Doctor if complications occur.

## 2024-04-17 NOTE — FLOWSHEET NOTE
Pt. Arrived in Phase 2.  Suprapubic cath inserted with local. No sedation given.  Pt. Is A&Ox4.  Tolerating water and cookies. Denies pain.  Daughter called to room.

## 2024-04-17 NOTE — DISCHARGE INSTRUCTIONS
INTERVENTIONAL RADIOLOGY DEPARTMENT  [facility]  [address] [place], [place] [address]  Telephone: [phone]      PATIENT NAME: Regina Lawson  MEDICAL RECORD NUMBER:  [id number]  TODAY'S DATE: @ED@      Discharge Instructions - Post Supra Pubic Catheter Placement.    How can you care for yourself at home?  Wash your hands before you handle the tube.  Clean the area around the tube with soap and water every day and make sure to keep dry.   Empty the drainage bag before it is completely full or every 2 to 3 hours.  Do not swim or take baths while you have a catheter.   If the device that holds the tube comes loose, please call #[phone] and ask for the radiology nurse.  No lifting over 10 lbs for the next 3-4 days      When should you call for help?  Call your doctor now or seek immediate medical care if:  Your catheter becomes blocked.  Your tube leaks.  You have pain in your back just below your rib cage. This is called flank pain.  You have a fever, chills, or body aches.  You have groin or belly pain.  You have pain or bleeding around the tube.  You have swelling around the catheter.    Watch closely for changes in your health, and be sure to contact Mayank Dorsey MD  if:  You need more help to learn how to care for your catheter.

## 2024-04-21 ENCOUNTER — APPOINTMENT (OUTPATIENT)
Dept: CT IMAGING | Age: 84
End: 2024-04-21
Payer: MEDICARE

## 2024-04-21 ENCOUNTER — HOSPITAL ENCOUNTER (EMERGENCY)
Age: 84
Discharge: HOME | End: 2024-04-21
Attending: EMERGENCY MEDICINE
Payer: MEDICARE

## 2024-04-21 VITALS
OXYGEN SATURATION: 98 % | DIASTOLIC BLOOD PRESSURE: 64 MMHG | HEART RATE: 61 BPM | RESPIRATION RATE: 16 BRPM | TEMPERATURE: 98.1 F | SYSTOLIC BLOOD PRESSURE: 126 MMHG

## 2024-04-21 DIAGNOSIS — K59.00 CONSTIPATION, UNSPECIFIED CONSTIPATION TYPE: ICD-10-CM

## 2024-04-21 DIAGNOSIS — T83.010A SUPRAPUBIC CATHETER DYSFUNCTION, INITIAL ENCOUNTER (HCC): Primary | ICD-10-CM

## 2024-04-21 DIAGNOSIS — N30.00 ACUTE CYSTITIS WITHOUT HEMATURIA: ICD-10-CM

## 2024-04-21 LAB
BACTERIA URNS QL MICRO: ABNORMAL /HPF
BILIRUB UR QL STRIP.AUTO: NEGATIVE
CLARITY UR: ABNORMAL
COLOR UR: YELLOW
EPI CELLS #/AREA URNS AUTO: 3 /HPF (ref 0–5)
GLUCOSE UR STRIP.AUTO-MCNC: NEGATIVE MG/DL
HGB UR QL STRIP.AUTO: ABNORMAL
HYALINE CASTS #/AREA URNS AUTO: 2 /LPF (ref 0–8)
KETONES UR STRIP.AUTO-MCNC: NEGATIVE MG/DL
LEUKOCYTE ESTERASE UR QL STRIP.AUTO: ABNORMAL
NITRITE UR QL STRIP.AUTO: POSITIVE
PH UR STRIP.AUTO: 6 [PH] (ref 5–8)
PROT UR STRIP.AUTO-MCNC: 30 MG/DL
RBC CLUMPS #/AREA URNS AUTO: 3 /HPF (ref 0–4)
SP GR UR STRIP.AUTO: 1.01 (ref 1–1.03)
UA COMPLETE W REFLEX CULTURE PNL UR: YES
UA DIPSTICK W REFLEX MICRO PNL UR: YES
URN SPEC COLLECT METH UR: ABNORMAL
UROBILINOGEN UR STRIP-ACNC: 0.2 E.U./DL
WBC #/AREA URNS AUTO: 1536 /HPF (ref 0–5)

## 2024-04-21 PROCEDURE — 99284 EMERGENCY DEPT VISIT MOD MDM: CPT

## 2024-04-21 PROCEDURE — 87186 SC STD MICRODIL/AGAR DIL: CPT

## 2024-04-21 PROCEDURE — 81001 URINALYSIS AUTO W/SCOPE: CPT

## 2024-04-21 PROCEDURE — 74176 CT ABD & PELVIS W/O CONTRAST: CPT

## 2024-04-21 PROCEDURE — 51702 INSERT TEMP BLADDER CATH: CPT

## 2024-04-21 PROCEDURE — 87077 CULTURE AEROBIC IDENTIFY: CPT

## 2024-04-21 PROCEDURE — 87086 URINE CULTURE/COLONY COUNT: CPT

## 2024-04-21 RX ORDER — SENNA AND DOCUSATE SODIUM 50; 8.6 MG/1; MG/1
1 TABLET, FILM COATED ORAL DAILY
Qty: 6 TABLET | Refills: 0 | Status: SHIPPED | OUTPATIENT
Start: 2024-04-21

## 2024-04-21 RX ORDER — POLYETHYLENE GLYCOL 3350 17 G/17G
17 POWDER, FOR SOLUTION ORAL DAILY
Qty: 1530 G | Refills: 1 | Status: SHIPPED | OUTPATIENT
Start: 2024-04-21 | End: 2024-05-21

## 2024-04-21 RX ORDER — CEFUROXIME AXETIL 250 MG/1
250 TABLET ORAL 2 TIMES DAILY
Qty: 14 TABLET | Refills: 0 | Status: SHIPPED | OUTPATIENT
Start: 2024-04-21 | End: 2024-04-28

## 2024-04-22 NOTE — ED PROVIDER NOTES
for decreased urine volume.       Positives and Pertinent negatives as per HPI.     SURGICAL HISTORY     Past Surgical History:   Procedure Laterality Date    BACK SURGERY  12/16/10    L4-5 Left complete, radical facetectomy, L3-4-5 nerve root exploration and foraminotomy, pedicle screws    BREAST BIOPSY Right     x2    CARPAL TUNNEL RELEASE Left 12/13/2018    LEFT CARPAL TUNNEL RELEASE performed by Manfred David MD at [facility] OR    CARPAL TUNNEL RELEASE Right 8/22/2019    RIGHT CARPAL TUNNEL RELEASE performed by Manfred David MD at [facility] OR    COLONOSCOPY  06/23/2011    HOT SNARE CECAL POLYPECTOMY    COLONOSCOPY  9-8-14    Colonoscopy.  No biopsies.  No polypectomies    COLONOSCOPY Right 11/22/2023    COLONOSCOPY FLEXIBLE W/ DECOMPRESSION performed by Arturo Marquez MD at [facility] ENDOSCOPY    COLONOSCOPY  11/22/2023    COLONOSCOPY SUBMUCOSAL INJECTION performed by Arturo Marquez MD at [facility] ENDOSCOPY    COLOSTOMY N/A 11/24/2023    SIGMOID COLECTOMY, END SIGMOID COLOSTOMY performed by David Emerson MD at [facility] OR    CT VISCERAL PERCUTANEOUS DRAIN  4/17/2024    CT VISCERAL PERCUTANEOUS DRAIN 4/17/2024 [facility] CT    CYSTOSCOPY N/A 7/1/2019    CYSTOSCOPY WITH INJECTION OF URETHRAL BULKING AGENT COAPTITE performed by Clair Martin MD at [facility] OR    EYE SURGERY Bilateral     cataract removal with lens implants    HYSTERECTOMY, VAGINAL      TVT    JOINT REPLACEMENT Bilateral     LAMINECTOMY  2015    L3-4-5 decompression of nerve roots      PILONIDAL CYST EXCISION      SALPINGO-OOPHORECTOMY      bilateral    SHOULDER ARTHROPLASTY Right 6/11/14    SHOULDER SURGERY Left 10/9/2019    LEFT REVERSE TOTAL SHOULDER REPLACEMENT performed by Emiliano Alfaro MD at [facility] OR    SINUS SURGERY      right x2--mass in right sinus removed    SPINAL FUSION  12/16/10    posterior fusion, local bone graft    TOTAL KNEE ARTHROPLASTY  03/23/2010    left knee    TOTAL KNEE ARTHROPLASTY Right 03/12/2013    TUBAL LIGATION

## 2024-04-24 LAB
BACTERIA UR CULT: ABNORMAL
ORGANISM: ABNORMAL

## 2024-04-29 NOTE — PATIENT INSTRUCTIONS
[facility]  [address]   [place], [place] [address]  Telephone: [phone]     FAX [phone]     Discharge Instructions     Important reminders:     **If you have any signs and symptoms of illness (Cough, fever, congestion, nausea, vomiting, diarrhea, etc.) please call the wound care center prior to your appointment.     1. Increase Protein intake for optimal wound healing  2. No added salt to reduce any swelling  3. If diabetic, maintain good glucose control  4. If you smoke, smoking prohibits wound healing, we ask that you refrain from smoking.  5. When taking antibiotics take the entire prescription as ordered. Do not stop taking until medication is all gone unless otherwise instructed.   6. Exercise as tolerated.   7. Keep weight off wounds and reposition every 2 hours if applicable.  8. If wound(s) is on your lower extremity, elevate legs to the level of the heart or above for 30 minutes 4-5 times a day and/or when sitting. Avoid standing for long periods of time.   9. Do not get wounds wet in bath or shower unless otherwise instructed by your physician. If your wound is on your foot or leg, you may purchase a cast bag. Please ask at the pharmacy.        If Vascular testing is ordered, please call [address] [place] [address] [phone]) to schedule.     Vascular tests ordered by Wound Care Physicians may take up to 2 hours to complete. Please keep that in mind when scheduling.      If Vascular testing is scheduled, please bring supplies to replace your dressing after testing is done. The vascular department does not stock supplies.      Wound: Sacrum, Back     With each dressing change, rinse wounds with 0.9% Saline. (May use wound wash or soft contact solution. Both can be purchased at a local drug store). If unable to obtain saline, may use a gentle soap and water.     Dressing care: Please check and maintain banegas catheter and flush as needed to prevent leakage daily. Apply Madeleine(silver collagen),

## 2024-05-02 ENCOUNTER — HOSPITAL ENCOUNTER (OUTPATIENT)
Dept: WOUND CARE | Age: 84
Discharge: HOME | End: 2024-05-02
Attending: EMERGENCY MEDICINE
Payer: MEDICARE

## 2024-05-02 VITALS
TEMPERATURE: 96.2 F | DIASTOLIC BLOOD PRESSURE: 69 MMHG | RESPIRATION RATE: 16 BRPM | SYSTOLIC BLOOD PRESSURE: 120 MMHG | HEART RATE: 64 BPM

## 2024-05-02 DIAGNOSIS — L89.154 DECUBITUS ULCER OF SACRAL REGION, STAGE 4 (HCC): Primary | ICD-10-CM

## 2024-05-02 PROCEDURE — 11043 DBRDMT MUSC&/FSCA 1ST 20/<: CPT

## 2024-05-02 PROCEDURE — 11043 DBRDMT MUSC&/FSCA 1ST 20/<: CPT | Performed by: EMERGENCY MEDICINE

## 2024-05-02 RX ORDER — LIDOCAINE HYDROCHLORIDE 20 MG/ML
JELLY TOPICAL ONCE
OUTPATIENT
Start: 2024-05-02 | End: 2024-05-02

## 2024-05-02 RX ORDER — GENTAMICIN SULFATE 1 MG/G
OINTMENT TOPICAL ONCE
OUTPATIENT
Start: 2024-05-02 | End: 2024-05-02

## 2024-05-02 RX ORDER — SODIUM CHLOR/HYPOCHLOROUS ACID 0.033 %
SOLUTION, IRRIGATION IRRIGATION ONCE
OUTPATIENT
Start: 2024-05-02 | End: 2024-05-02

## 2024-05-02 RX ORDER — LIDOCAINE 40 MG/G
CREAM TOPICAL ONCE
OUTPATIENT
Start: 2024-05-02 | End: 2024-05-02

## 2024-05-02 RX ORDER — IBUPROFEN 200 MG
TABLET ORAL ONCE
OUTPATIENT
Start: 2024-05-02 | End: 2024-05-02

## 2024-05-02 RX ORDER — BACITRACIN ZINC AND POLYMYXIN B SULFATE 500; 1000 [USP'U]/G; [USP'U]/G
OINTMENT TOPICAL ONCE
OUTPATIENT
Start: 2024-05-02 | End: 2024-05-02

## 2024-05-02 RX ORDER — LIDOCAINE 40 MG/G
CREAM TOPICAL ONCE
Status: DISCONTINUED | OUTPATIENT
Start: 2024-05-02 | End: 2024-05-03 | Stop reason: HOSPADM

## 2024-05-02 RX ORDER — GINSENG 100 MG
CAPSULE ORAL ONCE
OUTPATIENT
Start: 2024-05-02 | End: 2024-05-02

## 2024-05-02 RX ORDER — LIDOCAINE 50 MG/G
OINTMENT TOPICAL ONCE
OUTPATIENT
Start: 2024-05-02 | End: 2024-05-02

## 2024-05-02 RX ORDER — TRIAMCINOLONE ACETONIDE 1 MG/G
OINTMENT TOPICAL ONCE
OUTPATIENT
Start: 2024-05-02 | End: 2024-05-02

## 2024-05-02 RX ORDER — CLOBETASOL PROPIONATE 0.5 MG/G
OINTMENT TOPICAL ONCE
OUTPATIENT
Start: 2024-05-02 | End: 2024-05-02

## 2024-05-02 RX ORDER — LIDOCAINE HYDROCHLORIDE 40 MG/ML
SOLUTION TOPICAL ONCE
OUTPATIENT
Start: 2024-05-02 | End: 2024-05-02

## 2024-05-02 RX ORDER — BETAMETHASONE DIPROPIONATE 0.05 %
OINTMENT (GRAM) TOPICAL ONCE
OUTPATIENT
Start: 2024-05-02 | End: 2024-05-02

## 2024-05-02 NOTE — PROGRESS NOTES
Insurance Verification Request            Ordering Center:    [facility]  [address]   [place], [place] [address]  Telephone: [phone]       FAX [phone]    Facility NPI: [id number]  Facility Tax ID [id number]    ILSA GROVER RN      Patient Demographics:      Regina Velazquez  [address]  [facility] [address]   [phone]   : 1940  AGE: 83 y.o.     GENDER: female   TODAYS DATE:  2024      Insurance Information:     PRIMARY INSURANCE:  Plan: HU[employer] GOLD PLUS HMO  Coverage: HUMANA MEDICARE  Effective Date: 2024  Group Number: [unfilled]  Subscriber Number: [id number] - (Medicare Managed)    Payer/Plan Subscr  Sex Relation Sub. Ins. ID Effective Group Num   1. [employer] MEDICA* REGINA VELAZQUEZ 1940 Female Self [id number]                                   [address] [address]   2. [employer]* REGINA VELAZQUEZ 1940 Female Self [id number]                                   [address] [address]       Application/Product Information:    Benefit Verification Request:    Reason for Request: New Wound    [employer] Fax # [phone] and [employer] ([employer]) Fax # [phone]    Trunk/Arms/Legs [address] (1st 25 sq cm)    [facility] and [employer]    Has patient been treated with any other Skin Substitute for this Wound:   No    If yes, How many previous applications:  0    WOUND #: 1    Total Square CM: 6.21    Procedure setting: Hospital Outpatient Department POS 22    Is the Patient currently in a skilled nursing facility: Yes Patient is residing in a nursing facility for over 100 days    Is the wound work related: No    Procedure date: 24        Clinical Information:    Dx Codes:   Diabetic Ulcer [] Yes   [] No,   Venous Ulcer [] Yes   [] No,   Other [x] Yes   [] No    Wound ICD-10 Code: L89.154    Problem List Items Addressed This Visit       Decubitus ulcer of sacral region, stage 4 (HCC) - Primary    Relevant Medications    
[facility] at [phone] Monday  - Thursday 8:00 am - 4:00 pm and Friday 8:00 am - 1:00pm. If you need help with your wound outside these hours and cannot wait until we are again available, contact your PCP or go to the hospital emergency room.      PLEASE NOTE: IF YOU ARE UNABLE TO OBTAIN WOUND SUPPLIES, CONTINUE TO USE THE SUPPLIES YOU HAVE AVAILABLE UNTIL YOU ARE ABLE TO REACH US. IT IS MOST IMPORTANT TO KEEP THE WOUND COVERED AT ALL TIMES.     Patient Experience     Thank you for trusting us with your care.  You may receive a survey from a company called [employer] asking for your feedback.  We would appreciate it if you took a few minutes to share your experience.  Your input is very valuable to us.      Skilled nurse to evaluate and treat for wound care. Change dressing as ordered  once a day on Tuesday and Saturday using clean technique. Patient/Family/caregiver may change dressings as needed as instructed when Home Care unavailable.    WOUNDS REQUIRING DRESSING Changes:     Wound 09/21/23 Sacrum #1 (Active)   Wound Image   04/11/24 0812   Wound Etiology Pressure Stage 3 05/02/24 0816   Dressing Status Clean;Dry;Intact 11/29/23 0830   Wound Cleansed Cleansed with saline 05/02/24 0816   Dressing/Treatment ABD;Gauze dressing/dressing sponge;Moist to dry 11/29/23 0830   Dressing Change Due 11/23/23 11/22/23 2149   Wound Length (cm) 2.6 cm 05/02/24 0816   Wound Width (cm) 2.2 cm 05/02/24 0816   Wound Depth (cm) 4 cm 05/02/24 0816   Wound Surface Area (cm^2) 5.72 cm^2 05/02/24 0816   Change in Wound Size % (l*w) 80.93 05/02/24 0816   Wound Volume (cm^3) 22.88 cm^3 05/02/24 0816   Wound Healing % 81 05/02/24 0816   Post-Procedure Length (cm) 2.7 cm 05/02/24 0831   Post-Procedure Width (cm) 2.3 cm 05/02/24 0831   Post-Procedure Depth (cm) 4.1 cm 05/02/24 0831   Post-Procedure Surface Area (cm^2) 6.21 cm^2 05/02/24 0831   Post-Procedure Volume (cm^3) 25.461 cm^3 05/02/24 0831   Undermining Starts 
70 MG tablet Take 1 tablet by mouth every 7 days      Calcium Carb-Cholecalciferol (CALCIUM + D3) 600-200 MG-UNIT TABS Take 1 capsule by mouth daily      Vitamin D (CHOLECALCIFEROL) 1000 UNITS CAPS capsule Take 1 capsule by mouth daily       No current facility-administered medications on file prior to encounter.       REVIEW OF SYSTEMS  A comprehensive review of systems was negative except noted in HPI/wound narrative and/or updates above.    Written patient dismissal instructions given to patient and signed by patient or POA.  Patient voiced understanding that the importance of adherence to instructions is paramount to wound healing improvement or success.          Patient Instructions   [facility]  [address]   [place], [place] [address]  Telephone: [phone]     FAX [phone]     Discharge Instructions     Important reminders:     **If you have any signs and symptoms of illness (Cough, fever, congestion, nausea, vomiting, diarrhea, etc.) please call the wound care center prior to your appointment.     1. Increase Protein intake for optimal wound healing  2. No added salt to reduce any swelling  3. If diabetic, maintain good glucose control  4. If you smoke, smoking prohibits wound healing, we ask that you refrain from smoking.  5. When taking antibiotics take the entire prescription as ordered. Do not stop taking until medication is all gone unless otherwise instructed.   6. Exercise as tolerated.   7. Keep weight off wounds and reposition every 2 hours if applicable.  8. If wound(s) is on your lower extremity, elevate legs to the level of the heart or above for 30 minutes 4-5 times a day and/or when sitting. Avoid standing for long periods of time.   9. Do not get wounds wet in bath or shower unless otherwise instructed by your physician. If your wound is on your foot or leg, you may purchase a cast bag. Please ask at the pharmacy.        If Vascular testing is ordered, please call

## 2024-05-02 NOTE — PLAN OF CARE
Discharge instructions given.  Patient verbalized understanding.  Return to [facility] in 3 week(s).  Called/faxed orders to  [facility]

## 2024-05-17 RX ORDER — METOPROLOL SUCCINATE 25 MG/1
25 TABLET, EXTENDED RELEASE ORAL 2 TIMES DAILY
COMMUNITY

## 2024-05-17 NOTE — PROGRESS NOTES
Follow Up Prior to Surgery    DOS:   :1940    Labs to be drawn at [facility] on .  CBC w diff and CMP    Please call them on  if they did not fax them by then  [phone] ask for Adore.        Received and placed in box to scan into epic

## 2024-05-17 NOTE — PROGRESS NOTES
[facility] PRE-OPERATIVE INSTRUCTIONS    Day of Procedure:  5/30              Arrival time:   14         Surgery time:1525    Take the following medications with a sip of water:  Follow your MD/Surgeons pre-procedure instructions regarding your medications     Do not eat or drink anything after 12:00 midnight prior to your surgery.  This includes water chewing gum, mints and ice chips.   You may brush your teeth and gargle the morning of your surgery, but do not swallow the water     Please see your family doctor/pediatrician for a history and physical and/or concerning medications.   Bring any test results/reports from your physicians office.   If you are under the care of a heart doctor or specialist doctor, please be aware that you may be asked to them for clearance    You may be asked to stop blood thinners such as Coumadin, Plavix, Fragmin, Lovenox, etc., or any anti-inflammatories such as:  Aspirin, Ibuprofen, Advil, Naproxen prior to your surgery.    We also ask that you stop any OTC medications such as fish oil, vitamin E, glucosamine, garlic, Multivitamins, COQ 10, etc.    We ask that you do not smoke 24 hours prior to surgery  We ask that you do not  drink any alcoholic beverages 24 hours prior to surgery     You must make arrangements for a responsible adult to take you home after your surgery.    For your safety you will not be allowed to leave alone or drive yourself home.  Your surgery will be cancelled if you do not have a ride home.     Also for your safety, it is strongly suggested that someone stay with you the first 24 hours after your surgery.     A parent or legal guardian must accompany a child scheduled for surgery and plan to stay at the hospital until the child is discharged.    Please do not bring other children with you.    For your comfort, please wear simple loose fitting clothing to the hospital.  Please do not bring valuables.    Do not wear any make-up or nail polish on

## 2024-05-17 NOTE — PROGRESS NOTES
Nursing Home Patient Worksheet-Pre Admission Testing Department  :[birth year]  Day of Surgery:  Surgeon:Zoila     Arrival Time: 1400   Surgery Time:1525  Facility:[facility]  Nurse or Care provider:[facility]  Contact number: [phone]   Fax number: [phone]    Day of Surgery Information    Can Patient sign own consent?   [x] YES    []  NO      H/P: Complete:      [x] YES    []  NO    []  N/A    Labs: to be drawn at RN home  CBC w diff and CBC-done and in epic/meida [x] YES    []  NO   []  N/A    Transportation confirmed:     [x] YES    []  NO  Via stretcher  Will STNA be provided:     [] YES    [x]  NO    Medication Reconciliation Complete:  [x] YES    []  NO    Demographics (Med History) Complete:  [x] YES    []  NO    Pt. Ambulation Status: uses power wheel chair, High fall risk, total assist    Pt. LOC: AxOx3    PAT INTERVIEW COMPLETE:    [x] YES   []  NO    Additional Notes:  Pt has bnaegas cath/supra pubic and colostomy

## 2024-05-22 NOTE — PROGRESS NOTES
[facility]     H&P or Progress Note: Medical Staff Progress Note    Regina Lawson  MEDICAL RECORD NUMBER:  [id number]  AGE: 83 y.o.   GENDER: female  : 1940  EPISODE DATE:  2024    Chief complaint and reason for visit:     Chief Complaint   Patient presents with    Wound Check     Follow up pressure wound        HPI/Wound Narrative:      Regina Lawson is a 83 y.o. female who presents today for an evaluation of a wound/ulcer. Wound duration:  2023 .    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 Here for follow up.  Pertinent associated symptoms: drainage , redness, swelling, skin discoloration, and impaired mobility    23: not getting turn well the last 2 weeks at [facility]  23: having a lot of stool incontinence, mostly mucinous. Contaminating wound. Dressings not being done on time and correctly per daughter.  23: patient has been admitted since last office visit.  She presented with bloating and found to have bowel obstruction.  Followed by GI, surgery, infectious diseases.  Patient underwent exploratory laparotomy, sigmoid colectomy, sigmoid colostomy and closure of rectal stump on 2023.  She is returning to our care related to a chronic sacral pressure ulcer.  23: doing well. No new issues. But npwt still hasn't been done yet  24: Patient had vera flow negative pressure wound therapy attempted but having issues with staying in place.  A lot of leakage her [facility] nursing report.   24: veraflo currently.  24: no new wound care issues. Tolerating collagen dressing changes. Vac was stopped last visit.    3/14/24: question about banegas leaking. States she thinks ECF tried to fix it by inflating the balloon.  24: recent UTI and getting suprapubic catheter soon  24: has suprapubic catheter but getting larger size soon.    24: no new issues. Skin subs not approved    : Medical Decision Making:

## 2024-05-23 ENCOUNTER — HOSPITAL ENCOUNTER (OUTPATIENT)
Dept: WOUND CARE | Age: 84
Discharge: HOME | End: 2024-05-23
Attending: EMERGENCY MEDICINE
Payer: MEDICARE

## 2024-05-23 VITALS — SYSTOLIC BLOOD PRESSURE: 99 MMHG | DIASTOLIC BLOOD PRESSURE: 60 MMHG | RESPIRATION RATE: 15 BRPM | HEART RATE: 59 BPM

## 2024-05-23 DIAGNOSIS — L89.154 DECUBITUS ULCER OF SACRAL REGION, STAGE 4 (HCC): Primary | ICD-10-CM

## 2024-05-23 PROCEDURE — 11043 DBRDMT MUSC&/FSCA 1ST 20/<: CPT

## 2024-05-23 RX ORDER — BETAMETHASONE DIPROPIONATE 0.05 %
OINTMENT (GRAM) TOPICAL ONCE
OUTPATIENT
Start: 2024-05-23 | End: 2024-05-23

## 2024-05-23 RX ORDER — GINSENG 100 MG
CAPSULE ORAL ONCE
OUTPATIENT
Start: 2024-05-23 | End: 2024-05-23

## 2024-05-23 RX ORDER — GENTAMICIN SULFATE 1 MG/G
OINTMENT TOPICAL ONCE
OUTPATIENT
Start: 2024-05-23 | End: 2024-05-23

## 2024-05-23 RX ORDER — LIDOCAINE 50 MG/G
OINTMENT TOPICAL ONCE
OUTPATIENT
Start: 2024-05-23 | End: 2024-05-23

## 2024-05-23 RX ORDER — LIDOCAINE 40 MG/G
CREAM TOPICAL ONCE
OUTPATIENT
Start: 2024-05-23 | End: 2024-05-23

## 2024-05-23 RX ORDER — LIDOCAINE HYDROCHLORIDE 20 MG/ML
JELLY TOPICAL ONCE
OUTPATIENT
Start: 2024-05-23 | End: 2024-05-23

## 2024-05-23 RX ORDER — SODIUM CHLOR/HYPOCHLOROUS ACID 0.033 %
SOLUTION, IRRIGATION IRRIGATION ONCE
OUTPATIENT
Start: 2024-05-23 | End: 2024-05-23

## 2024-05-23 RX ORDER — IBUPROFEN 200 MG
TABLET ORAL ONCE
OUTPATIENT
Start: 2024-05-23 | End: 2024-05-23

## 2024-05-23 RX ORDER — BACITRACIN ZINC AND POLYMYXIN B SULFATE 500; 1000 [USP'U]/G; [USP'U]/G
OINTMENT TOPICAL ONCE
OUTPATIENT
Start: 2024-05-23 | End: 2024-05-23

## 2024-05-23 RX ORDER — LIDOCAINE 50 MG/G
OINTMENT TOPICAL ONCE
Status: DISCONTINUED | OUTPATIENT
Start: 2024-05-23 | End: 2024-05-24 | Stop reason: HOSPADM

## 2024-05-23 RX ORDER — LIDOCAINE HYDROCHLORIDE 40 MG/ML
SOLUTION TOPICAL ONCE
OUTPATIENT
Start: 2024-05-23 | End: 2024-05-23

## 2024-05-23 RX ORDER — TRIAMCINOLONE ACETONIDE 1 MG/G
OINTMENT TOPICAL ONCE
OUTPATIENT
Start: 2024-05-23 | End: 2024-05-23

## 2024-05-23 RX ORDER — CLOBETASOL PROPIONATE 0.5 MG/G
OINTMENT TOPICAL ONCE
OUTPATIENT
Start: 2024-05-23 | End: 2024-05-23

## 2024-05-23 NOTE — PATIENT INSTRUCTIONS
[facility]  [address]   [place], [place] [address]  Telephone: [phone]     FAX [phone]     Discharge Instructions     Important reminders:     **If you have any signs and symptoms of illness (Cough, fever, congestion, nausea, vomiting, diarrhea, etc.) please call the wound care center prior to your appointment.     1. Increase Protein intake for optimal wound healing  2. No added salt to reduce any swelling  3. If diabetic, maintain good glucose control  4. If you smoke, smoking prohibits wound healing, we ask that you refrain from smoking.  5. When taking antibiotics take the entire prescription as ordered. Do not stop taking until medication is all gone unless otherwise instructed.   6. Exercise as tolerated.   7. Keep weight off wounds and reposition every 2 hours if applicable.  8. If wound(s) is on your lower extremity, elevate legs to the level of the heart or above for 30 minutes 4-5 times a day and/or when sitting. Avoid standing for long periods of time.   9. Do not get wounds wet in bath or shower unless otherwise instructed by your physician. If your wound is on your foot or leg, you may purchase a cast bag. Please ask at the pharmacy.        If Vascular testing is ordered, please call [address] [place] [address] [phone]) to schedule.     Vascular tests ordered by Wound Care Physicians may take up to 2 hours to complete. Please keep that in mind when scheduling.      If Vascular testing is scheduled, please bring supplies to replace your dressing after testing is done. The vascular department does not stock supplies.      Wound: Sacrum, Back     With each dressing change, rinse wounds with 0.9% Saline. (May use wound wash or soft contact solution. Both can be purchased at a local drug store). If unable to obtain saline, may use a gentle soap and water.     Dressing care: Please check and maintain banegas catheter and flush as needed to prevent leakage daily. Apply Madeleine(silver

## 2024-05-23 NOTE — PLAN OF CARE
Discharge instructions given.  Patient verbalized understanding.  Return to [facility] in 3 week(s).  Called/faxed orders to  [facility]

## 2024-05-23 NOTE — PROGRESS NOTES
[facility]  [address]   [place], [place] [address]  Telephone: [phone]     FAX [phone]            [facility] [place]    Patient Instructions   [facility]  [address]   [place], [place] [address]  Telephone: [phone]     FAX [phone]     Discharge Instructions     Important reminders:     **If you have any signs and symptoms of illness (Cough, fever, congestion, nausea, vomiting, diarrhea, etc.) please call the wound care center prior to your appointment.     1. Increase Protein intake for optimal wound healing  2. No added salt to reduce any swelling  3. If diabetic, maintain good glucose control  4. If you smoke, smoking prohibits wound healing, we ask that you refrain from smoking.  5. When taking antibiotics take the entire prescription as ordered. Do not stop taking until medication is all gone unless otherwise instructed.   6. Exercise as tolerated.   7. Keep weight off wounds and reposition every 2 hours if applicable.  8. If wound(s) is on your lower extremity, elevate legs to the level of the heart or above for 30 minutes 4-5 times a day and/or when sitting. Avoid standing for long periods of time.   9. Do not get wounds wet in bath or shower unless otherwise instructed by your physician. If your wound is on your foot or leg, you may purchase a cast bag. Please ask at the pharmacy.        If Vascular testing is ordered, please call [address] [place] [address] [phone]) to schedule.     Vascular tests ordered by Wound Care Physicians may take up to 2 hours to complete. Please keep that in mind when scheduling.      If Vascular testing is scheduled, please bring supplies to replace your dressing after testing is done. The vascular department does not stock supplies.      Wound: Sacrum, Back     With each dressing change, rinse wounds with 0.9% Saline. (May use wound wash or soft contact solution. Both can be purchased at a local drug store). If unable to obtain

## 2024-05-29 ENCOUNTER — ANESTHESIA EVENT (OUTPATIENT)
Dept: OPERATING ROOM | Age: 84
End: 2024-05-29
Payer: MEDICARE

## 2024-05-30 ENCOUNTER — ANESTHESIA (OUTPATIENT)
Dept: OPERATING ROOM | Age: 84
End: 2024-05-30
Payer: MEDICARE

## 2024-05-30 ENCOUNTER — HOSPITAL ENCOUNTER (OUTPATIENT)
Age: 84
Setting detail: OUTPATIENT SURGERY
Discharge: HOME | End: 2024-05-30
Attending: UROLOGY | Admitting: UROLOGY
Payer: MEDICARE

## 2024-05-30 VITALS
OXYGEN SATURATION: 98 % | SYSTOLIC BLOOD PRESSURE: 134 MMHG | HEIGHT: 64 IN | BODY MASS INDEX: 30.73 KG/M2 | TEMPERATURE: 97.5 F | HEART RATE: 63 BPM | DIASTOLIC BLOOD PRESSURE: 85 MMHG | WEIGHT: 180 LBS | RESPIRATION RATE: 16 BRPM

## 2024-05-30 PROCEDURE — C1894 INTRO/SHEATH, NON-LASER: HCPCS | Performed by: UROLOGY

## 2024-05-30 PROCEDURE — 3600000012 HC SURGERY LEVEL 2 ADDTL 15MIN: Performed by: UROLOGY

## 2024-05-30 PROCEDURE — 3700000001 HC ADD 15 MINUTES (ANESTHESIA): Performed by: UROLOGY

## 2024-05-30 PROCEDURE — 2500000003 HC RX 250 WO HCPCS: Performed by: NURSE ANESTHETIST, CERTIFIED REGISTERED

## 2024-05-30 PROCEDURE — 7100000011 HC PHASE II RECOVERY - ADDTL 15 MIN: Performed by: UROLOGY

## 2024-05-30 PROCEDURE — A4217 STERILE WATER/SALINE, 500 ML: HCPCS | Performed by: UROLOGY

## 2024-05-30 PROCEDURE — 2709999900 HC NON-CHARGEABLE SUPPLY: Performed by: UROLOGY

## 2024-05-30 PROCEDURE — 2580000003 HC RX 258: Performed by: UROLOGY

## 2024-05-30 PROCEDURE — 7100000010 HC PHASE II RECOVERY - FIRST 15 MIN: Performed by: UROLOGY

## 2024-05-30 PROCEDURE — 7100000001 HC PACU RECOVERY - ADDTL 15 MIN: Performed by: UROLOGY

## 2024-05-30 PROCEDURE — C1769 GUIDE WIRE: HCPCS | Performed by: UROLOGY

## 2024-05-30 PROCEDURE — 2580000003 HC RX 258: Performed by: ANESTHESIOLOGY

## 2024-05-30 PROCEDURE — 3700000000 HC ANESTHESIA ATTENDED CARE: Performed by: UROLOGY

## 2024-05-30 PROCEDURE — 6360000002 HC RX W HCPCS: Performed by: NURSE ANESTHETIST, CERTIFIED REGISTERED

## 2024-05-30 PROCEDURE — 6360000002 HC RX W HCPCS: Performed by: UROLOGY

## 2024-05-30 PROCEDURE — 2580000003 HC RX 258: Performed by: NURSE ANESTHETIST, CERTIFIED REGISTERED

## 2024-05-30 PROCEDURE — 3600000002 HC SURGERY LEVEL 2 BASE: Performed by: UROLOGY

## 2024-05-30 PROCEDURE — 7100000000 HC PACU RECOVERY - FIRST 15 MIN: Performed by: UROLOGY

## 2024-05-30 RX ORDER — SODIUM CHLORIDE 0.9 % (FLUSH) 0.9 %
5-40 SYRINGE (ML) INJECTION EVERY 12 HOURS SCHEDULED
Status: DISCONTINUED | OUTPATIENT
Start: 2024-05-30 | End: 2024-05-30 | Stop reason: HOSPADM

## 2024-05-30 RX ORDER — SODIUM CHLORIDE 9 MG/ML
INJECTION, SOLUTION INTRAVENOUS CONTINUOUS PRN
Status: DISCONTINUED | OUTPATIENT
Start: 2024-05-30 | End: 2024-05-30 | Stop reason: SDUPTHER

## 2024-05-30 RX ORDER — PROPOFOL 10 MG/ML
INJECTION, EMULSION INTRAVENOUS PRN
Status: DISCONTINUED | OUTPATIENT
Start: 2024-05-30 | End: 2024-05-30 | Stop reason: SDUPTHER

## 2024-05-30 RX ORDER — SODIUM CHLORIDE 9 MG/ML
INJECTION, SOLUTION INTRAVENOUS PRN
Status: DISCONTINUED | OUTPATIENT
Start: 2024-05-30 | End: 2024-05-30 | Stop reason: HOSPADM

## 2024-05-30 RX ORDER — LABETALOL HYDROCHLORIDE 5 MG/ML
5 INJECTION, SOLUTION INTRAVENOUS
Status: DISCONTINUED | OUTPATIENT
Start: 2024-05-30 | End: 2024-05-30 | Stop reason: HOSPADM

## 2024-05-30 RX ORDER — NALOXONE HYDROCHLORIDE 0.4 MG/ML
INJECTION, SOLUTION INTRAMUSCULAR; INTRAVENOUS; SUBCUTANEOUS PRN
Status: DISCONTINUED | OUTPATIENT
Start: 2024-05-30 | End: 2024-05-30 | Stop reason: HOSPADM

## 2024-05-30 RX ORDER — DIPHENHYDRAMINE HYDROCHLORIDE 50 MG/ML
12.5 INJECTION INTRAMUSCULAR; INTRAVENOUS
Status: DISCONTINUED | OUTPATIENT
Start: 2024-05-30 | End: 2024-05-30 | Stop reason: HOSPADM

## 2024-05-30 RX ORDER — ONDANSETRON 2 MG/ML
4 INJECTION INTRAMUSCULAR; INTRAVENOUS
Status: DISCONTINUED | OUTPATIENT
Start: 2024-05-30 | End: 2024-05-30 | Stop reason: HOSPADM

## 2024-05-30 RX ORDER — SODIUM CHLORIDE 0.9 % (FLUSH) 0.9 %
5-40 SYRINGE (ML) INJECTION PRN
Status: DISCONTINUED | OUTPATIENT
Start: 2024-05-30 | End: 2024-05-30 | Stop reason: HOSPADM

## 2024-05-30 RX ORDER — MAGNESIUM HYDROXIDE 1200 MG/15ML
LIQUID ORAL CONTINUOUS PRN
Status: COMPLETED | OUTPATIENT
Start: 2024-05-30 | End: 2024-05-30

## 2024-05-30 RX ORDER — FENTANYL CITRATE 0.05 MG/ML
25 INJECTION, SOLUTION INTRAMUSCULAR; INTRAVENOUS EVERY 5 MIN PRN
Status: DISCONTINUED | OUTPATIENT
Start: 2024-05-30 | End: 2024-05-30 | Stop reason: HOSPADM

## 2024-05-30 RX ORDER — LIDOCAINE HYDROCHLORIDE 20 MG/ML
INJECTION, SOLUTION EPIDURAL; INFILTRATION; INTRACAUDAL; PERINEURAL PRN
Status: DISCONTINUED | OUTPATIENT
Start: 2024-05-30 | End: 2024-05-30 | Stop reason: SDUPTHER

## 2024-05-30 RX ADMIN — LIDOCAINE HYDROCHLORIDE 60 MG: 20 INJECTION, SOLUTION EPIDURAL; INFILTRATION; INTRACAUDAL; PERINEURAL at 14:47

## 2024-05-30 RX ADMIN — SODIUM CHLORIDE 50 ML/HR: 9 INJECTION, SOLUTION INTRAVENOUS at 14:19

## 2024-05-30 RX ADMIN — PROPOFOL 100 MCG/KG/MIN: 10 INJECTION, EMULSION INTRAVENOUS at 14:48

## 2024-05-30 RX ADMIN — PROPOFOL 70 MG: 10 INJECTION, EMULSION INTRAVENOUS at 14:47

## 2024-05-30 RX ADMIN — SODIUM CHLORIDE: 9 INJECTION, SOLUTION INTRAVENOUS at 14:41

## 2024-05-30 RX ADMIN — CEFTRIAXONE 2000 MG: 2 INJECTION, POWDER, FOR SOLUTION INTRAMUSCULAR; INTRAVENOUS at 14:41

## 2024-05-30 ASSESSMENT — PAIN - FUNCTIONAL ASSESSMENT
PAIN_FUNCTIONAL_ASSESSMENT: 0-10
PAIN_FUNCTIONAL_ASSESSMENT: NONE - DENIES PAIN
PAIN_FUNCTIONAL_ASSESSMENT: NONE - DENIES PAIN

## 2024-05-30 ASSESSMENT — ENCOUNTER SYMPTOMS: SHORTNESS OF BREATH: 0

## 2024-05-30 ASSESSMENT — LIFESTYLE VARIABLES: SMOKING_STATUS: 0

## 2024-05-30 NOTE — DISCHARGE INSTRUCTIONS
No diet or activity restrictions    Flush SP tube once per day with 60 ml saline    Place urethral catheter if the SP tube fails to drain    My office will call her to arrange next procedure in 6 weeks

## 2024-05-30 NOTE — ANESTHESIA PRE PROCEDURE
Department of Anesthesiology  Preprocedure Note       Name:  Regina Lawson   Age:  83 y.o.  :  1940                                          MRN:  [id number]         Date:  2024      Surgeon: Surgeon(s):  Clair Martin MD    Procedure: Procedure(s):  CYSTOSCOPY SUPRAPUBIC TRACK DILATION AND PLACEMENT OF TUBE    Medications prior to admission:   Prior to Admission medications    Medication Sig Start Date End Date Taking? Authorizing Provider   metoprolol succinate (TOPROL XL) 25 MG extended release tablet Take 1 tablet by mouth 2 times daily   Yes Josse Rojas MD   sennosides-docusate sodium (SENOKOT-S) 8.6-50 MG tablet Take 1 tablet by mouth daily 24   Derick Griffin MD   aspirin (ASPIRIN CHILDRENS) 81 MG chewable tablet Take 1 tablet by mouth daily 23   John Lnogoria MD   amiodarone (CORDARONE) 200 MG tablet Take 1 tablet by mouth daily 23   Andrew Meyers MD   furosemide (LASIX) 20 MG tablet Take 1 tablet by mouth daily    Josse Rojas MD   Lactobacillus (ACIDOPHILUS/PECTIN) 100 MG CAPS Take 1 capsule by mouth daily    Josse Rojas MD   atorvastatin (LIPITOR) 10 MG tablet Take 1 tablet by mouth at bedtime    Josse Rojas MD   ferrous sulfate (FE TABS 325) 325 (65 Fe) MG EC tablet Take 1 tablet by mouth with breakfast and with evening meal    Josse Rojas MD   miconazole (MICOTIN) 2 % powder Apply 1 application topically 2 times daily 23   Josse Rojas MD   potassium chloride (KLOR-CON M) 20 MEQ extended release tablet Take 1 tablet by mouth daily 6/10/23   Josse Rojas MD   Ascorbic Acid (VITAMIN C) 1000 MG tablet Take 1 tablet by mouth daily    Josse Rojas MD   alendronate (FOSAMAX) 70 MG tablet Take 1 tablet by mouth every 7 days 17   Josse Rojas MD   Calcium Carb-Cholecalciferol (CALCIUM + D3) 600-200 MG-UNIT TABS Take 1 capsule by mouth daily    Josse Rojas MD

## 2024-05-30 NOTE — PROGRESS NOTES
Verbal and written discharge instructions were given to the patient and family, patient and family verbalize understanding of discharge instructions including medications orders for home and possible side effects associated with them. Patient instructed and verbalizes understanding to call the doctor listed if they should have any complications or worsening symptoms. Verbalizes understanding about follow-up appointments. D/C IV patient tolerated well, no signs of bleeding. Patient discharged home with all belongings.   Transportation here to take pt back to [facility] via stretcher. Accomp by daughter.

## 2024-05-30 NOTE — PROGRESS NOTES
Transportation services called (#[phone]) for pickup. [facility] (#[phone]). Spoke with Kalie. Unable to reach nurse. Message left with return phone number. Awaiting return call for report.

## 2024-05-30 NOTE — PROGRESS NOTES
Antunez with luh foul smelling sediment cloudy drainage.  SP cath insertion site red with purulent drainage tegaderm dsg intact.

## 2024-05-30 NOTE — INTERVAL H&P NOTE
Update History & Physical    The patient's History and Physical  was reviewed with the patient and I examined the patient. There was no change. The surgical site was confirmed by the patient and me.     Plan: The risks, benefits, expected outcome, and alternative to the recommended procedure have been discussed with the patient. Patient understands and wants to proceed with the procedure.     Electronically signed by Clair Martin MD on 5/30/2024 at 2:38 PM

## 2024-05-30 NOTE — ANESTHESIA POSTPROCEDURE EVALUATION
Department of Anesthesiology  Postprocedure Note    Patient: Regina Lawson  MRN: [id number]  YOB: 1940  Date of evaluation: 5/30/2024    Procedure Summary       Date: 05/30/24 Room / Location: [address]    Anesthesia Start: 1441 Anesthesia Stop: 1549    Procedure: CYSTOSCOPY SUPRAPUBIC TRACK DILATION AND TUBE EXCHANGE Diagnosis:       Retention of urine, unspecified      (Retention of urine, unspecified [R33.9])    Surgeons: Clair Martin MD Responsible Provider: Megan Arreola MD    Anesthesia Type: MAC ASA Status: 3            Anesthesia Type: MAC    Ole Phase I: Ole Score: 10    Ole Phase II: Ole Score: 10    Anesthesia Post Evaluation    Patient location during evaluation: PACU  Patient participation: complete - patient participated  Level of consciousness: awake  Pain score: 0  Airway patency: patent  Nausea & Vomiting: no nausea  Cardiovascular status: hemodynamically stable  Respiratory status: acceptable  Hydration status: euvolemic  Multimodal analgesia pain management approach        No notable events documented.

## 2024-05-30 NOTE — PROGRESS NOTES
Pt received into room 20 from PACU. Report obtained. Vss. Pt stable. S/P cath site c/d/I. Antunez draining yellow/clear urine. Denies pain. Daughter at bedside. PO provided.

## 2024-05-31 NOTE — OP NOTE
MD MARY JO      D:  05/30/2024 16:07:25     T:  05/30/2024 20:51:07     AKILA/MARLEN  Job #:  [id number]     Doc#:  [id number]

## 2024-06-11 NOTE — PATIENT INSTRUCTIONS
at [phone] Monday  - Thursday 8:00 am - 4:00 pm and Friday 8:00 am - 1:00pm. If you need help with your wound outside these hours and cannot wait until we are again available, contact your PCP or go to the hospital emergency room.      PLEASE NOTE: IF YOU ARE UNABLE TO OBTAIN WOUND SUPPLIES, CONTINUE TO USE THE SUPPLIES YOU HAVE AVAILABLE UNTIL YOU ARE ABLE TO REACH US. IT IS MOST IMPORTANT TO KEEP THE WOUND COVERED AT ALL TIMES.     Patient Experience     Thank you for trusting us with your care.  You may receive a survey from a company called [employer] asking for your feedback.  We would appreciate it if you took a few minutes to share your experience.  Your input is very valuable to us.

## 2024-06-13 ENCOUNTER — TELEPHONE (OUTPATIENT)
Dept: SURGERY | Age: 84
End: 2024-06-13

## 2024-06-13 ENCOUNTER — HOSPITAL ENCOUNTER (OUTPATIENT)
Dept: WOUND CARE | Age: 84
Discharge: HOME | End: 2024-06-13
Attending: EMERGENCY MEDICINE
Payer: MEDICARE

## 2024-06-13 VITALS — HEART RATE: 67 BPM | SYSTOLIC BLOOD PRESSURE: 94 MMHG | DIASTOLIC BLOOD PRESSURE: 58 MMHG | TEMPERATURE: 96.9 F

## 2024-06-13 DIAGNOSIS — L89.154 DECUBITUS ULCER OF SACRAL REGION, STAGE 4 (HCC): Primary | ICD-10-CM

## 2024-06-13 PROCEDURE — 11043 DBRDMT MUSC&/FSCA 1ST 20/<: CPT | Performed by: EMERGENCY MEDICINE

## 2024-06-13 PROCEDURE — 11043 DBRDMT MUSC&/FSCA 1ST 20/<: CPT

## 2024-06-13 RX ORDER — IBUPROFEN 200 MG
TABLET ORAL ONCE
OUTPATIENT
Start: 2024-06-13 | End: 2024-06-13

## 2024-06-13 RX ORDER — CLOBETASOL PROPIONATE 0.5 MG/G
OINTMENT TOPICAL ONCE
OUTPATIENT
Start: 2024-06-13 | End: 2024-06-13

## 2024-06-13 RX ORDER — LIDOCAINE 50 MG/G
OINTMENT TOPICAL ONCE
OUTPATIENT
Start: 2024-06-13 | End: 2024-06-13

## 2024-06-13 RX ORDER — GENTAMICIN SULFATE 1 MG/G
OINTMENT TOPICAL ONCE
OUTPATIENT
Start: 2024-06-13 | End: 2024-06-13

## 2024-06-13 RX ORDER — LIDOCAINE 40 MG/G
CREAM TOPICAL ONCE
OUTPATIENT
Start: 2024-06-13 | End: 2024-06-13

## 2024-06-13 RX ORDER — LIDOCAINE HYDROCHLORIDE 40 MG/ML
SOLUTION TOPICAL ONCE
OUTPATIENT
Start: 2024-06-13 | End: 2024-06-13

## 2024-06-13 RX ORDER — GINSENG 100 MG
CAPSULE ORAL ONCE
OUTPATIENT
Start: 2024-06-13 | End: 2024-06-13

## 2024-06-13 RX ORDER — TRIAMCINOLONE ACETONIDE 1 MG/G
OINTMENT TOPICAL ONCE
OUTPATIENT
Start: 2024-06-13 | End: 2024-06-13

## 2024-06-13 RX ORDER — SODIUM CHLOR/HYPOCHLOROUS ACID 0.033 %
SOLUTION, IRRIGATION IRRIGATION ONCE
OUTPATIENT
Start: 2024-06-13 | End: 2024-06-13

## 2024-06-13 RX ORDER — BETAMETHASONE DIPROPIONATE 0.05 %
OINTMENT (GRAM) TOPICAL ONCE
OUTPATIENT
Start: 2024-06-13 | End: 2024-06-13

## 2024-06-13 RX ORDER — LIDOCAINE 40 MG/G
CREAM TOPICAL ONCE
Status: DISCONTINUED | OUTPATIENT
Start: 2024-06-13 | End: 2024-06-14 | Stop reason: HOSPADM

## 2024-06-13 RX ORDER — BACITRACIN ZINC AND POLYMYXIN B SULFATE 500; 1000 [USP'U]/G; [USP'U]/G
OINTMENT TOPICAL ONCE
OUTPATIENT
Start: 2024-06-13 | End: 2024-06-13

## 2024-06-13 RX ORDER — LIDOCAINE HYDROCHLORIDE 20 MG/ML
JELLY TOPICAL ONCE
OUTPATIENT
Start: 2024-06-13 | End: 2024-06-13

## 2024-06-13 NOTE — TELEPHONE ENCOUNTER
PO 11/24/23-   SIGMOID COLECTOMY, END SIGMOID COLOSTOMY     Pt's daughter states pt is having mucus discharge from rectum like she was having prior to surgery. Daughter states has been going on for about 3 weeks.  Please call daughter Laurie and advise.

## 2024-06-13 NOTE — PLAN OF CARE
Discharge instructions given.  Patient verbalized understanding.  Return to [facility] in 2 week(s).  Called/faxed orders to [facility].

## 2024-06-13 NOTE — PROGRESS NOTES
prevent leakage daily. Check for rectal discharge leaking into wound dressing every 2 hours and need to change dressing. Apply Madeleine(silver collagen-slightly moisten), moistened fluffed 2 x 2 into wound -loosely-not tight, cover with Sacral Border dressing. Change dressing daily and as needed for drainage or soilage. Turn and reposition off buttocks while in bed at least every hour side to side. Use pressure relieving pillow while up in chair. Pt is able to be up in chair for meals 3x daily for 1 hour at a time for meals and place pillows under shoulders, lower back, under knees, etc as needed to help positioning and relieve pressure.    Gastrologist appt 10/24  Keep using offloading mattress.   Give protein supplements in between meals (Kranthi supplements recommended) .    Turn side to side ONLY every 1-2 hours (position with pillows and place away from wound sites) and allowed on back for 30mins only during meals.  Start Diflucan from pharmacy 9/21    Home Care Agency/Facility: [facility]     Your wound-care supplies will be provided by:   Please note, depending on your insurance coverage, you may have out-of-pocket expenses for these supplies. Someone from the company should call you to confirm your order and discuss those potential costs before they ship your products -- please anticipate that call. If your out-of-pocket cost could be substantial, Many companies have financial hardship programs for patients who qualify, so please ask about that if you might need a hand. If you have any questions about your supplies or your potential out-of-pocket costs, or if you need to place an order for a refill of supplies (typically monthly), please call the company directly.      Your  is Sara     Follow up with Dr Finney In 2 week(s) in the wound care center.         Wound Care Center Information: Should you experience any significant changes in your wound(s) or have questions about your wound care, please 
by mouth at bedtime      ferrous sulfate (FE TABS 325) 325 (65 Fe) MG EC tablet Take 1 tablet by mouth with breakfast and with evening meal      miconazole (MICOTIN) 2 % powder Apply 1 application topically 2 times daily      potassium chloride (KLOR-CON M) 20 MEQ extended release tablet Take 1 tablet by mouth daily      Ascorbic Acid (VITAMIN C) 1000 MG tablet Take 1 tablet by mouth daily      alendronate (FOSAMAX) 70 MG tablet Take 1 tablet by mouth every 7 days      Calcium Carb-Cholecalciferol (CALCIUM + D3) 600-200 MG-UNIT TABS Take 1 capsule by mouth daily      Vitamin D (CHOLECALCIFEROL) 1000 UNITS CAPS capsule Take 1 capsule by mouth daily       No current facility-administered medications on file prior to encounter.       REVIEW OF SYSTEMS  A comprehensive review of systems was negative except noted in HPI/wound narrative and/or updates above.    Written patient dismissal instructions given to patient and signed by patient or POA.  Patient voiced understanding that the importance of adherence to instructions is paramount to wound healing improvement or success.          Patient Instructions   [facility]  [address] [address]  Telephone: [phone]     FAX [phone]     Discharge Instructions     Important reminders:     **If you have any signs and symptoms of illness (Cough, fever, congestion, nausea, vomiting, diarrhea, etc.) please call the wound care center prior to your appointment.     1. Increase Protein intake for optimal wound healing  2. No added salt to reduce any swelling  3. If diabetic, maintain good glucose control  4. If you smoke, smoking prohibits wound healing, we ask that you refrain from smoking.  5. When taking antibiotics take the entire prescription as ordered. Do not stop taking until medication is all gone unless otherwise instructed.   6. Exercise as tolerated.   7. Keep weight off wounds and reposition every 2 hours if applicable.  8.

## 2024-06-14 NOTE — TELEPHONE ENCOUNTER
Dr. Emerson said that this can happen. He wants a zinc oxide cream to be applied to the area to help prevent irritation and follow up with Dr. Sierra HUNTER.     I called and advised Laurie, she said that the nursing home has been doing the zinc oxide cream since this has started. Problem is that pt has been having ulcers/wounds that they have been battling and does not want this to continue. I advised that she go ahead and make the appointment with Dr. Esquivel to follow up with him.

## 2024-06-15 ENCOUNTER — APPOINTMENT (OUTPATIENT)
Dept: CT IMAGING | Age: 84
End: 2024-06-15
Payer: MEDICARE

## 2024-06-15 ENCOUNTER — HOSPITAL ENCOUNTER (EMERGENCY)
Age: 84
Discharge: HOME | End: 2024-06-16
Payer: MEDICARE

## 2024-06-15 VITALS
BODY MASS INDEX: 32.44 KG/M2 | TEMPERATURE: 98.7 F | HEART RATE: 77 BPM | WEIGHT: 190 LBS | RESPIRATION RATE: 16 BRPM | DIASTOLIC BLOOD PRESSURE: 63 MMHG | HEIGHT: 64 IN | SYSTOLIC BLOOD PRESSURE: 114 MMHG | OXYGEN SATURATION: 93 %

## 2024-06-15 DIAGNOSIS — T83.510A URINARY TRACT INFECTION ASSOCIATED WITH CYSTOSTOMY CATHETER, INITIAL ENCOUNTER (HCC): Primary | ICD-10-CM

## 2024-06-15 DIAGNOSIS — K46.9 PERISTOMAL HERNIA: ICD-10-CM

## 2024-06-15 DIAGNOSIS — K59.00 CONSTIPATION, UNSPECIFIED CONSTIPATION TYPE: ICD-10-CM

## 2024-06-15 DIAGNOSIS — N39.0 URINARY TRACT INFECTION ASSOCIATED WITH CYSTOSTOMY CATHETER, INITIAL ENCOUNTER (HCC): Primary | ICD-10-CM

## 2024-06-15 LAB
ALBUMIN SERPL-MCNC: 3.1 G/DL (ref 3.4–5)
ALBUMIN/GLOB SERPL: 0.8 {RATIO} (ref 1.1–2.2)
ALP SERPL-CCNC: 155 U/L (ref 40–129)
ALT SERPL-CCNC: 18 U/L (ref 10–40)
ANION GAP SERPL CALCULATED.3IONS-SCNC: 11 MMOL/L (ref 3–16)
AST SERPL-CCNC: 21 U/L (ref 15–37)
BASOPHILS # BLD: 0 K/UL (ref 0–0.2)
BASOPHILS NFR BLD: 0.4 %
BILIRUB SERPL-MCNC: 0.4 MG/DL (ref 0–1)
BILIRUB UR QL STRIP.AUTO: NEGATIVE
BUN SERPL-MCNC: 20 MG/DL (ref 7–20)
CALCIUM SERPL-MCNC: 9.7 MG/DL (ref 8.3–10.6)
CHLORIDE SERPL-SCNC: 95 MMOL/L (ref 99–110)
CLARITY UR: ABNORMAL
CO2 SERPL-SCNC: 26 MMOL/L (ref 21–32)
COLOR UR: ABNORMAL
CREAT SERPL-MCNC: 0.8 MG/DL (ref 0.6–1.2)
DEPRECATED RDW RBC AUTO: 15.7 % (ref 12.4–15.4)
EOSINOPHIL # BLD: 0.1 K/UL (ref 0–0.6)
EOSINOPHIL NFR BLD: 1.2 %
EPI CELLS #/AREA URNS HPF: ABNORMAL /HPF (ref 0–5)
FLUAV RNA RESP QL NAA+PROBE: NOT DETECTED
FLUBV RNA RESP QL NAA+PROBE: NOT DETECTED
GFR SERPLBLD CREATININE-BSD FMLA CKD-EPI: 72 ML/MIN/{1.73_M2}
GLUCOSE SERPL-MCNC: 139 MG/DL (ref 70–99)
GLUCOSE UR STRIP.AUTO-MCNC: NEGATIVE MG/DL
HCT VFR BLD AUTO: 33.5 % (ref 36–48)
HGB BLD-MCNC: 11.3 G/DL (ref 12–16)
HGB UR QL STRIP.AUTO: ABNORMAL
KETONES UR STRIP.AUTO-MCNC: NEGATIVE MG/DL
LACTATE BLDV-SCNC: 1.9 MMOL/L (ref 0.4–1.9)
LEUKOCYTE ESTERASE UR QL STRIP.AUTO: ABNORMAL
LIPASE SERPL-CCNC: 34 U/L (ref 13–60)
LYMPHOCYTES # BLD: 0.8 K/UL (ref 1–5.1)
LYMPHOCYTES NFR BLD: 11.1 %
MCH RBC QN AUTO: 28 PG (ref 26–34)
MCHC RBC AUTO-ENTMCNC: 33.6 G/DL (ref 31–36)
MCV RBC AUTO: 83.2 FL (ref 80–100)
MONOCYTES # BLD: 0.7 K/UL (ref 0–1.3)
MONOCYTES NFR BLD: 9.2 %
NEUTROPHILS # BLD: 5.8 K/UL (ref 1.7–7.7)
NEUTROPHILS NFR BLD: 78.1 %
NITRITE UR QL STRIP.AUTO: NEGATIVE
PH UR STRIP.AUTO: 5 [PH] (ref 5–8)
PLATELET # BLD AUTO: 285 K/UL (ref 135–450)
PMV BLD AUTO: 7.8 FL (ref 5–10.5)
POTASSIUM SERPL-SCNC: 3.9 MMOL/L (ref 3.5–5.1)
PROCALCITONIN SERPL IA-MCNC: 0.13 NG/ML (ref 0–0.15)
PROT SERPL-MCNC: 7 G/DL (ref 6.4–8.2)
PROT UR STRIP.AUTO-MCNC: 30 MG/DL
RBC # BLD AUTO: 4.03 M/UL (ref 4–5.2)
RBC #/AREA URNS HPF: ABNORMAL /HPF (ref 0–4)
SARS-COV-2 RNA RESP QL NAA+PROBE: NOT DETECTED
SODIUM SERPL-SCNC: 132 MMOL/L (ref 136–145)
SP GR UR STRIP.AUTO: >=1.03 (ref 1–1.03)
UA COMPLETE W REFLEX CULTURE PNL UR: YES
UA DIPSTICK W REFLEX MICRO PNL UR: YES
URN SPEC COLLECT METH UR: ABNORMAL
UROBILINOGEN UR STRIP-ACNC: 1 E.U./DL
WBC # BLD AUTO: 7.5 K/UL (ref 4–11)
WBC #/AREA URNS HPF: ABNORMAL /HPF (ref 0–5)

## 2024-06-15 PROCEDURE — 6370000000 HC RX 637 (ALT 250 FOR IP): Performed by: PHYSICIAN ASSISTANT

## 2024-06-15 PROCEDURE — 83605 ASSAY OF LACTIC ACID: CPT

## 2024-06-15 PROCEDURE — 87636 SARSCOV2 & INF A&B AMP PRB: CPT

## 2024-06-15 PROCEDURE — 87086 URINE CULTURE/COLONY COUNT: CPT

## 2024-06-15 PROCEDURE — 84145 PROCALCITONIN (PCT): CPT

## 2024-06-15 PROCEDURE — 85025 COMPLETE CBC W/AUTO DIFF WBC: CPT

## 2024-06-15 PROCEDURE — 80053 COMPREHEN METABOLIC PANEL: CPT

## 2024-06-15 PROCEDURE — 96361 HYDRATE IV INFUSION ADD-ON: CPT

## 2024-06-15 PROCEDURE — 87186 SC STD MICRODIL/AGAR DIL: CPT

## 2024-06-15 PROCEDURE — 99285 EMERGENCY DEPT VISIT HI MDM: CPT

## 2024-06-15 PROCEDURE — 81001 URINALYSIS AUTO W/SCOPE: CPT

## 2024-06-15 PROCEDURE — 2580000003 HC RX 258: Performed by: PHYSICIAN ASSISTANT

## 2024-06-15 PROCEDURE — 87077 CULTURE AEROBIC IDENTIFY: CPT

## 2024-06-15 PROCEDURE — 74177 CT ABD & PELVIS W/CONTRAST: CPT

## 2024-06-15 PROCEDURE — 6360000004 HC RX CONTRAST MEDICATION: Performed by: PHYSICIAN ASSISTANT

## 2024-06-15 PROCEDURE — 83690 ASSAY OF LIPASE: CPT

## 2024-06-15 PROCEDURE — 96374 THER/PROPH/DIAG INJ IV PUSH: CPT

## 2024-06-15 RX ORDER — CEFUROXIME AXETIL 250 MG/1
250 TABLET ORAL 2 TIMES DAILY
Qty: 14 TABLET | Refills: 0 | Status: SHIPPED | OUTPATIENT
Start: 2024-06-15 | End: 2024-06-22

## 2024-06-15 RX ORDER — ACETAMINOPHEN 500 MG
1000 TABLET ORAL ONCE
Status: COMPLETED | OUTPATIENT
Start: 2024-06-15 | End: 2024-06-15

## 2024-06-15 RX ORDER — 0.9 % SODIUM CHLORIDE 0.9 %
1000 INTRAVENOUS SOLUTION INTRAVENOUS ONCE
Status: COMPLETED | OUTPATIENT
Start: 2024-06-15 | End: 2024-06-16

## 2024-06-15 RX ADMIN — SODIUM CHLORIDE 1000 ML: 9 INJECTION, SOLUTION INTRAVENOUS at 21:50

## 2024-06-15 RX ADMIN — IOPAMIDOL 75 ML: 755 INJECTION, SOLUTION INTRAVENOUS at 21:18

## 2024-06-15 RX ADMIN — ACETAMINOPHEN 1000 MG: 500 TABLET ORAL at 20:36

## 2024-06-15 ASSESSMENT — PAIN - FUNCTIONAL ASSESSMENT: PAIN_FUNCTIONAL_ASSESSMENT: NONE - DENIES PAIN

## 2024-06-16 PROCEDURE — 6360000002 HC RX W HCPCS: Performed by: PHYSICIAN ASSISTANT

## 2024-06-16 PROCEDURE — 2580000003 HC RX 258: Performed by: PHYSICIAN ASSISTANT

## 2024-06-16 PROCEDURE — 96361 HYDRATE IV INFUSION ADD-ON: CPT

## 2024-06-16 RX ADMIN — WATER 1000 MG: 1 INJECTION INTRAMUSCULAR; INTRAVENOUS; SUBCUTANEOUS at 00:08

## 2024-06-16 NOTE — ED PROVIDER NOTES
[facility] EMERGENCY DEPARTMENT  EMERGENCY DEPARTMENT ENCOUNTER        Pt Name: Regina Lawson  MRN: [id number]  Birthdate 1940  Date of evaluation: 6/15/2024  Provider: Piyush Lemos PA-C  PCP: Bailee Sharp DO  Note Started: 8:13 PM EDT 6/15/24      RENA. I have evaluated this patient.        CHIEF COMPLAINT       Chief Complaint   Patient presents with    GI Problem     Pt arrived via [facility] from [facility]. Nursing home concerned about stoma protruding more than normal. Pt has no complaints.        HISTORY OF PRESENT ILLNESS: 1 or more Elements     History from : Patient    Limitations to history : None    Regina Lawson is a 83 y.o. female with a history of large bowel obstruction with colostomy, and suprapubic catheter who presents to the emergency department today via ambulance from [facility] because nursing staff was concerned that her stoma in the left lower abdomen is protruding more than normal.  Patient does admit that it might be protruding slightly more than normal.  She denies having any abdominal pain.  She states 2 days ago she did experience some nausea and vomiting but has not had any nausea or vomiting yesterday or today.  She reports normal output into her Antunez catheter.  She denies chest pain, shortness of breath or coughing.  Patient is noted to be febrile on arrival with a temperature of 100.8 °F.  She denies having fevers or chills.  She denies headache, lightheadedness or dizziness.        Nursing Notes were all reviewed and agreed with or any disagreements were addressed in the HPI.    REVIEW OF SYSTEMS :      Review of Systems   Constitutional:  Negative for chills and fever.   Respiratory:  Negative for chest tightness and shortness of breath.    Cardiovascular:  Negative for chest pain, palpitations and leg swelling.   Gastrointestinal:  Negative for abdominal distention, abdominal pain, diarrhea, nausea and vomiting.   Genitourinary:

## 2024-06-18 LAB
BACTERIA UR CULT: ABNORMAL
BACTERIA UR CULT: ABNORMAL
ORGANISM: ABNORMAL
ORGANISM: ABNORMAL

## 2024-06-18 NOTE — ED NOTES
[facility]   Emergency Department Culture Follow-Up       Regina Lawson (CSN: [id number]) was seen and evaluated at [facility] Emergency Department on 6/15/24 by provider Kraig Lemos PA-C.    A urine culture was positive and is growing >100k E coli, 75k Proteus mirabilis. Sensitivity results: E coli is ESBL, sensitive to nitrofurantoin, proteus sensitive to cefuroxime      Treatment Course:    The patient was treated and discharged with cefuroxime.      Follow-Up:    The patient's care facility was contacted and notified of the results. Results were faxed by: Bailee Storey to facility: [facility]. Facility fax number: [phone]. Results verbally discussed with patient's RN.     Thank you,    Bailee Storey [facility]  6/18/2024

## 2024-06-25 ENCOUNTER — OFFICE VISIT (OUTPATIENT)
Dept: SURGERY | Age: 84
End: 2024-06-25
Payer: MEDICARE

## 2024-06-25 VITALS — BODY MASS INDEX: 32.61 KG/M2 | SYSTOLIC BLOOD PRESSURE: 114 MMHG | WEIGHT: 190 LBS | DIASTOLIC BLOOD PRESSURE: 63 MMHG

## 2024-06-25 DIAGNOSIS — K46.9 PERISTOMAL HERNIA: Primary | ICD-10-CM

## 2024-06-25 PROCEDURE — G8400 PT W/DXA NO RESULTS DOC: HCPCS | Performed by: SURGERY

## 2024-06-25 PROCEDURE — 1036F TOBACCO NON-USER: CPT | Performed by: SURGERY

## 2024-06-25 PROCEDURE — G8427 DOCREV CUR MEDS BY ELIG CLIN: HCPCS | Performed by: SURGERY

## 2024-06-25 PROCEDURE — G8417 CALC BMI ABV UP PARAM F/U: HCPCS | Performed by: SURGERY

## 2024-06-25 PROCEDURE — 1090F PRES/ABSN URINE INCON ASSESS: CPT | Performed by: SURGERY

## 2024-06-25 PROCEDURE — 1123F ACP DISCUSS/DSCN MKR DOCD: CPT | Performed by: SURGERY

## 2024-06-25 PROCEDURE — 99213 OFFICE O/P EST LOW 20 MIN: CPT | Performed by: SURGERY

## 2024-06-25 NOTE — PROGRESS NOTES
There is a 2.5 cm nodule in the left adrenal gland which appears  stable from the prior study and has internal density of approximately 74  Hounsfield units on this postcontrast study.  Has had previous CTs and MRI.     Kidneys are enhancing equally without perinephric fluid collection or  hydronephrosis on either side.     GI/Bowel: Hiatal hernia is present and extending above the level of the  current imaging.  The small bowel and colon are not dilated.  Colostomy in  the left lower quadrant.  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  However small bowel in the hernia and proximal to the  hernia are not dilated.  Constipation of the ascending and transverse colon.  The rectum no sigmoid junction has postsurgical blind-ending pouch.  Fluid  distending the rectosigmoid segment.  High density structure is noted in the  rectum and smaller areas inferiorly near the anal rectal junction.  There is  no ascites or pneumoperitoneum.     Pelvis: Previous hysterectomy.  Suprapubic catheter is present coursing into  the urinary bladder.  Bladder is under distended but could be thickened.     Peritoneum/Retroperitoneum: Atherosclerosis along the aorta but is not  aneurysmal.  No retroperitoneal hematoma.     Bones/Soft Tissues: Bones are osteopenic.  Posterior fay and screw fixation  down the spine from the lower thoracic spine to the upper sacrum.  But  fixation screws across the sacroiliac joints.  Chronic deformities of the  vertebral bodies but the details are limited by artifact from the hardware.  Areas of anterior fixation at the expected L3-4 and L4-5 levels.  Disc spacer  at L5-S1.     IMPRESSION:  1.  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  Small bowel  loops are coursing into the peristomal hernia and new from the previous exam.  However small bowel in the hernia and proximal to the hernia are not dilated.  No sign obstruction small-bowel obstruction.     2.  There

## 2024-06-26 ENCOUNTER — PREP FOR PROCEDURE (OUTPATIENT)
Dept: SURGERY | Age: 84
End: 2024-06-26

## 2024-06-26 DIAGNOSIS — K46.9 PERISTOMAL HERNIA: ICD-10-CM

## 2024-06-26 NOTE — PATIENT INSTRUCTIONS
[facility]  [address]   [place], [place] [address]  Telephone: [phone]     FAX [phone]     Discharge Instructions     Important reminders:     **If you have any signs and symptoms of illness (Cough, fever, congestion, nausea, vomiting, diarrhea, etc.) please call the wound care center prior to your appointment.     1. Increase Protein intake for optimal wound healing  2. No added salt to reduce any swelling  3. If diabetic, maintain good glucose control  4. If you smoke, smoking prohibits wound healing, we ask that you refrain from smoking.  5. When taking antibiotics take the entire prescription as ordered. Do not stop taking until medication is all gone unless otherwise instructed.   6. Exercise as tolerated.   7. Keep weight off wounds and reposition every 2 hours if applicable.  8. If wound(s) is on your lower extremity, elevate legs to the level of the heart or above for 30 minutes 4-5 times a day and/or when sitting. Avoid standing for long periods of time.   9. Do not get wounds wet in bath or shower unless otherwise instructed by your physician. If your wound is on your foot or leg, you may purchase a cast bag. Please ask at the pharmacy.        If Vascular testing is ordered, please call [address] [place] [address] [phone]) to schedule.     Vascular tests ordered by Wound Care Physicians may take up to 2 hours to complete. Please keep that in mind when scheduling.      If Vascular testing is scheduled, please bring supplies to replace your dressing after testing is done. The vascular department does not stock supplies.      Wound: Sacrum, Back     With each dressing change, rinse wounds with 0.9% Saline. (May use wound wash or soft contact solution. Both can be purchased at a local drug store). If unable to obtain saline, may use a gentle soap and water.     Dressing care: Please check and maintain banegas catheter and flush as needed to prevent leakage daily. Check for rectal discharge

## 2024-06-27 ENCOUNTER — HOSPITAL ENCOUNTER (OUTPATIENT)
Dept: WOUND CARE | Age: 84
Discharge: HOME | End: 2024-06-27
Attending: EMERGENCY MEDICINE
Payer: MEDICARE

## 2024-06-27 VITALS — SYSTOLIC BLOOD PRESSURE: 96 MMHG | RESPIRATION RATE: 15 BRPM | HEART RATE: 66 BPM | DIASTOLIC BLOOD PRESSURE: 56 MMHG

## 2024-06-27 DIAGNOSIS — L89.154 DECUBITUS ULCER OF SACRAL REGION, STAGE 4 (HCC): Primary | ICD-10-CM

## 2024-06-27 PROCEDURE — 11043 DBRDMT MUSC&/FSCA 1ST 20/<: CPT

## 2024-06-27 PROCEDURE — 11043 DBRDMT MUSC&/FSCA 1ST 20/<: CPT | Performed by: EMERGENCY MEDICINE

## 2024-06-27 RX ORDER — GINSENG 100 MG
CAPSULE ORAL ONCE
OUTPATIENT
Start: 2024-06-27 | End: 2024-06-27

## 2024-06-27 RX ORDER — IBUPROFEN 200 MG
TABLET ORAL ONCE
OUTPATIENT
Start: 2024-06-27 | End: 2024-06-27

## 2024-06-27 RX ORDER — METRONIDAZOLE 500 MG/1
500 TABLET ORAL 3 TIMES DAILY
Qty: 21 TABLET | Refills: 0 | Status: SHIPPED | OUTPATIENT
Start: 2024-06-27 | End: 2024-07-04

## 2024-06-27 RX ORDER — LIDOCAINE 50 MG/G
OINTMENT TOPICAL ONCE
OUTPATIENT
Start: 2024-06-27 | End: 2024-06-27

## 2024-06-27 RX ORDER — GENTAMICIN SULFATE 1 MG/G
OINTMENT TOPICAL ONCE
OUTPATIENT
Start: 2024-06-27 | End: 2024-06-27

## 2024-06-27 RX ORDER — BACITRACIN ZINC AND POLYMYXIN B SULFATE 500; 1000 [USP'U]/G; [USP'U]/G
OINTMENT TOPICAL ONCE
OUTPATIENT
Start: 2024-06-27 | End: 2024-06-27

## 2024-06-27 RX ORDER — BETAMETHASONE DIPROPIONATE 0.05 %
OINTMENT (GRAM) TOPICAL ONCE
OUTPATIENT
Start: 2024-06-27 | End: 2024-06-27

## 2024-06-27 RX ORDER — SODIUM CHLORIDE 0.9 % (FLUSH) 0.9 %
5-40 SYRINGE (ML) INJECTION PRN
OUTPATIENT
Start: 2024-06-27

## 2024-06-27 RX ORDER — CLOBETASOL PROPIONATE 0.5 MG/G
OINTMENT TOPICAL ONCE
OUTPATIENT
Start: 2024-06-27 | End: 2024-06-27

## 2024-06-27 RX ORDER — LIDOCAINE HYDROCHLORIDE 20 MG/ML
JELLY TOPICAL ONCE
OUTPATIENT
Start: 2024-06-27 | End: 2024-06-27

## 2024-06-27 RX ORDER — LIDOCAINE HYDROCHLORIDE 40 MG/ML
SOLUTION TOPICAL ONCE
OUTPATIENT
Start: 2024-06-27 | End: 2024-06-27

## 2024-06-27 RX ORDER — LIDOCAINE 40 MG/G
CREAM TOPICAL ONCE
Status: DISCONTINUED | OUTPATIENT
Start: 2024-06-27 | End: 2024-06-28 | Stop reason: HOSPADM

## 2024-06-27 RX ORDER — SODIUM CHLORIDE 0.9 % (FLUSH) 0.9 %
5-40 SYRINGE (ML) INJECTION EVERY 12 HOURS SCHEDULED
OUTPATIENT
Start: 2024-06-27

## 2024-06-27 RX ORDER — TRIAMCINOLONE ACETONIDE 1 MG/G
OINTMENT TOPICAL ONCE
OUTPATIENT
Start: 2024-06-27 | End: 2024-06-27

## 2024-06-27 RX ORDER — SODIUM CHLORIDE 9 MG/ML
INJECTION, SOLUTION INTRAVENOUS PRN
OUTPATIENT
Start: 2024-06-27

## 2024-06-27 RX ORDER — LIDOCAINE 40 MG/G
CREAM TOPICAL ONCE
OUTPATIENT
Start: 2024-06-27 | End: 2024-06-27

## 2024-06-27 RX ORDER — SODIUM CHLOR/HYPOCHLOROUS ACID 0.033 %
SOLUTION, IRRIGATION IRRIGATION ONCE
OUTPATIENT
Start: 2024-06-27 | End: 2024-06-27

## 2024-06-27 NOTE — PLAN OF CARE
Discharge instructions given.  Patient verbalized understanding.  Return to [facility] in 2 week(s).  Called/faxed orders to  [facility].

## 2024-06-27 NOTE — PROGRESS NOTES
[facility]     H&P or Progress Note: Medical Staff Progress Note    Regina Lawson  MEDICAL RECORD NUMBER:  [id number]  AGE: 83 y.o.   GENDER: female  : 1940  EPISODE DATE:  2024    Chief complaint and reason for visit:     Chief Complaint   Patient presents with    Wound Check     Follow up pressure wound        HPI/Wound Narrative:      Regina Lawson is a 83 y.o. female who presents today for an evaluation of a wound/ulcer. Wound duration:  2023 .    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 Here for follow up.  Pertinent associated symptoms: drainage , redness, swelling, skin discoloration, and impaired mobility    23: not getting turn well the last 2 weeks at [facility]  23: having a lot of stool incontinence, mostly mucinous. Contaminating wound. Dressings not being done on time and correctly per daughter.  23: patient has been admitted since last office visit.  She presented with bloating and found to have bowel obstruction.  Followed by GI, surgery, infectious diseases.  Patient underwent exploratory laparotomy, sigmoid colectomy, sigmoid colostomy and closure of rectal stump on 2023.  She is returning to our care related to a chronic sacral pressure ulcer.  23: doing well. No new issues. But npwt still hasn't been done yet  24: Patient had vera flow negative pressure wound therapy attempted but having issues with staying in place.  A lot of leakage her [facility] nursing report.   24: veraflo currently.  24: no new wound care issues. Tolerating collagen dressing changes. Vac was stopped last visit.    3/14/24: question about banegas leaking. States she thinks ECF tried to fix it by inflating the balloon.  24: recent UTI and getting suprapubic catheter soon  24: has suprapubic catheter but getting larger size soon.    24: no new issues. Skin subs not approved  23: got a larger suprapubic 
prevent leakage daily. Check for rectal discharge leaking into wound dressing every 2 hours and need to change dressing. Apply Madeleine(silver collagen-slightly moisten), moistened fluffed 2 x 2 into wound -loosely-not tight, cover with Sacral Border dressing. Change dressing daily and as needed for drainage or soilage. Turn and reposition off buttocks while in bed at least every hour side to side. Use pressure relieving pillow while up in chair. Pt is able to be up in chair for meals 3x daily for 1 hour at a time for meals and place pillows under shoulders, lower back, under knees, etc as needed to help positioning and relieve pressure.    Gastrologist appt 10/24  Keep using offloading mattress.   Give protein supplements in between meals (Kranthi supplements recommended) .    Turn side to side ONLY every 1-2 hours (position with pillows and place away from wound sites) and allowed on back for 30mins only during meals.  Start Diflucan from pharmacy 9/21    Home Care Agency/Facility: [facility]     Your wound-care supplies will be provided by:   Please note, depending on your insurance coverage, you may have out-of-pocket expenses for these supplies. Someone from the company should call you to confirm your order and discuss those potential costs before they ship your products -- please anticipate that call. If your out-of-pocket cost could be substantial, Many companies have financial hardship programs for patients who qualify, so please ask about that if you might need a hand. If you have any questions about your supplies or your potential out-of-pocket costs, or if you need to place an order for a refill of supplies (typically monthly), please call the company directly.      Your  is Sara     Follow up with Dr Finney In 2 week(s) in the wound care center.         Wound Care Center Information: Should you experience any significant changes in your wound(s) or have questions about your wound care, please

## 2024-06-28 NOTE — PROGRESS NOTES
Nursing Home Patient Worksheet-Pre Admission Testing Department  :[birth year]  Day of Surgery:  Surgeon: Ryan    Arrival Time: 1300   Surgery Time:1450  Facility:[facility]  Nurse or Care provider:Morena  Contact number: [phone]   Fax number: [phone]    Day of Surgery Information    Can Patient sign own consent?   [x] YES    []  NO      H/P: Complete:      [x] YES    []  NO    []  N/A    Labs:        [x] YES    []  NO   []  N/A    Transportation confirmed:     [] YES    []  NO  See follow up  Will STNA be provided:     [] YES    []  NO   Unsure- but pt daughter Laurie will be there per pt. statement  Medication Reconciliation Complete:  [x] YES    []  NO    Demographics (Med History) Complete:  [x] YES    []  NO    Pt. Ambulation Status:  Pt is stretcher bound, colostomy and suprapubic, verbal and orientated    PAT INTERVIEW COMPLETE:    [x] YES   []  NO

## 2024-06-28 NOTE — PROGRESS NOTES
Follow Up Prior to Surgery    DOS:   :1940    Transportation:  To be confirmed by Morena by - please follow up alen Berg on  if she has not confirmed with us by then.  [phone]        PLEASE UPDATE THE ATTACHED RN HOME WORKSHEET as the above are completed.    DONE:  History and Physical and labs:  To be done at [facility] by - please follow up alen Berg on  if it has not been faxed to us by   [phone]-Labs and HP scanned into epic    Med list and Demographics:  To be faxed by Morena by -please follow up alen Berg on  if she has not faxed this to us by then.  [phone]-Med list/demographics received scanned into epic    PLEASE UPDATE THE ATTACHED RN HOME WORKSHEET as the above are completed.      UPDATE: Called to confirm transportation , left message for Macey the transportation  , await return call. Called [phone] spoke with Anne , she will not have confirmation until Friday, she will call PAT once complete.

## 2024-06-28 NOTE — PROCEDURES
[facility] PRE-OPERATIVE INSTRUCTIONS    Day of Procedure:  7/18              Arrival time: 1300               Surgery time:1450  CALL [facility] FOR MEDICATION INSTRUCTIONS  [phone]      Take the following medications with a sip of water:  Follow your MD/Surgeons pre-procedure instructions regarding your medications     Do not eat or drink anything after 12:00 midnight prior to your surgery.  This includes water chewing gum, mints and ice chips.   You may brush your teeth and gargle the morning of your surgery, but do not swallow the water     Please see your family doctor/pediatrician for a history and physical and/or concerning medications.   Bring any test results/reports from your physicians office.   If you are under the care of a heart doctor or specialist doctor, please be aware that you may be asked to them for clearance    You may be asked to stop blood thinners such as Coumadin, Plavix, Fragmin, Lovenox, etc., or any anti-inflammatories such as:  Aspirin, Ibuprofen, Advil, Naproxen prior to your surgery.    We also ask that you stop any OTC medications such as fish oil, vitamin E, glucosamine, garlic, Multivitamins, COQ 10, etc.    We ask that you do not smoke 24 hours prior to surgery  We ask that you do not  drink any alcoholic beverages 24 hours prior to surgery     You must make arrangements for a responsible adult to take you home after your surgery.    For your safety you will not be allowed to leave alone or drive yourself home.  Your surgery will be cancelled if you do not have a ride home.     Also for your safety, you must have someone stay with you the first 24 hours after your surgery.     A parent or legal guardian must accompany a child scheduled for surgery and plan to stay at the hospital until the child is discharged.    Please do not bring other children with you.    For your comfort, please wear simple loose fitting clothing to the hospital.  Please do not

## 2024-07-02 RX ORDER — LEVOTHYROXINE SODIUM 0.05 MG/1
50 TABLET ORAL DAILY
COMMUNITY

## 2024-07-02 RX ORDER — CALCIUM POLYCARBOPHIL 625 MG 625 MG/1
625 TABLET ORAL DAILY
COMMUNITY

## 2024-07-02 RX ORDER — POLYETHYLENE GLYCOL 3350 17 G/17G
17 POWDER, FOR SOLUTION ORAL DAILY
COMMUNITY

## 2024-07-02 RX ORDER — NITROFURANTOIN 25; 75 MG/1; MG/1
100 CAPSULE ORAL 2 TIMES DAILY
COMMUNITY

## 2024-07-08 ENCOUNTER — TELEPHONE (OUTPATIENT)
Dept: SURGERY | Age: 84
End: 2024-07-08

## 2024-07-08 NOTE — TELEPHONE ENCOUNTER
I called and talked to Kamryn, her question was if Dr. Emerson would order the Go Lite like they do for colonoscopies or if Dr. Emerson prefers the Miralax/Gatorade mixture?     Informed her to do the Miralax/Gatorade mixture.

## 2024-07-08 NOTE — TELEPHONE ENCOUNTER
Kamryn lowry/ Octavio Plascencia was reading instructions for surgery and she states is says to mix Miralax & Gatorade. Kamryn asking if Glycerin w/ Gatorade would be ok to use instead?  Please call Kamryn #[phone] and advise.

## 2024-07-08 NOTE — PATIENT INSTRUCTIONS
[facility]  [address]   [place], [place] [address]  Telephone: [phone]     FAX [phone]     Discharge Instructions     Important reminders:     **If you have any signs and symptoms of illness (Cough, fever, congestion, nausea, vomiting, diarrhea, etc.) please call the wound care center prior to your appointment.     1. Increase Protein intake for optimal wound healing  2. No added salt to reduce any swelling  3. If diabetic, maintain good glucose control  4. If you smoke, smoking prohibits wound healing, we ask that you refrain from smoking.  5. When taking antibiotics take the entire prescription as ordered. Do not stop taking until medication is all gone unless otherwise instructed.   6. Exercise as tolerated.   7. Keep weight off wounds and reposition every 2 hours if applicable.  8. If wound(s) is on your lower extremity, elevate legs to the level of the heart or above for 30 minutes 4-5 times a day and/or when sitting. Avoid standing for long periods of time.   9. Do not get wounds wet in bath or shower unless otherwise instructed by your physician. If your wound is on your foot or leg, you may purchase a cast bag. Please ask at the pharmacy.        If Vascular testing is ordered, please call [address] [place] [address] [phone]) to schedule.     Vascular tests ordered by Wound Care Physicians may take up to 2 hours to complete. Please keep that in mind when scheduling.      If Vascular testing is scheduled, please bring supplies to replace your dressing after testing is done. The vascular department does not stock supplies.      Wound: Sacrum, Back     With each dressing change, rinse wounds with 0.9% Saline. (May use wound wash or soft contact solution. Both can be purchased at a local drug store). If unable to obtain saline, may use a gentle soap and water.     Dressing care: Please check and maintain banegas catheter and flush as needed to prevent leakage daily. Check for rectal discharge

## 2024-07-11 ENCOUNTER — HOSPITAL ENCOUNTER (OUTPATIENT)
Dept: WOUND CARE | Age: 84
Discharge: HOME | End: 2024-07-11
Attending: EMERGENCY MEDICINE
Payer: MEDICARE

## 2024-07-11 VITALS — DIASTOLIC BLOOD PRESSURE: 61 MMHG | SYSTOLIC BLOOD PRESSURE: 102 MMHG | RESPIRATION RATE: 16 BRPM | HEART RATE: 67 BPM

## 2024-07-11 DIAGNOSIS — L89.154 DECUBITUS ULCER OF SACRAL REGION, STAGE 4 (HCC): Primary | ICD-10-CM

## 2024-07-11 PROCEDURE — 11043 DBRDMT MUSC&/FSCA 1ST 20/<: CPT

## 2024-07-11 PROCEDURE — 11043 DBRDMT MUSC&/FSCA 1ST 20/<: CPT | Performed by: EMERGENCY MEDICINE

## 2024-07-11 RX ORDER — GENTAMICIN SULFATE 1 MG/G
OINTMENT TOPICAL ONCE
OUTPATIENT
Start: 2024-07-11 | End: 2024-07-11

## 2024-07-11 RX ORDER — SODIUM CHLOR/HYPOCHLOROUS ACID 0.033 %
SOLUTION, IRRIGATION IRRIGATION ONCE
OUTPATIENT
Start: 2024-07-11 | End: 2024-07-11

## 2024-07-11 RX ORDER — CLOBETASOL PROPIONATE 0.5 MG/G
OINTMENT TOPICAL ONCE
OUTPATIENT
Start: 2024-07-11 | End: 2024-07-11

## 2024-07-11 RX ORDER — BACITRACIN ZINC AND POLYMYXIN B SULFATE 500; 1000 [USP'U]/G; [USP'U]/G
OINTMENT TOPICAL ONCE
OUTPATIENT
Start: 2024-07-11 | End: 2024-07-11

## 2024-07-11 RX ORDER — IBUPROFEN 200 MG
TABLET ORAL ONCE
OUTPATIENT
Start: 2024-07-11 | End: 2024-07-11

## 2024-07-11 RX ORDER — LIDOCAINE 40 MG/G
CREAM TOPICAL ONCE
Status: DISCONTINUED | OUTPATIENT
Start: 2024-07-11 | End: 2024-07-12 | Stop reason: HOSPADM

## 2024-07-11 RX ORDER — LIDOCAINE 50 MG/G
OINTMENT TOPICAL ONCE
OUTPATIENT
Start: 2024-07-11 | End: 2024-07-11

## 2024-07-11 RX ORDER — LIDOCAINE 40 MG/G
CREAM TOPICAL ONCE
OUTPATIENT
Start: 2024-07-11 | End: 2024-07-11

## 2024-07-11 RX ORDER — BETAMETHASONE DIPROPIONATE 0.05 %
OINTMENT (GRAM) TOPICAL ONCE
OUTPATIENT
Start: 2024-07-11 | End: 2024-07-11

## 2024-07-11 RX ORDER — LIDOCAINE HYDROCHLORIDE 20 MG/ML
JELLY TOPICAL ONCE
OUTPATIENT
Start: 2024-07-11 | End: 2024-07-11

## 2024-07-11 RX ORDER — LIDOCAINE HYDROCHLORIDE 40 MG/ML
SOLUTION TOPICAL ONCE
OUTPATIENT
Start: 2024-07-11 | End: 2024-07-11

## 2024-07-11 RX ORDER — GINSENG 100 MG
CAPSULE ORAL ONCE
OUTPATIENT
Start: 2024-07-11 | End: 2024-07-11

## 2024-07-11 RX ORDER — TRIAMCINOLONE ACETONIDE 1 MG/G
OINTMENT TOPICAL ONCE
OUTPATIENT
Start: 2024-07-11 | End: 2024-07-11

## 2024-07-11 NOTE — PLAN OF CARE
Discharge instructions given.  Patient verbalized understanding.  Return to [facility] in 3 week(s).  Called/faxed orders to  [facility].

## 2024-07-11 NOTE — PROGRESS NOTES
your wound care, please contact the [facility] at [phone] Monday  - Thursday 8:00 am - 4:00 pm and Friday 8:00 am - 1:00pm. If you need help with your wound outside these hours and cannot wait until we are again available, contact your PCP or go to the hospital emergency room.      PLEASE NOTE: IF YOU ARE UNABLE TO OBTAIN WOUND SUPPLIES, CONTINUE TO USE THE SUPPLIES YOU HAVE AVAILABLE UNTIL YOU ARE ABLE TO REACH US. IT IS MOST IMPORTANT TO KEEP THE WOUND COVERED AT ALL TIMES.     Patient Experience     Thank you for trusting us with your care.  You may receive a survey from a company called [employer] asking for your feedback.  We would appreciate it if you took a few minutes to share your experience.  Your input is very valuable to us.      Skilled nurse to evaluate and treat for wound care. Change dressing as ordered  once a day on Monday, Tuesday, Wednesday, Thursday, Friday, Saturday, and Sunday using clean technique. Patient/Family/caregiver may change dressings as needed as instructed when Home Care unavailable.    WOUNDS REQUIRING DRESSING Changes:     Wound 09/21/23 Sacrum #1 (Active)   Wound Image   05/23/24 0807   Wound Etiology Pressure Stage 3 07/11/24 0813   Dressing Status Clean;Dry;Intact 11/29/23 0830   Wound Cleansed Cleansed with saline 07/11/24 0813   Dressing/Treatment ABD;Gauze dressing/dressing sponge;Moist to dry 11/29/23 0830   Dressing Change Due 11/23/23 11/22/23 2149   Wound Length (cm) 3.4 cm 07/11/24 0813   Wound Width (cm) 4 cm 07/11/24 0813   Wound Depth (cm) 2.5 cm 07/11/24 0813   Wound Surface Area (cm^2) 13.6 cm^2 07/11/24 0813   Change in Wound Size % (l*w) 54.67 07/11/24 0813   Wound Volume (cm^3) 34 cm^3 07/11/24 0813   Wound Healing % 72 07/11/24 0813   Post-Procedure Length (cm) 3.5 cm 07/11/24 0830   Post-Procedure Width (cm) 4.1 cm 07/11/24 0830   Post-Procedure Depth (cm) 2.55 cm 07/11/24 0830   Post-Procedure Surface Area (cm^2) 14.35 cm^2 
Apply Madeleine(silver collagen-slightly moisten), moistened fluffed 2 x 2 into wound -loosely-not tight, cover with Sacral Border dressing. Change dressing daily and as needed for drainage or soilage. Turn and reposition off buttocks while in bed at least every hour side to side. Use pressure relieving pillow while up in chair.   Pt is able to be up in chair for meals 3x daily for 1 hour at a time for meals and place pillows under shoulders, lower back, under knees, etc as needed to help positioning and relieve pressure.    Gastrologist appt 10/24  Keep using offloading mattress.   Give protein supplements in between meals (Kranthi supplements recommended) .    Turn side to side ONLY every 1-2 hours (position with pillows and place away from wound sites) and allowed on back for 30mins only during meals.  Start Diflucan from pharmacy 9/21    Home Care Agency/Facility: [facility]     Your wound-care supplies will be provided by:   Please note, depending on your insurance coverage, you may have out-of-pocket expenses for these supplies. Someone from the company should call you to confirm your order and discuss those potential costs before they ship your products -- please anticipate that call. If your out-of-pocket cost could be substantial, Many companies have financial hardship programs for patients who qualify, so please ask about that if you might need a hand. If you have any questions about your supplies or your potential out-of-pocket costs, or if you need to place an order for a refill of supplies (typically monthly), please call the company directly.      Your  is Sara Riggs up with Dr Finney In 2 week(s) in the wound care center.         Wound Care Center Information: Should you experience any significant changes in your wound(s) or have questions about your wound care, please contact the [facility] at [phone] Monday  - Thursday 8:00 am - 4:00 pm and Friday 8:00

## 2024-07-15 NOTE — PROGRESS NOTES
Nursing Home Patient Worksheet-Pre Admission Testing Department  :[birth year]  Day of Surgery:  Surgeon: Ryan     Arrival Time: 1300                            Surgery Time:1450  [facility]  Nurse or Care provider:Morena  Contact number: [phone]                             Fax number: [phone]     Day of Surgery Information     Can Patient sign own consent?                               [x] YES    []  NO         H/P: Complete:                                                         [x] YES    []  NO    []  N/A     Labs:                                                                           [x] YES    []  NO   []  N/A     Transportation confirmed:      [facility] [phone]                                   [x] YES    []  NO  See follow up  Will STNA be provided:                                         [] YES    [x]  NO   Unsure- but pt daughter Laurie will be there per pt. statement  Medication Reconciliation Complete:                   [x] YES    []  NO     Demographics (Med History) Complete:              [x] YES    []  NO     Pt. Ambulation Status:  Pt is stretcher bound, colostomy and suprapubic, verbal and orientated     PAT INTERVIEW COMPLETE:                     [x] YES   []  NO

## 2024-07-15 NOTE — PROGRESS NOTES
Follow Up Prior to Surgery    DOS: 24  :1940                Nora Kenney:     Transportation through [facility] [phone]. No STNA, however her daughter Laurie will accompany patient during surgery.   Patient needs to be picked up from the hospital by 6PM  per transportation  Anne [phone]   Jad office made aware to please make nursing home patients her first case in the future; to allow more time for transportation.                               Surgeon's Name: Mario

## 2024-07-15 NOTE — PROGRESS NOTES
Follow Up Prior to Surgery     DOS:   :1940     Transportation:  To be confirmed by Morena by - please follow up alen Berg on  if she has not confirmed with us by then.  [phone]           PLEASE UPDATE THE ATTACHED RN HOME WORKSHEET as the above are completed.     DONE:  History and Physical and labs:  To be done at [facility] by - please follow up alen Berg on  if it has not been faxed to us by   [phone]-Labs and HP scanned into epic     Med list and Demographics:  To be faxed by Morena by -please follow up alen Berg on  if she has not faxed this to us by then.  [phone]-Med list/demographics received scanned into epic     PLEASE UPDATE THE ATTACHED RN HOME WORKSHEET as the above are completed.        UPDATE: Called to confirm transportation , left message for Macey the transportation  , await return call. Called [phone] spoke with Anne , she will not have confirmation until Friday, she will call [facility] once complete.     UPDATE 7/15/24- Called out to Anne to confirm transportation. No answer, left a message. Awaiting call back.   UPDATE 7/15/24-  transportation is through [employer] [phone]. No STNA will be with patient. Patient does need to be picked up by 6PM per Anne the transportation  [phone].

## 2024-07-17 ENCOUNTER — ANESTHESIA EVENT (OUTPATIENT)
Dept: OPERATING ROOM | Age: 84
End: 2024-07-17
Payer: MEDICARE

## 2024-07-18 ENCOUNTER — ANESTHESIA (OUTPATIENT)
Dept: OPERATING ROOM | Age: 84
End: 2024-07-18
Payer: MEDICARE

## 2024-07-18 ENCOUNTER — HOSPITAL ENCOUNTER (OUTPATIENT)
Age: 84
Setting detail: OUTPATIENT SURGERY
Discharge: HOME | End: 2024-07-18
Attending: UROLOGY | Admitting: UROLOGY
Payer: MEDICARE

## 2024-07-18 VITALS
WEIGHT: 190 LBS | OXYGEN SATURATION: 95 % | DIASTOLIC BLOOD PRESSURE: 59 MMHG | HEIGHT: 64 IN | SYSTOLIC BLOOD PRESSURE: 105 MMHG | BODY MASS INDEX: 32.44 KG/M2 | HEART RATE: 62 BPM | TEMPERATURE: 96.7 F | RESPIRATION RATE: 16 BRPM

## 2024-07-18 LAB
ALBUMIN SERPL-MCNC: 3.1 G/DL (ref 3.4–5)
ALBUMIN/GLOB SERPL: 0.7 {RATIO} (ref 1.1–2.2)
ALP SERPL-CCNC: 91 U/L (ref 40–129)
ALT SERPL-CCNC: 14 U/L (ref 10–40)
ANION GAP SERPL CALCULATED.3IONS-SCNC: 9 MMOL/L (ref 3–16)
AST SERPL-CCNC: 12 U/L (ref 15–37)
BASOPHILS # BLD: 0.1 K/UL (ref 0–0.2)
BASOPHILS NFR BLD: 1 %
BILIRUB SERPL-MCNC: 0.7 MG/DL (ref 0–1)
BUN SERPL-MCNC: 18 MG/DL (ref 7–20)
CALCIUM SERPL-MCNC: 10.3 MG/DL (ref 8.3–10.6)
CHLORIDE SERPL-SCNC: 96 MMOL/L (ref 99–110)
CO2 SERPL-SCNC: 27 MMOL/L (ref 21–32)
CREAT SERPL-MCNC: 0.6 MG/DL (ref 0.6–1.2)
DEPRECATED RDW RBC AUTO: 15.1 % (ref 12.4–15.4)
EOSINOPHIL # BLD: 0 K/UL (ref 0–0.6)
EOSINOPHIL NFR BLD: 0.3 %
GFR SERPLBLD CREATININE-BSD FMLA CKD-EPI: 88 ML/MIN/{1.73_M2}
GLUCOSE SERPL-MCNC: 76 MG/DL (ref 70–99)
HCT VFR BLD AUTO: 30.9 % (ref 36–48)
HGB BLD-MCNC: 10.4 G/DL (ref 12–16)
LYMPHOCYTES # BLD: 1.1 K/UL (ref 1–5.1)
LYMPHOCYTES NFR BLD: 21.2 %
MCH RBC QN AUTO: 27.4 PG (ref 26–34)
MCHC RBC AUTO-ENTMCNC: 33.6 G/DL (ref 31–36)
MCV RBC AUTO: 81.4 FL (ref 80–100)
MONOCYTES # BLD: 0.6 K/UL (ref 0–1.3)
MONOCYTES NFR BLD: 11.1 %
NEUTROPHILS # BLD: 3.5 K/UL (ref 1.7–7.7)
NEUTROPHILS NFR BLD: 66.4 %
PLATELET # BLD AUTO: 368 K/UL (ref 135–450)
PMV BLD AUTO: 7.5 FL (ref 5–10.5)
POTASSIUM SERPL-SCNC: 4.3 MMOL/L (ref 3.5–5.1)
PROT SERPL-MCNC: 7.7 G/DL (ref 6.4–8.2)
RBC # BLD AUTO: 3.79 M/UL (ref 4–5.2)
SODIUM SERPL-SCNC: 132 MMOL/L (ref 136–145)
WBC # BLD AUTO: 5.3 K/UL (ref 4–11)

## 2024-07-18 PROCEDURE — 7100000010 HC PHASE II RECOVERY - FIRST 15 MIN: Performed by: UROLOGY

## 2024-07-18 PROCEDURE — 3700000000 HC ANESTHESIA ATTENDED CARE: Performed by: UROLOGY

## 2024-07-18 PROCEDURE — 6360000002 HC RX W HCPCS: Performed by: NURSE ANESTHETIST, CERTIFIED REGISTERED

## 2024-07-18 PROCEDURE — C1894 INTRO/SHEATH, NON-LASER: HCPCS | Performed by: UROLOGY

## 2024-07-18 PROCEDURE — 80053 COMPREHEN METABOLIC PANEL: CPT

## 2024-07-18 PROCEDURE — 3700000001 HC ADD 15 MINUTES (ANESTHESIA): Performed by: UROLOGY

## 2024-07-18 PROCEDURE — 3600000012 HC SURGERY LEVEL 2 ADDTL 15MIN: Performed by: UROLOGY

## 2024-07-18 PROCEDURE — 6360000002 HC RX W HCPCS: Performed by: UROLOGY

## 2024-07-18 PROCEDURE — 85025 COMPLETE CBC W/AUTO DIFF WBC: CPT

## 2024-07-18 PROCEDURE — 7100000001 HC PACU RECOVERY - ADDTL 15 MIN: Performed by: UROLOGY

## 2024-07-18 PROCEDURE — 2500000003 HC RX 250 WO HCPCS: Performed by: NURSE ANESTHETIST, CERTIFIED REGISTERED

## 2024-07-18 PROCEDURE — C1769 GUIDE WIRE: HCPCS | Performed by: UROLOGY

## 2024-07-18 PROCEDURE — 7100000011 HC PHASE II RECOVERY - ADDTL 15 MIN: Performed by: UROLOGY

## 2024-07-18 PROCEDURE — 3600000002 HC SURGERY LEVEL 2 BASE: Performed by: UROLOGY

## 2024-07-18 PROCEDURE — 7100000000 HC PACU RECOVERY - FIRST 15 MIN: Performed by: UROLOGY

## 2024-07-18 PROCEDURE — 2580000003 HC RX 258: Performed by: UROLOGY

## 2024-07-18 PROCEDURE — 2709999900 HC NON-CHARGEABLE SUPPLY: Performed by: UROLOGY

## 2024-07-18 PROCEDURE — 2580000003 HC RX 258: Performed by: ANESTHESIOLOGY

## 2024-07-18 RX ORDER — SODIUM CHLORIDE 0.9 % (FLUSH) 0.9 %
5-40 SYRINGE (ML) INJECTION EVERY 12 HOURS SCHEDULED
Status: DISCONTINUED | OUTPATIENT
Start: 2024-07-18 | End: 2024-07-18 | Stop reason: HOSPADM

## 2024-07-18 RX ORDER — FENTANYL CITRATE 50 UG/ML
INJECTION, SOLUTION INTRAMUSCULAR; INTRAVENOUS PRN
Status: DISCONTINUED | OUTPATIENT
Start: 2024-07-18 | End: 2024-07-18 | Stop reason: SDUPTHER

## 2024-07-18 RX ORDER — PHENYLEPHRINE HCL IN 0.9% NACL 1 MG/10 ML
SYRINGE (ML) INTRAVENOUS PRN
Status: DISCONTINUED | OUTPATIENT
Start: 2024-07-18 | End: 2024-07-18 | Stop reason: SDUPTHER

## 2024-07-18 RX ORDER — OXYCODONE HYDROCHLORIDE 5 MG/1
5 TABLET ORAL PRN
Status: DISCONTINUED | OUTPATIENT
Start: 2024-07-18 | End: 2024-07-18 | Stop reason: HOSPADM

## 2024-07-18 RX ORDER — ONDANSETRON 2 MG/ML
4 INJECTION INTRAMUSCULAR; INTRAVENOUS
Status: DISCONTINUED | OUTPATIENT
Start: 2024-07-18 | End: 2024-07-18 | Stop reason: HOSPADM

## 2024-07-18 RX ORDER — SODIUM CHLORIDE 9 MG/ML
INJECTION, SOLUTION INTRAVENOUS PRN
Status: DISCONTINUED | OUTPATIENT
Start: 2024-07-18 | End: 2024-07-18 | Stop reason: HOSPADM

## 2024-07-18 RX ORDER — SODIUM CHLORIDE 0.9 % (FLUSH) 0.9 %
5-40 SYRINGE (ML) INJECTION PRN
Status: DISCONTINUED | OUTPATIENT
Start: 2024-07-18 | End: 2024-07-18 | Stop reason: HOSPADM

## 2024-07-18 RX ORDER — NALOXONE HYDROCHLORIDE 0.4 MG/ML
INJECTION, SOLUTION INTRAMUSCULAR; INTRAVENOUS; SUBCUTANEOUS PRN
Status: DISCONTINUED | OUTPATIENT
Start: 2024-07-18 | End: 2024-07-18 | Stop reason: HOSPADM

## 2024-07-18 RX ORDER — LIDOCAINE HYDROCHLORIDE 20 MG/ML
INJECTION, SOLUTION EPIDURAL; INFILTRATION; INTRACAUDAL; PERINEURAL PRN
Status: DISCONTINUED | OUTPATIENT
Start: 2024-07-18 | End: 2024-07-18 | Stop reason: SDUPTHER

## 2024-07-18 RX ORDER — OXYCODONE HYDROCHLORIDE 10 MG/1
10 TABLET ORAL PRN
Status: DISCONTINUED | OUTPATIENT
Start: 2024-07-18 | End: 2024-07-18 | Stop reason: HOSPADM

## 2024-07-18 RX ORDER — PROPOFOL 10 MG/ML
INJECTION, EMULSION INTRAVENOUS PRN
Status: DISCONTINUED | OUTPATIENT
Start: 2024-07-18 | End: 2024-07-18 | Stop reason: SDUPTHER

## 2024-07-18 RX ORDER — ONDANSETRON 2 MG/ML
INJECTION INTRAMUSCULAR; INTRAVENOUS PRN
Status: DISCONTINUED | OUTPATIENT
Start: 2024-07-18 | End: 2024-07-18 | Stop reason: SDUPTHER

## 2024-07-18 RX ADMIN — Medication 200 MCG: at 15:52

## 2024-07-18 RX ADMIN — Medication 200 MCG: at 16:03

## 2024-07-18 RX ADMIN — Medication 200 MCG: at 16:18

## 2024-07-18 RX ADMIN — FENTANYL CITRATE 25 MCG: 50 INJECTION INTRAMUSCULAR; INTRAVENOUS at 15:45

## 2024-07-18 RX ADMIN — SODIUM CHLORIDE: 9 INJECTION, SOLUTION INTRAVENOUS at 15:15

## 2024-07-18 RX ADMIN — LIDOCAINE HYDROCHLORIDE 80 MG: 20 INJECTION, SOLUTION EPIDURAL; INFILTRATION; INTRACAUDAL; PERINEURAL at 15:41

## 2024-07-18 RX ADMIN — PROPOFOL 160 MCG/KG/MIN: 10 INJECTION, EMULSION INTRAVENOUS at 15:42

## 2024-07-18 RX ADMIN — FENTANYL CITRATE 25 MCG: 50 INJECTION INTRAMUSCULAR; INTRAVENOUS at 15:47

## 2024-07-18 RX ADMIN — FENTANYL CITRATE 25 MCG: 50 INJECTION INTRAMUSCULAR; INTRAVENOUS at 15:53

## 2024-07-18 RX ADMIN — PROPOFOL 100 MG: 10 INJECTION, EMULSION INTRAVENOUS at 15:41

## 2024-07-18 RX ADMIN — FENTANYL CITRATE 25 MCG: 50 INJECTION INTRAMUSCULAR; INTRAVENOUS at 15:49

## 2024-07-18 RX ADMIN — CEFTRIAXONE 2000 MG: 2 INJECTION, POWDER, FOR SOLUTION INTRAMUSCULAR; INTRAVENOUS at 15:35

## 2024-07-18 RX ADMIN — ONDANSETRON 4 MG: 2 INJECTION INTRAMUSCULAR; INTRAVENOUS at 15:52

## 2024-07-18 ASSESSMENT — PAIN SCALES - GENERAL
PAINLEVEL_OUTOF10: 0

## 2024-07-18 ASSESSMENT — ENCOUNTER SYMPTOMS: SHORTNESS OF BREATH: 0

## 2024-07-18 ASSESSMENT — LIFESTYLE VARIABLES: SMOKING_STATUS: 0

## 2024-07-18 ASSESSMENT — PAIN - FUNCTIONAL ASSESSMENT: PAIN_FUNCTIONAL_ASSESSMENT: NONE - DENIES PAIN

## 2024-07-18 NOTE — FLOWSHEET NOTE
Pt. Arrived in PACU.  Awake, talking.  BP remains low.  86/46.  Phenylephrine given. Denies pain.

## 2024-07-18 NOTE — PROGRESS NOTES
Pt tolerating po. Discharge instructions given to patient and her daughter. Verbalized understanding. IV d/c'd.

## 2024-07-18 NOTE — INTERVAL H&P NOTE
Update History & Physical    The patient's History and Physical was reviewed with the patient and I examined the patient. There was no change. The surgical site was confirmed by the patient and me.     Plan: The risks, benefits, expected outcome, and alternative to the recommended procedure have been discussed with the patient. Patient understands and wants to proceed with the procedure.     Electronically signed by Clair Martin MD on 7/18/2024 at 3:35 PM

## 2024-07-18 NOTE — DISCHARGE INSTRUCTIONS
No diet or activity restrictions. Keep SP tube to gravity drainage. Flush SP tube prn for failure to drain.  Follow up in the office in 6 weeks

## 2024-07-18 NOTE — FLOWSHEET NOTE
Pt. Awake, talking.  Does not understand that the time is 4:30PM.  Disoriented to place and situation.  Suprapubic cath in place.  No drainage around dressing or in banegas bag.  BP 99/55. Colostomy bag intact, small amount of black stool.

## 2024-07-18 NOTE — ANESTHESIA POSTPROCEDURE EVALUATION
Department of Anesthesiology  Postprocedure Note    Patient: Regina Lawson  MRN: [id number]  YOB: 1940  Date of evaluation: 7/18/2024    Procedure Summary       Date: 07/18/24 Room / Location: [address]    Anesthesia Start: 1534 Anesthesia Stop: 1618    Procedure: CYSTOSCOPY SUPRAPUBIC TRACK DILATION AND PLACEMENT OF TUBE Diagnosis:       Retention of urine, unspecified      (Retention of urine, unspecified [R33.9])    Surgeons: Clair Martin MD Responsible Provider: Redd Haskins MD    Anesthesia Type: MAC ASA Status: 3            Anesthesia Type: No value filed.    Ole Phase I: Ole Score: 10    Ole Phase II: Ole Score: 10    Anesthesia Post Evaluation    Patient location during evaluation: PACU  Level of consciousness: awake and alert  Airway patency: patent  Nausea & Vomiting: no nausea and no vomiting  Cardiovascular status: blood pressure returned to baseline  Respiratory status: acceptable  Hydration status: euvolemic  Comments: Postoperative Anesthesia Note    Name:    Regina Lawson  MRN:      [id number]    Patient Vitals in the past 12 hrs:  07/18/24 1647, BP:(!) 105/59, Temp:(!) 96.7 °F (35.9 °C), Temp src:Temporal, Pulse:62, Resp:16, SpO2:95 %  07/18/24 1640, BP:(!) 99/54, Temp:96.8 °F (36 °C), Pulse:61, Resp:21, SpO2:99 %  07/18/24 1635, BP:(!) 86/57, Pulse:63, Resp:13, SpO2:99 %  07/18/24 1630, BP:(!) 96/54, Pulse:61, Resp:11, SpO2:98 %  07/18/24 1625, BP:(!) 99/55, Pulse:61, Resp:15, SpO2:99 %  07/18/24 1620, BP:116/60, Pulse:56, Resp:18, SpO2:97 %  07/18/24 1615, BP:(!) 86/46, Pulse:60, Resp:21, SpO2:98 %  07/18/24 1614, BP:(!) 94/52, Temp:97.2 °F (36.2 °C), Temp src:Temporal, Pulse:62, Resp:16, SpO2:98 %  07/18/24 1339, BP:(!) 122/59, Temp:97 °F (36.1 °C), Pulse:67, Resp:17, SpO2:93 %     LABS:    CBC  Lab Results       Component                Value               Date/Time                  WBC

## 2024-07-18 NOTE — FLOWSHEET NOTE
Dr. Haskins notified of BP 86/57.  No new orders at this time.  He will stop over to assess.  Pt. Is awake, talking.  And tolerating ice chips.

## 2024-07-18 NOTE — PROGRESS NOTES
Pt arrived to phase 2 from PACU. Pt awake and alert. Abd soft. SP tube intact, draining clear, yellow urine. Pt denies c/o pain.

## 2024-07-18 NOTE — BRIEF OP NOTE
Brief Postoperative Note      Patient: Regina Lawson  YOB: 1940  MRN: [id number]    Date of Procedure: 7/18/2024    Pre-Op Diagnosis Codes:     * Retention of urine, unspecified [R33.9]    Post-Op Diagnosis: Same       Procedure:  cystoscopy and SP tube exchange over a wire    Surgeon(s):  Clair Martin MD    Assistant:  Surgical Assistant: Mike Briggs    Anesthesia: Monitor Anesthesia Care    Estimated Blood Loss (mL): Minimal    Complications: None    Specimens:   * No specimens in log *    Implants:  * No implants in log *      Drains:   Colostomy LLQ (Active)       Suprapubic Catheter  (Active)       [REMOVED] Urinary Catheter 05/30/24 (Removed)       [REMOVED] Suprapubic Catheter (Removed)       [REMOVED] Suprapubic Catheter Latex (Removed)       [REMOVED] Suprapubic Catheter (Removed)       Findings:  Infection Present At Time Of Surgery (PATOS) (choose all levels that have infection present):  No infection present  Other Findings: 16 fr SP tube placed    Electronically signed by Clair Martin MD on 7/18/2024 at 4:10 PM

## 2024-07-18 NOTE — PROGRESS NOTES
Transportation here to take pt back to [facility]. Pt has her cell phone and DC instructions.

## 2024-07-18 NOTE — ANESTHESIA PRE PROCEDURE
Department of Anesthesiology  Preprocedure Note       Name:  Regina Lawson   Age:  83 y.o.  :  1940                                          MRN:  [id number]         Date:  2024      Surgeon: Surgeon(s):  Clair Martin MD    Procedure: Procedure(s):  CYSTOSCOPY SUPRAPUBIC TRACK DILATION AND PLACEMENT OF TUBE    Medications prior to admission:   Prior to Admission medications    Medication Sig Start Date End Date Taking? Authorizing Provider   polyethylene glycol (GLYCOLAX) 17 GM/SCOOP powder Take 17 g by mouth daily   Yes Josse Rojas MD   levothyroxine (SYNTHROID) 50 MCG tablet Take 1 tablet by mouth Daily   Yes Josse Rojas MD   nitrofurantoin, macrocrystal-monohydrate, (MACROBID) 100 MG capsule Take 1 capsule by mouth 2 times daily For 7 days   Yes Josse Rojas MD   polycarbophil (FIBERCON) 625 MG tablet Take 1 tablet by mouth daily   Yes Josse Rojas MD   metoprolol succinate (TOPROL XL) 25 MG extended release tablet Take 1 tablet by mouth 2 times daily    Josse Rojas MD   sennosides-docusate sodium (SENOKOT-S) 8.6-50 MG tablet Take 1 tablet by mouth daily 24   Derick Griffin MD   aspirin (ASPIRIN CHILDRENS) 81 MG chewable tablet Take 1 tablet by mouth daily 23   John Longoria MD   amiodarone (CORDARONE) 200 MG tablet Take 1 tablet by mouth daily 23   Andrew Meyers MD   furosemide (LASIX) 20 MG tablet Take 1 tablet by mouth daily    Josse Rojas MD   Lactobacillus (ACIDOPHILUS/PECTIN) 100 MG CAPS Take 1 capsule by mouth daily    Josse Rojas MD   atorvastatin (LIPITOR) 10 MG tablet Take 1 tablet by mouth at bedtime    Josse Rojas MD   ferrous sulfate (FE TABS 325) 325 (65 Fe) MG EC tablet Take 1 tablet by mouth with breakfast and with evening meal    Josse Rojas MD   miconazole (MICOTIN) 2 % powder Apply 1 application topically 2 times daily 23   Josse Rojas MD

## 2024-07-19 NOTE — OP NOTE
[facility]          [address] [place] [address]                            OPERATIVE REPORT      PATIENT NAME: GRANT VELAZQUEZ             : 1940  MED REC NO: [id number]                      ROOM: OR  ACCOUNT NO: [id number]                       ADMIT DATE: 2024  PROVIDER: Clair Martin MD      DATE OF PROCEDURE:  2024    SURGEON:  Clair Martin MD    PREOPERATIVE DIAGNOSIS:  Urinary retention.    POSTOPERATIVE DIAGNOSIS:  Urinary retention.    PROCEDURES:  Cystoscopy, exchange of suprapubic catheter over a wire.    ANESTHESIA:  TIVA.    COMPLICATIONS:  None.    BLOOD LOSS:  Minimal.    INDICATIONS:  83-year-old female with a history of chronic incontinence.  She has multiple sacral wounds and the care is difficult due to her incontinence.  She had a suprapubic tube placed by Interventional Radiology in April of this year.  They placed a 10-Dominican catheter.  She then was taken to the OR at the end of May for dilation of the SP tract.  This proved to be quite difficult and I was only able to get a 14-Dominican catheter in at that time.  She is here now for repeat dilation of SP tract with upsizing of the tube.    DETAILS OF PROCEDURE:  She was given Rocephin.  She was taken to the operative suite.  She moved onto the table.  Anesthesia was induced.  Her position was changed to the lithotomy position.  Her genitalia and lower abdomen as well as the indwelling 14-Dominican SP tube were prepped and draped.  The cystoscope was advanced through the urethra into the bladder.  The proximal end of the SP tube was cut.  The distal end of the SP tube was grasped with the cystoscope and pulled through the urethra.  I then passed a Super Stiff wire through the catheter so I had through-and-through access.  The old 14-Dominican catheter was removed.  I then passed a 16-Dominican Columbia-tip catheter over the wire into the bladder.  This

## 2024-07-22 ENCOUNTER — HOSPITAL ENCOUNTER (EMERGENCY)
Age: 84
Discharge: HOME | End: 2024-07-22
Attending: STUDENT IN AN ORGANIZED HEALTH CARE EDUCATION/TRAINING PROGRAM
Payer: MEDICARE

## 2024-07-22 ENCOUNTER — APPOINTMENT (OUTPATIENT)
Dept: CT IMAGING | Age: 84
End: 2024-07-22
Payer: MEDICARE

## 2024-07-22 VITALS
SYSTOLIC BLOOD PRESSURE: 101 MMHG | OXYGEN SATURATION: 95 % | HEART RATE: 67 BPM | BODY MASS INDEX: 32.61 KG/M2 | HEIGHT: 64 IN | DIASTOLIC BLOOD PRESSURE: 57 MMHG | TEMPERATURE: 98.6 F | RESPIRATION RATE: 14 BRPM

## 2024-07-22 DIAGNOSIS — N93.9 ABNORMAL VAGINAL BLEEDING: Primary | ICD-10-CM

## 2024-07-22 DIAGNOSIS — N95.2 VAGINAL ATROPHY: ICD-10-CM

## 2024-07-22 LAB
ABO + RH BLD: NORMAL
ANION GAP SERPL CALCULATED.3IONS-SCNC: 11 MMOL/L (ref 3–16)
APTT BLD: 27.7 SEC (ref 22.1–36.4)
BACTERIA URNS QL MICRO: ABNORMAL /HPF
BASOPHILS # BLD: 0 K/UL (ref 0–0.2)
BASOPHILS NFR BLD: 0.5 %
BILIRUB UR QL STRIP.AUTO: NEGATIVE
BLD GP AB SCN SERPL QL: NORMAL
BUN SERPL-MCNC: 18 MG/DL (ref 7–20)
CALCIUM SERPL-MCNC: 9.6 MG/DL (ref 8.3–10.6)
CHARACTER UR: ABNORMAL
CHLORIDE SERPL-SCNC: 94 MMOL/L (ref 99–110)
CLARITY UR: ABNORMAL
CO2 SERPL-SCNC: 26 MMOL/L (ref 21–32)
COLOR UR: YELLOW
CREAT SERPL-MCNC: 0.7 MG/DL (ref 0.6–1.2)
CRYSTALS URNS MICRO: ABNORMAL /HPF
DEPRECATED RDW RBC AUTO: 15.6 % (ref 12.4–15.4)
EOSINOPHIL # BLD: 0.1 K/UL (ref 0–0.6)
EOSINOPHIL NFR BLD: 1 %
EPI CELLS #/AREA URNS HPF: ABNORMAL /HPF (ref 0–5)
GFR SERPLBLD CREATININE-BSD FMLA CKD-EPI: 85 ML/MIN/{1.73_M2}
GLUCOSE SERPL-MCNC: 80 MG/DL (ref 70–99)
GLUCOSE UR STRIP.AUTO-MCNC: NEGATIVE MG/DL
HCT VFR BLD AUTO: 32.1 % (ref 36–48)
HEMOCCULT STL QL: NORMAL
HGB BLD-MCNC: 10.8 G/DL (ref 12–16)
HGB UR QL STRIP.AUTO: ABNORMAL
INR PPP: 1.11 (ref 0.85–1.15)
KETONES UR STRIP.AUTO-MCNC: NEGATIVE MG/DL
LEUKOCYTE ESTERASE UR QL STRIP.AUTO: ABNORMAL
LYMPHOCYTES # BLD: 1.2 K/UL (ref 1–5.1)
LYMPHOCYTES NFR BLD: 19.5 %
MCH RBC QN AUTO: 27.5 PG (ref 26–34)
MCHC RBC AUTO-ENTMCNC: 33.5 G/DL (ref 31–36)
MCV RBC AUTO: 82.1 FL (ref 80–100)
MONOCYTES # BLD: 0.6 K/UL (ref 0–1.3)
MONOCYTES NFR BLD: 10.6 %
NEUTROPHILS # BLD: 4.1 K/UL (ref 1.7–7.7)
NEUTROPHILS NFR BLD: 68.4 %
NITRITE UR QL STRIP.AUTO: POSITIVE
PH UR STRIP.AUTO: 5 [PH] (ref 5–8)
PLATELET # BLD AUTO: 356 K/UL (ref 135–450)
PMV BLD AUTO: 7.8 FL (ref 5–10.5)
POTASSIUM SERPL-SCNC: 4.3 MMOL/L (ref 3.5–5.1)
PROT UR STRIP.AUTO-MCNC: ABNORMAL MG/DL
PROTHROMBIN TIME: 14.5 SEC (ref 11.9–14.9)
RBC # BLD AUTO: 3.91 M/UL (ref 4–5.2)
RBC #/AREA URNS HPF: ABNORMAL /HPF (ref 0–4)
SODIUM SERPL-SCNC: 131 MMOL/L (ref 136–145)
SP GR UR STRIP.AUTO: 1.01 (ref 1–1.03)
UA COMPLETE W REFLEX CULTURE PNL UR: YES
UA DIPSTICK W REFLEX MICRO PNL UR: YES
URN SPEC COLLECT METH UR: ABNORMAL
UROBILINOGEN UR STRIP-ACNC: 1 E.U./DL
WBC # BLD AUTO: 6 K/UL (ref 4–11)
WBC #/AREA URNS HPF: ABNORMAL /HPF (ref 0–5)

## 2024-07-22 PROCEDURE — 85730 THROMBOPLASTIN TIME PARTIAL: CPT

## 2024-07-22 PROCEDURE — 86900 BLOOD TYPING SEROLOGIC ABO: CPT

## 2024-07-22 PROCEDURE — 81001 URINALYSIS AUTO W/SCOPE: CPT

## 2024-07-22 PROCEDURE — 99285 EMERGENCY DEPT VISIT HI MDM: CPT

## 2024-07-22 PROCEDURE — 2580000003 HC RX 258: Performed by: STUDENT IN AN ORGANIZED HEALTH CARE EDUCATION/TRAINING PROGRAM

## 2024-07-22 PROCEDURE — 87077 CULTURE AEROBIC IDENTIFY: CPT

## 2024-07-22 PROCEDURE — 80048 BASIC METABOLIC PNL TOTAL CA: CPT

## 2024-07-22 PROCEDURE — 87186 SC STD MICRODIL/AGAR DIL: CPT

## 2024-07-22 PROCEDURE — 74177 CT ABD & PELVIS W/CONTRAST: CPT

## 2024-07-22 PROCEDURE — 87081 CULTURE SCREEN ONLY: CPT

## 2024-07-22 PROCEDURE — 87086 URINE CULTURE/COLONY COUNT: CPT

## 2024-07-22 PROCEDURE — 85610 PROTHROMBIN TIME: CPT

## 2024-07-22 PROCEDURE — 85025 COMPLETE CBC W/AUTO DIFF WBC: CPT

## 2024-07-22 PROCEDURE — 6360000004 HC RX CONTRAST MEDICATION: Performed by: STUDENT IN AN ORGANIZED HEALTH CARE EDUCATION/TRAINING PROGRAM

## 2024-07-22 PROCEDURE — 86901 BLOOD TYPING SEROLOGIC RH(D): CPT

## 2024-07-22 PROCEDURE — 82270 OCCULT BLOOD FECES: CPT

## 2024-07-22 PROCEDURE — 86850 RBC ANTIBODY SCREEN: CPT

## 2024-07-22 RX ORDER — 0.9 % SODIUM CHLORIDE 0.9 %
1000 INTRAVENOUS SOLUTION INTRAVENOUS ONCE
Status: COMPLETED | OUTPATIENT
Start: 2024-07-22 | End: 2024-07-22

## 2024-07-22 RX ADMIN — SODIUM CHLORIDE 1000 ML: 9 INJECTION, SOLUTION INTRAVENOUS at 20:02

## 2024-07-22 RX ADMIN — IOPAMIDOL 75 ML: 755 INJECTION, SOLUTION INTRAVENOUS at 19:05

## 2024-07-22 NOTE — ED PROVIDER NOTES
Dr. Joseph Bella about patient Hgb of 6.8. Also about possibly consulting hematology/oncology per Dr. Josef Velazquez request. Awaiting response. MACROCRYSTAL-MONOHYDRATE, (MACROBID) 100 MG CAPSULE    Take 1 capsule by mouth 2 times daily For 7 days    POLYCARBOPHIL (FIBERCON) 625 MG TABLET    Take 1 tablet by mouth daily    POLYETHYLENE GLYCOL (GLYCOLAX) 17 GM/SCOOP POWDER    Take 17 g by mouth daily    POTASSIUM CHLORIDE (KLOR-CON M) 20 MEQ EXTENDED RELEASE TABLET    Take 1 tablet by mouth daily    SENNOSIDES-DOCUSATE SODIUM (SENOKOT-S) 8.6-50 MG TABLET    Take 1 tablet by mouth daily       ALLERGIES     Sulfa antibiotics, Tape [adhesive tape], and Thimerosal (thiomersal)    FAMILY HISTORY       Family History   Problem Relation Age of Onset    Heart Disease Mother     High Blood Pressure Mother     Stroke Mother     Heart Disease Father     High Blood Pressure Father     Stroke Father           SOCIAL HISTORY       Social History     Socioeconomic History    Marital status:      Spouse name: None    Number of children: None    Years of education: None    Highest education level: None   Occupational History    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   Tobacco Use    Smoking status: Former     Current packs/day: 0.00     Types: Cigarettes     Quit date: 3/11/1963     Years since quittin.4    Smokeless tobacco: Never    Tobacco comments:     quite age 22   Vaping Use    Vaping Use: Never used   Substance and Sexual Activity    Alcohol use: Not Currently     Comment: social--rare    Drug use: Never    Sexual activity: Not Currently       SCREENINGS        Estephania Coma Scale  Eye Opening: Spontaneous  Best Verbal Response: Oriented  Best Motor Response: Obeys commands  Estephania Coma Scale Score: 15               PHYSICAL EXAM    (up to 7 for level 4, 8 or more for level 5)     ED Triage Vitals [24 1707]   BP Temp Temp Source Pulse Resp SpO2 Height Weight   -- 98.6 °F (37 °C) Oral -- -- -- 1.626 m (5' 4\") --       Physical Exam  Constitutional:       Appearance: Normal appearance.   HENT:      Head: Normocephalic and atraumatic.   Eyes:

## 2024-07-23 NOTE — PROGRESS NOTES
Discharge information sent home with pt. Was unable to make nurse to nurse report. No answer from nursing [place].

## 2024-07-24 LAB
BACTERIA UR CULT: ABNORMAL
ORGANISM: ABNORMAL

## 2024-07-25 LAB — GP B STREP SPEC QL CULT: NORMAL

## 2024-07-31 NOTE — PATIENT INSTRUCTIONS
[facility]  [address]   [place], [place] [address]  Telephone: [phone]     FAX [phone]     Discharge Instructions     Important reminders:     **If you have any signs and symptoms of illness (Cough, fever, congestion, nausea, vomiting, diarrhea, etc.) please call the wound care center prior to your appointment.     1. Increase Protein intake for optimal wound healing  2. No added salt to reduce any swelling  3. If diabetic, maintain good glucose control  4. If you smoke, smoking prohibits wound healing, we ask that you refrain from smoking.  5. When taking antibiotics take the entire prescription as ordered. Do not stop taking until medication is all gone unless otherwise instructed.   6. Exercise as tolerated.   7. Keep weight off wounds and reposition every 2 hours if applicable.  8. If wound(s) is on your lower extremity, elevate legs to the level of the heart or above for 30 minutes 4-5 times a day and/or when sitting. Avoid standing for long periods of time.   9. Do not get wounds wet in bath or shower unless otherwise instructed by your physician. If your wound is on your foot or leg, you may purchase a cast bag. Please ask at the pharmacy.        If Vascular testing is ordered, please call [address] [place] [address] [phone]) to schedule.     Vascular tests ordered by Wound Care Physicians may take up to 2 hours to complete. Please keep that in mind when scheduling.      If Vascular testing is scheduled, please bring supplies to replace your dressing after testing is done. The vascular department does not stock supplies.      Wound: Sacrum, Back     With each dressing change, rinse wounds with 0.9% Saline. (May use wound wash or soft contact solution. Both can be purchased at a local drug store). If unable to obtain saline, may use a gentle soap and water.     Dressing care: Please check and maintain banegas catheter and flush as needed to prevent leakage daily. Check for rectal discharge

## 2024-07-31 NOTE — PROGRESS NOTES
Name__Regina Lawson_____________________________________Printed:____________________  Date and time of surgery___8/2/2024_______1127______________Arrival Time:__1000_at [address] [place] [address]____   1. The instructions given regarding when and if a patient needs to stop oral intake prior to surgery varies.Follow the specific instructions you were given                  _x__Nothing to eat or to drink after Midnight the night before.                  2. Take the following pills with a small sip of water on the morning of surgery___amiodarone, metoprolol and levothyroxine________________________________________________                  Do not take blood pressure medications ending in pril or sartan the lakia prior to surgery or the morning of surgery. Dr Flores's patient are not to take any medications the AM of surgery.         3. Aspirin, Ibuprofen, Advil, Naproxen, Vitamin E and other Anti-inflammatory products and supplements should be stopped for 5 -7days before surgery or as directed by your physician.   4. Check with your Doctor regarding stopping Plavix, Coumadin,Eliquis, Lovenox,Effient,Pradaxa,Xarelto, Fragmin or other blood thinners and follow their instructions.   5. Do not smoke, and do not drink any alcoholic beverages 24 hours prior to surgery.  This includes NA Beer.Refrain from the usage of any recreational drugs.   6. You may brush your teeth and gargle the morning of surgery.  DO NOT SWALLOW WATER   7. You MUST make arrangements for a responsible adult to stay on site while you are here and take you home after your surgery. You will not be allowed to leave alone or drive yourself home.  It is strongly suggested someone stay with you the first 24 hrs. Your surgery will be cancelled if you do not have a ride home.   8. A parent/legal guardian must accompany a child scheduled for surgery and plan to stay at the hospital until the child is discharged.  Please do not bring other children with  you.   9. Please wear simple, loose fitting clothing to the hospital.  Do not bring valuables (money, credit cards, checkbooks, etc.) Do not wear any makeup (including no eye makeup) or nail polish on your fingers or toes.             10. DO NOT wear any jewelry or piercings on day of surgery.  All body piercing jewelry must be removed.             11. If you have ___dentures, they will be removed before going to the OR; we will provide you a container.  If you wear ___contact lenses or ___glasses, they will be removed; please bring a case for them.             12. Please see your family doctor/pediatrician for a history & physical and/or concerning medications.  Bring any test results/reports from your physician's office.   PCP__________________Phone___________H&P Appt. Date________             13 If you  have a Living Will and Durable Power of  for Healthcare, please bring in a copy.             14. Notify your Surgeon if you develop any illness between now and surgery  time, cough, cold, fever, sore throat, nausea, vomiting, etc.  Please notify your surgeon if you experience dizziness, shortness of breath or blurred vision between now & the time of your surgery             15. DO NOT shave your operative site 96 hours prior to surgery. For face & neck surgery, men may use an electric razor 48 hours prior to surgery.             16. Shower the night before or morning of surgery using an antibacterial soap or as you have been instructed.             17. To provide excellent care visitors will be limited to one in the room at any given time.             18.  Please bring picture ID and insurance card.             19.  Visit our web site for additional information:  [email or web address]              20.During flu season no children under the age of 14 are permitted in the hospital for the safety of all patients.                              21. If you take a long acting insulin in the evening only  take

## 2024-08-01 ENCOUNTER — HOSPITAL ENCOUNTER (OUTPATIENT)
Dept: WOUND CARE | Age: 84
Discharge: HOME | End: 2024-08-01
Attending: EMERGENCY MEDICINE
Payer: MEDICARE

## 2024-08-01 VITALS — DIASTOLIC BLOOD PRESSURE: 54 MMHG | HEART RATE: 62 BPM | SYSTOLIC BLOOD PRESSURE: 100 MMHG | RESPIRATION RATE: 14 BRPM

## 2024-08-01 DIAGNOSIS — L89.154 DECUBITUS ULCER OF SACRAL REGION, STAGE 4 (HCC): Primary | ICD-10-CM

## 2024-08-01 PROCEDURE — 11043 DBRDMT MUSC&/FSCA 1ST 20/<: CPT

## 2024-08-01 RX ORDER — LIDOCAINE HYDROCHLORIDE 20 MG/ML
JELLY TOPICAL ONCE
OUTPATIENT
Start: 2024-08-01 | End: 2024-08-01

## 2024-08-01 RX ORDER — SODIUM CHLOR/HYPOCHLOROUS ACID 0.033 %
SOLUTION, IRRIGATION IRRIGATION ONCE
OUTPATIENT
Start: 2024-08-01 | End: 2024-08-01

## 2024-08-01 RX ORDER — BACITRACIN ZINC AND POLYMYXIN B SULFATE 500; 1000 [USP'U]/G; [USP'U]/G
OINTMENT TOPICAL ONCE
OUTPATIENT
Start: 2024-08-01 | End: 2024-08-01

## 2024-08-01 RX ORDER — GENTAMICIN SULFATE 1 MG/G
OINTMENT TOPICAL ONCE
OUTPATIENT
Start: 2024-08-01 | End: 2024-08-01

## 2024-08-01 RX ORDER — LIDOCAINE 40 MG/G
CREAM TOPICAL ONCE
Status: DISCONTINUED | OUTPATIENT
Start: 2024-08-01 | End: 2024-08-02 | Stop reason: HOSPADM

## 2024-08-01 RX ORDER — LIDOCAINE 40 MG/G
CREAM TOPICAL ONCE
OUTPATIENT
Start: 2024-08-01 | End: 2024-08-01

## 2024-08-01 RX ORDER — GINSENG 100 MG
CAPSULE ORAL ONCE
OUTPATIENT
Start: 2024-08-01 | End: 2024-08-01

## 2024-08-01 RX ORDER — CLOBETASOL PROPIONATE 0.5 MG/G
OINTMENT TOPICAL ONCE
OUTPATIENT
Start: 2024-08-01 | End: 2024-08-01

## 2024-08-01 RX ORDER — BETAMETHASONE DIPROPIONATE 0.05 %
OINTMENT (GRAM) TOPICAL ONCE
OUTPATIENT
Start: 2024-08-01 | End: 2024-08-01

## 2024-08-01 RX ORDER — LIDOCAINE HYDROCHLORIDE 40 MG/ML
SOLUTION TOPICAL ONCE
OUTPATIENT
Start: 2024-08-01 | End: 2024-08-01

## 2024-08-01 RX ORDER — TRIAMCINOLONE ACETONIDE 1 MG/G
OINTMENT TOPICAL ONCE
OUTPATIENT
Start: 2024-08-01 | End: 2024-08-01

## 2024-08-01 RX ORDER — IBUPROFEN 200 MG
TABLET ORAL ONCE
OUTPATIENT
Start: 2024-08-01 | End: 2024-08-01

## 2024-08-01 RX ORDER — LIDOCAINE 50 MG/G
OINTMENT TOPICAL ONCE
OUTPATIENT
Start: 2024-08-01 | End: 2024-08-01

## 2024-08-01 NOTE — PROGRESS NOTES
[facility]     H&P or Progress Note: Medical Staff Progress Note    Regina Lawson  MEDICAL RECORD NUMBER:  [id number]  AGE: 83 y.o.   GENDER: female  : 1940  EPISODE DATE:  2024    Chief complaint and reason for visit:     Chief Complaint   Patient presents with    Wound Check     Follow up pressure wound        HPI/Wound Narrative:      Regina Lawson is a 83 y.o. female who presents today for an evaluation of a wound/ulcer. Wound duration:  2023 .    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 Here for follow up.  Pertinent associated symptoms: drainage , redness, swelling, skin discoloration, and impaired mobility    23: not getting turn well the last 2 weeks at [facility]  23: having a lot of stool incontinence, mostly mucinous. Contaminating wound. Dressings not being done on time and correctly per daughter.  23: patient has been admitted since last office visit.  She presented with bloating and found to have bowel obstruction.  Followed by GI, surgery, infectious diseases.  Patient underwent exploratory laparotomy, sigmoid colectomy, sigmoid colostomy and closure of rectal stump on 2023.  She is returning to our care related to a chronic sacral pressure ulcer.  23: doing well. No new issues. But npwt still hasn't been done yet  24: Patient had vera flow negative pressure wound therapy attempted but having issues with staying in place.  A lot of leakage her [facility] nursing report.   24: veraflo currently.  24: no new wound care issues. Tolerating collagen dressing changes. Vac was stopped last visit.    3/14/24: question about banegas leaking. States she thinks ECF tried to fix it by inflating the balloon.  24: recent UTI and getting suprapubic catheter soon  24: has suprapubic catheter but getting larger size soon.    24: no new issues. Skin subs not approved  23: got a larger suprapubic

## 2024-08-01 NOTE — PLAN OF CARE
Discharge instructions given.  Patient verbalized understanding.  Return to [facility] in 3 week(s).  Called/faxed orders to  [facility].

## 2024-08-02 ENCOUNTER — ANESTHESIA (OUTPATIENT)
Dept: OPERATING ROOM | Age: 84
DRG: 330 | End: 2024-08-02
Payer: MEDICARE

## 2024-08-02 ENCOUNTER — ANESTHESIA EVENT (OUTPATIENT)
Dept: OPERATING ROOM | Age: 84
DRG: 330 | End: 2024-08-02
Payer: MEDICARE

## 2024-08-02 ENCOUNTER — HOSPITAL ENCOUNTER (INPATIENT)
Age: 84
LOS: 4 days | Discharge: NURSING, REHAB OR LONG-TERM CARE | DRG: 330 | End: 2024-08-06
Attending: SURGERY | Admitting: SURGERY
Payer: MEDICARE

## 2024-08-02 PROBLEM — K43.5 PARASTOMAL HERNIA WITHOUT OBSTRUCTION OR GANGRENE: Status: ACTIVE | Noted: 2024-08-02

## 2024-08-02 PROCEDURE — 3700000001 HC ADD 15 MINUTES (ANESTHESIA): Performed by: SURGERY

## 2024-08-02 PROCEDURE — 7100000001 HC PACU RECOVERY - ADDTL 15 MIN: Performed by: SURGERY

## 2024-08-02 PROCEDURE — 6360000002 HC RX W HCPCS: Performed by: SURGERY

## 2024-08-02 PROCEDURE — 7100000000 HC PACU RECOVERY - FIRST 15 MIN: Performed by: SURGERY

## 2024-08-02 PROCEDURE — 0D1L4Z4 BYPASS TRANSVERSE COLON TO CUTANEOUS, PERCUTANEOUS ENDOSCOPIC APPROACH: ICD-10-PCS | Performed by: SURGERY

## 2024-08-02 PROCEDURE — 0WQF0ZZ REPAIR ABDOMINAL WALL, OPEN APPROACH: ICD-10-PCS | Performed by: SURGERY

## 2024-08-02 PROCEDURE — 97605 NEG PRS WND THER DME<=50SQCM: CPT | Performed by: SURGERY

## 2024-08-02 PROCEDURE — 3600000004 HC SURGERY LEVEL 4 BASE: Performed by: SURGERY

## 2024-08-02 PROCEDURE — 2709999900 HC NON-CHARGEABLE SUPPLY: Performed by: SURGERY

## 2024-08-02 PROCEDURE — 2500000003 HC RX 250 WO HCPCS: Performed by: NURSE ANESTHETIST, CERTIFIED REGISTERED

## 2024-08-02 PROCEDURE — 6360000002 HC RX W HCPCS: Performed by: NURSE ANESTHETIST, CERTIFIED REGISTERED

## 2024-08-02 PROCEDURE — 2500000003 HC RX 250 WO HCPCS: Performed by: SURGERY

## 2024-08-02 PROCEDURE — 2580000003 HC RX 258: Performed by: SURGERY

## 2024-08-02 PROCEDURE — A4217 STERILE WATER/SALINE, 500 ML: HCPCS | Performed by: SURGERY

## 2024-08-02 PROCEDURE — 2720000010 HC SURG SUPPLY STERILE: Performed by: SURGERY

## 2024-08-02 PROCEDURE — 49622 RPR PARASTOMAL HRNA NCR/STRN: CPT | Performed by: SURGERY

## 2024-08-02 PROCEDURE — 6370000000 HC RX 637 (ALT 250 FOR IP): Performed by: SURGERY

## 2024-08-02 PROCEDURE — 1200000000 HC SEMI PRIVATE

## 2024-08-02 PROCEDURE — 3700000000 HC ANESTHESIA ATTENDED CARE: Performed by: SURGERY

## 2024-08-02 PROCEDURE — 3600000014 HC SURGERY LEVEL 4 ADDTL 15MIN: Performed by: SURGERY

## 2024-08-02 RX ORDER — METRONIDAZOLE 500 MG/100ML
500 INJECTION, SOLUTION INTRAVENOUS EVERY 8 HOURS
Status: DISCONTINUED | OUTPATIENT
Start: 2024-08-02 | End: 2024-08-02 | Stop reason: HOSPADM

## 2024-08-02 RX ORDER — PROPOFOL 10 MG/ML
INJECTION, EMULSION INTRAVENOUS PRN
Status: DISCONTINUED | OUTPATIENT
Start: 2024-08-02 | End: 2024-08-02 | Stop reason: SDUPTHER

## 2024-08-02 RX ORDER — SENNA AND DOCUSATE SODIUM 50; 8.6 MG/1; MG/1
1 TABLET, FILM COATED ORAL DAILY
Status: DISCONTINUED | OUTPATIENT
Start: 2024-08-03 | End: 2024-08-06 | Stop reason: HOSPADM

## 2024-08-02 RX ORDER — FUROSEMIDE 20 MG/1
20 TABLET ORAL DAILY
Status: DISCONTINUED | OUTPATIENT
Start: 2024-08-03 | End: 2024-08-06 | Stop reason: HOSPADM

## 2024-08-02 RX ORDER — POLYETHYLENE GLYCOL 3350 17 G/17G
17 POWDER, FOR SOLUTION ORAL DAILY
Status: DISCONTINUED | OUTPATIENT
Start: 2024-08-02 | End: 2024-08-02 | Stop reason: RX

## 2024-08-02 RX ORDER — OXYCODONE HYDROCHLORIDE 5 MG/1
5 TABLET ORAL PRN
Status: DISCONTINUED | OUTPATIENT
Start: 2024-08-02 | End: 2024-08-02 | Stop reason: HOSPADM

## 2024-08-02 RX ORDER — MORPHINE SULFATE 2 MG/ML
2 INJECTION, SOLUTION INTRAMUSCULAR; INTRAVENOUS
Status: DISCONTINUED | OUTPATIENT
Start: 2024-08-02 | End: 2024-08-05

## 2024-08-02 RX ORDER — AMIODARONE HYDROCHLORIDE 200 MG/1
200 TABLET ORAL DAILY
Status: DISCONTINUED | OUTPATIENT
Start: 2024-08-02 | End: 2024-08-06 | Stop reason: HOSPADM

## 2024-08-02 RX ORDER — LIDOCAINE HYDROCHLORIDE 20 MG/ML
INJECTION, SOLUTION EPIDURAL; INFILTRATION; INTRACAUDAL; PERINEURAL PRN
Status: DISCONTINUED | OUTPATIENT
Start: 2024-08-02 | End: 2024-08-02 | Stop reason: SDUPTHER

## 2024-08-02 RX ORDER — ONDANSETRON 2 MG/ML
4 INJECTION INTRAMUSCULAR; INTRAVENOUS EVERY 4 HOURS PRN
Status: DISCONTINUED | OUTPATIENT
Start: 2024-08-02 | End: 2024-08-06 | Stop reason: HOSPADM

## 2024-08-02 RX ORDER — POLYETHYLENE GLYCOL 3350 17 G/17G
17 POWDER, FOR SOLUTION ORAL DAILY
Status: DISCONTINUED | OUTPATIENT
Start: 2024-08-02 | End: 2024-08-02

## 2024-08-02 RX ORDER — MAGNESIUM HYDROXIDE 1200 MG/15ML
LIQUID ORAL CONTINUOUS PRN
Status: COMPLETED | OUTPATIENT
Start: 2024-08-02 | End: 2024-08-02

## 2024-08-02 RX ORDER — METOCLOPRAMIDE HYDROCHLORIDE 5 MG/ML
10 INJECTION INTRAMUSCULAR; INTRAVENOUS EVERY 6 HOURS
Status: DISPENSED | OUTPATIENT
Start: 2024-08-02 | End: 2024-08-04

## 2024-08-02 RX ORDER — POLYETHYLENE GLYCOL 3350 17 G/17G
17 POWDER, FOR SOLUTION ORAL DAILY
Status: DISCONTINUED | OUTPATIENT
Start: 2024-08-02 | End: 2024-08-06 | Stop reason: HOSPADM

## 2024-08-02 RX ORDER — CALCIUM POLYCARBOPHIL 625 MG 625 MG/1
625 TABLET ORAL DAILY
Status: DISCONTINUED | OUTPATIENT
Start: 2024-08-02 | End: 2024-08-06 | Stop reason: HOSPADM

## 2024-08-02 RX ORDER — GLYCOPYRROLATE 0.2 MG/ML
INJECTION INTRAMUSCULAR; INTRAVENOUS PRN
Status: DISCONTINUED | OUTPATIENT
Start: 2024-08-02 | End: 2024-08-02 | Stop reason: SDUPTHER

## 2024-08-02 RX ORDER — SODIUM CHLORIDE 0.9 % (FLUSH) 0.9 %
5-40 SYRINGE (ML) INJECTION PRN
Status: DISCONTINUED | OUTPATIENT
Start: 2024-08-02 | End: 2024-08-06 | Stop reason: HOSPADM

## 2024-08-02 RX ORDER — SODIUM CHLORIDE 0.9 % (FLUSH) 0.9 %
5-40 SYRINGE (ML) INJECTION EVERY 12 HOURS SCHEDULED
Status: DISCONTINUED | OUTPATIENT
Start: 2024-08-02 | End: 2024-08-02 | Stop reason: HOSPADM

## 2024-08-02 RX ORDER — PANTOPRAZOLE SODIUM 40 MG/10ML
40 INJECTION, POWDER, LYOPHILIZED, FOR SOLUTION INTRAVENOUS DAILY
Status: DISCONTINUED | OUTPATIENT
Start: 2024-08-02 | End: 2024-08-06 | Stop reason: HOSPADM

## 2024-08-02 RX ORDER — BUPIVACAINE HYDROCHLORIDE 5 MG/ML
INJECTION, SOLUTION EPIDURAL; INTRACAUDAL
Status: COMPLETED | OUTPATIENT
Start: 2024-08-02 | End: 2024-08-02

## 2024-08-02 RX ORDER — SODIUM CHLORIDE 9 MG/ML
INJECTION, SOLUTION INTRAVENOUS PRN
Status: DISCONTINUED | OUTPATIENT
Start: 2024-08-02 | End: 2024-08-02 | Stop reason: HOSPADM

## 2024-08-02 RX ORDER — ONDANSETRON 2 MG/ML
INJECTION INTRAMUSCULAR; INTRAVENOUS PRN
Status: DISCONTINUED | OUTPATIENT
Start: 2024-08-02 | End: 2024-08-02 | Stop reason: SDUPTHER

## 2024-08-02 RX ORDER — SODIUM CHLORIDE 0.9 % (FLUSH) 0.9 %
5-40 SYRINGE (ML) INJECTION PRN
Status: DISCONTINUED | OUTPATIENT
Start: 2024-08-02 | End: 2024-08-02 | Stop reason: HOSPADM

## 2024-08-02 RX ORDER — DEXTROSE MONOHYDRATE, SODIUM CHLORIDE, AND POTASSIUM CHLORIDE 50; 1.49; 4.5 G/1000ML; G/1000ML; G/1000ML
INJECTION, SOLUTION INTRAVENOUS
Status: DISPENSED
Start: 2024-08-02 | End: 2024-08-03

## 2024-08-02 RX ORDER — ONDANSETRON 2 MG/ML
4 INJECTION INTRAMUSCULAR; INTRAVENOUS
Status: DISCONTINUED | OUTPATIENT
Start: 2024-08-02 | End: 2024-08-02 | Stop reason: HOSPADM

## 2024-08-02 RX ORDER — SODIUM CHLORIDE 9 MG/ML
INJECTION, SOLUTION INTRAVENOUS PRN
Status: DISCONTINUED | OUTPATIENT
Start: 2024-08-02 | End: 2024-08-06 | Stop reason: HOSPADM

## 2024-08-02 RX ORDER — SODIUM CHLORIDE 0.9 % (FLUSH) 0.9 %
5-40 SYRINGE (ML) INJECTION EVERY 12 HOURS SCHEDULED
Status: DISCONTINUED | OUTPATIENT
Start: 2024-08-02 | End: 2024-08-06 | Stop reason: HOSPADM

## 2024-08-02 RX ORDER — FERROUS SULFATE 325(65) MG
325 TABLET ORAL 2 TIMES DAILY WITH MEALS
Status: DISCONTINUED | OUTPATIENT
Start: 2024-08-02 | End: 2024-08-06 | Stop reason: HOSPADM

## 2024-08-02 RX ORDER — NALOXONE HYDROCHLORIDE 0.4 MG/ML
INJECTION, SOLUTION INTRAMUSCULAR; INTRAVENOUS; SUBCUTANEOUS PRN
Status: DISCONTINUED | OUTPATIENT
Start: 2024-08-02 | End: 2024-08-02 | Stop reason: HOSPADM

## 2024-08-02 RX ORDER — DEXTROSE MONOHYDRATE, SODIUM CHLORIDE, AND POTASSIUM CHLORIDE 50; 1.49; 4.5 G/1000ML; G/1000ML; G/1000ML
INJECTION, SOLUTION INTRAVENOUS CONTINUOUS
Status: DISCONTINUED | OUTPATIENT
Start: 2024-08-02 | End: 2024-08-05

## 2024-08-02 RX ORDER — ROCURONIUM BROMIDE 10 MG/ML
INJECTION, SOLUTION INTRAVENOUS PRN
Status: DISCONTINUED | OUTPATIENT
Start: 2024-08-02 | End: 2024-08-02 | Stop reason: SDUPTHER

## 2024-08-02 RX ORDER — MEPERIDINE HYDROCHLORIDE 25 MG/ML
12.5 INJECTION INTRAMUSCULAR; INTRAVENOUS; SUBCUTANEOUS
Status: DISCONTINUED | OUTPATIENT
Start: 2024-08-02 | End: 2024-08-02 | Stop reason: HOSPADM

## 2024-08-02 RX ORDER — DEXAMETHASONE SODIUM PHOSPHATE 4 MG/ML
INJECTION, SOLUTION INTRA-ARTICULAR; INTRALESIONAL; INTRAMUSCULAR; INTRAVENOUS; SOFT TISSUE PRN
Status: DISCONTINUED | OUTPATIENT
Start: 2024-08-02 | End: 2024-08-02 | Stop reason: SDUPTHER

## 2024-08-02 RX ORDER — ASPIRIN 81 MG/1
81 TABLET, CHEWABLE ORAL DAILY
Status: DISCONTINUED | OUTPATIENT
Start: 2024-08-02 | End: 2024-08-02

## 2024-08-02 RX ORDER — ATORVASTATIN CALCIUM 10 MG/1
10 TABLET, FILM COATED ORAL NIGHTLY
Status: DISCONTINUED | OUTPATIENT
Start: 2024-08-02 | End: 2024-08-06 | Stop reason: HOSPADM

## 2024-08-02 RX ORDER — OXYCODONE HYDROCHLORIDE 5 MG/1
5 TABLET ORAL EVERY 4 HOURS PRN
Status: DISCONTINUED | OUTPATIENT
Start: 2024-08-02 | End: 2024-08-06 | Stop reason: HOSPADM

## 2024-08-02 RX ORDER — ACETAMINOPHEN 500 MG
1000 TABLET ORAL EVERY 8 HOURS SCHEDULED
Status: DISPENSED | OUTPATIENT
Start: 2024-08-02 | End: 2024-08-05

## 2024-08-02 RX ORDER — OXYCODONE HYDROCHLORIDE 5 MG/1
10 TABLET ORAL PRN
Status: DISCONTINUED | OUTPATIENT
Start: 2024-08-02 | End: 2024-08-02 | Stop reason: HOSPADM

## 2024-08-02 RX ORDER — HYDROMORPHONE HYDROCHLORIDE 2 MG/ML
0.5 INJECTION, SOLUTION INTRAMUSCULAR; INTRAVENOUS; SUBCUTANEOUS EVERY 5 MIN PRN
Status: DISCONTINUED | OUTPATIENT
Start: 2024-08-02 | End: 2024-08-02 | Stop reason: HOSPADM

## 2024-08-02 RX ORDER — ENOXAPARIN SODIUM 100 MG/ML
40 INJECTION SUBCUTANEOUS DAILY
Status: DISCONTINUED | OUTPATIENT
Start: 2024-08-02 | End: 2024-08-06 | Stop reason: HOSPADM

## 2024-08-02 RX ORDER — FENTANYL CITRATE 50 UG/ML
50 INJECTION, SOLUTION INTRAMUSCULAR; INTRAVENOUS EVERY 5 MIN PRN
Status: DISCONTINUED | OUTPATIENT
Start: 2024-08-02 | End: 2024-08-02 | Stop reason: HOSPADM

## 2024-08-02 RX ORDER — FENTANYL CITRATE 50 UG/ML
INJECTION, SOLUTION INTRAMUSCULAR; INTRAVENOUS PRN
Status: DISCONTINUED | OUTPATIENT
Start: 2024-08-02 | End: 2024-08-02 | Stop reason: SDUPTHER

## 2024-08-02 RX ORDER — LEVOTHYROXINE SODIUM 0.03 MG/1
50 TABLET ORAL DAILY
Status: DISCONTINUED | OUTPATIENT
Start: 2024-08-02 | End: 2024-08-06 | Stop reason: HOSPADM

## 2024-08-02 RX ADMIN — CALCIUM POLYCARBOPHIL 625 MG: 625 TABLET, FILM COATED ORAL at 17:45

## 2024-08-02 RX ADMIN — PROPOFOL 120 MG: 10 INJECTION, EMULSION INTRAVENOUS at 12:11

## 2024-08-02 RX ADMIN — PANTOPRAZOLE SODIUM 40 MG: 40 INJECTION, POWDER, FOR SOLUTION INTRAVENOUS at 17:44

## 2024-08-02 RX ADMIN — METRONIDAZOLE 500 MG: 500 INJECTION, SOLUTION INTRAVENOUS at 12:01

## 2024-08-02 RX ADMIN — POLYETHYLENE GLYCOL 3350 17 G: 17 POWDER, FOR SOLUTION ORAL at 18:28

## 2024-08-02 RX ADMIN — ROCURONIUM BROMIDE 50 MG: 10 INJECTION, SOLUTION INTRAVENOUS at 12:11

## 2024-08-02 RX ADMIN — LIDOCAINE HYDROCHLORIDE 100 MG: 20 INJECTION, SOLUTION EPIDURAL; INFILTRATION; INTRACAUDAL; PERINEURAL at 12:11

## 2024-08-02 RX ADMIN — SODIUM CHLORIDE: 9 INJECTION, SOLUTION INTRAVENOUS at 12:09

## 2024-08-02 RX ADMIN — POTASSIUM CHLORIDE, DEXTROSE MONOHYDRATE AND SODIUM CHLORIDE: 150; 5; 450 INJECTION, SOLUTION INTRAVENOUS at 17:05

## 2024-08-02 RX ADMIN — PHENYLEPHRINE HYDROCHLORIDE 200 MCG: 10 INJECTION INTRAVENOUS at 12:20

## 2024-08-02 RX ADMIN — ACETAMINOPHEN 1000 MG: 500 TABLET ORAL at 17:44

## 2024-08-02 RX ADMIN — ENOXAPARIN SODIUM 40 MG: 100 INJECTION SUBCUTANEOUS at 17:45

## 2024-08-02 RX ADMIN — FENTANYL CITRATE 50 MCG: 50 INJECTION, SOLUTION INTRAMUSCULAR; INTRAVENOUS at 12:11

## 2024-08-02 RX ADMIN — SUGAMMADEX 200 MG: 100 INJECTION, SOLUTION INTRAVENOUS at 13:40

## 2024-08-02 RX ADMIN — GLYCOPYRROLATE 0.2 MG: 0.2 INJECTION, SOLUTION INTRAMUSCULAR; INTRAVENOUS at 12:35

## 2024-08-02 RX ADMIN — FENTANYL CITRATE 50 MCG: 50 INJECTION, SOLUTION INTRAMUSCULAR; INTRAVENOUS at 12:55

## 2024-08-02 RX ADMIN — ONDANSETRON 4 MG: 2 INJECTION INTRAMUSCULAR; INTRAVENOUS at 13:44

## 2024-08-02 RX ADMIN — DEXAMETHASONE SODIUM PHOSPHATE 8 MG: 4 INJECTION, SOLUTION INTRAMUSCULAR; INTRAVENOUS at 12:20

## 2024-08-02 RX ADMIN — AMIODARONE HYDROCHLORIDE 200 MG: 200 TABLET ORAL at 17:45

## 2024-08-02 RX ADMIN — FERROUS SULFATE TAB 325 MG (65 MG ELEMENTAL FE) 325 MG: 325 (65 FE) TAB at 17:45

## 2024-08-02 RX ADMIN — LEVOTHYROXINE SODIUM 50 MCG: 0.03 TABLET ORAL at 17:44

## 2024-08-02 RX ADMIN — CEFAZOLIN 2000 MG: 2 INJECTION, POWDER, FOR SOLUTION INTRAMUSCULAR; INTRAVENOUS at 12:01

## 2024-08-02 RX ADMIN — PHENYLEPHRINE HYDROCHLORIDE 200 MCG: 10 INJECTION INTRAVENOUS at 12:34

## 2024-08-02 RX ADMIN — METRONIDAZOLE 500 MG: 500 INJECTION, SOLUTION INTRAVENOUS at 12:09

## 2024-08-02 ASSESSMENT — PAIN - FUNCTIONAL ASSESSMENT: PAIN_FUNCTIONAL_ASSESSMENT: 0-10

## 2024-08-02 NOTE — PROGRESS NOTES
Pt arrived to PACU from OR, VSS, pt arouses to voice. Colostomy site is CDI. Urostomy site is CDI, yellow cloudy urine noted in collection bag. Will continue to monitor.

## 2024-08-02 NOTE — FLOWSHEET NOTE
Phase 1 complete, pt seen by anesthesiologist. VSS, pt resting comfortably. Incision sites x3 are CDI, ice applied. Colostomy site is CDI. Suprapubic catheter drainage bag notes yellow, cloudy urine. Wound vac in place. Will transfer to 4T when a bed becomes available, family updated.

## 2024-08-02 NOTE — PROGRESS NOTES
4 Eyes Skin Assessment     NAME:  Regina Lawson  YOB: 1940  MEDICAL RECORD NUMBER:  [id number]    The patient is being assessed for  Admission    I agree that at least one RN has performed a thorough Head to Toe Skin Assessment on the patient. ALL assessment sites listed below have been assessed.      Areas assessed by both nurses:    Head, Face, Ears, Shoulders, Back, Chest, Arms, Elbows, Hands, Sacrum. Buttock, Coccyx, Ischium, Legs. Feet and Heels, and Under Medical Devices         Does the Patient have a Wound? Yes wound(s) were present on assessment. LDA wound assessment was Initiated and completed by RN       William Prevention initiated by RN: Yes  Wound Care Orders initiated by RN: Yes    Pressure Injury (Stage 3,4, Unstageable, DTI, NWPT, and Complex wounds) if present, place Wound referral order by RN under : Yes    New Ostomies, if present place, Ostomy referral order under : Yes     Nurse 1 eSignature: Electronically signed by Melinda Heart RN on 8/2/24 at 5:13 PM EDT    **SHARE this note so that the co-signing nurse can place an eSignature**    Nurse 2 eSignature: Electronically signed by Savannah Quiroga RN on 8/2/24 at 5:33 PM EDT

## 2024-08-02 NOTE — BRIEF OP NOTE
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    Post-Op Diagnosis: Same       Procedure(s):  LAPAROSCOPIC ASSISTED PERISTOMAL HERNIA REPAIR  VAC placement    Surgeon(s):  David Emerson MD    Assistant:  Surgical Assistant: Bhavin Chowdhury    Anesthesia: General    Estimated Blood Loss (mL): Minimal    Complications: None    Specimens:   * No specimens in log *    Implants:  * No implants in log *      Drains:   Colostomy (Active)       [REMOVED] Colostomy LLQ (Removed)       [REMOVED] Urinary Catheter 05/30/24 (Removed)       [REMOVED] Suprapubic Catheter (Removed)       [REMOVED] Suprapubic Catheter Latex (Removed)       [REMOVED] Suprapubic Catheter (Removed)       [REMOVED] Suprapubic Catheter  (Removed)   Urine Color Yellow 07/18/24 1647   Urine Appearance Clear 07/18/24 1647   Collection Container Standard 07/18/24 1647       Findings:  Infection Present At Time Of Surgery (PATOS) (choose all levels that have infection present):  No infection present  Electronically signed by David Emerson MD on 8/2/2024 at 2:05 PM

## 2024-08-02 NOTE — ANESTHESIA PRE PROCEDURE
Department of Anesthesiology  Preprocedure Note       Name:  Regina Lawson   Age:  83 y.o.  :  1940                                          MRN:  [id number]         Date:  2024      Surgeon: Surgeon(s):  David Emerson MD    Procedure: Procedure(s):  LAPAROSCOPIC ASSISTED PERISTOMAL HERNIA REPAIR    Medications prior to admission:   Prior to Admission medications    Medication Sig Start Date End Date Taking? Authorizing Provider   polyethylene glycol (GLYCOLAX) 17 GM/SCOOP powder Take 17 g by mouth daily    Josse Rojas MD   levothyroxine (SYNTHROID) 50 MCG tablet Take 1 tablet by mouth Daily    Josse Rojas MD   nitrofurantoin, macrocrystal-monohydrate, (MACROBID) 100 MG capsule Take 1 capsule by mouth 2 times daily For 7 days    Josse Rojas MD   polycarbophil (FIBERCON) 625 MG tablet Take 1 tablet by mouth daily    Josse Rojas MD   metoprolol succinate (TOPROL XL) 25 MG extended release tablet Take 1 tablet by mouth 2 times daily    Josse Rojas MD   sennosides-docusate sodium (SENOKOT-S) 8.6-50 MG tablet Take 1 tablet by mouth daily 24   Derick Griffin MD   aspirin (ASPIRIN CHILDRENS) 81 MG chewable tablet Take 1 tablet by mouth daily 23   John Longoria MD   amiodarone (CORDARONE) 200 MG tablet Take 1 tablet by mouth daily 23   Andrew Meyers MD   furosemide (LASIX) 20 MG tablet Take 1 tablet by mouth daily    Josse Rojas MD   Lactobacillus (ACIDOPHILUS/PECTIN) 100 MG CAPS Take 1 capsule by mouth daily    Josse Rojas MD   atorvastatin (LIPITOR) 10 MG tablet Take 1 tablet by mouth at bedtime    Josse Rojas MD   ferrous sulfate (FE TABS 325) 325 (65 Fe) MG EC tablet Take 1 tablet by mouth with breakfast and with evening meal    Josse Rojas MD   miconazole (MICOTIN) 2 % powder Apply 1 application topically 2 times daily 23   Josse Rojas MD   potassium chloride

## 2024-08-02 NOTE — H&P
[facility] GENERAL AND LAPAROSCOPIC SURGERY              PATIENT NAME: Regina Lawson      TODAY'S DATE: 8/2/2024     CC: hernia  SUBJECTIVE:    Pt here for eval, on stretcher. Has had left abd wall protrusion, CT with hernia.  Has good stomal fxn, no N.V, No fever, normal diet. No bag leak.  Has some mucoid rectal drainage.     OBJECTIVE:  VITALS:  /63   Wt 86.2 kg (190 lb)   BMI 32.61 kg/m²      CONSTITUTIONAL:  awake and alert, on stretcher  LUNGS:  clear to auscultation  HEART: RRR  ABDOMEN:  normal bowel sounds, soft, non-distended, non-tender, stoma on left, prominent, normal stool output - with prolapse     Data:     Radiology Review:   CT OF THE ABDOMEN AND PELVIS WITH CONTRAST 6/15/2024 9:20 pm     TECHNIQUE:  CT of the abdomen and pelvis was performed with the administration of  intravenous contrast. Multiplanar reformatted images are provided for review.  Automated exposure control, iterative reconstruction, and/or weight based  adjustment of the mA/kV was utilized to reduce the radiation dose to as low  as reasonably achievable.     COMPARISON:  04/21/2024     HISTORY:  ORDERING SYSTEM PROVIDED HISTORY: protruding bowel through stoma  TECHNOLOGIST PROVIDED HISTORY:  If patient is on cardiac monitor and/or pulse ox, they may be taken off  cardiac monitor and pulse ox, left on O2 if currently on. All monitors  reattached when patient returns to room.  Additional Contrast?->None  Reason for exam:->protruding bowel through stoma     FINDINGS:  Lower Chest: Dependent and hazy basilar opacities in the lung bases  increased.  Hiatal hernia is redemonstrated.  Streak artifact from the spinal  hardware.     Organs: Artifact from the spinal hardware and from the arms at the sides.  No  enhancing mass is seen within the liver.  The gallbladder is contracted.  The  spleen is not enlarged.  Fatty infiltration of the pancreas.  Pancreas  appears stable from the previous exam.  The right

## 2024-08-02 NOTE — PLAN OF CARE
Problem: Discharge Planning  Goal: Discharge to home or other facility with appropriate resources  Flowsheets (Taken 8/2/2024 1717)  Discharge to home or other facility with appropriate resources:   Identify barriers to discharge with patient and caregiver   Arrange for needed discharge resources and transportation as appropriate     Problem: Pain  Goal: Verbalizes/displays adequate comfort level or baseline comfort level  Flowsheets (Taken 8/2/2024 1717)  Verbalizes/displays adequate comfort level or baseline comfort level:   Assess pain using appropriate pain scale   Encourage patient to monitor pain and request assistance   Administer analgesics based on type and severity of pain and evaluate response     Problem: Safety - Adult  Goal: Free from fall injury  Flowsheets (Taken 8/2/2024 1717)  Free From Fall Injury: Instruct family/caregiver on patient safety     Problem: ABCDS Injury Assessment  Goal: Absence of physical injury  Flowsheets (Taken 8/2/2024 1717)  Absence of Physical Injury: Implement safety measures based on patient assessment

## 2024-08-02 NOTE — PROGRESS NOTES
Pt admitted to room 4477. AOX4. VSS on room air. Admission completed. Incision sites x3 are CDI, ice applied. Ostomy site is CDI. Wound vac in place. Pt alert to room and call light.     Electronically signed by Melinda Heart RN on 8/2/2024 at 5:12 PM

## 2024-08-02 NOTE — PROGRESS NOTES
Patient arrived in preop for scheduled procedure with Dr. Emerson in stable condition via stretcher, VSS, LLQ colostomy leaking, suprapubic cath draining to bag, able to sign own consents, AA x4, NPO since midnight. Daughter Laurie, patient's POA, at the bedside.     Teaching / education initiated regarding perioperative experience, expectations, and pain management during stay. Patient verbalized understanding.

## 2024-08-03 LAB
ANION GAP SERPL CALCULATED.3IONS-SCNC: 11 MMOL/L (ref 3–16)
BASOPHILS # BLD: 0 K/UL (ref 0–0.2)
BASOPHILS NFR BLD: 0.1 %
BUN SERPL-MCNC: 15 MG/DL (ref 7–20)
CALCIUM SERPL-MCNC: 9.7 MG/DL (ref 8.3–10.6)
CHLORIDE SERPL-SCNC: 103 MMOL/L (ref 99–110)
CO2 SERPL-SCNC: 23 MMOL/L (ref 21–32)
CREAT SERPL-MCNC: 0.7 MG/DL (ref 0.6–1.2)
DEPRECATED RDW RBC AUTO: 16.1 % (ref 12.4–15.4)
EOSINOPHIL # BLD: 0 K/UL (ref 0–0.6)
EOSINOPHIL NFR BLD: 0 %
GFR SERPLBLD CREATININE-BSD FMLA CKD-EPI: 85 ML/MIN/{1.73_M2}
GLUCOSE SERPL-MCNC: 161 MG/DL (ref 70–99)
HCT VFR BLD AUTO: 29.9 % (ref 36–48)
HGB BLD-MCNC: 10.1 G/DL (ref 12–16)
LYMPHOCYTES # BLD: 0.5 K/UL (ref 1–5.1)
LYMPHOCYTES NFR BLD: 11.2 %
MCH RBC QN AUTO: 27.7 PG (ref 26–34)
MCHC RBC AUTO-ENTMCNC: 33.6 G/DL (ref 31–36)
MCV RBC AUTO: 82.3 FL (ref 80–100)
MONOCYTES # BLD: 0.3 K/UL (ref 0–1.3)
MONOCYTES NFR BLD: 6.6 %
NEUTROPHILS # BLD: 4 K/UL (ref 1.7–7.7)
NEUTROPHILS NFR BLD: 82.1 %
PLATELET # BLD AUTO: 294 K/UL (ref 135–450)
PMV BLD AUTO: 7.8 FL (ref 5–10.5)
POTASSIUM SERPL-SCNC: 4.6 MMOL/L (ref 3.5–5.1)
RBC # BLD AUTO: 3.64 M/UL (ref 4–5.2)
SODIUM SERPL-SCNC: 137 MMOL/L (ref 136–145)
WBC # BLD AUTO: 4.9 K/UL (ref 4–11)

## 2024-08-03 PROCEDURE — 94150 VITAL CAPACITY TEST: CPT

## 2024-08-03 PROCEDURE — 6370000000 HC RX 637 (ALT 250 FOR IP): Performed by: SURGERY

## 2024-08-03 PROCEDURE — 99024 POSTOP FOLLOW-UP VISIT: CPT | Performed by: SURGERY

## 2024-08-03 PROCEDURE — 94760 N-INVAS EAR/PLS OXIMETRY 1: CPT

## 2024-08-03 PROCEDURE — 2500000003 HC RX 250 WO HCPCS: Performed by: SURGERY

## 2024-08-03 PROCEDURE — 80048 BASIC METABOLIC PNL TOTAL CA: CPT

## 2024-08-03 PROCEDURE — 85025 COMPLETE CBC W/AUTO DIFF WBC: CPT

## 2024-08-03 PROCEDURE — 2580000003 HC RX 258: Performed by: SURGERY

## 2024-08-03 PROCEDURE — 6360000002 HC RX W HCPCS: Performed by: SURGERY

## 2024-08-03 PROCEDURE — 1200000000 HC SEMI PRIVATE

## 2024-08-03 PROCEDURE — 36415 COLL VENOUS BLD VENIPUNCTURE: CPT

## 2024-08-03 RX ADMIN — POLYETHYLENE GLYCOL 3350 17 G: 17 POWDER, FOR SOLUTION ORAL at 08:30

## 2024-08-03 RX ADMIN — METOPROLOL TARTRATE 25 MG: 25 TABLET, FILM COATED ORAL at 22:20

## 2024-08-03 RX ADMIN — Medication 10 ML: at 22:20

## 2024-08-03 RX ADMIN — ACETAMINOPHEN 1000 MG: 500 TABLET ORAL at 06:49

## 2024-08-03 RX ADMIN — AMIODARONE HYDROCHLORIDE 200 MG: 200 TABLET ORAL at 08:30

## 2024-08-03 RX ADMIN — FERROUS SULFATE TAB 325 MG (65 MG ELEMENTAL FE) 325 MG: 325 (65 FE) TAB at 08:29

## 2024-08-03 RX ADMIN — POTASSIUM CHLORIDE, DEXTROSE MONOHYDRATE AND SODIUM CHLORIDE: 150; 5; 450 INJECTION, SOLUTION INTRAVENOUS at 14:17

## 2024-08-03 RX ADMIN — SENNOSIDES AND DOCUSATE SODIUM 1 TABLET: 50; 8.6 TABLET ORAL at 08:30

## 2024-08-03 RX ADMIN — ENOXAPARIN SODIUM 40 MG: 100 INJECTION SUBCUTANEOUS at 08:30

## 2024-08-03 RX ADMIN — CALCIUM POLYCARBOPHIL 625 MG: 625 TABLET, FILM COATED ORAL at 08:33

## 2024-08-03 RX ADMIN — METOCLOPRAMIDE 10 MG: 5 INJECTION, SOLUTION INTRAMUSCULAR; INTRAVENOUS at 22:20

## 2024-08-03 RX ADMIN — ACETAMINOPHEN 1000 MG: 500 TABLET ORAL at 22:20

## 2024-08-03 RX ADMIN — PANTOPRAZOLE SODIUM 40 MG: 40 INJECTION, POWDER, FOR SOLUTION INTRAVENOUS at 08:29

## 2024-08-03 RX ADMIN — METOPROLOL TARTRATE 25 MG: 25 TABLET, FILM COATED ORAL at 08:30

## 2024-08-03 RX ADMIN — Medication 10 ML: at 03:39

## 2024-08-03 RX ADMIN — FUROSEMIDE 20 MG: 20 TABLET ORAL at 08:29

## 2024-08-03 RX ADMIN — Medication 10 ML: at 08:30

## 2024-08-03 RX ADMIN — ATORVASTATIN CALCIUM 10 MG: 10 TABLET, FILM COATED ORAL at 22:20

## 2024-08-03 RX ADMIN — METOCLOPRAMIDE 10 MG: 5 INJECTION, SOLUTION INTRAMUSCULAR; INTRAVENOUS at 12:46

## 2024-08-03 RX ADMIN — LEVOTHYROXINE SODIUM 50 MCG: 0.03 TABLET ORAL at 06:55

## 2024-08-03 ASSESSMENT — PAIN SCALES - GENERAL
PAINLEVEL_OUTOF10: 0
PAINLEVEL_OUTOF10: 3
PAINLEVEL_OUTOF10: 0
PAINLEVEL_OUTOF10: 0

## 2024-08-03 ASSESSMENT — PAIN DESCRIPTION - ORIENTATION: ORIENTATION: LEFT

## 2024-08-03 ASSESSMENT — PAIN DESCRIPTION - LOCATION: LOCATION: ABDOMEN

## 2024-08-03 ASSESSMENT — PAIN DESCRIPTION - DESCRIPTORS: DESCRIPTORS: ACHING

## 2024-08-03 NOTE — PROGRESS NOTES
08/03/24 0845   Incentive Spirometry Tx   Treatment Effort Initial;Assisted by RT;Independent;Instructed;Well   Predicted Volume 547   Achieved Volume (mL) 800 mL

## 2024-08-03 NOTE — CARE COORDINATION
Case Management Assessment  Initial Evaluation    Date/Time of Evaluation: 8/3/2024 2:08 PM  Assessment Completed by: Amaury Parada Jr, RN    If patient is discharged prior to next notation, then this note serves as note for discharge by case management.    Patient Name: Regina Lawson                   YOB: 1940  Diagnosis: Peristomal hernia [K46.9]                   Date / Time: 8/2/2024  9:30 AM    Patient Admission Status: Inpatient   Readmission Risk (Low < 19, Mod (19-27), High > 27): Readmission Risk Score: 16.9    Current PCP: Bailee Sharp, DO  PCP verified by CM? (P) Yes    Chart Reviewed: Yes      History Provided by: (P) Patient  Patient Orientation: (P) Alert and Oriented    Patient Cognition: (P) Alert    Hospitalization in the last 30 days (Readmission):  No    If yes, Readmission Assessment in CM Navigator will be completed.    Advance Directives:      Code Status: Full Code   Patient's Primary Decision Maker is: (P) Legal Next of Kin      Discharge Planning:    Patient lives with: (P) Alone Type of Home: (P) House  Primary Care Giver: (P) Self  Patient Support Systems include: (P) Family Members   Current Financial resources: (P) Medicare  Current community resources: (P) None  Current services prior to admission: (P) None            Current DME:              Type of Home Care services:  (P) None    ADLS  Prior functional level: (P) Independent in ADLs/IADLs  Current functional level: (P) Independent in ADLs/IADLs    PT AM-PAC:   /24  OT AM-PAC:   /24    Family can provide assistance at DC: (P) Yes  Would you like Case Management to discuss the discharge plan with any other family members/significant others, and if so, who? (P) No  Plans to Return to Present Housing: (P) Unknown at present  Other Identified Issues/Barriers to RETURNING to current housing:   Potential Assistance needed at discharge: (P) Other (Comment) (TBD)            Potential DME:    Patient expects to

## 2024-08-03 NOTE — OP NOTE
[place] [address]                            OPERATIVE REPORT      PATIENT NAME: GRANT VELAZQUEZ             : 1940  MED REC NO: [id number]                      ROOM: [address] [place] [address]  ACCOUNT NO: [id number]                       ADMIT DATE: 2024  PROVIDER: David Emerson MD      DATE OF PROCEDURE:  2024    SURGEON:  David Emerson MD    PREOPERATIVE DIAGNOSES:    1. Parastomal hernia with incarcerated small bowel.  2. Colostomy prolapse.    POSTOPERATIVE DIAGNOSES:    1. Parastomal hernia with incarcerated small bowel.  2. Colostomy prolapse.    PROCEDURES:  Laparoscopic-assisted parastomal hernia repair with takedown of prolapsed colon resection, new colostomy placement at a second new site and subcutaneous abdominal wall VAC placement 6 cm x 5 cm size.    ANESTHESIA:  General plus local.    ESTIMATED BLOOD LOSS:  Minimal.    COMPLICATIONS:  None.    SPECIMENS:  None.  Old colostomy removed and discarded.    INDICATIONS FOR PROCEDURE:  The patient is an 83-year-old female, who has an ongoing need for colostomy for large sacral wound infection and wound perineal soilage due to paraplegia and she has had a prior colostomy in place.  This has developed prolapse on the left side and also parastomal hernia with the small bowel up in the hernia.  This was reduced and repaired today.    DETAILS OF SURGERY:  The patient was brought to the operating room and placed on the operative table in supine position.  General anesthetic was administered.  The old colostomy area was evaluated.  The skin was cleaned thoroughly.  She also has a suprapubic tube, which was cleaned and dressed and kept clear the surgical field.  The colostomy was closed with 2-0 silk sutures.    Abdomen was prepped with Betadine scrub and paint and Ioban was draped.  Time-out was done.  In the right upper abdomen subcostally, a 5 mm direct

## 2024-08-03 NOTE — PROGRESS NOTES
[facility]    Surgery Progress Note           POD # 1    PATIENT NAME: Regina Lawson     TODAY'S DATE: 8/3/2024    SUBJECTIVE:    Pt without concerns, resting in bed, no N/V. Appears comfortable, no bag leak.     OBJECTIVE:   VITALS:  /60   Pulse 57   Temp 97.2 °F (36.2 °C) (Oral)   Resp 16   Ht 1.626 m (5' 4.02\")   Wt 76.8 kg (169 lb 5 oz)   SpO2 93%   BMI 29.05 kg/m²     INTAKE/OUTPUT:    I/O last 3 completed shifts:  In: 450 [I.V.:150; Other:300]  Out: 625 [Urine:625]  I/O this shift:  In: -   Out: 1000 [Urine:1000]              CONSTITUTIONAL:  awake and alert  LUNGS:  clear to auscultation  ABDOMEN:   hypoactive bowel sounds, soft, non-distended, non-tender   INCISIONS: clean, dry, new stoma is viable    Data:  CBC:   Recent Labs     08/03/24  0618   WBC 4.9   HGB 10.1*   HCT 29.9*        BMP:    Recent Labs     08/03/24  0618      K 4.6      CO2 23   BUN 15   CREATININE 0.7   GLUCOSE 161*     Hepatic: No results for input(s): \"AST\", \"ALT\", \"BILITOT\", \"ALKPHOS\" in the last 72 hours.    Invalid input(s): \"ALB\"  Mag:    No results for input(s): \"MG\" in the last 72 hours.   Phos:   No results for input(s): \"PHOS\" in the last 72 hours.   INR: No results for input(s): \"INR\" in the last 72 hours.    Radiology Review:  None    ASSESSMENT AND PLAN:  83 y.o. female status post peristomal hernia repair, colostomy prolapse resection / new colostomy     Doing well  No pain issues  No N/V  Will begin fulls po  VAC change tomorrow    David Emerson MD

## 2024-08-03 NOTE — CARE COORDINATION
IMM Letter       08/03/24 1440   IMM Letter   IMM Letter given to Patient/Family/Significant other/Guardian/POA/by: Patient   IMM Letter date given: 08/03/24   IMM Letter time given: 1410     MARIMAR Fuentes RN    [facility]  Phone: [phone]

## 2024-08-04 PROCEDURE — 6360000002 HC RX W HCPCS: Performed by: SURGERY

## 2024-08-04 PROCEDURE — 2580000003 HC RX 258: Performed by: SURGERY

## 2024-08-04 PROCEDURE — 97605 NEG PRS WND THER DME<=50SQCM: CPT | Performed by: SURGERY

## 2024-08-04 PROCEDURE — 87070 CULTURE OTHR SPECIMN AEROBIC: CPT

## 2024-08-04 PROCEDURE — 6370000000 HC RX 637 (ALT 250 FOR IP): Performed by: SURGERY

## 2024-08-04 PROCEDURE — 87205 SMEAR GRAM STAIN: CPT

## 2024-08-04 PROCEDURE — 1200000000 HC SEMI PRIVATE

## 2024-08-04 RX ORDER — METOCLOPRAMIDE HYDROCHLORIDE 5 MG/ML
10 INJECTION INTRAMUSCULAR; INTRAVENOUS EVERY 6 HOURS
Status: COMPLETED | OUTPATIENT
Start: 2024-08-04 | End: 2024-08-05

## 2024-08-04 RX ADMIN — ENOXAPARIN SODIUM 40 MG: 100 INJECTION SUBCUTANEOUS at 08:23

## 2024-08-04 RX ADMIN — METOCLOPRAMIDE 10 MG: 5 INJECTION, SOLUTION INTRAMUSCULAR; INTRAVENOUS at 21:52

## 2024-08-04 RX ADMIN — SENNOSIDES AND DOCUSATE SODIUM 1 TABLET: 50; 8.6 TABLET ORAL at 08:23

## 2024-08-04 RX ADMIN — ATORVASTATIN CALCIUM 10 MG: 10 TABLET, FILM COATED ORAL at 21:51

## 2024-08-04 RX ADMIN — METOCLOPRAMIDE 10 MG: 5 INJECTION, SOLUTION INTRAMUSCULAR; INTRAVENOUS at 16:56

## 2024-08-04 RX ADMIN — METOPROLOL TARTRATE 25 MG: 25 TABLET, FILM COATED ORAL at 08:23

## 2024-08-04 RX ADMIN — LEVOTHYROXINE SODIUM 50 MCG: 0.03 TABLET ORAL at 06:53

## 2024-08-04 RX ADMIN — CALCIUM POLYCARBOPHIL 625 MG: 625 TABLET, FILM COATED ORAL at 08:23

## 2024-08-04 RX ADMIN — PANTOPRAZOLE SODIUM 40 MG: 40 INJECTION, POWDER, FOR SOLUTION INTRAVENOUS at 08:23

## 2024-08-04 RX ADMIN — POLYETHYLENE GLYCOL 3350 17 G: 17 POWDER, FOR SOLUTION ORAL at 08:23

## 2024-08-04 RX ADMIN — ACETAMINOPHEN 1000 MG: 500 TABLET ORAL at 06:53

## 2024-08-04 RX ADMIN — Medication 10 ML: at 21:52

## 2024-08-04 RX ADMIN — Medication 10 ML: at 08:24

## 2024-08-04 RX ADMIN — FERROUS SULFATE TAB 325 MG (65 MG ELEMENTAL FE) 325 MG: 325 (65 FE) TAB at 08:23

## 2024-08-04 RX ADMIN — AMIODARONE HYDROCHLORIDE 200 MG: 200 TABLET ORAL at 08:24

## 2024-08-04 RX ADMIN — METOPROLOL TARTRATE 25 MG: 25 TABLET, FILM COATED ORAL at 21:52

## 2024-08-04 RX ADMIN — ACETAMINOPHEN 1000 MG: 500 TABLET ORAL at 21:52

## 2024-08-04 RX ADMIN — FUROSEMIDE 20 MG: 20 TABLET ORAL at 08:23

## 2024-08-04 RX ADMIN — FERROUS SULFATE TAB 325 MG (65 MG ELEMENTAL FE) 325 MG: 325 (65 FE) TAB at 16:56

## 2024-08-04 ASSESSMENT — PAIN DESCRIPTION - DESCRIPTORS: DESCRIPTORS: ACHING

## 2024-08-04 ASSESSMENT — PAIN DESCRIPTION - LOCATION: LOCATION: ABDOMEN

## 2024-08-04 ASSESSMENT — PAIN SCALES - GENERAL
PAINLEVEL_OUTOF10: 5
PAINLEVEL_OUTOF10: 0
PAINLEVEL_OUTOF10: 0

## 2024-08-04 NOTE — PROGRESS NOTES
[facility]    Surgery Progress Note           POD # 2    PATIENT NAME: Regina Lawson     TODAY'S DATE: 8/4/2024    SUBJECTIVE:    Pt without concerns, resting in bed, no N/V. Appears comfortable, no bag leak.     OBJECTIVE:   VITALS:  BP (!) 96/58   Pulse 53   Temp 97.8 °F (36.6 °C) (Oral)   Resp 17   Ht 1.626 m (5' 4.02\")   Wt 80.5 kg (177 lb 7.5 oz)   SpO2 97%   BMI 30.45 kg/m²     INTAKE/OUTPUT:    I/O last 3 completed shifts:  In: -   Out: 1975 [Urine:1975]  I/O this shift:  In: -   Out: 300 [Urine:300]              CONSTITUTIONAL:  awake and alert  LUNGS:  clear to auscultation  ABDOMEN:   hypoactive bowel sounds, soft, non-distended, non-tender   INCISIONS: clean, dry, new stoma is viable, VAC with good seal    Data:  CBC:   Recent Labs     08/03/24  0618   WBC 4.9   HGB 10.1*   HCT 29.9*          BMP:    Recent Labs     08/03/24  0618      K 4.6      CO2 23   BUN 15   CREATININE 0.7   GLUCOSE 161*       Hepatic: No results for input(s): \"AST\", \"ALT\", \"BILITOT\", \"ALKPHOS\" in the last 72 hours.    Invalid input(s): \"ALB\"  Mag:    No results for input(s): \"MG\" in the last 72 hours.   Phos:   No results for input(s): \"PHOS\" in the last 72 hours.   INR: No results for input(s): \"INR\" in the last 72 hours.    Radiology Review:  None    ASSESSMENT AND PLAN:  83 y.o. female status post peristomal hernia repair, colostomy prolapse resection / new colostomy     Doing well  No pain issues  No N/V  Reg diet today  VAC change today  VAC removed, wound without cellulitis or infected fluid; new black foam 5 X 3 cm placed for wound, good seal    David Emerson MD

## 2024-08-05 PROBLEM — S31.109A OPEN ABDOMINAL WALL WOUND: Status: ACTIVE | Noted: 2024-08-05

## 2024-08-05 PROCEDURE — 99024 POSTOP FOLLOW-UP VISIT: CPT | Performed by: SURGERY

## 2024-08-05 PROCEDURE — APPSS15 APP SPLIT SHARED TIME 0-15 MINUTES: Performed by: NURSE PRACTITIONER

## 2024-08-05 PROCEDURE — 1200000000 HC SEMI PRIVATE

## 2024-08-05 PROCEDURE — APPNB30 APP NON BILLABLE TIME 0-30 MINS: Performed by: NURSE PRACTITIONER

## 2024-08-05 PROCEDURE — 2580000003 HC RX 258: Performed by: SURGERY

## 2024-08-05 PROCEDURE — 6370000000 HC RX 637 (ALT 250 FOR IP): Performed by: SURGERY

## 2024-08-05 PROCEDURE — 6360000002 HC RX W HCPCS: Performed by: SURGERY

## 2024-08-05 RX ORDER — ACETAMINOPHEN 500 MG
1000 TABLET ORAL EVERY 8 HOURS PRN
Status: DISCONTINUED | OUTPATIENT
Start: 2024-08-05 | End: 2024-08-06 | Stop reason: HOSPADM

## 2024-08-05 RX ADMIN — PANTOPRAZOLE SODIUM 40 MG: 40 INJECTION, POWDER, FOR SOLUTION INTRAVENOUS at 09:10

## 2024-08-05 RX ADMIN — METOCLOPRAMIDE 10 MG: 5 INJECTION, SOLUTION INTRAMUSCULAR; INTRAVENOUS at 04:34

## 2024-08-05 RX ADMIN — ENOXAPARIN SODIUM 40 MG: 100 INJECTION SUBCUTANEOUS at 09:10

## 2024-08-05 RX ADMIN — POLYETHYLENE GLYCOL 3350 17 G: 17 POWDER, FOR SOLUTION ORAL at 09:10

## 2024-08-05 RX ADMIN — LEVOTHYROXINE SODIUM 50 MCG: 0.03 TABLET ORAL at 06:34

## 2024-08-05 RX ADMIN — FERROUS SULFATE TAB 325 MG (65 MG ELEMENTAL FE) 325 MG: 325 (65 FE) TAB at 17:31

## 2024-08-05 RX ADMIN — ACETAMINOPHEN 1000 MG: 500 TABLET ORAL at 06:34

## 2024-08-05 RX ADMIN — Medication 10 ML: at 21:32

## 2024-08-05 RX ADMIN — ATORVASTATIN CALCIUM 10 MG: 10 TABLET, FILM COATED ORAL at 21:31

## 2024-08-05 RX ADMIN — FERROUS SULFATE TAB 325 MG (65 MG ELEMENTAL FE) 325 MG: 325 (65 FE) TAB at 09:10

## 2024-08-05 RX ADMIN — CALCIUM POLYCARBOPHIL 625 MG: 625 TABLET, FILM COATED ORAL at 09:10

## 2024-08-05 RX ADMIN — SENNOSIDES AND DOCUSATE SODIUM 1 TABLET: 50; 8.6 TABLET ORAL at 09:11

## 2024-08-05 NOTE — DISCHARGE INSTR - COC
Continuity of Care Form    Patient Name: Regina Lawson   :  1940  MRN:  [id number]    Admit date:  2024  Discharge date:  2024    Code Status Order: Full Code   Advance Directives:   Advance Care Flowsheet Documentation       Date/Time Healthcare Directive Type of Healthcare Directive Copy in Chart Healthcare Agent Appointed Healthcare Agent's Name Healthcare Agent's Phone Number    24 1026 Yes, patient has an advance directive for healthcare treatment Durable power of  for health care No, copy requested from family Adult Children Laurie Lawson [phone]            Admitting Physician:  David Emerson MD  PCP: Bailee Sharp DO    Discharging Nurse: Apolonia Lozoya RN  Discharging Hospital Unit/Room#: 4TN-4477/4477-01  Discharging Unit Phone Number: [phone]    Emergency Contact:   Extended Emergency Contact Information  Primary Emergency Contact: Laurie Lawson  Address: [address]   [facility]  Home Phone: [phone]  Work Phone: [phone]  Mobile Phone: [phone]  Relation: Child  Secondary Emergency Contact: Mukesh Lawson  Address: [address]   [place]  Home Phone: [phone]  Mobile Phone: [phone]  Relation: Other    Past Surgical History:  Past Surgical History:   Procedure Laterality Date    BACK SURGERY  12/16/10    L4-5 Left complete, radical facetectomy, L3-4-5 nerve root exploration and foraminotomy, pedicle screws    BREAST BIOPSY Right     x2    CARPAL TUNNEL RELEASE Left 2018    LEFT CARPAL TUNNEL RELEASE performed by Manfred David MD at [facility] OR    CARPAL TUNNEL RELEASE Right 2019    RIGHT CARPAL TUNNEL RELEASE performed by Manfred David MD at [facility] OR    COLONOSCOPY  2011    HOT SNARE CECAL POLYPECTOMY    COLONOSCOPY  14    Colonoscopy.  No biopsies.  No polypectomies    COLONOSCOPY Right 2023    COLONOSCOPY FLEXIBLE W/ DECOMPRESSION performed by Arturo Marquez MD at

## 2024-08-05 NOTE — PROGRESS NOTES
Patient A&Ox4. Assessment  complete. VSSA with c/o of abdomen pain 5/10. Pain resolved with scheduled Tylenol. All evening medications given. All patient needs addressed. Bed locked, in lowest position, with alarm on. Bedside table and call light within reach. Plan of care ongoing.

## 2024-08-05 NOTE — PROGRESS NOTES
[facility]        Discharge Summary    Patient Name: Regina Lawson  MRN: [id number]  YOB: 1940  PCP: Bailee Sharp DO  Admission Date: 8/2/2024  Discharge Date: 8/6/2024  Disposition: ECF  Admitting Diagnosis: Peristomal hernia [K46.9]  Discharge Diagnosis:   Patient Active Problem List   Diagnosis    Previous back surgery    Status post lumbar spinal fusion    Spondylolisthesis of lumbar region    DDD (degenerative disc disease), lumbosacral    Stenosis, spinal, lumbar    Left wrist pain    left CMC arthritis, thumb, degenerative    De Quervain's disease (tenosynovitis)    CTS (carpal tunnel syndrome)    Postlaminectomy syndrome, lumbar region    Primary localized osteoarthrosis of shoulder region    Status post total shoulder replacement    Pectoralis muscle strain    Osteoarthritis of hand    Weight loss counseling, encounter for    Lumbar stenosis    Left groin pain    Strain of hip adductor muscle    S/P reverse total shoulder arthroplasty, right    History of total right knee replacement    History of total left knee replacement    Arthritis of left hip    Arthritis of left shoulder region    Other specified arthritis, left shoulder    Arthritis of right knee    Status post reverse total shoulder replacement, left 10/9/2019    Urinary tract infection associated with indwelling urethral catheter (HCC)    Acute cystitis without hematuria    Altered mental status    History of Clostridioides difficile colitis    Hypotension    Malaise    Troponin level elevated    Nursing home resident    Class 1 obesity due to excess calories with body mass index (BMI) of 32.0 to 32.9 in adult    Sacral wound    VRE (vancomycin resistant enterococcus) culture positive    Decubitus ulcer of sacral region, stage 4 (HCC)    New onset a-fib (HCC)    Atrial tachycardia (HCC)    Bedridden    Dermatitis associated with moisture from stool incontinence    SBO (small bowel  your doctor about these medications      aspirin 81 MG chewable tablet  Commonly known as: Aspirin Childrens  Take 1 tablet by mouth daily            2. Diet as tolerated.  3. Activity: activity as tolerated, no driving while on analgesics, and no heavy lifting for 6 weeks.  4. Wound care: continue wound VAC.  5. Follow-up: recommend follow up with PCP in 2-4 weeks.  6. Okay to shower, don't submerge incisions under water.  7. No driving until off pain medication and able to move torso freely without any pain.  8. Return to hospital, or contact Dr. Emerson' office ([phone]) if any signs of infection are seen.  9. The patient is not currently smoking. Recommend maintaining a smoke-free lifestyle.  10. Return to Clinic: in 2 weeks.  11. Reviewed discharge instructions, restrictions, and reasons to call the office.    EDUCATION:  Educated patient on plan of care and disease process--all questions answered.    Plans discussed with patient and nursing.  Reviewed and discussed with Dr. Emerson.    Time spent for discharge: 30 minutes, including plan of care, patient education, and care coordination.      Signed:  SHERICE Felix - CNP  8/6/2024 10:25 AM    Pt doing well today  VAC change completed, new 6 cm X 5 cm black foam placed, good seal  See in office in 2 weeks    David Emerson MD

## 2024-08-05 NOTE — PROGRESS NOTES
Patient BP 89/48. Patient asymptomatic. No c/o of dizziness or lightheadedness On call notified. No new orders. Will continue to monitor.

## 2024-08-05 NOTE — ANESTHESIA POSTPROCEDURE EVALUATION
Department of Anesthesiology  Postprocedure Note    Patient: Regina Lawson  MRN: [id number]  YOB: 1940  Date of evaluation: 8/5/2024    Procedure Summary       Date: 08/02/24 Room / Location: [address]    Anesthesia Start: 1204 Anesthesia Stop: 1400    Procedure: LAPAROSCOPIC ASSISTED PERISTOMAL HERNIA REPAIR (Abdomen) Diagnosis:       Peristomal hernia      (Peristomal hernia [K46.9])    Surgeons: David Emerson MD Responsible Provider: JONO Stuart MD    Anesthesia Type: general ASA Status: 3            Anesthesia Type: No value filed.    Ole Phase I: Ole Score: 10    Ole Phase II:      Anesthesia Post Evaluation    Patient location during evaluation: PACU  Patient participation: complete - patient participated  Level of consciousness: awake and alert  Pain score: 1  Airway patency: patent  Nausea & Vomiting: no nausea  Cardiovascular status: blood pressure returned to baseline  Respiratory status: acceptable  Hydration status: euvolemic  Comments: Seen in pacu post op. Note placed later  Multimodal analgesia pain management approach  Pain management: adequate    No notable events documented.

## 2024-08-05 NOTE — DISCHARGE INSTRUCTIONS
[facility]    Discharge Instructions      Activity  - activity as tolerated, no driving while on analgesics, and no heavy lifting for 6 weeks; it is OK to be up walking around--walking up and down stairs is also OK. Do what is comfortable: stop and rest if you feel tired.    Diet  - regular diet  - Drink plenty of fluids     Pain  - You may use narcotic pain medication as prescribed for breakthrough pain  - You can use OTC Tylenol or Ibuprofen for 1-2 weeks (may need to adjust the dose as directed on the bottle if enteric coated ibuprofen chosen)  - Avoid taking narcotic pain medication on an empty stomach which may result in nausea, if pain medication is causing nausea try decreasing the dose  - Narcotic pain medication is not necessary, please contact the office if pain is not controlled  - Narcotic pain medication will not be refilled on weekends and after hours  - Please contact the office Monday-Friday 8a-4p if a refill is requested    Nausea  - Avoid taking narcotic pain medication on an empty stomach which may result in nausea  - If pain medication is causing nausea you may try decreasing the dose  - Nausea can be common for 24 hours after surgery--if nausea uncontrolled or worsening, contact the office or go to the ED    Constipation  - Pain medication can be constipating  - Often, it helps to take a stool softener with the goal of at least one soft bowel movement daily with minimal straining  - You may also need to increase water and fiber intake--may supplement with Benefiber and increasing your activity will also be helpful  - If a stool softener is needed, the following OTC medications are recommend: Colace 100 mg by mouth twice daily, Miralax 17 gm by mouth 1-3 times daily with glass of water, dulcolax suppositories, and Fleets enemas    Wound Care  - Continue wound VAC, with home health nurse changing 3 times per week.  - If incisional bleeding is noted, hold firm  pressure for 15-20 minutes. If remains uncontrolled, either contact the office or present to nearest ED  - It is common to have bruising or mild redness around the wounds within the first few days after surgery. If it worsens, or starts draining, do not hesitate to contact the office  - May shower but no tub baths or swimming pools--do not submerge incisions under water    Cautions  - Watch for signs of infection, such as: fever over 100.5, excessive warmth or redness around incisions, bloody or cloudy drainage from incisions  - Watch for signs of complication: uncontrolled pain, nausea, bloating, sudden lightheadedness  - Serious problems can arise after surgery that need urgent or immediate care  - Do not hesitate to contact the office with any questions    Follow-up with your surgeon in  2 weeks.  Call [phone] to schedule follow-up visit.

## 2024-08-05 NOTE — PROGRESS NOTES
[facility]        Progress Note    Patient Name: Regina Lawson  MRN: [id number]  YOB: 1940  Date of Evaluation: 2024    Subjective:  No acute events overnight  Pain controlled without narcotics  No nausea or vomiting, tolerating regular diet  Flatus and stool per colostomy  Resting in bed at this time    Post-Operative Day #3      Vital Signs:  Patient Vitals for the past 24 hrs:   BP Temp Temp src Pulse Resp SpO2 Height Weight   24 1425 -- -- -- -- -- -- 1.626 m (5' 4.02\") --   24 1137 132/66 97.8 °F (36.6 °C) Oral 64 18 98 % -- --   24 0917 (!) 89/44 -- -- -- -- -- -- --   24 0909 (!) 86/52 98.1 °F (36.7 °C) Oral 60 18 98 % -- --   24 0430 108/68 98 °F (36.7 °C) Oral 59 16 98 % -- 80.7 kg (177 lb 14.6 oz)   24 2345 (!) 89/48 98.4 °F (36.9 °C) Oral 60 16 96 % -- --   24 2344 (!) 79/47 -- -- -- -- -- -- --   24 2145 104/65 100.2 °F (37.9 °C) Oral 76 18 96 % -- --   24 1530 (!) 93/54 98.1 °F (36.7 °C) Oral 60 18 94 % -- --      TEMPERATURE HISTORY 24H: Temp (24hrs), Av.4 °F (36.9 °C), Min:97.8 °F (36.6 °C), Max:100.2 °F (37.9 °C)    BLOOD PRESSURE HISTORY: Systolic (36hrs), Av , Min:79 , Max:132    Diastolic (36hrs), Av, Min:44, Max:68      Intake/Output:  I/O last 3 completed shifts:  In: -   Out: 1950 [Urine:1800; Stool:150]  I/O this shift:  In: 600 [P.O.:600]  Out: 175 [Urine:175]  Drain/tube Output:  Negative Pressure Wound Therapy Abdomen Left;Upper-Output (ml): 0 ml    Physical Exam:  General: awake, alert, oriented to person, place, time  Lungs: unlabored respirations  Abdomen: soft, non-distended, incisional tenderness only, bowel sounds present, stoma is red/viable with flatus and brown stool output, suprapubic catheter is in place  Skin/Wound: open abdominal wound with wound VAC in place, seal/suction intact--wound dimensions are 5 cm x 3 cm x 3 cm    Labs:  CBC:    Recent Labs

## 2024-08-05 NOTE — PLAN OF CARE
Problem: Discharge Planning  Goal: Discharge to home or other facility with appropriate resources  8/5/2024 0808 by Apolonia Lozoya RN  Outcome: Progressing  8/5/2024 0133 by Renae Infante RN  Outcome: Progressing     Problem: Pain  Goal: Verbalizes/displays adequate comfort level or baseline comfort level  8/5/2024 0808 by Apolonia Lozoya RN  Outcome: Progressing  8/5/2024 0133 by Renae Infante RN  Outcome: Progressing     Problem: Safety - Adult  Goal: Free from fall injury  8/5/2024 0808 by Apolonia Lozoya RN  Outcome: Progressing  8/5/2024 0133 by Renae Infante RN  Outcome: Progressing     Problem: Skin/Tissue Integrity  Goal: Absence of new skin breakdown  Description: 1.  Monitor for areas of redness and/or skin breakdown  2.  Assess vascular access sites hourly  3.  Every 4-6 hours minimum:  Change oxygen saturation probe site  4.  Every 4-6 hours:  If on nasal continuous positive airway pressure, respiratory therapy assess nares and determine need for appliance change or resting period.  8/5/2024 0808 by Apolonia Lozoya RN  Outcome: Progressing  8/5/2024 0133 by Renae Infante RN  Outcome: Progressing     Problem: ABCDS Injury Assessment  Goal: Absence of physical injury  8/5/2024 0808 by Apolonia Lozoya RN  Outcome: Progressing  8/5/2024 0133 by Renae Infante RN  Outcome: Progressing

## 2024-08-05 NOTE — CARE COORDINATION
Case Management Assessment  Initial Evaluation    Date/Time of Evaluation: 8/5/2024 3:03 PM  Assessment Completed by: Thania Gomez    If patient is discharged prior to next notation, then this note serves as note for discharge by case management.    Patient Name: Regina Lawson                   YOB: 1940  Diagnosis: Peristomal hernia [K46.9]                   Date / Time: 8/2/2024  9:30 AM    Patient Admission Status: Inpatient   Readmission Risk (Low < 19, Mod (19-27), High > 27): Readmission Risk Score: 16.8    Current PCP: Bailee Sharp, DO  PCP verified by CM? (P) Yes    Chart Reviewed: Yes      History Provided by: (P) Patient  Patient Orientation: (P) Alert and Oriented, Person, Place, Situation    Patient Cognition: (P) Alert    Hospitalization in the last 30 days (Readmission):  No    If yes, Readmission Assessment in CM Navigator will be completed.    Advance Directives:      Code Status: Full Code   Patient's Primary Decision Maker is: (P) Legal Next of Kin      Discharge Planning:    Patient lives with: (P) Other (Comment) (LTC) Type of Home: (P) Long-Term Acute Care  Primary Care Giver: (P) Self  Patient Support Systems include: (P) Family Members   Current Financial resources: (P) Medicare  Current community resources: (P) None  Current services prior to admission: (P) None            Current DME:              Type of Home Care services:  (P) None    ADLS  Prior functional level: (P) Cooking, Housework, Shopping, Toileting, Dressing, Bathing, Assistance with the following: (pt reports bed bound)  Current functional level: (P) Assistance with the following:, Bathing, Dressing, Toileting, Cooking, Housework, Shopping    PT AM-PAC:   /24  OT AM-PAC:   /24    Family can provide assistance at DC: (P) No (Pt lives at LTC)  Would you like Case Management to discuss the discharge plan with any other family members/significant others, and if so, who? (P) No  Plans to Return to Present

## 2024-08-05 NOTE — PROGRESS NOTES
Nutrition Note    RECOMMENDATIONS  PO Diet: Continue current diet   ONS: Offer chocolate Ensure Plus High Protein and [place] Kranthi BID  Nutrition Support: None      ASSESSMENT   Pt triggered positive nursing nutrition screen for wounds. 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 Pt seen over lunch tray eating well on regular diet, states she is consuming greater than 50% of meals. Discussed increased nutrient needs for wound healing with pt; agreeable to trial chocolate Ensure Plus High Protein and orange Kranthi BID.       Malnutrition Status: No malnutrition    NUTRITION DIAGNOSIS   Increased nutrient needs related to increase demand for energy/nutrients as evidenced by wounds    Goals: PO intake 50% or greater, prior to discharge     NUTRITION RELATED FINDINGS  Objective: Colostomy with stool. Kimberlee WNL.  Wounds: Surgical Incision, Stage IV, Pressure Injury, Wound Vac    CURRENT NUTRITION THERAPIES  ADULT DIET; Regular     PO Intake: 51-75%, %   PO Supplement Intake:None Ordered    ANTHROPOMETRICS  Current Height: 162.6 cm (5' 4.02\")  Current Weight - Scale: 80.7 kg (177 lb 14.6 oz)    Ideal Body Weight (IBW): 120 lbs  (55 kg)        BMI: 30.5    COMPARATIVE STANDARDS  Total Energy Requirements (kcals/day): 1744     Protein (g):   grams       Fluid (mL/day):  1744 mL    EDUCATION  Education not indicated     The patient will be monitored per nutrition standards of care. Consult dietitian if additional nutrition interventions are needed prior to RD reassessment.     Tiff Rainey MS, RD, LD    Contact: [phone]

## 2024-08-05 NOTE — CARE COORDINATION
MARIA LUISA was update by Corie HONG that Pt has will need wound vac. Corie signed [facility] form for wound vac home needs.   MARIA LUISA faxed order [facility] [phone]  CM called [facility] [phone] vm redirected me to [phone] Cindy, MARIA LUISA called LVM informing her wound vac script has been faxed.     Electronically signed by Thania Gomez on 8/5/2024 at 2:54 PM     Cm to bedside to see Pt, Pt reports she lives at [facility].   MARIA LUISA called [facility] Admission liaison [phone] to report need for wound vac, Aura reports she will inquire about wound vac, and respond.     MARIA LUISA called Cindy at [facility] to report that pt is from [facility], and will report back if vac needed from [facility] via LTC.     Electronically signed by Thania Gomez on 8/5/2024 at 3:09 PM     Update: Per Aura at [facility] they will order the wound vac, it will come overnight.   Pt will be able to discharge tomorrow.     MARIA LUISA updated GS CNP. And RN    Electronically signed by Thania Gomez on 8/5/2024 at 3:25 PM

## 2024-08-06 VITALS
DIASTOLIC BLOOD PRESSURE: 62 MMHG | TEMPERATURE: 98.5 F | HEART RATE: 75 BPM | BODY MASS INDEX: 30.37 KG/M2 | WEIGHT: 177.91 LBS | RESPIRATION RATE: 18 BRPM | OXYGEN SATURATION: 97 % | HEIGHT: 64 IN | SYSTOLIC BLOOD PRESSURE: 102 MMHG

## 2024-08-06 LAB
BACTERIA SPEC AEROBE CULT: ABNORMAL
GRAM STN SPEC: ABNORMAL

## 2024-08-06 PROCEDURE — 6360000002 HC RX W HCPCS: Performed by: SURGERY

## 2024-08-06 PROCEDURE — 97605 NEG PRS WND THER DME<=50SQCM: CPT | Performed by: SURGERY

## 2024-08-06 PROCEDURE — 2580000003 HC RX 258: Performed by: SURGERY

## 2024-08-06 PROCEDURE — APPNB30 APP NON BILLABLE TIME 0-30 MINS: Performed by: NURSE PRACTITIONER

## 2024-08-06 PROCEDURE — 6370000000 HC RX 637 (ALT 250 FOR IP): Performed by: SURGERY

## 2024-08-06 PROCEDURE — APPSS30 APP SPLIT SHARED TIME 16-30 MINUTES: Performed by: NURSE PRACTITIONER

## 2024-08-06 RX ADMIN — LEVOTHYROXINE SODIUM 50 MCG: 0.03 TABLET ORAL at 08:57

## 2024-08-06 RX ADMIN — FERROUS SULFATE TAB 325 MG (65 MG ELEMENTAL FE) 325 MG: 325 (65 FE) TAB at 08:58

## 2024-08-06 RX ADMIN — ENOXAPARIN SODIUM 40 MG: 100 INJECTION SUBCUTANEOUS at 08:58

## 2024-08-06 RX ADMIN — SENNOSIDES AND DOCUSATE SODIUM 1 TABLET: 50; 8.6 TABLET ORAL at 08:58

## 2024-08-06 RX ADMIN — POLYETHYLENE GLYCOL 3350 17 G: 17 POWDER, FOR SOLUTION ORAL at 08:58

## 2024-08-06 RX ADMIN — Medication 10 ML: at 08:57

## 2024-08-06 RX ADMIN — CALCIUM POLYCARBOPHIL 625 MG: 625 TABLET, FILM COATED ORAL at 08:58

## 2024-08-06 RX ADMIN — PANTOPRAZOLE SODIUM 40 MG: 40 INJECTION, POWDER, FOR SOLUTION INTRAVENOUS at 08:57

## 2024-08-06 NOTE — PROGRESS NOTES
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 Patient being discharged today at 1430. Wound care RN called and will see the patient before she leaves.

## 2024-08-06 NOTE — PROGRESS NOTES
Order to discharge. Octavio Landrum on duty nurse Sobeida called patient's report.   Verbal/written post procedure instructions were given to patient/caregiver./Instructed patient/caregiver to follow-up with primary care physician./Instructed patient/caregiver regarding signs and symptoms of infection./Keep the cast/splint/dressing clean and dry.

## 2024-08-06 NOTE — FLOWSHEET NOTE
Patient seen for sacral wound.  Patient being discharged back to [facility] today.  Patient was seen in wound center by Dr Finney on 8/1/24.  Patient admitted 8/2 for revision of old colostomy site that had a peristomal hernia and new colostomy site created.  Sacral wound has subcutaneous tissue visualized and some bleeding.  Wound was cleansed with VASHE and packed with VASHE moistened gauze, abd pad and medipore tape. Patient encouraged to eat adequate amounts of protein for good wound healing and to reposition off of wound every two hours while in bed.  Patient verbalized understanding  HIGINIO BECERRAN, RN, CWOCN  Inpatient  Wound/Ostomy Care  [phone]              08/06/24 1405   Wound 09/21/23 Sacrum #1   Date First Assessed: 09/21/23   Wound Approximate Age at First Assessment (Weeks): (c) 4 weeks  Primary Wound Type: Pressure Injury  Location: Sacrum  Wound Description (Comments): #1   Wound Image    Wound Etiology Pressure Stage 4   Dressing Status New dressing applied   Wound Cleansed Vashe   Dressing/Treatment ABD;Roll gauze;Gauze dressing/dressing sponge   Dressing Change Due 08/07/24   Wound Length (cm) 3 cm   Wound Width (cm) 5 cm   Wound Depth (cm) 3.5 cm   Wound Surface Area (cm^2) 15 cm^2   Change in Wound Size % (l*w) 50   Wound Volume (cm^3) 52.5 cm^3   Wound Healing % 56   Wound Assessment Bleeding   Drainage Amount Small (< 25%)   Drainage Description Sanguinous   Odor Mild   Alysha-wound Assessment Intact   Margins Attached edges   Wound Thickness Description not for Pressure Injury Full thickness

## 2024-08-06 NOTE — PROGRESS NOTES
Wound vac dressing removed. Wound cleaned with saline water. Wet to dry dressing applied. Well tolerated by patient. Dressing remains CDI.

## 2024-08-06 NOTE — PLAN OF CARE
Problem: Discharge Planning  Goal: Discharge to home or other facility with appropriate resources  8/6/2024 0806 by Apolonia Lozoya RN  Outcome: Progressing  8/5/2024 2052 by Barry Feng RN  Outcome: Progressing     Problem: Pain  Goal: Verbalizes/displays adequate comfort level or baseline comfort level  8/6/2024 0806 by Apolonia Lozoya RN  Outcome: Progressing  8/5/2024 2052 by Barry Feng RN  Outcome: Progressing     Problem: Safety - Adult  Goal: Free from fall injury  8/6/2024 0806 by Apolonia Lozoya RN  Outcome: Progressing  8/5/2024 2052 by Barry Feng RN  Outcome: Progressing     Problem: Skin/Tissue Integrity  Goal: Absence of new skin breakdown  Description: 1.  Monitor for areas of redness and/or skin breakdown  2.  Assess vascular access sites hourly  3.  Every 4-6 hours minimum:  Change oxygen saturation probe site  4.  Every 4-6 hours:  If on nasal continuous positive airway pressure, respiratory therapy assess nares and determine need for appliance change or resting period.  8/6/2024 0806 by Apolonia Lozoya RN  Outcome: Progressing  8/5/2024 2052 by Barry Feng RN  Outcome: Progressing     Problem: ABCDS Injury Assessment  Goal: Absence of physical injury  8/6/2024 0806 by Apolonia Lozoya RN  Outcome: Progressing  8/5/2024 2052 by Barry Feng RN  Outcome: Progressing     Problem: Nutrition Deficit:  Goal: Optimize nutritional status  8/6/2024 0806 by Apolonia Lozoya RN  Outcome: Progressing  8/5/2024 2052 by Barry Feng RN  Outcome: Progressing

## 2024-08-06 NOTE — PLAN OF CARE
Problem: Discharge Planning  Goal: Discharge to home or other facility with appropriate resources  8/5/2024 2052 by Barry Feng RN  Outcome: Progressing     Problem: Pain  Goal: Verbalizes/displays adequate comfort level or baseline comfort level  8/5/2024 2052 by Barry Feng RN  Outcome: Progressing     Problem: Safety - Adult  Goal: Free from fall injury  8/5/2024 2052 by Barry Feng RN  Outcome: Progressing     Problem: Skin/Tissue Integrity  Goal: Absence of new skin breakdown  Description: 1.  Monitor for areas of redness and/or skin breakdown  2.  Assess vascular access sites hourly  3.  Every 4-6 hours minimum:  Change oxygen saturation probe site  4.  Every 4-6 hours:  If on nasal continuous positive airway pressure, respiratory therapy assess nares and determine need for appliance change or resting period.  8/5/2024 2052 by Barry Feng, RN  Outcome: Progressing     Problem: ABCDS Injury Assessment  Goal: Absence of physical injury  8/5/2024 2052 by Barry Feng RN  Outcome: Progressing     Problem: Nutrition Deficit:  Goal: Optimize nutritional status  Outcome: Progressing

## 2024-08-06 NOTE — CARE COORDINATION
Case Management -  Discharge Note      Patient Name: Regina Lawson                   YOB: 1940            Readmission Risk (Low < 19, Mod (19-27), High > 27): Readmission Risk Score: 15.4    Current PCP: Bailee Sharp DO    (IMM) Important Message from Medicare:    Date: 08/02/2024    PT AM-PAC:   /24  OT AM-PAC:   /24    Patient/patient representative has been educated on the benefits of LTC as well as the possible risks of declining recommended services. Patient/patient representative has acknowledged the information provided and decided on the following discharge plan. Patient/ patient representative has been provided freedom of choice regarding service provider, supported by basic dialogue that supports the patient's individualized plan of care/goals.    Patient noted to have a discharge order.  Pt has been medically cleared for transition to Skilled Nursing Rehab Facility    [facility]  [address] [id number]  Phone: [phone]  Fax: [phone]         HENS Completed:  N/A    Pre-cert required/obtained:  N/A    Transportation scheduled for 1430    Transportation provided by: [employer]    AVS faxed and agency notified:  yes    The following prescriptions sent with pt: N/A    Family Notified:  Laurie Lawson, daughter    Nurse to call report to facility: [phone]       Financial    Payor: [employer] MEDICARE / Plan: [employer] GOLD PLUS HMO / Product Type: *No Product type* /     Pharmacy:  Potential assistance Purchasing Medications: No  Meds-to-Beds request: No      [id number] - [place], [place] - [address] [facility] [phone] - F [phone]  [address] [facility] [address]  Phone: [phone] Fax: [phone]    [facility] - [facility] [address] [phone] - F [phone]  [address] [facility] [address]  Phone: [phone] Fax:

## 2024-08-06 NOTE — CARE COORDINATION
08/06/24 0901   IMM Letter   IMM Letter given to Patient/Family/Significant other/Guardian/POA/by: IMM given by CM   IMM Letter date given: 08/06/24   IMM Letter time given: 0847

## 2024-08-06 NOTE — PROGRESS NOTES
2319: Assessment complete, VSS, BS active, in upper quadrants, ostomy noted with small amt of green/brown liquid stool, tolerating diet, surgical lap sites well approximated, no redness or drainage noted, wound vac site to LLQ intact, scant amt of serosanguinous drainage noted in collection container, pt denies pain, repositioned in the bed, pt declines PM dose of metoprolol, /60, HR 97, discussed care plan, pt mutually agrees, no other needs expressed at this time.  Sarah Ceron, RN

## 2024-08-06 NOTE — PLAN OF CARE
Problem: Discharge Planning  Goal: Discharge to home or other facility with appropriate resources  8/6/2024 1605 by Luis Pairsi RN  Outcome: Completed  8/6/2024 0806 by Apolonia Lozoya RN  Outcome: Progressing     Problem: Pain  Goal: Verbalizes/displays adequate comfort level or baseline comfort level  8/6/2024 1605 by Luis Parisi RN  Outcome: Completed  8/6/2024 0806 by Apolonia Lozoya RN  Outcome: Progressing     Problem: Safety - Adult  Goal: Free from fall injury  8/6/2024 1605 by Luis Parisi RN  Outcome: Completed  8/6/2024 0806 by Apolonia Lozoya RN  Outcome: Progressing     Problem: Skin/Tissue Integrity  Goal: Absence of new skin breakdown  Description: 1.  Monitor for areas of redness and/or skin breakdown  2.  Assess vascular access sites hourly  3.  Every 4-6 hours minimum:  Change oxygen saturation probe site  4.  Every 4-6 hours:  If on nasal continuous positive airway pressure, respiratory therapy assess nares and determine need for appliance change or resting period.  8/6/2024 1605 by Luis Parisi RN  Outcome: Completed  8/6/2024 0806 by Apolonia Lozoya RN  Outcome: Progressing     Problem: ABCDS Injury Assessment  Goal: Absence of physical injury  8/6/2024 1605 by Luis Parisi RN  Outcome: Completed  8/6/2024 0806 by Apolonia Lozoya RN  Outcome: Progressing     Problem: Nutrition Deficit:  Goal: Optimize nutritional status  8/6/2024 1605 by Luis Parisi RN  Outcome: Completed  8/6/2024 0806 by Apolonia Lozoya RN  Outcome: Progressing

## 2024-08-13 NOTE — PROGRESS NOTES
Physician Progress Note      PATIENT:               GRANT VELAZQUEZ  [facility] #:                  [id number]  :                       1940  ADMIT DATE:       2024 9:30 AM  DISCH DATE:        2024 5:41 PM  RESPONDING  PROVIDER #:        David Booker MD          QUERY TEXT:    Patient admitted with peristomal hernia and colostomy prolapse . Per RN Flow   sheet 8/3/24, noted to also have pressure ulcer. If possible, please document   in progress notes and discharge summary the location, present on admission   status and stage of the pressure ulcer:    The medical record reflects the following:  Risk Factors: Paraplegia  Clinical Indicators: 8/3/24 Per RN Flow sheet Stage 4 Decubitus ulcer of   Sacrum  Treatment: Skin care protocol, Specialty bed and daily monitoring    Stage 1:  Non-blanchable erythema of intact skin  Stage 2:  Abrasion, Blister, Partial-thickness skin loss, with exposed dermis  Stage 3:  Full-thickness skin loss with damage or necrosis of subcutaneous   tissue  Stage 4:  Full-thickness skin & soft tissue loss through to underlying muscle,   tendon or bone  Unstageable: Obscured full-thickness skin & tissue loss  Options provided:  -- Stage 4 Pressure Ulcer of  Sacrum present on admission  -- Other - I will add my own diagnosis  -- Disagree - Not applicable / Not valid  -- Disagree - Clinically unable to determine / Unknown  -- Refer to Clinical Documentation Reviewer    PROVIDER RESPONSE TEXT:    This patient has a Stage 4 pressure ulcer of the Sacrum which was present on   admission.    Query created by: Anne Davis on 8/3/2024 10:07 PM      Electronically signed by:  David Booker MD 2024 9:58 AM

## 2024-08-14 ENCOUNTER — TELEPHONE (OUTPATIENT)
Dept: SURGERY | Age: 84
End: 2024-08-14

## 2024-08-14 NOTE — TELEPHONE ENCOUNTER
Octavio Plascencia states pt's wound vac is painful. Depth of wound is deceasing but width and length is worsening. Tried to get pt in tomorrow 8/15 but Octavio could not get transportation that soon.  Does  want to continue wet to dry with wound vac?  Please call Adore pedraza#[phone] and advise.

## 2024-08-16 NOTE — TELEPHONE ENCOUNTER
I talked to Dr. Emerson late Thursday, he said that it is fine. Continue to use the wound vac and we will see her on Tuesday.    I called Friday morning and talked to Adore, she said that the area is very tender to the patient, the depth has gone down significantly. Initially there was some tunneling but that is now gone as well. I told her that when we see her on Tuesday, we will take the vac off here in the office and probably send her home with a wet to dry dressing. The vac should be put back on on Wednesday.

## 2024-08-20 ENCOUNTER — OFFICE VISIT (OUTPATIENT)
Dept: SURGERY | Age: 84
End: 2024-08-20
Payer: MEDICARE

## 2024-08-20 VITALS — WEIGHT: 177 LBS | DIASTOLIC BLOOD PRESSURE: 62 MMHG | SYSTOLIC BLOOD PRESSURE: 102 MMHG | BODY MASS INDEX: 30.37 KG/M2

## 2024-08-20 DIAGNOSIS — K46.9 PERISTOMAL HERNIA: Primary | ICD-10-CM

## 2024-08-20 PROCEDURE — 99212 OFFICE O/P EST SF 10 MIN: CPT | Performed by: SURGERY

## 2024-08-20 NOTE — PROGRESS NOTES
[facility] GENERAL AND LAPAROSCOPIC SURGERY          PATIENT NAME: Regina Lawson     TODAY'S DATE: 8/20/2024    SUBJECTIVE:    Pt here for post op check up. In stretcher, no concerns.     OBJECTIVE:  VITALS:  /62   Wt 80.3 kg (177 lb)   BMI 30.37 kg/m²     CONSTITUTIONAL:  awake and alert  LUNGS:  clear to auscultation  ABDOMEN:  normal bowel sounds, soft, non-distended, non-tender, stoma healthy, bag full, bag slightly lifted off laterally. VAC removed, old wound / stoma site clean, about 1/2 closed     Data:      Radiology Review:  None      ASSESSMENT AND PLAN:  Colostomy care  Regular diet  Vac change - continue VAC - do change 3 x / week  Will need new stoma wafer this afternoon    See back in 3 weeks - may be about done with VAC by then    David Emerson MD

## 2024-08-21 NOTE — PATIENT INSTRUCTIONS
[facility]  [address]   [place], [place] [address]  Telephone: [phone]     FAX [phone]     Discharge Instructions     Important reminders:     **If you have any signs and symptoms of illness (Cough, fever, congestion, nausea, vomiting, diarrhea, etc.) please call the wound care center prior to your appointment.     1. Increase Protein intake for optimal wound healing  2. No added salt to reduce any swelling  3. If diabetic, maintain good glucose control  4. If you smoke, smoking prohibits wound healing, we ask that you refrain from smoking.  5. When taking antibiotics take the entire prescription as ordered. Do not stop taking until medication is all gone unless otherwise instructed.   6. Exercise as tolerated.   7. Keep weight off wounds and reposition every 2 hours if applicable.  8. If wound(s) is on your lower extremity, elevate legs to the level of the heart or above for 30 minutes 4-5 times a day and/or when sitting. Avoid standing for long periods of time.   9. Do not get wounds wet in bath or shower unless otherwise instructed by your physician. If your wound is on your foot or leg, you may purchase a cast bag. Please ask at the pharmacy.        If Vascular testing is ordered, please call [address] [place] [address] [phone]) to schedule.     Vascular tests ordered by Wound Care Physicians may take up to 2 hours to complete. Please keep that in mind when scheduling.      If Vascular testing is scheduled, please bring supplies to replace your dressing after testing is done. The vascular department does not stock supplies.      Wound: Sacrum, Back     With each dressing change, rinse wounds with 0.9% Saline. (May use wound wash or soft contact solution. Both can be purchased at a local drug store). If unable to obtain saline, may use a gentle soap and water.     Dressing care: Please check and maintain banegas catheter and flush as needed to prevent leakage daily. Check for rectal discharge

## 2024-08-22 ENCOUNTER — HOSPITAL ENCOUNTER (OUTPATIENT)
Dept: WOUND CARE | Age: 84
Discharge: HOME | End: 2024-08-22
Attending: EMERGENCY MEDICINE
Payer: MEDICARE

## 2024-08-22 VITALS — TEMPERATURE: 95.5 F | HEART RATE: 66 BPM | DIASTOLIC BLOOD PRESSURE: 64 MMHG | SYSTOLIC BLOOD PRESSURE: 101 MMHG

## 2024-08-22 DIAGNOSIS — L89.154 DECUBITUS ULCER OF SACRAL REGION, STAGE 4 (HCC): Primary | ICD-10-CM

## 2024-08-22 PROCEDURE — 11043 DBRDMT MUSC&/FSCA 1ST 20/<: CPT

## 2024-08-22 RX ORDER — GENTAMICIN SULFATE 1 MG/G
OINTMENT TOPICAL ONCE
OUTPATIENT
Start: 2024-08-22 | End: 2024-08-22

## 2024-08-22 RX ORDER — LIDOCAINE HYDROCHLORIDE 20 MG/ML
JELLY TOPICAL ONCE
OUTPATIENT
Start: 2024-08-22 | End: 2024-08-22

## 2024-08-22 RX ORDER — CLOBETASOL PROPIONATE 0.5 MG/G
OINTMENT TOPICAL ONCE
OUTPATIENT
Start: 2024-08-22 | End: 2024-08-22

## 2024-08-22 RX ORDER — GINSENG 100 MG
CAPSULE ORAL ONCE
OUTPATIENT
Start: 2024-08-22 | End: 2024-08-22

## 2024-08-22 RX ORDER — BACITRACIN ZINC AND POLYMYXIN B SULFATE 500; 1000 [USP'U]/G; [USP'U]/G
OINTMENT TOPICAL ONCE
OUTPATIENT
Start: 2024-08-22 | End: 2024-08-22

## 2024-08-22 RX ORDER — SODIUM CHLOR/HYPOCHLOROUS ACID 0.033 %
SOLUTION, IRRIGATION IRRIGATION ONCE
OUTPATIENT
Start: 2024-08-22 | End: 2024-08-22

## 2024-08-22 RX ORDER — LIDOCAINE 50 MG/G
OINTMENT TOPICAL ONCE
OUTPATIENT
Start: 2024-08-22 | End: 2024-08-22

## 2024-08-22 RX ORDER — TRIAMCINOLONE ACETONIDE 1 MG/G
OINTMENT TOPICAL ONCE
OUTPATIENT
Start: 2024-08-22 | End: 2024-08-22

## 2024-08-22 RX ORDER — LIDOCAINE 40 MG/G
CREAM TOPICAL ONCE
OUTPATIENT
Start: 2024-08-22 | End: 2024-08-22

## 2024-08-22 RX ORDER — LIDOCAINE HYDROCHLORIDE 40 MG/ML
SOLUTION TOPICAL ONCE
OUTPATIENT
Start: 2024-08-22 | End: 2024-08-22

## 2024-08-22 RX ORDER — IBUPROFEN 200 MG
TABLET ORAL ONCE
OUTPATIENT
Start: 2024-08-22 | End: 2024-08-22

## 2024-08-22 RX ORDER — BETAMETHASONE DIPROPIONATE 0.05 %
OINTMENT (GRAM) TOPICAL ONCE
OUTPATIENT
Start: 2024-08-22 | End: 2024-08-22

## 2024-08-22 ASSESSMENT — PAIN DESCRIPTION - ONSET: ONSET: ON-GOING

## 2024-08-22 ASSESSMENT — PAIN DESCRIPTION - DESCRIPTORS: DESCRIPTORS: SORE

## 2024-08-22 ASSESSMENT — PAIN SCALES - GENERAL: PAINLEVEL_OUTOF10: 1

## 2024-08-22 ASSESSMENT — PAIN DESCRIPTION - FREQUENCY: FREQUENCY: INTERMITTENT

## 2024-08-22 ASSESSMENT — PAIN DESCRIPTION - PAIN TYPE: TYPE: CHRONIC PAIN

## 2024-08-22 ASSESSMENT — PAIN DESCRIPTION - LOCATION: LOCATION: BUTTOCKS

## 2024-08-22 NOTE — PROGRESS NOTES
[facility]     H&P or Progress Note: Medical Staff Progress Note    Regina Lawson  MEDICAL RECORD NUMBER:  [id number]  AGE: 83 y.o.   GENDER: female  : 1940  EPISODE DATE:  2024    Chief complaint and reason for visit:     Chief Complaint   Patient presents with    Wound Check     Buttock F/U        HPI/Wound Narrative:      Regina Lawson is a 83 y.o. female who presents today for an evaluation of a wound/ulcer. Wound duration:  2023 .    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 Here for follow up.  Pertinent associated symptoms: drainage , redness, swelling, skin discoloration, and impaired mobility    23: not getting turn well the last 2 weeks at [facility]  23: having a lot of stool incontinence, mostly mucinous. Contaminating wound. Dressings not being done on time and correctly per daughter.  23: patient has been admitted since last office visit.  She presented with bloating and found to have bowel obstruction.  Followed by GI, surgery, infectious diseases.  Patient underwent exploratory laparotomy, sigmoid colectomy, sigmoid colostomy and closure of rectal stump on 2023.  She is returning to our care related to a chronic sacral pressure ulcer.  23: doing well. No new issues. But npwt still hasn't been done yet  24: Patient had vera flow negative pressure wound therapy attempted but having issues with staying in place.  A lot of leakage her [facility] nursing report.   24: veraflo currently.  24: no new wound care issues. Tolerating collagen dressing changes. Vac was stopped last visit.    3/14/24: question about banegas leaking. States she thinks ECF tried to fix it by inflating the balloon.  24: recent UTI and getting suprapubic catheter soon  24: has suprapubic catheter but getting larger size soon.    24: no new issues. Skin subs not approved  23: got a larger suprapubic catheter

## 2024-08-22 NOTE — PLAN OF CARE
Discharge instructions given.  Patient verbalized understanding.  Return to [facility] in 1 week(s).  Called/faxed orders to  [facility].

## 2024-08-22 NOTE — PROGRESS NOTES
Octavio Plascencia -[phone], [phone]      Patient Instructions   [facility]  [address]   [place], [place] [address]  Telephone: [phone]     FAX [phone]     Discharge Instructions     Important reminders:     **If you have any signs and symptoms of illness (Cough, fever, congestion, nausea, vomiting, diarrhea, etc.) please call the wound care center prior to your appointment.     1. Increase Protein intake for optimal wound healing  2. No added salt to reduce any swelling  3. If diabetic, maintain good glucose control  4. If you smoke, smoking prohibits wound healing, we ask that you refrain from smoking.  5. When taking antibiotics take the entire prescription as ordered. Do not stop taking until medication is all gone unless otherwise instructed.   6. Exercise as tolerated.   7. Keep weight off wounds and reposition every 2 hours if applicable.  8. If wound(s) is on your lower extremity, elevate legs to the level of the heart or above for 30 minutes 4-5 times a day and/or when sitting. Avoid standing for long periods of time.   9. Do not get wounds wet in bath or shower unless otherwise instructed by your physician. If your wound is on your foot or leg, you may purchase a cast bag. Please ask at the pharmacy.        If Vascular testing is ordered, please call [address] [place] [address] [phone]) to schedule.     Vascular tests ordered by Wound Care Physicians may take up to 2 hours to complete. Please keep that in mind when scheduling.      If Vascular testing is scheduled, please bring supplies to replace your dressing after testing is done. The vascular department does not stock supplies.      Wound: Sacrum, Back     With each dressing change, rinse wounds with 0.9% Saline. (May use wound wash or soft contact solution. Both can be purchased at a local drug store). If unable to obtain saline, may use a gentle soap and water.     Dressing care: Please check and maintain banegas

## 2024-08-22 NOTE — PLAN OF CARE
Discharge instructions given.  Patient verbalized understanding.  Return to [facility] in 2 week(s).  Called/faxed orders to  [facility].

## 2024-09-10 ENCOUNTER — OFFICE VISIT (OUTPATIENT)
Dept: SURGERY | Age: 84
End: 2024-09-10
Payer: MEDICARE

## 2024-09-10 DIAGNOSIS — Z93.3 COLOSTOMY IN PLACE (HCC): ICD-10-CM

## 2024-09-10 DIAGNOSIS — K46.9 PERISTOMAL HERNIA: Primary | ICD-10-CM

## 2024-09-10 PROCEDURE — G8427 DOCREV CUR MEDS BY ELIG CLIN: HCPCS | Performed by: SURGERY

## 2024-09-10 PROCEDURE — G8417 CALC BMI ABV UP PARAM F/U: HCPCS | Performed by: SURGERY

## 2024-09-10 PROCEDURE — 1036F TOBACCO NON-USER: CPT | Performed by: SURGERY

## 2024-09-10 PROCEDURE — 1123F ACP DISCUSS/DSCN MKR DOCD: CPT | Performed by: SURGERY

## 2024-09-10 PROCEDURE — G8400 PT W/DXA NO RESULTS DOC: HCPCS | Performed by: SURGERY

## 2024-09-10 PROCEDURE — 1090F PRES/ABSN URINE INCON ASSESS: CPT | Performed by: SURGERY

## 2024-09-10 PROCEDURE — 99212 OFFICE O/P EST SF 10 MIN: CPT | Performed by: SURGERY

## 2024-09-10 RX ORDER — LIDOCAINE HYDROCHLORIDE 20 MG/ML
JELLY TOPICAL ONCE
OUTPATIENT
Start: 2024-09-12 | End: 2024-09-12

## 2024-09-10 RX ORDER — BACITRACIN ZINC AND POLYMYXIN B SULFATE 500; 1000 [USP'U]/G; [USP'U]/G
OINTMENT TOPICAL ONCE
OUTPATIENT
Start: 2024-09-12 | End: 2024-09-12

## 2024-09-10 RX ORDER — SODIUM CHLOR/HYPOCHLOROUS ACID 0.033 %
SOLUTION, IRRIGATION IRRIGATION ONCE
OUTPATIENT
Start: 2024-09-12 | End: 2024-09-12

## 2024-09-10 RX ORDER — BETAMETHASONE DIPROPIONATE 0.5 MG/G
CREAM TOPICAL ONCE
OUTPATIENT
Start: 2024-09-12 | End: 2024-09-12

## 2024-09-10 RX ORDER — TRIAMCINOLONE ACETONIDE 1 MG/G
OINTMENT TOPICAL ONCE
OUTPATIENT
Start: 2024-09-12 | End: 2024-09-12

## 2024-09-10 RX ORDER — LIDOCAINE HYDROCHLORIDE 40 MG/ML
SOLUTION TOPICAL ONCE
OUTPATIENT
Start: 2024-09-12 | End: 2024-09-12

## 2024-09-10 RX ORDER — GENTAMICIN SULFATE 1 MG/G
OINTMENT TOPICAL ONCE
OUTPATIENT
Start: 2024-09-12 | End: 2024-09-12

## 2024-09-10 RX ORDER — MUPIROCIN 20 MG/G
OINTMENT TOPICAL ONCE
OUTPATIENT
Start: 2024-09-12 | End: 2024-09-12

## 2024-09-10 RX ORDER — NEOMYCIN/BACITRACIN/POLYMYXINB 3.5-400-5K
OINTMENT (GRAM) TOPICAL ONCE
OUTPATIENT
Start: 2024-09-12 | End: 2024-09-12

## 2024-09-10 RX ORDER — LIDOCAINE 50 MG/G
OINTMENT TOPICAL ONCE
OUTPATIENT
Start: 2024-09-12 | End: 2024-09-12

## 2024-09-10 RX ORDER — CLOBETASOL PROPIONATE 0.5 MG/G
OINTMENT TOPICAL ONCE
OUTPATIENT
Start: 2024-09-12 | End: 2024-09-12

## 2024-09-10 RX ORDER — GINSENG 100 MG
CAPSULE ORAL ONCE
OUTPATIENT
Start: 2024-09-12 | End: 2024-09-12

## 2024-09-12 ENCOUNTER — HOSPITAL ENCOUNTER (OUTPATIENT)
Dept: WOUND CARE | Age: 84
Discharge: HOME | End: 2024-09-12
Attending: EMERGENCY MEDICINE
Payer: MEDICARE

## 2024-09-12 VITALS — HEART RATE: 66 BPM | RESPIRATION RATE: 15 BRPM | SYSTOLIC BLOOD PRESSURE: 111 MMHG | DIASTOLIC BLOOD PRESSURE: 69 MMHG

## 2024-09-12 DIAGNOSIS — L89.154 DECUBITUS ULCER OF SACRAL REGION, STAGE 4 (HCC): Primary | ICD-10-CM

## 2024-09-12 PROCEDURE — 11043 DBRDMT MUSC&/FSCA 1ST 20/<: CPT | Performed by: EMERGENCY MEDICINE

## 2024-09-12 PROCEDURE — 11043 DBRDMT MUSC&/FSCA 1ST 20/<: CPT

## 2024-09-12 RX ORDER — GINSENG 100 MG
CAPSULE ORAL ONCE
OUTPATIENT
Start: 2024-09-12 | End: 2024-09-12

## 2024-09-12 RX ORDER — LIDOCAINE 50 MG/G
OINTMENT TOPICAL ONCE
OUTPATIENT
Start: 2024-09-12 | End: 2024-09-12

## 2024-09-12 RX ORDER — SODIUM CHLOR/HYPOCHLOROUS ACID 0.033 %
SOLUTION, IRRIGATION IRRIGATION ONCE
OUTPATIENT
Start: 2024-09-12 | End: 2024-09-12

## 2024-09-12 RX ORDER — LIDOCAINE 40 MG/G
CREAM TOPICAL ONCE
Status: DISCONTINUED | OUTPATIENT
Start: 2024-09-12 | End: 2024-09-13 | Stop reason: HOSPADM

## 2024-09-12 RX ORDER — GENTAMICIN SULFATE 1 MG/G
OINTMENT TOPICAL ONCE
OUTPATIENT
Start: 2024-09-12 | End: 2024-09-12

## 2024-09-12 RX ORDER — MUPIROCIN 20 MG/G
OINTMENT TOPICAL ONCE
OUTPATIENT
Start: 2024-09-12 | End: 2024-09-12

## 2024-09-12 RX ORDER — CLOBETASOL PROPIONATE 0.5 MG/G
OINTMENT TOPICAL ONCE
OUTPATIENT
Start: 2024-09-12 | End: 2024-09-12

## 2024-09-12 RX ORDER — LIDOCAINE 40 MG/G
CREAM TOPICAL ONCE
OUTPATIENT
Start: 2024-09-12 | End: 2024-09-12

## 2024-09-12 RX ORDER — LIDOCAINE HYDROCHLORIDE 20 MG/ML
JELLY TOPICAL ONCE
OUTPATIENT
Start: 2024-09-12 | End: 2024-09-12

## 2024-09-12 RX ORDER — LIDOCAINE HYDROCHLORIDE 40 MG/ML
SOLUTION TOPICAL ONCE
OUTPATIENT
Start: 2024-09-12 | End: 2024-09-12

## 2024-09-12 RX ORDER — BACITRACIN ZINC AND POLYMYXIN B SULFATE 500; 1000 [USP'U]/G; [USP'U]/G
OINTMENT TOPICAL ONCE
OUTPATIENT
Start: 2024-09-12 | End: 2024-09-12

## 2024-09-12 RX ORDER — TRIAMCINOLONE ACETONIDE 1 MG/G
OINTMENT TOPICAL ONCE
OUTPATIENT
Start: 2024-09-12 | End: 2024-09-12

## 2024-09-12 RX ORDER — NEOMYCIN/BACITRACIN/POLYMYXINB 3.5-400-5K
OINTMENT (GRAM) TOPICAL ONCE
OUTPATIENT
Start: 2024-09-12 | End: 2024-09-12

## 2024-09-12 RX ORDER — BETAMETHASONE DIPROPIONATE 0.5 MG/G
CREAM TOPICAL ONCE
OUTPATIENT
Start: 2024-09-12 | End: 2024-09-12

## 2024-10-01 ENCOUNTER — TELEPHONE (OUTPATIENT)
Dept: SURGERY | Age: 84
End: 2024-10-01

## 2024-10-01 ENCOUNTER — OFFICE VISIT (OUTPATIENT)
Dept: SURGERY | Age: 84
End: 2024-10-01
Payer: MEDICARE

## 2024-10-01 DIAGNOSIS — K46.9 PERISTOMAL HERNIA: Primary | ICD-10-CM

## 2024-10-01 PROCEDURE — 99212 OFFICE O/P EST SF 10 MIN: CPT | Performed by: SURGERY

## 2024-10-01 NOTE — PROGRESS NOTES
[facility] GENERAL AND LAPAROSCOPIC SURGERY          PATIENT NAME: Regina Lawson     TODAY'S DATE: 10/1/2024    SUBJECTIVE:    Pt here on stretcher, no concerns.  Eating well, normal stomal function. Has VAC in place     OBJECTIVE:  VITALS:  There were no vitals taken for this visit.    CONSTITUTIONAL:  awake and alert  LUNGS:  clear to auscultation  ABDOMEN:  normal bowel sounds, soft, non-distended, non-tender, stoma ok. VAC removed, wound closed up to skin level, healthy granulation     Data:      Radiology Review:  None      ASSESSMENT AND PLAN:  Wound clean and improved, up to skin level; 2 x 3 cm  OK to stop VAC  Plan clean like rest of body, place PSO and bandaid  or gauze daily  Should be healed over in 2 - 3 weeks  See me jimi Emerson MD

## 2024-10-02 NOTE — PATIENT INSTRUCTIONS
[phone] Monday  - Thursday 8:00 am - 4:00 pm and Friday 8:00 am - 1:00pm. If you need help with your wound outside these hours and cannot wait until we are again available, contact your PCP or go to the hospital emergency room.      PLEASE NOTE: IF YOU ARE UNABLE TO OBTAIN WOUND SUPPLIES, CONTINUE TO USE THE SUPPLIES YOU HAVE AVAILABLE UNTIL YOU ARE ABLE TO REACH US. IT IS MOST IMPORTANT TO KEEP THE WOUND COVERED AT ALL TIMES.     Patient Experience     Thank you for trusting us with your care.  You may receive a survey from a company called [employer] asking for your feedback.  We would appreciate it if you took a few minutes to share your experience.  Your input is very valuable to us.

## 2024-10-03 ENCOUNTER — HOSPITAL ENCOUNTER (OUTPATIENT)
Dept: WOUND CARE | Age: 84
Discharge: HOME | End: 2024-10-03
Attending: EMERGENCY MEDICINE
Payer: MEDICARE

## 2024-10-03 VITALS — DIASTOLIC BLOOD PRESSURE: 62 MMHG | HEART RATE: 57 BPM | SYSTOLIC BLOOD PRESSURE: 113 MMHG | TEMPERATURE: 97.6 F

## 2024-10-03 DIAGNOSIS — L89.154 DECUBITUS ULCER OF SACRAL REGION, STAGE 4 (HCC): Primary | ICD-10-CM

## 2024-10-03 PROCEDURE — 11043 DBRDMT MUSC&/FSCA 1ST 20/<: CPT | Performed by: EMERGENCY MEDICINE

## 2024-10-03 PROCEDURE — 11043 DBRDMT MUSC&/FSCA 1ST 20/<: CPT

## 2024-10-03 RX ORDER — LIDOCAINE 40 MG/G
CREAM TOPICAL ONCE
Status: DISCONTINUED | OUTPATIENT
Start: 2024-10-03 | End: 2024-10-04 | Stop reason: HOSPADM

## 2024-10-03 RX ORDER — GINSENG 100 MG
CAPSULE ORAL ONCE
OUTPATIENT
Start: 2024-10-03 | End: 2024-10-03

## 2024-10-03 RX ORDER — TRIAMCINOLONE ACETONIDE 1 MG/G
OINTMENT TOPICAL ONCE
OUTPATIENT
Start: 2024-10-03 | End: 2024-10-03

## 2024-10-03 RX ORDER — GENTAMICIN SULFATE 1 MG/G
OINTMENT TOPICAL ONCE
OUTPATIENT
Start: 2024-10-03 | End: 2024-10-03

## 2024-10-03 RX ORDER — CLOBETASOL PROPIONATE 0.5 MG/G
OINTMENT TOPICAL ONCE
OUTPATIENT
Start: 2024-10-03 | End: 2024-10-03

## 2024-10-03 RX ORDER — SODIUM CHLOR/HYPOCHLOROUS ACID 0.033 %
SOLUTION, IRRIGATION IRRIGATION ONCE
OUTPATIENT
Start: 2024-10-03 | End: 2024-10-03

## 2024-10-03 RX ORDER — BACITRACIN ZINC AND POLYMYXIN B SULFATE 500; 1000 [USP'U]/G; [USP'U]/G
OINTMENT TOPICAL ONCE
OUTPATIENT
Start: 2024-10-03 | End: 2024-10-03

## 2024-10-03 RX ORDER — LIDOCAINE HYDROCHLORIDE 20 MG/ML
JELLY TOPICAL ONCE
OUTPATIENT
Start: 2024-10-03 | End: 2024-10-03

## 2024-10-03 RX ORDER — LIDOCAINE HYDROCHLORIDE 40 MG/ML
SOLUTION TOPICAL ONCE
OUTPATIENT
Start: 2024-10-03 | End: 2024-10-03

## 2024-10-03 RX ORDER — BETAMETHASONE DIPROPIONATE 0.5 MG/G
CREAM TOPICAL ONCE
OUTPATIENT
Start: 2024-10-03 | End: 2024-10-03

## 2024-10-03 RX ORDER — NEOMYCIN/BACITRACIN/POLYMYXINB 3.5-400-5K
OINTMENT (GRAM) TOPICAL ONCE
OUTPATIENT
Start: 2024-10-03 | End: 2024-10-03

## 2024-10-03 RX ORDER — LIDOCAINE 40 MG/G
CREAM TOPICAL ONCE
OUTPATIENT
Start: 2024-10-03 | End: 2024-10-03

## 2024-10-03 RX ORDER — LIDOCAINE 50 MG/G
OINTMENT TOPICAL ONCE
OUTPATIENT
Start: 2024-10-03 | End: 2024-10-03

## 2024-10-03 RX ORDER — MUPIROCIN 20 MG/G
OINTMENT TOPICAL ONCE
OUTPATIENT
Start: 2024-10-03 | End: 2024-10-03

## 2024-10-03 NOTE — PROGRESS NOTES
[facility]     H&P or Progress Note: Medical Staff Progress Note    Regina aLwson  MEDICAL RECORD NUMBER:  [id number]  AGE: 83 y.o.   GENDER: female  : 1940  EPISODE DATE:  10/3/2024    Chief complaint and reason for visit:     Chief Complaint   Patient presents with    Wound Check     Buttock F/U        HPI/Wound Narrative:      Regina Lawson is a 83 y.o. female who presents today for an evaluation of a wound/ulcer. Wound duration:  2023 .    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 Here for follow up.  Pertinent associated symptoms: drainage , redness, swelling, skin discoloration, and impaired mobility    23: not getting turn well the last 2 weeks at [facility]  23: having a lot of stool incontinence, mostly mucinous. Contaminating wound. Dressings not being done on time and correctly per daughter.  23: patient has been admitted since last office visit.  She presented with bloating and found to have bowel obstruction.  Followed by GI, surgery, infectious diseases.  Patient underwent exploratory laparotomy, sigmoid colectomy, sigmoid colostomy and closure of rectal stump on 2023.  She is returning to our care related to a chronic sacral pressure ulcer.  23: doing well. No new issues. But npwt still hasn't been done yet  24: Patient had vera flow negative pressure wound therapy attempted but having issues with staying in place.  A lot of leakage her [facility] nursing report.   24: veraflo currently.  24: no new wound care issues. Tolerating collagen dressing changes. Vac was stopped last visit.    3/14/24: question about banegas leaking. States she thinks ECF tried to fix it by inflating the balloon.  24: recent UTI and getting suprapubic catheter soon  24: has suprapubic catheter but getting larger size soon.    24: no new issues. Skin subs not approved  23: got a larger suprapubic catheter

## 2024-10-03 NOTE — PLAN OF CARE
Discharge instructions given.  Patient verbalized understanding.  Return to [facility] in 2 week(s).  Called/faxed orders to [facility].      
periwound, lightly pack wound with Madeleine, stack of dry gauze, cover with Sacral Border dressing. Change dressing daily and as needed for drainage or soilage.      >Please check and maintain banegas catheter and flush as needed to prevent leakage daily. Check for rectal discharge leaking into wound dressing every 2 hours and need to change dressing.   >Turn and reposition off buttocks while in bed at least every hour side to side. Use pressure relieving pillow while up in chair.   >Pt is able to be up in chair for meals 3x daily for 1 hour at a time for meals and place pillows under shoulders, lower back, under knees, etc as needed to help positioning and relieve pressure.    Surgery for hernia 8/2  Gastrologist appt 10/24  Keep using offloading mattress.   Give protein supplements in between meals (Kranthi supplements recommended) .    Turn side to side ONLY every 1-2 hours (position with pillows and place away from wound sites) and allowed on back for 30mins only during meals.    Home Care Agency/Facility: [facility]     Your wound-care supplies will be provided by:   Please note, depending on your insurance coverage, you may have out-of-pocket expenses for these supplies. Someone from the company should call you to confirm your order and discuss those potential costs before they ship your products -- please anticipate that call. If your out-of-pocket cost could be substantial, Many companies have financial hardship programs for patients who qualify, so please ask about that if you might need a hand. If you have any questions about your supplies or your potential out-of-pocket costs, or if you need to place an order for a refill of supplies (typically monthly), please call the company directly.      Your  is Sara Riggs up with Dr Finney In 3 week(s) in the wound care center.         Wound Care Center Information: Should you experience any significant changes in your wound(s) or have questions about

## 2024-10-14 ENCOUNTER — HOSPITAL ENCOUNTER (EMERGENCY)
Age: 84
Discharge: HOME | End: 2024-10-14
Attending: EMERGENCY MEDICINE
Payer: MEDICARE

## 2024-10-14 VITALS
DIASTOLIC BLOOD PRESSURE: 59 MMHG | SYSTOLIC BLOOD PRESSURE: 122 MMHG | HEART RATE: 73 BPM | RESPIRATION RATE: 20 BRPM | TEMPERATURE: 98 F | BODY MASS INDEX: 27.6 KG/M2 | WEIGHT: 150 LBS | OXYGEN SATURATION: 98 % | HEIGHT: 62 IN

## 2024-10-14 DIAGNOSIS — T83.010A SUPRAPUBIC CATHETER DYSFUNCTION, INITIAL ENCOUNTER (HCC): Primary | ICD-10-CM

## 2024-10-14 DIAGNOSIS — Z86.19 HISTORY OF ESBL E. COLI INFECTION: ICD-10-CM

## 2024-10-14 LAB
ALBUMIN SERPL-MCNC: 3.3 G/DL (ref 3.4–5)
ALBUMIN/GLOB SERPL: 1 {RATIO} (ref 1.1–2.2)
ALP SERPL-CCNC: 121 U/L (ref 40–129)
ALT SERPL-CCNC: 23 U/L (ref 10–40)
ANION GAP SERPL CALCULATED.3IONS-SCNC: 8 MMOL/L (ref 3–16)
AST SERPL-CCNC: 22 U/L (ref 15–37)
BACTERIA URNS QL MICRO: ABNORMAL /HPF
BASOPHILS # BLD: 0 K/UL (ref 0–0.2)
BASOPHILS NFR BLD: 1 %
BILIRUB SERPL-MCNC: 0.3 MG/DL (ref 0–1)
BILIRUB UR QL STRIP.AUTO: NEGATIVE
BUN SERPL-MCNC: 32 MG/DL (ref 7–20)
CALCIUM SERPL-MCNC: 9.6 MG/DL (ref 8.3–10.6)
CHLORIDE SERPL-SCNC: 103 MMOL/L (ref 99–110)
CLARITY UR: ABNORMAL
CO2 SERPL-SCNC: 28 MMOL/L (ref 21–32)
COLOR UR: YELLOW
CREAT SERPL-MCNC: 0.8 MG/DL (ref 0.6–1.2)
CRYSTALS URNS MICRO: ABNORMAL /HPF
DEPRECATED RDW RBC AUTO: 17.2 % (ref 12.4–15.4)
EOSINOPHIL # BLD: 0.2 K/UL (ref 0–0.6)
EOSINOPHIL NFR BLD: 5.6 %
EPI CELLS #/AREA URNS HPF: ABNORMAL /HPF (ref 0–5)
GFR SERPLBLD CREATININE-BSD FMLA CKD-EPI: 72 ML/MIN/{1.73_M2}
GLUCOSE SERPL-MCNC: 113 MG/DL (ref 70–99)
GLUCOSE UR STRIP.AUTO-MCNC: NEGATIVE MG/DL
HCT VFR BLD AUTO: 35.1 % (ref 36–48)
HGB BLD-MCNC: 11.5 G/DL (ref 12–16)
HGB UR QL STRIP.AUTO: ABNORMAL
HYALINE CASTS #/AREA URNS LPF: ABNORMAL /LPF (ref 0–2)
KETONES UR STRIP.AUTO-MCNC: NEGATIVE MG/DL
LACTATE BLDV-SCNC: 1.7 MMOL/L (ref 0.4–1.9)
LEUKOCYTE ESTERASE UR QL STRIP.AUTO: ABNORMAL
LYMPHOCYTES # BLD: 0.9 K/UL (ref 1–5.1)
LYMPHOCYTES NFR BLD: 20.5 %
MCH RBC QN AUTO: 28.6 PG (ref 26–34)
MCHC RBC AUTO-ENTMCNC: 32.9 G/DL (ref 31–36)
MCV RBC AUTO: 86.8 FL (ref 80–100)
MONOCYTES # BLD: 0.5 K/UL (ref 0–1.3)
MONOCYTES NFR BLD: 12.8 %
NEUTROPHILS # BLD: 2.6 K/UL (ref 1.7–7.7)
NEUTROPHILS NFR BLD: 60.1 %
NITRITE UR QL STRIP.AUTO: NEGATIVE
PH UR STRIP.AUTO: >=9 [PH] (ref 5–8)
PLATELET # BLD AUTO: 225 K/UL (ref 135–450)
PMV BLD AUTO: 8.3 FL (ref 5–10.5)
POTASSIUM SERPL-SCNC: 3.8 MMOL/L (ref 3.5–5.1)
PROT SERPL-MCNC: 6.7 G/DL (ref 6.4–8.2)
PROT UR STRIP.AUTO-MCNC: 100 MG/DL
RBC # BLD AUTO: 4.04 M/UL (ref 4–5.2)
RBC #/AREA URNS HPF: ABNORMAL /HPF (ref 0–4)
SODIUM SERPL-SCNC: 139 MMOL/L (ref 136–145)
SP GR UR STRIP.AUTO: 1.01 (ref 1–1.03)
UA COMPLETE W REFLEX CULTURE PNL UR: YES
UA DIPSTICK W REFLEX MICRO PNL UR: YES
URN SPEC COLLECT METH UR: ABNORMAL
UROBILINOGEN UR STRIP-ACNC: 0.2 E.U./DL
WBC # BLD AUTO: 4.3 K/UL (ref 4–11)
WBC #/AREA URNS HPF: ABNORMAL /HPF (ref 0–5)

## 2024-10-14 PROCEDURE — 51702 INSERT TEMP BLADDER CATH: CPT

## 2024-10-14 PROCEDURE — 87086 URINE CULTURE/COLONY COUNT: CPT

## 2024-10-14 PROCEDURE — 81001 URINALYSIS AUTO W/SCOPE: CPT

## 2024-10-14 PROCEDURE — 83605 ASSAY OF LACTIC ACID: CPT

## 2024-10-14 PROCEDURE — 85025 COMPLETE CBC W/AUTO DIFF WBC: CPT

## 2024-10-14 PROCEDURE — 99283 EMERGENCY DEPT VISIT LOW MDM: CPT

## 2024-10-14 PROCEDURE — 80053 COMPREHEN METABOLIC PANEL: CPT

## 2024-10-14 RX ORDER — NITROFURANTOIN 25; 75 MG/1; MG/1
100 CAPSULE ORAL 2 TIMES DAILY
Qty: 14 CAPSULE | Refills: 0 | Status: SHIPPED | OUTPATIENT
Start: 2024-10-14 | End: 2024-10-21

## 2024-10-14 ASSESSMENT — ENCOUNTER SYMPTOMS
NAUSEA: 0
WHEEZING: 0
SHORTNESS OF BREATH: 0
VOMITING: 0
DIARRHEA: 0
RHINORRHEA: 0
ABDOMINAL PAIN: 0
COUGH: 0

## 2024-10-14 NOTE — ED PROVIDER NOTES
Hyaline Casts, UA 3-5 (*)     WBC, UA  (*)     RBC, UA 21-50 (*)     Bacteria, UA 4+ (*)     Crystals, UA 3+ Triple Phos (*)     All other components within normal limits   CULTURE, URINE   LACTATE, SEPSIS     Vitals:    10/14/24 1436 10/14/24 1437 10/14/24 1438 10/14/24 1439   BP: (!) 122/59      Pulse:    73   Resp:    20   Temp:   98 °F (36.7 °C)    SpO2:  98%     Weight:   68 kg (150 lb)    Height:   1.575 m (5' 2\")      History from:  Patient  Limitations to history:  None  Chronic Conditions:  has a past medical history of Acute cystitis without hematuria, Altered mental state, Arthritis, At high risk for falls, Atrial fibrillation, controlled (HCC), Carpal tunnel syndrome, Colostomy care (HCC), Constipation, Encounter for care or replacement of suprapubic tube ([facility]), Environmental allergies, Fall at home, Fusion of spine, GERD (gastroesophageal reflux disease), History of anemia, Hyperlipidemia, Hypertension, Malaise, Neuromuscular dysfunction of bladder, Neuropathy of leg, OAB (overactive bladder), Obesity, Other intervertebral disc degeneration, lumbosacral region, Personal history of other infectious and parasitic diseases, Presence of artificial knee joint, bilateral, Presence of left artificial shoulder joint, Spondylolisthesis of lumbar region, Thyroid disease, and UTI (lower urinary tract infection).  Records Reviewed:  Outpatient notes  Disposition Considerations (Tests not ordered but considered, Shared Decision Making, Pt Expectation of Test or Tx.):  MR imaging not deemed clinically necessary in the ED setting    PROCEDURES:  I supervised catheter change by SHILPA Lagunas.     New Prescriptions    NITROFURANTOIN, MACROCRYSTAL-MONOHYDRATE, (MACROBID) 100 MG CAPSULE    Take 1 capsule by mouth 2 times daily for 7 days     FOLLOW UP:    [place]  [id number] [place] [facility] [id number] [phone]    Call   For a re-check in  1-2  days    [facility] 
patient is NOT to be included for SEP-1 Core Measure due to:  2+ SIRS criteria are not met    Chronic Conditions affecting care:    has a past medical history of Acute cystitis without hematuria, Altered mental state, Arthritis, At high risk for falls, Atrial fibrillation, controlled ([facility]), Carpal tunnel syndrome, Colostomy care ([facility]), Constipation, Encounter for care or replacement of suprapubic tube ([facility]), Environmental allergies, Fall at home (01/25/2015), Fusion of spine, GERD (gastroesophageal reflux disease), History of anemia, Hyperlipidemia, Hypertension, Malaise, Neuromuscular dysfunction of bladder, Neuropathy of leg, OAB (overactive bladder), Obesity, Other intervertebral disc degeneration, lumbosacral region, Personal history of other infectious and parasitic diseases, Presence of artificial knee joint, bilateral, Presence of left artificial shoulder joint, Spondylolisthesis of lumbar region, Thyroid disease, and UTI (lower urinary tract infection).    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  On exam, she is resting comfortably in bed no acute distress and nontoxic.  Vitals are within normal limits and she is afebrile.  Lungs are clear to auscultation bilaterally.  Abdomen is soft, nontender.  Ileostomy and suprapubic catheter in place, well appearing without sign of infection.  Catheter does appear clogged.  There is a large amount of sediment present in the tubing and bag.  This was removed and subsequently replaced per procedure note to tolerate without difficulty.     Disposition Considerations (tests considered but not done, Admit vs D/C, Shared Decision Making, Pt Expectation of Test or Tx.): CBC and CMP are relatively unremarkable.  There is no leukocytosis.  No

## 2024-10-15 LAB — BACTERIA UR CULT: NORMAL

## 2024-10-22 NOTE — PATIENT INSTRUCTIONS
[facility]  [address]   [place], [place] [address]  Telephone: [phone]     FAX [phone]     Discharge Instructions     Important reminders:     **If you have any signs and symptoms of illness (Cough, fever, congestion, nausea, vomiting, diarrhea, etc.) please call the wound care center prior to your appointment.     1. Increase Protein intake for optimal wound healing  2. No added salt to reduce any swelling  3. If diabetic, maintain good glucose control  4. If you smoke, smoking prohibits wound healing, we ask that you refrain from smoking.  5. When taking antibiotics take the entire prescription as ordered. Do not stop taking until medication is all gone unless otherwise instructed.   6. Exercise as tolerated.   7. Keep weight off wounds and reposition every 2 hours if applicable.  8. If wound(s) is on your lower extremity, elevate legs to the level of the heart or above for 30 minutes 4-5 times a day and/or when sitting. Avoid standing for long periods of time.   9. Do not get wounds wet in bath or shower unless otherwise instructed by your physician. If your wound is on your foot or leg, you may purchase a cast bag. Please ask at the pharmacy.        If Vascular testing is ordered, please call [address] [place] [address] [phone]) to schedule.     Vascular tests ordered by Wound Care Physicians may take up to 2 hours to complete. Please keep that in mind when scheduling.      If Vascular testing is scheduled, please bring supplies to replace your dressing after testing is done. The vascular department does not stock supplies.      Wound: Sacrum, Back     With each dressing change, rinse wounds with 0.9% Saline. (May use wound wash or soft contact solution. Both can be purchased at a local drug store). If unable to obtain saline, may use a gentle soap and water.     DRESSING CARE INSTRUCTIONS:Apply Zinc cream to periwound and wound bed, lightly pack wound with gauze, stack of dry gauze, cover with

## 2024-10-24 ENCOUNTER — HOSPITAL ENCOUNTER (OUTPATIENT)
Dept: WOUND CARE | Age: 84
Discharge: HOME | End: 2024-10-24
Attending: EMERGENCY MEDICINE
Payer: MEDICARE

## 2024-10-24 VITALS — HEART RATE: 67 BPM | SYSTOLIC BLOOD PRESSURE: 109 MMHG | DIASTOLIC BLOOD PRESSURE: 68 MMHG | TEMPERATURE: 97 F

## 2024-10-24 DIAGNOSIS — L89.154 DECUBITUS ULCER OF SACRAL REGION, STAGE 4 (HCC): Primary | ICD-10-CM

## 2024-10-24 PROCEDURE — 11043 DBRDMT MUSC&/FSCA 1ST 20/<: CPT

## 2024-10-24 PROCEDURE — 11043 DBRDMT MUSC&/FSCA 1ST 20/<: CPT | Performed by: EMERGENCY MEDICINE

## 2024-10-24 NOTE — PROGRESS NOTES
[facility]     H&P or Progress Note: Medical Staff Progress Note    Regina Lawson  MEDICAL RECORD NUMBER:  [id number]  AGE: 83 y.o.   GENDER: female  : 1940  EPISODE DATE:  10/24/2024    Chief complaint and reason for visit:     Chief Complaint   Patient presents with    Wound Check     Buttock F/U        HPI/Wound Narrative:      Regina Lawson is a 83 y.o. female who presents today for an evaluation of a wound/ulcer. Wound duration:  2023 .    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 Here for follow up.  Pertinent associated symptoms: drainage , redness, swelling, skin discoloration, and impaired mobility    23: not getting turn well the last 2 weeks at [facility]  23: having a lot of stool incontinence, mostly mucinous. Contaminating wound. Dressings not being done on time and correctly per daughter.  23: patient has been admitted since last office visit.  She presented with bloating and found to have bowel obstruction.  Followed by GI, surgery, infectious diseases.  Patient underwent exploratory laparotomy, sigmoid colectomy, sigmoid colostomy and closure of rectal stump on 2023.  She is returning to our care related to a chronic sacral pressure ulcer.  23: doing well. No new issues. But npwt still hasn't been done yet  24: Patient had vera flow negative pressure wound therapy attempted but having issues with staying in place.  A lot of leakage her [facility] nursing report.   24: veraflo currently.  24: no new wound care issues. Tolerating collagen dressing changes. Vac was stopped last visit.    3/14/24: question about banegas leaking. States she thinks [facility]CF tried to fix it by inflating the balloon.  24: recent UTI and getting suprapubic catheter soon  24: has suprapubic catheter but getting larger size soon.    24: no new issues. Skin subs not approved  23: got a larger suprapubic catheter

## 2024-10-24 NOTE — PLAN OF CARE
Discharge instructions given.  Patient verbalized understanding.  Return to [facility] in 3 week(s).  Called/faxed orders to  [facility].

## 2024-10-24 NOTE — PLAN OF CARE
Octavio Plascencia -[phone], [phone]      Patient Instructions   [facility]  [address]   [place], [place] [address]  Telephone: [phone]     FAX [phone]     Discharge Instructions     Important reminders:     **If you have any signs and symptoms of illness (Cough, fever, congestion, nausea, vomiting, diarrhea, etc.) please call the wound care center prior to your appointment.     1. Increase Protein intake for optimal wound healing  2. No added salt to reduce any swelling  3. If diabetic, maintain good glucose control  4. If you smoke, smoking prohibits wound healing, we ask that you refrain from smoking.  5. When taking antibiotics take the entire prescription as ordered. Do not stop taking until medication is all gone unless otherwise instructed.   6. Exercise as tolerated.   7. Keep weight off wounds and reposition every 2 hours if applicable.  8. If wound(s) is on your lower extremity, elevate legs to the level of the heart or above for 30 minutes 4-5 times a day and/or when sitting. Avoid standing for long periods of time.   9. Do not get wounds wet in bath or shower unless otherwise instructed by your physician. If your wound is on your foot or leg, you may purchase a cast bag. Please ask at the pharmacy.        If Vascular testing is ordered, please call [address] [place] [address] [phone]) to schedule.     Vascular tests ordered by Wound Care Physicians may take up to 2 hours to complete. Please keep that in mind when scheduling.      If Vascular testing is scheduled, please bring supplies to replace your dressing after testing is done. The vascular department does not stock supplies.      Wound: Sacrum, Back     With each dressing change, rinse wounds with 0.9% Saline. (May use wound wash or soft contact solution. Both can be purchased at a local drug store). If unable to obtain saline, may use a gentle soap and water.     DRESSING CARE INSTRUCTIONS:Apply Zinc cream to

## 2024-11-13 NOTE — PATIENT INSTRUCTIONS
Sacral Border dressing. Change dressing daily and as needed for drainage or soilage.      >Please check and maintain banegas catheter and flush as needed to prevent leakage daily. Check for rectal discharge leaking into wound dressing every 2 hours and need to change dressing.   >Turn and reposition off buttocks while in bed at least every hour side to side. Use pressure relieving pillow while up in chair.   >Pt is able to be up in chair for meals 3x daily for 1 hour at a time for meals and place pillows under shoulders, lower back, under knees, etc as needed to help positioning and relieve pressure.  11/20/24 Cystoscopy  Surgery for hernia 8/2  Gastrologist appt 10/24  Keep using offloading mattress.   Give protein supplements in between meals (Kranthi supplements recommended) .    Turn side to side ONLY every 1-2 hours (position with pillows and place away from wound sites) and allowed on back for 30mins only during meals.    Home Care Agency/Facility: [facility]     Your wound-care supplies will be provided by:   Please note, depending on your insurance coverage, you may have out-of-pocket expenses for these supplies. Someone from the company should call you to confirm your order and discuss those potential costs before they ship your products -- please anticipate that call. If your out-of-pocket cost could be substantial, Many companies have financial hardship programs for patients who qualify, so please ask about that if you might need a hand. If you have any questions about your supplies or your potential out-of-pocket costs, or if you need to place an order for a refill of supplies (typically monthly), please call the company directly.      Your  is Sara Riggs up with Dr Finney In 3 week(s) in the wound care center.         Wound Care Center Information: Should you experience any significant changes in your wound(s) or have questions about your wound care, please contact the [facility]

## 2024-11-14 ENCOUNTER — HOSPITAL ENCOUNTER (OUTPATIENT)
Dept: WOUND CARE | Age: 84
Discharge: HOME | End: 2024-11-14
Attending: EMERGENCY MEDICINE
Payer: MEDICARE

## 2024-11-14 VITALS — RESPIRATION RATE: 16 BRPM | HEART RATE: 65 BPM | DIASTOLIC BLOOD PRESSURE: 52 MMHG | SYSTOLIC BLOOD PRESSURE: 104 MMHG

## 2024-11-14 DIAGNOSIS — L89.154 DECUBITUS ULCER OF SACRAL REGION, STAGE 4 (HCC): Primary | ICD-10-CM

## 2024-11-14 PROCEDURE — 11043 DBRDMT MUSC&/FSCA 1ST 20/<: CPT

## 2024-11-14 PROCEDURE — 11043 DBRDMT MUSC&/FSCA 1ST 20/<: CPT | Performed by: EMERGENCY MEDICINE

## 2024-11-14 RX ORDER — LIDOCAINE HYDROCHLORIDE 40 MG/ML
SOLUTION TOPICAL ONCE
OUTPATIENT
Start: 2024-11-14 | End: 2024-11-14

## 2024-11-14 RX ORDER — GENTAMICIN SULFATE 1 MG/G
OINTMENT TOPICAL ONCE
OUTPATIENT
Start: 2024-11-14 | End: 2024-11-14

## 2024-11-14 RX ORDER — BETAMETHASONE DIPROPIONATE 0.5 MG/G
CREAM TOPICAL ONCE
OUTPATIENT
Start: 2024-11-14 | End: 2024-11-14

## 2024-11-14 RX ORDER — LIDOCAINE HYDROCHLORIDE 20 MG/ML
JELLY TOPICAL ONCE
OUTPATIENT
Start: 2024-11-14 | End: 2024-11-14

## 2024-11-14 RX ORDER — LIDOCAINE 50 MG/G
OINTMENT TOPICAL ONCE
OUTPATIENT
Start: 2024-11-14 | End: 2024-11-14

## 2024-11-14 RX ORDER — CLOBETASOL PROPIONATE 0.5 MG/G
OINTMENT TOPICAL ONCE
OUTPATIENT
Start: 2024-11-14 | End: 2024-11-14

## 2024-11-14 RX ORDER — TRIAMCINOLONE ACETONIDE 1 MG/G
OINTMENT TOPICAL ONCE
OUTPATIENT
Start: 2024-11-14 | End: 2024-11-14

## 2024-11-14 RX ORDER — NEOMYCIN/BACITRACIN/POLYMYXINB 3.5-400-5K
OINTMENT (GRAM) TOPICAL ONCE
OUTPATIENT
Start: 2024-11-14 | End: 2024-11-14

## 2024-11-14 RX ORDER — MUPIROCIN 20 MG/G
OINTMENT TOPICAL ONCE
OUTPATIENT
Start: 2024-11-14 | End: 2024-11-14

## 2024-11-14 RX ORDER — BACITRACIN ZINC AND POLYMYXIN B SULFATE 500; 1000 [USP'U]/G; [USP'U]/G
OINTMENT TOPICAL ONCE
OUTPATIENT
Start: 2024-11-14 | End: 2024-11-14

## 2024-11-14 RX ORDER — GINSENG 100 MG
CAPSULE ORAL ONCE
OUTPATIENT
Start: 2024-11-14 | End: 2024-11-14

## 2024-11-14 RX ORDER — SODIUM CHLOR/HYPOCHLOROUS ACID 0.033 %
SOLUTION, IRRIGATION IRRIGATION ONCE
OUTPATIENT
Start: 2024-11-14 | End: 2024-11-14

## 2024-11-14 RX ORDER — LIDOCAINE 40 MG/G
CREAM TOPICAL ONCE
Status: DISCONTINUED | OUTPATIENT
Start: 2024-11-14 | End: 2024-11-15 | Stop reason: HOSPADM

## 2024-11-14 RX ORDER — LIDOCAINE 40 MG/G
CREAM TOPICAL ONCE
OUTPATIENT
Start: 2024-11-14 | End: 2024-11-14

## 2024-11-14 NOTE — PLAN OF CARE
Discharge instructions given.  Patient verbalized understanding.  Return to [facility] in 2 week(s).  Called/faxed orders to [facility].

## 2024-11-14 NOTE — PROGRESS NOTES
Octavio Plascencia -[phone], [phone]      Patient Instructions   [facility]  [address]   [place], [place] [address]  Telephone: [phone]     FAX [phone]     Discharge Instructions     Important reminders:     **If you have any signs and symptoms of illness (Cough, fever, congestion, nausea, vomiting, diarrhea, etc.) please call the wound care center prior to your appointment.     1. Increase Protein intake for optimal wound healing  2. No added salt to reduce any swelling  3. If diabetic, maintain good glucose control  4. If you smoke, smoking prohibits wound healing, we ask that you refrain from smoking.  5. When taking antibiotics take the entire prescription as ordered. Do not stop taking until medication is all gone unless otherwise instructed.   6. Exercise as tolerated.   7. Keep weight off wounds and reposition every 2 hours if applicable.  8. If wound(s) is on your lower extremity, elevate legs to the level of the heart or above for 30 minutes 4-5 times a day and/or when sitting. Avoid standing for long periods of time.   9. Do not get wounds wet in bath or shower unless otherwise instructed by your physician. If your wound is on your foot or leg, you may purchase a cast bag. Please ask at the pharmacy.        If Vascular testing is ordered, please call [address] [place] [address] [phone]) to schedule.     Vascular tests ordered by Wound Care Physicians may take up to 2 hours to complete. Please keep that in mind when scheduling.      If Vascular testing is scheduled, please bring supplies to replace your dressing after testing is done. The vascular department does not stock supplies.      Wound: Sacrum, Back     With each dressing change, rinse wounds with 0.9% Saline. (May use wound wash or soft contact solution. Both can be purchased at a local drug store). If unable to obtain saline, may use a gentle soap and water.     DRESSING CARE INSTRUCTIONS:Apply Zinc cream to 
wound-care supplies will be provided by:   Please note, depending on your insurance coverage, you may have out-of-pocket expenses for these supplies. Someone from the company should call you to confirm your order and discuss those potential costs before they ship your products -- please anticipate that call. If your out-of-pocket cost could be substantial, Many companies have financial hardship programs for patients who qualify, so please ask about that if you might need a hand. If you have any questions about your supplies or your potential out-of-pocket costs, or if you need to place an order for a refill of supplies (typically monthly), please call the company directly.      Your  is Sara Riggs up with Dr Finney In 3 week(s) in the wound care center.         Wound Care Center Information: Should you experience any significant changes in your wound(s) or have questions about your wound care, please contact the [facility] at [phone] Monday  - Thursday 8:00 am - 4:00 pm and Friday 8:00 am - 1:00pm. If you need help with your wound outside these hours and cannot wait until we are again available, contact your PCP or go to the hospital emergency room.      PLEASE NOTE: IF YOU ARE UNABLE TO OBTAIN WOUND SUPPLIES, CONTINUE TO USE THE SUPPLIES YOU HAVE AVAILABLE UNTIL YOU ARE ABLE TO REACH US. IT IS MOST IMPORTANT TO KEEP THE WOUND COVERED AT ALL TIMES.     Patient Experience     Thank you for trusting us with your care.  You may receive a survey from a company called [employer] asking for your feedback.  We would appreciate it if you took a few minutes to share your experience.  Your input is very valuable to us.        Electronically signed by TOMASA FINNEY MD on 11/14/2024 at 9:09 AM

## 2024-12-04 NOTE — PATIENT INSTRUCTIONS
[facility]  [address]   [place], [place] [address]  Telephone: [phone]     FAX [phone]     Discharge Instructions     Important reminders:     **If you have any signs and symptoms of illness (Cough, fever, congestion, nausea, vomiting, diarrhea, etc.) please call the wound care center prior to your appointment.     1. Increase Protein intake for optimal wound healing  2. No added salt to reduce any swelling  3. If diabetic, maintain good glucose control  4. If you smoke, smoking prohibits wound healing, we ask that you refrain from smoking.  5. When taking antibiotics take the entire prescription as ordered. Do not stop taking until medication is all gone unless otherwise instructed.   6. Exercise as tolerated.   7. Keep weight off wounds and reposition every 2 hours if applicable.  8. If wound(s) is on your lower extremity, elevate legs to the level of the heart or above for 30 minutes 4-5 times a day and/or when sitting. Avoid standing for long periods of time.   9. Do not get wounds wet in bath or shower unless otherwise instructed by your physician. If your wound is on your foot or leg, you may purchase a cast bag. Please ask at the pharmacy.        If Vascular testing is ordered, please call [address] [place] [address] [phone]) to schedule.     Vascular tests ordered by Wound Care Physicians may take up to 2 hours to complete. Please keep that in mind when scheduling.      If Vascular testing is scheduled, please bring supplies to replace your dressing after testing is done. The vascular department does not stock supplies.      Wound: Sacrum, Back     With each dressing change, rinse wounds with 0.9% Saline. (May use wound wash or soft contact solution. Both can be purchased at a local drug store). If unable to obtain saline, may use a gentle soap and water.     DRESSING CARE INSTRUCTIONS:Apply Zinc cream to periwound and wound bed, lightly pack wound with gauze, stack of dry gauze, cover with

## 2024-12-05 ENCOUNTER — HOSPITAL ENCOUNTER (OUTPATIENT)
Dept: WOUND CARE | Age: 84
Discharge: HOME | End: 2024-12-05
Attending: EMERGENCY MEDICINE
Payer: MEDICARE

## 2024-12-05 VITALS — HEART RATE: 62 BPM | RESPIRATION RATE: 15 BRPM | DIASTOLIC BLOOD PRESSURE: 60 MMHG | SYSTOLIC BLOOD PRESSURE: 93 MMHG

## 2024-12-05 DIAGNOSIS — L89.154 DECUBITUS ULCER OF SACRAL REGION, STAGE 4 (HCC): Primary | ICD-10-CM

## 2024-12-05 PROCEDURE — 11043 DBRDMT MUSC&/FSCA 1ST 20/<: CPT | Performed by: EMERGENCY MEDICINE

## 2024-12-05 PROCEDURE — 11043 DBRDMT MUSC&/FSCA 1ST 20/<: CPT

## 2024-12-05 RX ORDER — LIDOCAINE HYDROCHLORIDE 20 MG/ML
JELLY TOPICAL ONCE
OUTPATIENT
Start: 2024-12-05 | End: 2024-12-05

## 2024-12-05 RX ORDER — LIDOCAINE 40 MG/G
CREAM TOPICAL ONCE
OUTPATIENT
Start: 2024-12-05 | End: 2024-12-05

## 2024-12-05 RX ORDER — CLOBETASOL PROPIONATE 0.5 MG/G
OINTMENT TOPICAL ONCE
OUTPATIENT
Start: 2024-12-05 | End: 2024-12-05

## 2024-12-05 RX ORDER — GENTAMICIN SULFATE 1 MG/G
OINTMENT TOPICAL ONCE
OUTPATIENT
Start: 2024-12-05 | End: 2024-12-05

## 2024-12-05 RX ORDER — NEOMYCIN/BACITRACIN/POLYMYXINB 3.5-400-5K
OINTMENT (GRAM) TOPICAL ONCE
OUTPATIENT
Start: 2024-12-05 | End: 2024-12-05

## 2024-12-05 RX ORDER — BETAMETHASONE DIPROPIONATE 0.5 MG/G
CREAM TOPICAL ONCE
OUTPATIENT
Start: 2024-12-05 | End: 2024-12-05

## 2024-12-05 RX ORDER — LIDOCAINE HYDROCHLORIDE 40 MG/ML
SOLUTION TOPICAL ONCE
OUTPATIENT
Start: 2024-12-05 | End: 2024-12-05

## 2024-12-05 RX ORDER — SODIUM CHLOR/HYPOCHLOROUS ACID 0.033 %
SOLUTION, IRRIGATION IRRIGATION ONCE
OUTPATIENT
Start: 2024-12-05 | End: 2024-12-05

## 2024-12-05 RX ORDER — LIDOCAINE 50 MG/G
OINTMENT TOPICAL ONCE
OUTPATIENT
Start: 2024-12-05 | End: 2024-12-05

## 2024-12-05 RX ORDER — TRIAMCINOLONE ACETONIDE 1 MG/G
OINTMENT TOPICAL ONCE
OUTPATIENT
Start: 2024-12-05 | End: 2024-12-05

## 2024-12-05 RX ORDER — GINSENG 100 MG
CAPSULE ORAL ONCE
OUTPATIENT
Start: 2024-12-05 | End: 2024-12-05

## 2024-12-05 RX ORDER — MUPIROCIN 20 MG/G
OINTMENT TOPICAL ONCE
OUTPATIENT
Start: 2024-12-05 | End: 2024-12-05

## 2024-12-05 RX ORDER — BACITRACIN ZINC AND POLYMYXIN B SULFATE 500; 1000 [USP'U]/G; [USP'U]/G
OINTMENT TOPICAL ONCE
OUTPATIENT
Start: 2024-12-05 | End: 2024-12-05

## 2024-12-05 RX ORDER — LIDOCAINE 40 MG/G
CREAM TOPICAL ONCE
Status: DISCONTINUED | OUTPATIENT
Start: 2024-12-05 | End: 2024-12-06 | Stop reason: HOSPADM

## 2024-12-05 NOTE — PROGRESS NOTES
[facility]     H&P or Progress Note: Medical Staff Progress Note    Regina Lawson  MEDICAL RECORD NUMBER:  [id number]  AGE: 84 y.o.   GENDER: female  : 1940  EPISODE DATE:  2024    Chief complaint and reason for visit:     Chief Complaint   Patient presents with    Wound Check     Follow up Pressure wound        HPI/Wound Narrative:      Regina Lawson is a 84 y.o. female who presents today for an evaluation of a wound/ulcer. Wound duration:  2023 .    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 Here for follow up.  Pertinent associated symptoms: drainage , redness, swelling, skin discoloration, and impaired mobility    24: cystoscopy done no new issues with wound care. Still some urinary leakage.    23: not getting turn well the last 2 weeks at [facility]  23: having a lot of stool incontinence, mostly mucinous. Contaminating wound. Dressings not being done on time and correctly per daughter.  23: patient has been admitted since last office visit.  She presented with bloating and found to have bowel obstruction.  Followed by GI, surgery, infectious diseases.  Patient underwent exploratory laparotomy, sigmoid colectomy, sigmoid colostomy and closure of rectal stump on 2023.  She is returning to our care related to a chronic sacral pressure ulcer.  23: doing well. No new issues. But npwt still hasn't been done yet  24: Patient had vera flow negative pressure wound therapy attempted but having issues with staying in place.  A lot of leakage her [facility] nursing report.   24: veraflo currently.  24: no new wound care issues. Tolerating collagen dressing changes. Vac was stopped last visit.    3/14/24: question about banegas leaking. States she thinks ECF tried to fix it by inflating the balloon.  24: recent UTI and getting suprapubic catheter soon  24: has suprapubic catheter but getting larger size

## 2024-12-05 NOTE — PLAN OF CARE
Octavio Plascencia -[phone], [phone]      Patient Instructions   [facility]  [address]   [place], [place] [address]  Telephone: [phone]     FAX [phone]     Discharge Instructions     Important reminders:     **If you have any signs and symptoms of illness (Cough, fever, congestion, nausea, vomiting, diarrhea, etc.) please call the wound care center prior to your appointment.     1. Increase Protein intake for optimal wound healing  2. No added salt to reduce any swelling  3. If diabetic, maintain good glucose control  4. If you smoke, smoking prohibits wound healing, we ask that you refrain from smoking.  5. When taking antibiotics take the entire prescription as ordered. Do not stop taking until medication is all gone unless otherwise instructed.   6. Exercise as tolerated.   7. Keep weight off wounds and reposition every 2 hours if applicable.  8. If wound(s) is on your lower extremity, elevate legs to the level of the heart or above for 30 minutes 4-5 times a day and/or when sitting. Avoid standing for long periods of time.   9. Do not get wounds wet in bath or shower unless otherwise instructed by your physician. If your wound is on your foot or leg, you may purchase a cast bag. Please ask at the pharmacy.        If Vascular testing is ordered, please call [address] [place] [address] [phone]) to schedule.     Vascular tests ordered by Wound Care Physicians may take up to 2 hours to complete. Please keep that in mind when scheduling.      If Vascular testing is scheduled, please bring supplies to replace your dressing after testing is done. The vascular department does not stock supplies.      Wound: Sacrum, Back     With each dressing change, rinse wounds with 0.9% Saline. (May use wound wash or soft contact solution. Both can be purchased at a local drug store). If unable to obtain saline, may use a gentle soap and water.     DRESSING CARE INSTRUCTIONS:Apply Zinc cream to

## 2024-12-05 NOTE — PLAN OF CARE
Discharge instructions given.  Patient verbalized understanding.  Return to [facility] in 3 week(s).  Called/faxed orders to  [facility].

## 2025-01-08 NOTE — PATIENT INSTRUCTIONS
[facility] at [phone] Monday  - Thursday 8:00 am - 4:00 pm and Friday 8:00 am - 1:00pm. If you need help with your wound outside these hours and cannot wait until we are again available, contact your PCP or go to the hospital emergency room.      PLEASE NOTE: IF YOU ARE UNABLE TO OBTAIN WOUND SUPPLIES, CONTINUE TO USE THE SUPPLIES YOU HAVE AVAILABLE UNTIL YOU ARE ABLE TO REACH US. IT IS MOST IMPORTANT TO KEEP THE WOUND COVERED AT ALL TIMES.     Patient Experience     Thank you for trusting us with your care.  You may receive a survey from a company called [employer] asking for your feedback.  We would appreciate it if you took a few minutes to share your experience.  Your input is very valuable to us.

## 2025-01-09 ENCOUNTER — HOSPITAL ENCOUNTER (OUTPATIENT)
Dept: WOUND CARE | Age: 85
Discharge: HOME | End: 2025-01-09
Attending: EMERGENCY MEDICINE
Payer: MEDICARE

## 2025-01-09 VITALS — SYSTOLIC BLOOD PRESSURE: 108 MMHG | RESPIRATION RATE: 15 BRPM | HEART RATE: 62 BPM | DIASTOLIC BLOOD PRESSURE: 58 MMHG

## 2025-01-09 DIAGNOSIS — L89.154 DECUBITUS ULCER OF SACRAL REGION, STAGE 4 (HCC): Primary | ICD-10-CM

## 2025-01-09 PROCEDURE — 11043 DBRDMT MUSC&/FSCA 1ST 20/<: CPT | Performed by: EMERGENCY MEDICINE

## 2025-01-09 PROCEDURE — 11043 DBRDMT MUSC&/FSCA 1ST 20/<: CPT

## 2025-01-09 RX ORDER — LIDOCAINE 40 MG/G
CREAM TOPICAL ONCE
Status: DISCONTINUED | OUTPATIENT
Start: 2025-01-09 | End: 2025-01-10 | Stop reason: HOSPADM

## 2025-01-09 RX ORDER — SODIUM CHLOR/HYPOCHLOROUS ACID 0.033 %
SOLUTION, IRRIGATION IRRIGATION ONCE
OUTPATIENT
Start: 2025-01-09 | End: 2025-01-09

## 2025-01-09 RX ORDER — LIDOCAINE 40 MG/G
CREAM TOPICAL ONCE
OUTPATIENT
Start: 2025-01-09 | End: 2025-01-09

## 2025-01-09 RX ORDER — LIDOCAINE HYDROCHLORIDE 40 MG/ML
SOLUTION TOPICAL ONCE
OUTPATIENT
Start: 2025-01-09 | End: 2025-01-09

## 2025-01-09 RX ORDER — LIDOCAINE HYDROCHLORIDE 20 MG/ML
JELLY TOPICAL ONCE
OUTPATIENT
Start: 2025-01-09 | End: 2025-01-09

## 2025-01-09 RX ORDER — GENTAMICIN SULFATE 1 MG/G
OINTMENT TOPICAL ONCE
OUTPATIENT
Start: 2025-01-09 | End: 2025-01-09

## 2025-01-09 RX ORDER — GINSENG 100 MG
CAPSULE ORAL ONCE
OUTPATIENT
Start: 2025-01-09 | End: 2025-01-09

## 2025-01-09 RX ORDER — BACITRACIN ZINC AND POLYMYXIN B SULFATE 500; 1000 [USP'U]/G; [USP'U]/G
OINTMENT TOPICAL ONCE
OUTPATIENT
Start: 2025-01-09 | End: 2025-01-09

## 2025-01-09 RX ORDER — MUPIROCIN 20 MG/G
OINTMENT TOPICAL ONCE
OUTPATIENT
Start: 2025-01-09 | End: 2025-01-09

## 2025-01-09 RX ORDER — TRIAMCINOLONE ACETONIDE 1 MG/G
OINTMENT TOPICAL ONCE
OUTPATIENT
Start: 2025-01-09 | End: 2025-01-09

## 2025-01-09 RX ORDER — LIDOCAINE 50 MG/G
OINTMENT TOPICAL ONCE
OUTPATIENT
Start: 2025-01-09 | End: 2025-01-09

## 2025-01-09 RX ORDER — NEOMYCIN/BACITRACIN/POLYMYXINB 3.5-400-5K
OINTMENT (GRAM) TOPICAL ONCE
OUTPATIENT
Start: 2025-01-09 | End: 2025-01-09

## 2025-01-09 RX ORDER — CLOBETASOL PROPIONATE 0.5 MG/G
OINTMENT TOPICAL ONCE
OUTPATIENT
Start: 2025-01-09 | End: 2025-01-09

## 2025-01-09 RX ORDER — BETAMETHASONE DIPROPIONATE 0.5 MG/G
CREAM TOPICAL ONCE
OUTPATIENT
Start: 2025-01-09 | End: 2025-01-09

## 2025-01-09 NOTE — PLAN OF CARE
Octavio Plascencia -[phone], [phone]      Patient Instructions   [facility]  [address]   [place], [place] [address]  Telephone: [phone]     FAX [phone]     Discharge Instructions     Important reminders:     **If you have any signs and symptoms of illness (Cough, fever, congestion, nausea, vomiting, diarrhea, etc.) please call the wound care center prior to your appointment.     1. Increase Protein intake for optimal wound healing  2. No added salt to reduce any swelling  3. If diabetic, maintain good glucose control  4. If you smoke, smoking prohibits wound healing, we ask that you refrain from smoking.  5. When taking antibiotics take the entire prescription as ordered. Do not stop taking until medication is all gone unless otherwise instructed.   6. Exercise as tolerated.   7. Keep weight off wounds and reposition every 2 hours if applicable.  8. If wound(s) is on your lower extremity, elevate legs to the level of the heart or above for 30 minutes 4-5 times a day and/or when sitting. Avoid standing for long periods of time.   9. Do not get wounds wet in bath or shower unless otherwise instructed by your physician. If your wound is on your foot or leg, you may purchase a cast bag. Please ask at the pharmacy.        If Vascular testing is ordered, please call [address] [place] [address] [phone]) to schedule.     Vascular tests ordered by Wound Care Physicians may take up to 2 hours to complete. Please keep that in mind when scheduling.      If Vascular testing is scheduled, please bring supplies to replace your dressing after testing is done. The vascular department does not stock supplies.      Wound: Sacrum, Back     With each dressing change, rinse wounds with 0.9% Saline. (May use wound wash or soft contact solution. Both can be purchased at a local drug store). If unable to obtain saline, may use a gentle soap and water.     DRESSING CARE INSTRUCTIONS:Apply Zinc cream to

## 2025-01-09 NOTE — PROGRESS NOTES
[facility]     H&P or Progress Note: Medical Staff Progress Note    Regina Lawson  MEDICAL RECORD NUMBER:  [id number]  AGE: 84 y.o.   GENDER: female  : 1940  EPISODE DATE:  2025    Chief complaint and reason for visit:     Chief Complaint   Patient presents with    Wound Check     Follow up        HPI/Wound Narrative:      Regina Lawson is a 84 y.o. female who presents today for an evaluation of a wound/ulcer. Wound duration:  2023 .    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 Here for follow up.  Pertinent associated symptoms: drainage , redness, swelling, skin discoloration, and impaired mobility    25: still with urine leakage. Found another urologist for additional opinion, but success rate not great per daughter.    24: cystoscopy done no new issues with wound care. Still some urinary leakage.  23: not getting turn well the last 2 weeks at [facility]  23: having a lot of stool incontinence, mostly mucinous. Contaminating wound. Dressings not being done on time and correctly per daughter.  23: patient has been admitted since last office visit.  She presented with bloating and found to have bowel obstruction.  Followed by GI, surgery, infectious diseases.  Patient underwent exploratory laparotomy, sigmoid colectomy, sigmoid colostomy and closure of rectal stump on 2023.  She is returning to our care related to a chronic sacral pressure ulcer.  23: doing well. No new issues. But npwt still hasn't been done yet  24: Patient had vera flow negative pressure wound therapy attempted but having issues with staying in place.  A lot of leakage her [facility] nursing report.   24: veraflo currently.  24: no new wound care issues. Tolerating collagen dressing changes. Vac was stopped last visit.    3/14/24: question about banegas leaking. States she thinks [facility] tried to fix it by inflating the balloon.  24:

## 2025-01-29 NOTE — PATIENT INSTRUCTIONS
[facility]  [address]   [place], [place] [address]  Telephone: [phone]     FAX [phone]     Discharge Instructions     Important reminders:     **If you have any signs and symptoms of illness (Cough, fever, congestion, nausea, vomiting, diarrhea, etc.) please call the wound care center prior to your appointment.     1. Increase Protein intake for optimal wound healing  2. No added salt to reduce any swelling  3. If diabetic, maintain good glucose control  4. If you smoke, smoking prohibits wound healing, we ask that you refrain from smoking.  5. When taking antibiotics take the entire prescription as ordered. Do not stop taking until medication is all gone unless otherwise instructed.   6. Exercise as tolerated.   7. Keep weight off wounds and reposition every 2 hours if applicable.  8. If wound(s) is on your lower extremity, elevate legs to the level of the heart or above for 30 minutes 4-5 times a day and/or when sitting. Avoid standing for long periods of time.   9. Do not get wounds wet in bath or shower unless otherwise instructed by your physician. If your wound is on your foot or leg, you may purchase a cast bag. Please ask at the pharmacy.        If Vascular testing is ordered, please call [address] [place] [address] [phone]) to schedule.     Vascular tests ordered by Wound Care Physicians may take up to 2 hours to complete. Please keep that in mind when scheduling.      If Vascular testing is scheduled, please bring supplies to replace your dressing after testing is done. The vascular department does not stock supplies.      Wound: Sacrum, Back     With each dressing change, rinse wounds with 0.9% Saline. (May use wound wash or soft contact solution. Both can be purchased at a local drug store). If unable to obtain saline, may use a gentle soap and water.     DRESSING CARE INSTRUCTIONS:Apply Zinc cream to periwound and wound bed, lightly pack wound with gauze(do not pack to much gauze),

## 2025-01-30 ENCOUNTER — HOSPITAL ENCOUNTER (OUTPATIENT)
Dept: WOUND CARE | Age: 85
Discharge: HOME | End: 2025-01-30
Attending: EMERGENCY MEDICINE
Payer: MEDICARE

## 2025-01-30 VITALS — DIASTOLIC BLOOD PRESSURE: 52 MMHG | SYSTOLIC BLOOD PRESSURE: 116 MMHG | HEART RATE: 66 BPM

## 2025-01-30 DIAGNOSIS — L89.154 DECUBITUS ULCER OF SACRAL REGION, STAGE 4 (HCC): Primary | ICD-10-CM

## 2025-01-30 PROCEDURE — 11043 DBRDMT MUSC&/FSCA 1ST 20/<: CPT

## 2025-01-30 PROCEDURE — 11043 DBRDMT MUSC&/FSCA 1ST 20/<: CPT | Performed by: EMERGENCY MEDICINE

## 2025-01-30 RX ORDER — LIDOCAINE HYDROCHLORIDE 40 MG/ML
SOLUTION TOPICAL ONCE
OUTPATIENT
Start: 2025-01-30 | End: 2025-01-30

## 2025-01-30 RX ORDER — SODIUM CHLOR/HYPOCHLOROUS ACID 0.033 %
SOLUTION, IRRIGATION IRRIGATION ONCE
OUTPATIENT
Start: 2025-01-30 | End: 2025-01-30

## 2025-01-30 RX ORDER — BACITRACIN ZINC AND POLYMYXIN B SULFATE 500; 1000 [USP'U]/G; [USP'U]/G
OINTMENT TOPICAL ONCE
OUTPATIENT
Start: 2025-01-30 | End: 2025-01-30

## 2025-01-30 RX ORDER — MUPIROCIN 20 MG/G
OINTMENT TOPICAL ONCE
OUTPATIENT
Start: 2025-01-30 | End: 2025-01-30

## 2025-01-30 RX ORDER — LIDOCAINE 50 MG/G
OINTMENT TOPICAL ONCE
OUTPATIENT
Start: 2025-01-30 | End: 2025-01-30

## 2025-01-30 RX ORDER — LIDOCAINE 40 MG/G
CREAM TOPICAL ONCE
OUTPATIENT
Start: 2025-01-30 | End: 2025-01-30

## 2025-01-30 RX ORDER — LIDOCAINE 40 MG/G
CREAM TOPICAL ONCE
Status: DISCONTINUED | OUTPATIENT
Start: 2025-01-30 | End: 2025-01-31 | Stop reason: HOSPADM

## 2025-01-30 RX ORDER — GENTAMICIN SULFATE 1 MG/G
OINTMENT TOPICAL ONCE
OUTPATIENT
Start: 2025-01-30 | End: 2025-01-30

## 2025-01-30 RX ORDER — LIDOCAINE HYDROCHLORIDE 20 MG/ML
JELLY TOPICAL ONCE
OUTPATIENT
Start: 2025-01-30 | End: 2025-01-30

## 2025-01-30 RX ORDER — GINSENG 100 MG
CAPSULE ORAL ONCE
OUTPATIENT
Start: 2025-01-30 | End: 2025-01-30

## 2025-01-30 RX ORDER — NEOMYCIN/BACITRACIN/POLYMYXINB 3.5-400-5K
OINTMENT (GRAM) TOPICAL ONCE
OUTPATIENT
Start: 2025-01-30 | End: 2025-01-30

## 2025-01-30 RX ORDER — TRIAMCINOLONE ACETONIDE 1 MG/G
OINTMENT TOPICAL ONCE
OUTPATIENT
Start: 2025-01-30 | End: 2025-01-30

## 2025-01-30 RX ORDER — BETAMETHASONE DIPROPIONATE 0.5 MG/G
CREAM TOPICAL ONCE
OUTPATIENT
Start: 2025-01-30 | End: 2025-01-30

## 2025-01-30 RX ORDER — CLOBETASOL PROPIONATE 0.5 MG/G
OINTMENT TOPICAL ONCE
OUTPATIENT
Start: 2025-01-30 | End: 2025-01-30

## 2025-01-30 NOTE — PLAN OF CARE
Discharge instructions given.  Patient verbalized understanding.  Return to [facility] in 3 week(s).  Called/faxed orders to [facility].

## 2025-01-30 NOTE — PROGRESS NOTES
every 1-2 hours (position with pillows and place away from wound sites) and allowed on back for 30mins only during meals.     Home Care Agency/Facility: [facility]      Your wound-care supplies will be provided by:   Please note, depending on your insurance coverage, you may have out-of-pocket expenses for these supplies. Someone from the company should call you to confirm your order and discuss those potential costs before they ship your products -- please anticipate that call. If your out-of-pocket cost could be substantial, Many companies have financial hardship programs for patients who qualify, so please ask about that if you might need a hand. If you have any questions about your supplies or your potential out-of-pocket costs, or if you need to place an order for a refill of supplies (typically monthly), please call the company directly.      Your  is Sara Riggs up with Dr Finney In 3 week(s) in the wound care center.         Wound Care Center Information: Should you experience any significant changes in your wound(s) or have questions about your wound care, please contact the [facility] at [phone] Monday  - Thursday 8:00 am - 4:00 pm and Friday 8:00 am - 1:00pm. If you need help with your wound outside these hours and cannot wait until we are again available, contact your PCP or go to the hospital emergency room.      PLEASE NOTE: IF YOU ARE UNABLE TO OBTAIN WOUND SUPPLIES, CONTINUE TO USE THE SUPPLIES YOU HAVE AVAILABLE UNTIL YOU ARE ABLE TO REACH US. IT IS MOST IMPORTANT TO KEEP THE WOUND COVERED AT ALL TIMES.     Patient Experience     Thank you for trusting us with your care.  You may receive a survey from a company called [employer] asking for your feedback.  We would appreciate it if you took a few minutes to share your experience.  Your input is very valuable to us.           Electronically signed by TOMASA FINNEY MD on 1/30/2025 at 8:28 AM

## 2025-02-19 NOTE — PATIENT INSTRUCTIONS
[facility]  [address]   [place], [place] [address]  Telephone: [phone]     FAX [phone]     Discharge Instructions     Important reminders:     **If you have any signs and symptoms of illness (Cough, fever, congestion, nausea, vomiting, diarrhea, etc.) please call the wound care center prior to your appointment.     1. Increase Protein intake for optimal wound healing  2. No added salt to reduce any swelling  3. If diabetic, maintain good glucose control  4. If you smoke, smoking prohibits wound healing, we ask that you refrain from smoking.  5. When taking antibiotics take the entire prescription as ordered. Do not stop taking until medication is all gone unless otherwise instructed.   6. Exercise as tolerated.   7. Keep weight off wounds and reposition every 2 hours if applicable.  8. If wound(s) is on your lower extremity, elevate legs to the level of the heart or above for 30 minutes 4-5 times a day and/or when sitting. Avoid standing for long periods of time.   9. Do not get wounds wet in bath or shower unless otherwise instructed by your physician. If your wound is on your foot or leg, you may purchase a cast bag. Please ask at the pharmacy.        If Vascular testing is ordered, please call [address] [place] [address] [phone]) to schedule.     Vascular tests ordered by Wound Care Physicians may take up to 2 hours to complete. Please keep that in mind when scheduling.      If Vascular testing is scheduled, please bring supplies to replace your dressing after testing is done. The vascular department does not stock supplies.      Wound: Sacrum, Back     With each dressing change, rinse wounds with 0.9% Saline. (May use wound wash or soft contact solution. Both can be purchased at a local drug store). If unable to obtain saline, may use a gentle soap and water.     DRESSING CARE INSTRUCTIONS:Apply Zinc cream to periwound and wound bed(all wounds), lightly pack wound with gauze(do not pack to much

## 2025-02-20 ENCOUNTER — HOSPITAL ENCOUNTER (OUTPATIENT)
Dept: WOUND CARE | Age: 85
Discharge: HOME | End: 2025-02-20
Attending: EMERGENCY MEDICINE
Payer: MEDICARE

## 2025-02-20 VITALS — HEART RATE: 61 BPM | DIASTOLIC BLOOD PRESSURE: 63 MMHG | RESPIRATION RATE: 16 BRPM | SYSTOLIC BLOOD PRESSURE: 117 MMHG

## 2025-02-20 DIAGNOSIS — L89.154 DECUBITUS ULCER OF SACRAL REGION, STAGE 4 (HCC): Primary | ICD-10-CM

## 2025-02-20 DIAGNOSIS — L89.313 PRESSURE ULCER OF RIGHT BUTTOCK, STAGE 3 (HCC): ICD-10-CM

## 2025-02-20 PROCEDURE — 17250 CHEM CAUT OF GRANLTJ TISSUE: CPT | Performed by: EMERGENCY MEDICINE

## 2025-02-20 PROCEDURE — 99213 OFFICE O/P EST LOW 20 MIN: CPT | Performed by: EMERGENCY MEDICINE

## 2025-02-20 PROCEDURE — 11043 DBRDMT MUSC&/FSCA 1ST 20/<: CPT

## 2025-02-20 PROCEDURE — 17250 CHEM CAUT OF GRANLTJ TISSUE: CPT

## 2025-02-20 PROCEDURE — 11043 DBRDMT MUSC&/FSCA 1ST 20/<: CPT | Performed by: EMERGENCY MEDICINE

## 2025-02-20 RX ORDER — LIDOCAINE 40 MG/G
CREAM TOPICAL ONCE
OUTPATIENT
Start: 2025-02-20 | End: 2025-02-20

## 2025-02-20 RX ORDER — MUPIROCIN 20 MG/G
OINTMENT TOPICAL ONCE
OUTPATIENT
Start: 2025-02-20 | End: 2025-02-20

## 2025-02-20 RX ORDER — TRIAMCINOLONE ACETONIDE 1 MG/G
OINTMENT TOPICAL ONCE
OUTPATIENT
Start: 2025-02-20 | End: 2025-02-20

## 2025-02-20 RX ORDER — NEOMYCIN/BACITRACIN/POLYMYXINB 3.5-400-5K
OINTMENT (GRAM) TOPICAL ONCE
OUTPATIENT
Start: 2025-02-20 | End: 2025-02-20

## 2025-02-20 RX ORDER — SODIUM CHLOR/HYPOCHLOROUS ACID 0.033 %
SOLUTION, IRRIGATION IRRIGATION ONCE
OUTPATIENT
Start: 2025-02-20 | End: 2025-02-20

## 2025-02-20 RX ORDER — GENTAMICIN SULFATE 1 MG/G
OINTMENT TOPICAL ONCE
OUTPATIENT
Start: 2025-02-20 | End: 2025-02-20

## 2025-02-20 RX ORDER — LIDOCAINE 50 MG/G
OINTMENT TOPICAL ONCE
OUTPATIENT
Start: 2025-02-20 | End: 2025-02-20

## 2025-02-20 RX ORDER — LIDOCAINE 40 MG/G
CREAM TOPICAL ONCE
Status: DISCONTINUED | OUTPATIENT
Start: 2025-02-20 | End: 2025-02-21 | Stop reason: HOSPADM

## 2025-02-20 RX ORDER — LIDOCAINE HYDROCHLORIDE 20 MG/ML
JELLY TOPICAL ONCE
OUTPATIENT
Start: 2025-02-20 | End: 2025-02-20

## 2025-02-20 RX ORDER — BETAMETHASONE DIPROPIONATE 0.5 MG/G
CREAM TOPICAL ONCE
OUTPATIENT
Start: 2025-02-20 | End: 2025-02-20

## 2025-02-20 RX ORDER — CLOBETASOL PROPIONATE 0.5 MG/G
OINTMENT TOPICAL ONCE
OUTPATIENT
Start: 2025-02-20 | End: 2025-02-20

## 2025-02-20 RX ORDER — BACITRACIN ZINC AND POLYMYXIN B SULFATE 500; 1000 [USP'U]/G; [USP'U]/G
OINTMENT TOPICAL ONCE
OUTPATIENT
Start: 2025-02-20 | End: 2025-02-20

## 2025-02-20 RX ORDER — LIDOCAINE HYDROCHLORIDE 40 MG/ML
SOLUTION TOPICAL ONCE
OUTPATIENT
Start: 2025-02-20 | End: 2025-02-20

## 2025-02-20 RX ORDER — GINSENG 100 MG
CAPSULE ORAL ONCE
OUTPATIENT
Start: 2025-02-20 | End: 2025-02-20

## 2025-02-20 NOTE — PROGRESS NOTES
[facility]     H&P or Progress Note: Medical Staff Progress Note    Regina Lawson  MEDICAL RECORD NUMBER:  [id number]  AGE: 84 y.o.   GENDER: female  : 1940  EPISODE DATE:  2025    Chief complaint and reason for visit:     Chief Complaint   Patient presents with    Wound Check     Sacrum          HPI/Wound Narrative:      Regina Lawson is a 84 y.o. female who presents today for an evaluation of a wound/ulcer. Wound duration:  2023 .    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 Here for follow up. Pertinent associated symptoms: drainage , redness, swelling, skin discoloration, and impaired mobility    25: has new wound noted today    25: still with urine leakage. Found another urologist for additional opinion, but success rate not great per daughter.  24: cystoscopy done no new issues with wound care. Still some urinary leakage.  23: not getting turn well the last 2 weeks at [facility]  23: having a lot of stool incontinence, mostly mucinous. Contaminating wound. Dressings not being done on time and correctly per daughter.  23: patient has been admitted since last office visit.  She presented with bloating and found to have bowel obstruction.  Followed by GI, surgery, infectious diseases.  Patient underwent exploratory laparotomy, sigmoid colectomy, sigmoid colostomy and closure of rectal stump on 2023.  She is returning to our care related to a chronic sacral pressure ulcer.  23: doing well. No new issues. But npwt still hasn't been done yet  24: Patient had vera flow negative pressure wound therapy attempted but having issues with staying in place.  A lot of leakage her [facility] nursing report.   24: veraflo currently.  24: no new wound care issues. Tolerating collagen dressing changes. Vac was stopped last visit.    3/14/24: question about banegas leaking. States she thinks [facility] tried to fix it by

## 2025-02-20 NOTE — PLAN OF CARE
Octavio Plascencia -[phone], [phone]      Patient Instructions   [facility]  [address]   [place], [place] [address]  Telephone: [phone]     FAX [phone]     Discharge Instructions     Important reminders:     **If you have any signs and symptoms of illness (Cough, fever, congestion, nausea, vomiting, diarrhea, etc.) please call the wound care center prior to your appointment.     1. Increase Protein intake for optimal wound healing  2. No added salt to reduce any swelling  3. If diabetic, maintain good glucose control  4. If you smoke, smoking prohibits wound healing, we ask that you refrain from smoking.  5. When taking antibiotics take the entire prescription as ordered. Do not stop taking until medication is all gone unless otherwise instructed.   6. Exercise as tolerated.   7. Keep weight off wounds and reposition every 2 hours if applicable.  8. If wound(s) is on your lower extremity, elevate legs to the level of the heart or above for 30 minutes 4-5 times a day and/or when sitting. Avoid standing for long periods of time.   9. Do not get wounds wet in bath or shower unless otherwise instructed by your physician. If your wound is on your foot or leg, you may purchase a cast bag. Please ask at the pharmacy.        If Vascular testing is ordered, please call [address] [place] [address] [phone]) to schedule.     Vascular tests ordered by Wound Care Physicians may take up to 2 hours to complete. Please keep that in mind when scheduling.      If Vascular testing is scheduled, please bring supplies to replace your dressing after testing is done. The vascular department does not stock supplies.      Wound: Sacrum, Back     With each dressing change, rinse wounds with 0.9% Saline. (May use wound wash or soft contact solution. Both can be purchased at a local drug store). If unable to obtain saline, may use a gentle soap and water.     DRESSING CARE INSTRUCTIONS:Apply Zinc cream to

## 2025-02-20 NOTE — PLAN OF CARE
Discharge instructions given.  Patient verbalized understanding.  Return to [facility] in 2 week(s).  Called/faxed orders to  [facility].

## 2025-03-12 NOTE — PATIENT INSTRUCTIONS
[facility]  [address]   [place], [place] [address]  Telephone: [phone]     FAX [phone]     Discharge Instructions     Important reminders:     **If you have any signs and symptoms of illness (Cough, fever, congestion, nausea, vomiting, diarrhea, etc.) please call the wound care center prior to your appointment.     1. Increase Protein intake for optimal wound healing  2. No added salt to reduce any swelling  3. If diabetic, maintain good glucose control  4. If you smoke, smoking prohibits wound healing, we ask that you refrain from smoking.  5. When taking antibiotics take the entire prescription as ordered. Do not stop taking until medication is all gone unless otherwise instructed.   6. Exercise as tolerated.   7. Keep weight off wounds and reposition every 2 hours if applicable.  8. If wound(s) is on your lower extremity, elevate legs to the level of the heart or above for 30 minutes 4-5 times a day and/or when sitting. Avoid standing for long periods of time.   9. Do not get wounds wet in bath or shower unless otherwise instructed by your physician. If your wound is on your foot or leg, you may purchase a cast bag. Please ask at the pharmacy.        If Vascular testing is ordered, please call [address] [place] [address] [phone]) to schedule.     Vascular tests ordered by Wound Care Physicians may take up to 2 hours to complete. Please keep that in mind when scheduling.      If Vascular testing is scheduled, please bring supplies to replace your dressing after testing is done. The vascular department does not stock supplies.      Wound: Sacrum, Back     With each dressing change, rinse wounds with 0.9% Saline. (May use wound wash or soft contact solution. Both can be purchased at a local drug store). If unable to obtain saline, may use a gentle soap and water.     DRESSING CARE INSTRUCTIONS:Apply Zinc cream to periwound, collagen to wound bed(all wounds), lightly pack wound with gauze(do not pack

## 2025-03-13 ENCOUNTER — HOSPITAL ENCOUNTER (OUTPATIENT)
Dept: WOUND CARE | Age: 85
Discharge: HOME | End: 2025-03-13
Attending: EMERGENCY MEDICINE
Payer: MEDICARE

## 2025-03-13 VITALS — TEMPERATURE: 97.1 F | SYSTOLIC BLOOD PRESSURE: 116 MMHG | DIASTOLIC BLOOD PRESSURE: 61 MMHG | HEART RATE: 62 BPM

## 2025-03-13 DIAGNOSIS — L89.154 DECUBITUS ULCER OF SACRAL REGION, STAGE 4 (HCC): Primary | ICD-10-CM

## 2025-03-13 PROCEDURE — 11043 DBRDMT MUSC&/FSCA 1ST 20/<: CPT

## 2025-03-13 PROCEDURE — 11043 DBRDMT MUSC&/FSCA 1ST 20/<: CPT | Performed by: EMERGENCY MEDICINE

## 2025-03-13 RX ORDER — LIDOCAINE 40 MG/G
CREAM TOPICAL ONCE
Status: DISCONTINUED | OUTPATIENT
Start: 2025-03-13 | End: 2025-03-14 | Stop reason: HOSPADM

## 2025-03-13 RX ORDER — BETAMETHASONE DIPROPIONATE 0.5 MG/G
CREAM TOPICAL ONCE
OUTPATIENT
Start: 2025-03-13 | End: 2025-03-13

## 2025-03-13 RX ORDER — SODIUM CHLOR/HYPOCHLOROUS ACID 0.033 %
SOLUTION, IRRIGATION IRRIGATION ONCE
OUTPATIENT
Start: 2025-03-13 | End: 2025-03-13

## 2025-03-13 RX ORDER — LIDOCAINE HYDROCHLORIDE 40 MG/ML
SOLUTION TOPICAL ONCE
OUTPATIENT
Start: 2025-03-13 | End: 2025-03-13

## 2025-03-13 RX ORDER — CLOBETASOL PROPIONATE 0.5 MG/G
OINTMENT TOPICAL ONCE
OUTPATIENT
Start: 2025-03-13 | End: 2025-03-13

## 2025-03-13 RX ORDER — LIDOCAINE HYDROCHLORIDE 20 MG/ML
JELLY TOPICAL ONCE
OUTPATIENT
Start: 2025-03-13 | End: 2025-03-13

## 2025-03-13 RX ORDER — GINSENG 100 MG
CAPSULE ORAL ONCE
OUTPATIENT
Start: 2025-03-13 | End: 2025-03-13

## 2025-03-13 RX ORDER — NEOMYCIN/BACITRACIN/POLYMYXINB 3.5-400-5K
OINTMENT (GRAM) TOPICAL ONCE
OUTPATIENT
Start: 2025-03-13 | End: 2025-03-13

## 2025-03-13 RX ORDER — BACITRACIN ZINC AND POLYMYXIN B SULFATE 500; 1000 [USP'U]/G; [USP'U]/G
OINTMENT TOPICAL ONCE
OUTPATIENT
Start: 2025-03-13 | End: 2025-03-13

## 2025-03-13 RX ORDER — LIDOCAINE 50 MG/G
OINTMENT TOPICAL ONCE
OUTPATIENT
Start: 2025-03-13 | End: 2025-03-13

## 2025-03-13 RX ORDER — MUPIROCIN 20 MG/G
OINTMENT TOPICAL ONCE
OUTPATIENT
Start: 2025-03-13 | End: 2025-03-13

## 2025-03-13 RX ORDER — GENTAMICIN SULFATE 1 MG/G
OINTMENT TOPICAL ONCE
OUTPATIENT
Start: 2025-03-13 | End: 2025-03-13

## 2025-03-13 RX ORDER — TRIAMCINOLONE ACETONIDE 1 MG/G
OINTMENT TOPICAL ONCE
OUTPATIENT
Start: 2025-03-13 | End: 2025-03-13

## 2025-03-13 RX ORDER — LIDOCAINE 40 MG/G
CREAM TOPICAL ONCE
OUTPATIENT
Start: 2025-03-13 | End: 2025-03-13

## 2025-03-13 NOTE — PROGRESS NOTES
[facility]     H&P or Progress Note: Medical Staff Progress Note    Regina Lawson  MEDICAL RECORD NUMBER:  [id number]  AGE: 84 y.o.   GENDER: female  : 1940  EPISODE DATE:  3/13/2025    Chief complaint and reason for visit:     Chief Complaint   Patient presents with    Wound Check     Buttock F/U        HPI/Wound Narrative:      Regina Lawson is a 84 y.o. female who presents today for an evaluation of a wound/ulcer. Wound duration:  2023 .    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 Here for follow up. Pertinent associated symptoms: drainage , redness, swelling, skin discoloration, and impaired mobility    3/13/25: no new issues.     25: has new wound noted today  25: still with urine leakage. Found another urologist for additional opinion, but success rate not great per daughter.  24: cystoscopy done no new issues with wound care. Still some urinary leakage.  23: not getting turn well the last 2 weeks at [facility]  23: having a lot of stool incontinence, mostly mucinous. Contaminating wound. Dressings not being done on time and correctly per daughter.  23: patient has been admitted since last office visit.  She presented with bloating and found to have bowel obstruction.  Followed by GI, surgery, infectious diseases.  Patient underwent exploratory laparotomy, sigmoid colectomy, sigmoid colostomy and closure of rectal stump on 2023.  She is returning to our care related to a chronic sacral pressure ulcer.  23: doing well. No new issues. But npwt still hasn't been done yet  24: Patient had vera flow negative pressure wound therapy attempted but having issues with staying in place.  A lot of leakage her [facility] nursing report.   24: veraflo currently.  24: no new wound care issues. Tolerating collagen dressing changes. Vac was stopped last visit.    3/14/24: question about banegas leaking. States she

## 2025-03-13 NOTE — PLAN OF CARE
Discharge instructions given.  Patient verbalized understanding.  Return to [facility] in 3 week(s).  Called/faxed orders to  [facility].

## 2025-03-13 NOTE — PLAN OF CARE
Octavio Plascencia -[phone], [phone]      Patient Instructions   [facility]  [address]   [place], [place] [address]  Telephone: [phone]     FAX [phone]     Discharge Instructions     Important reminders:     **If you have any signs and symptoms of illness (Cough, fever, congestion, nausea, vomiting, diarrhea, etc.) please call the wound care center prior to your appointment.     1. Increase Protein intake for optimal wound healing  2. No added salt to reduce any swelling  3. If diabetic, maintain good glucose control  4. If you smoke, smoking prohibits wound healing, we ask that you refrain from smoking.  5. When taking antibiotics take the entire prescription as ordered. Do not stop taking until medication is all gone unless otherwise instructed.   6. Exercise as tolerated.   7. Keep weight off wounds and reposition every 2 hours if applicable.  8. If wound(s) is on your lower extremity, elevate legs to the level of the heart or above for 30 minutes 4-5 times a day and/or when sitting. Avoid standing for long periods of time.   9. Do not get wounds wet in bath or shower unless otherwise instructed by your physician. If your wound is on your foot or leg, you may purchase a cast bag. Please ask at the pharmacy.        If Vascular testing is ordered, please call [address] [place] [address] [phone]) to schedule.     Vascular tests ordered by Wound Care Physicians may take up to 2 hours to complete. Please keep that in mind when scheduling.      If Vascular testing is scheduled, please bring supplies to replace your dressing after testing is done. The vascular department does not stock supplies.      Wound: Sacrum, Back     With each dressing change, rinse wounds with 0.9% Saline. (May use wound wash or soft contact solution. Both can be purchased at a local drug store). If unable to obtain saline, may use a gentle soap and water.     DRESSING CARE INSTRUCTIONS:Apply Zinc cream to

## 2025-04-02 NOTE — PATIENT INSTRUCTIONS
[facility]  [address]   [place], [place] [address]  Telephone: [phone]     FAX [phone]     Discharge Instructions     Important reminders:     **If you have any signs and symptoms of illness (Cough, fever, congestion, nausea, vomiting, diarrhea, etc.) please call the wound care center prior to your appointment.     1. Increase Protein intake for optimal wound healing  2. No added salt to reduce any swelling  3. If diabetic, maintain good glucose control  4. If you smoke, smoking prohibits wound healing, we ask that you refrain from smoking.  5. When taking antibiotics take the entire prescription as ordered. Do not stop taking until medication is all gone unless otherwise instructed.   6. Exercise as tolerated.   7. Keep weight off wounds and reposition every 2 hours if applicable.  8. If wound(s) is on your lower extremity, elevate legs to the level of the heart or above for 30 minutes 4-5 times a day and/or when sitting. Avoid standing for long periods of time.   9. Do not get wounds wet in bath or shower unless otherwise instructed by your physician. If your wound is on your foot or leg, you may purchase a cast bag. Please ask at the pharmacy.        If Vascular testing is ordered, please call [address] [place] [address] [phone] to schedule.     Vascular tests ordered by Wound Care Physicians may take up to 2 hours to complete. Please keep that in mind when scheduling.      If Vascular testing is scheduled, please bring supplies to replace your dressing after testing is done. The vascular department does not stock supplies.      Wound: Sacrum, Back     With each dressing change, rinse wounds with 0.9% Saline. (May use wound wash or soft contact solution. Both can be purchased at a local drug store). If unable to obtain saline, may use a gentle soap and water.     DRESSING CARE INSTRUCTIONS:Apply Zinc cream to periwound, pack with collagen(all wounds), place gauze over wound(do not pack), cover

## 2025-04-03 ENCOUNTER — HOSPITAL ENCOUNTER (OUTPATIENT)
Dept: WOUND CARE | Age: 85
Discharge: HOME | End: 2025-04-03
Attending: EMERGENCY MEDICINE
Payer: MEDICARE

## 2025-04-03 VITALS — HEART RATE: 62 BPM | SYSTOLIC BLOOD PRESSURE: 123 MMHG | DIASTOLIC BLOOD PRESSURE: 54 MMHG

## 2025-04-03 DIAGNOSIS — L89.154 DECUBITUS ULCER OF SACRAL REGION, STAGE 4 (HCC): Primary | ICD-10-CM

## 2025-04-03 PROCEDURE — 11043 DBRDMT MUSC&/FSCA 1ST 20/<: CPT | Performed by: EMERGENCY MEDICINE

## 2025-04-03 PROCEDURE — 11043 DBRDMT MUSC&/FSCA 1ST 20/<: CPT

## 2025-04-03 RX ORDER — LIDOCAINE 40 MG/G
CREAM TOPICAL ONCE
OUTPATIENT
Start: 2025-04-03 | End: 2025-04-03

## 2025-04-03 RX ORDER — LIDOCAINE 50 MG/G
OINTMENT TOPICAL ONCE
OUTPATIENT
Start: 2025-04-03 | End: 2025-04-03

## 2025-04-03 RX ORDER — GINSENG 100 MG
CAPSULE ORAL ONCE
OUTPATIENT
Start: 2025-04-03 | End: 2025-04-03

## 2025-04-03 RX ORDER — MUPIROCIN 20 MG/G
OINTMENT TOPICAL ONCE
OUTPATIENT
Start: 2025-04-03 | End: 2025-04-03

## 2025-04-03 RX ORDER — TRIAMCINOLONE ACETONIDE 1 MG/G
OINTMENT TOPICAL ONCE
OUTPATIENT
Start: 2025-04-03 | End: 2025-04-03

## 2025-04-03 RX ORDER — SODIUM CHLOR/HYPOCHLOROUS ACID 0.033 %
SOLUTION, IRRIGATION IRRIGATION ONCE
OUTPATIENT
Start: 2025-04-03 | End: 2025-04-03

## 2025-04-03 RX ORDER — BETAMETHASONE DIPROPIONATE 0.5 MG/G
CREAM TOPICAL ONCE
OUTPATIENT
Start: 2025-04-03 | End: 2025-04-03

## 2025-04-03 RX ORDER — CLOBETASOL PROPIONATE 0.5 MG/G
OINTMENT TOPICAL ONCE
OUTPATIENT
Start: 2025-04-03 | End: 2025-04-03

## 2025-04-03 RX ORDER — LIDOCAINE HYDROCHLORIDE 20 MG/ML
JELLY TOPICAL ONCE
OUTPATIENT
Start: 2025-04-03 | End: 2025-04-03

## 2025-04-03 RX ORDER — GENTAMICIN SULFATE 1 MG/G
OINTMENT TOPICAL ONCE
OUTPATIENT
Start: 2025-04-03 | End: 2025-04-03

## 2025-04-03 RX ORDER — LIDOCAINE 40 MG/G
CREAM TOPICAL ONCE
Status: DISCONTINUED | OUTPATIENT
Start: 2025-04-03 | End: 2025-04-04 | Stop reason: HOSPADM

## 2025-04-03 RX ORDER — NEOMYCIN/BACITRACIN/POLYMYXINB 3.5-400-5K
OINTMENT (GRAM) TOPICAL ONCE
OUTPATIENT
Start: 2025-04-03 | End: 2025-04-03

## 2025-04-03 RX ORDER — BACITRACIN ZINC AND POLYMYXIN B SULFATE 500; 1000 [USP'U]/G; [USP'U]/G
OINTMENT TOPICAL ONCE
OUTPATIENT
Start: 2025-04-03 | End: 2025-04-03

## 2025-04-03 RX ORDER — LIDOCAINE HYDROCHLORIDE 40 MG/ML
SOLUTION TOPICAL ONCE
OUTPATIENT
Start: 2025-04-03 | End: 2025-04-03

## 2025-04-03 NOTE — PLAN OF CARE
Octavio Plascencia -[phone], [phone]      Patient Instructions   [facility]  [address]   [place], [place] [address]  Telephone: [phone]     FAX [phone]     Discharge Instructions     Important reminders:     **If you have any signs and symptoms of illness (Cough, fever, congestion, nausea, vomiting, diarrhea, etc.) please call the wound care center prior to your appointment.     1. Increase Protein intake for optimal wound healing  2. No added salt to reduce any swelling  3. If diabetic, maintain good glucose control  4. If you smoke, smoking prohibits wound healing, we ask that you refrain from smoking.  5. When taking antibiotics take the entire prescription as ordered. Do not stop taking until medication is all gone unless otherwise instructed.   6. Exercise as tolerated.   7. Keep weight off wounds and reposition every 2 hours if applicable.  8. If wound(s) is on your lower extremity, elevate legs to the level of the heart or above for 30 minutes 4-5 times a day and/or when sitting. Avoid standing for long periods of time.   9. Do not get wounds wet in bath or shower unless otherwise instructed by your physician. If your wound is on your foot or leg, you may purchase a cast bag. Please ask at the pharmacy.        If Vascular testing is ordered, please call [address] [place] [address] [phone] to schedule.     Vascular tests ordered by Wound Care Physicians may take up to 2 hours to complete. Please keep that in mind when scheduling.      If Vascular testing is scheduled, please bring supplies to replace your dressing after testing is done. The vascular department does not stock supplies.      Wound: Sacrum, Back     With each dressing change, rinse wounds with 0.9% Saline. (May use wound wash or soft contact solution. Both can be purchased at a local drug store). If unable to obtain saline, may use a gentle soap and water.     DRESSING CARE INSTRUCTIONS:Apply Zinc cream to

## 2025-04-03 NOTE — PLAN OF CARE
Discharge instructions given.  Patient verbalized understanding.  Return to [facility] in 3 week(s).  Called/faxed orders to [facility].

## 2025-04-03 NOTE — PROGRESS NOTES
[facility]     H&P or Progress Note: Medical Staff Progress Note    Regina Lawson  MEDICAL RECORD NUMBER:  [id number]  AGE: 84 y.o.   GENDER: female  : 1940  EPISODE DATE:  4/3/2025    Chief complaint and reason for visit:     Chief Complaint   Patient presents with    Wound Check     Buttock F/U        HPI/Wound Narrative:      Regina Lawson is a 84 y.o. female who presents today for an evaluation of a wound/ulcer. Wound duration:  2023 .    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 Here for follow up. Pertinent associated symptoms: drainage , redness, swelling, skin discoloration, and impaired mobility    4/3/25: no new issues.     25: has new wound noted today  25: still with urine leakage. Found another urologist for additional opinion, but success rate not great per daughter.  24: cystoscopy done no new issues with wound care. Still some urinary leakage.  23: not getting turn well the last 2 weeks at [facility]  23: having a lot of stool incontinence, mostly mucinous. Contaminating wound. Dressings not being done on time and correctly per daughter.  23: patient has been admitted since last office visit.  She presented with bloating and found to have bowel obstruction.  Followed by GI, surgery, infectious diseases.  Patient underwent exploratory laparotomy, sigmoid colectomy, sigmoid colostomy and closure of rectal stump on 2023.  She is returning to our care related to a chronic sacral pressure ulcer.  23: doing well. No new issues. But npwt still hasn't been done yet  24: Patient had vera flow negative pressure wound therapy attempted but having issues with staying in place.  A lot of leakage her [facility] nursing report.   24: veraflo currently.  24: no new wound care issues. Tolerating collagen dressing changes. Vac was stopped last visit.    3/14/24: question about banegas leaking. States she

## 2025-04-24 ENCOUNTER — HOSPITAL ENCOUNTER (OUTPATIENT)
Dept: WOUND CARE | Age: 85
Discharge: HOME | End: 2025-04-24
Attending: EMERGENCY MEDICINE
Payer: MEDICARE

## 2025-04-24 VITALS
DIASTOLIC BLOOD PRESSURE: 59 MMHG | HEART RATE: 64 BPM | SYSTOLIC BLOOD PRESSURE: 99 MMHG | RESPIRATION RATE: 16 BRPM | TEMPERATURE: 97.6 F

## 2025-04-24 DIAGNOSIS — R32 DERMATITIS ASSOCIATED WITH MOISTURE FROM URINARY INCONTINENCE: ICD-10-CM

## 2025-04-24 DIAGNOSIS — L89.223 PRESSURE ULCER OF UPPER THIGH, LEFT, STAGE III (HCC): ICD-10-CM

## 2025-04-24 DIAGNOSIS — L25.8 DERMATITIS ASSOCIATED WITH MOISTURE FROM URINARY INCONTINENCE: ICD-10-CM

## 2025-04-24 DIAGNOSIS — T14.8XXA DEEP TISSUE INJURY: ICD-10-CM

## 2025-04-24 DIAGNOSIS — L89.154 DECUBITUS ULCER OF SACRAL REGION, STAGE 4 (HCC): Primary | ICD-10-CM

## 2025-04-24 PROBLEM — L89.221: Status: ACTIVE | Noted: 2025-04-24

## 2025-04-24 PROCEDURE — 11045 DBRDMT SUBQ TISS EACH ADDL: CPT

## 2025-04-24 PROCEDURE — 11045 DBRDMT SUBQ TISS EACH ADDL: CPT | Performed by: EMERGENCY MEDICINE

## 2025-04-24 PROCEDURE — 11043 DBRDMT MUSC&/FSCA 1ST 20/<: CPT

## 2025-04-24 PROCEDURE — 99213 OFFICE O/P EST LOW 20 MIN: CPT | Performed by: EMERGENCY MEDICINE

## 2025-04-24 PROCEDURE — 11043 DBRDMT MUSC&/FSCA 1ST 20/<: CPT | Performed by: EMERGENCY MEDICINE

## 2025-04-24 PROCEDURE — 11042 DBRDMT SUBQ TIS 1ST 20SQCM/<: CPT

## 2025-04-24 PROCEDURE — 11042 DBRDMT SUBQ TIS 1ST 20SQCM/<: CPT | Performed by: EMERGENCY MEDICINE

## 2025-04-24 RX ORDER — CLOBETASOL PROPIONATE 0.5 MG/G
OINTMENT TOPICAL ONCE
OUTPATIENT
Start: 2025-04-24 | End: 2025-04-24

## 2025-04-24 RX ORDER — GINSENG 100 MG
CAPSULE ORAL ONCE
OUTPATIENT
Start: 2025-04-24 | End: 2025-04-24

## 2025-04-24 RX ORDER — BACITRACIN ZINC AND POLYMYXIN B SULFATE 500; 1000 [USP'U]/G; [USP'U]/G
OINTMENT TOPICAL ONCE
OUTPATIENT
Start: 2025-04-24 | End: 2025-04-24

## 2025-04-24 RX ORDER — LIDOCAINE 50 MG/G
OINTMENT TOPICAL ONCE
OUTPATIENT
Start: 2025-04-24 | End: 2025-04-24

## 2025-04-24 RX ORDER — LIDOCAINE HYDROCHLORIDE 20 MG/ML
JELLY TOPICAL ONCE
OUTPATIENT
Start: 2025-04-24 | End: 2025-04-24

## 2025-04-24 RX ORDER — LIDOCAINE 40 MG/G
CREAM TOPICAL ONCE
Status: DISCONTINUED | OUTPATIENT
Start: 2025-04-24 | End: 2025-04-25 | Stop reason: HOSPADM

## 2025-04-24 RX ORDER — NEOMYCIN/BACITRACIN/POLYMYXINB 3.5-400-5K
OINTMENT (GRAM) TOPICAL ONCE
OUTPATIENT
Start: 2025-04-24 | End: 2025-04-24

## 2025-04-24 RX ORDER — BETAMETHASONE DIPROPIONATE 0.5 MG/G
CREAM TOPICAL ONCE
OUTPATIENT
Start: 2025-04-24 | End: 2025-04-24

## 2025-04-24 RX ORDER — SODIUM CHLOR/HYPOCHLOROUS ACID 0.033 %
SOLUTION, IRRIGATION IRRIGATION ONCE
OUTPATIENT
Start: 2025-04-24 | End: 2025-04-24

## 2025-04-24 RX ORDER — LIDOCAINE HYDROCHLORIDE 40 MG/ML
SOLUTION TOPICAL ONCE
OUTPATIENT
Start: 2025-04-24 | End: 2025-04-24

## 2025-04-24 RX ORDER — MUPIROCIN 20 MG/G
OINTMENT TOPICAL ONCE
OUTPATIENT
Start: 2025-04-24 | End: 2025-04-24

## 2025-04-24 RX ORDER — LIDOCAINE 40 MG/G
CREAM TOPICAL ONCE
OUTPATIENT
Start: 2025-04-24 | End: 2025-04-24

## 2025-04-24 RX ORDER — TRIAMCINOLONE ACETONIDE 1 MG/G
OINTMENT TOPICAL ONCE
OUTPATIENT
Start: 2025-04-24 | End: 2025-04-24

## 2025-04-24 RX ORDER — GENTAMICIN SULFATE 1 MG/G
OINTMENT TOPICAL ONCE
OUTPATIENT
Start: 2025-04-24 | End: 2025-04-24

## 2025-04-24 NOTE — PLAN OF CARE
Discharge instructions given.  Patient verbalized understanding.  Return to [facility] in 1 week(s).  Called/faxed orders to  [facility].

## 2025-04-24 NOTE — PROGRESS NOTES
[facility]     H&P or Progress Note: Medical Staff Progress Note    Regina Lawson  MEDICAL RECORD NUMBER:  [id number]  AGE: 84 y.o.   GENDER: female  : 1940  EPISODE DATE:  2025    Chief complaint and reason for visit:     Chief Complaint   Patient presents with    Wound Check     Sacrum f/u        HPI/Wound Narrative:      Regina Lawson is a 84 y.o. female who presents today for an evaluation of a wound/ulcer. Wound duration:  2023 .    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 Here for follow up. Pertinent associated symptoms: drainage , redness, swelling, skin discoloration, and impaired mobility    25: new wounds to left posterior thigh.     25: has new wound noted today  25: still with urine leakage. Found another urologist for additional opinion, but success rate not great per daughter.  24: cystoscopy done no new issues with wound care. Still some urinary leakage.  23: not getting turn well the last 2 weeks at [facility]  23: having a lot of stool incontinence, mostly mucinous. Contaminating wound. Dressings not being done on time and correctly per daughter.  23: patient has been admitted since last office visit.  She presented with bloating and found to have bowel obstruction.  Followed by GI, surgery, infectious diseases.  Patient underwent exploratory laparotomy, sigmoid colectomy, sigmoid colostomy and closure of rectal stump on 2023.  She is returning to our care related to a chronic sacral pressure ulcer.  23: doing well. No new issues. But npwt still hasn't been done yet  24: Patient had vera flow negative pressure wound therapy attempted but having issues with staying in place.  A lot of leakage her [facility] nursing report.   24: veraflo currently.  24: no new wound care issues. Tolerating collagen dressing changes. Vac was stopped last visit.    3/14/24: question about banegas

## 2025-04-24 NOTE — PLAN OF CARE
Octavio Plascencia -[phone], [phone]      Patient Instructions   [facility]  [address]   [place], [place] [address]  Telephone: [phone]     FAX [phone]     Discharge Instructions     Important reminders:     **If you have any signs and symptoms of illness (Cough, fever, congestion, nausea, vomiting, diarrhea, etc.) please call the wound care center prior to your appointment.     1. Increase Protein intake for optimal wound healing  2. No added salt to reduce any swelling  3. If diabetic, maintain good glucose control  4. If you smoke, smoking prohibits wound healing, we ask that you refrain from smoking.  5. When taking antibiotics take the entire prescription as ordered. Do not stop taking until medication is all gone unless otherwise instructed.   6. Exercise as tolerated.   7. Keep weight off wounds and reposition every 2 hours if applicable.  8. If wound(s) is on your lower extremity, elevate legs to the level of the heart or above for 30 minutes 4-5 times a day and/or when sitting. Avoid standing for long periods of time.   9. Do not get wounds wet in bath or shower unless otherwise instructed by your physician. If your wound is on your foot or leg, you may purchase a cast bag. Please ask at the pharmacy.        If Vascular testing is ordered, please call [address] [place] [address] [phone]) to schedule.     Vascular tests ordered by Wound Care Physicians may take up to 2 hours to complete. Please keep that in mind when scheduling.      If Vascular testing is scheduled, please bring supplies to replace your dressing after testing is done. The vascular department does not stock supplies.      Wound: Sacrum, Back     With each dressing change, rinse wounds with 0.9% Saline. (May use wound wash or soft contact solution. Both can be purchased at a local drug store). If unable to obtain saline, may use a gentle soap and water.     DRESSING CARE INSTRUCTIONS:Sacrum- Apply Zinc

## 2025-04-24 NOTE — PATIENT INSTRUCTIONS
[facility]  [address]   [place], [place] [address]  Telephone: [phone]     FAX [phone]     Discharge Instructions     Important reminders:     **If you have any signs and symptoms of illness (Cough, fever, congestion, nausea, vomiting, diarrhea, etc.) please call the wound care center prior to your appointment.     1. Increase Protein intake for optimal wound healing  2. No added salt to reduce any swelling  3. If diabetic, maintain good glucose control  4. If you smoke, smoking prohibits wound healing, we ask that you refrain from smoking.  5. When taking antibiotics take the entire prescription as ordered. Do not stop taking until medication is all gone unless otherwise instructed.   6. Exercise as tolerated.   7. Keep weight off wounds and reposition every 2 hours if applicable.  8. If wound(s) is on your lower extremity, elevate legs to the level of the heart or above for 30 minutes 4-5 times a day and/or when sitting. Avoid standing for long periods of time.   9. Do not get wounds wet in bath or shower unless otherwise instructed by your physician. If your wound is on your foot or leg, you may purchase a cast bag. Please ask at the pharmacy.        If Vascular testing is ordered, please call [address] [place] [address] [phone]) to schedule.     Vascular tests ordered by Wound Care Physicians may take up to 2 hours to complete. Please keep that in mind when scheduling.      If Vascular testing is scheduled, please bring supplies to replace your dressing after testing is done. The vascular department does not stock supplies.      Wound: Sacrum, Back     With each dressing change, rinse wounds with 0.9% Saline. (May use wound wash or soft contact solution. Both can be purchased at a local drug store). If unable to obtain saline, may use a gentle soap and water.     DRESSING CARE INSTRUCTIONS:Sacrum- Apply Zinc cream to periwound, pack with collagen(all wounds), place gauze over wound(do not pack),

## 2025-05-07 NOTE — PATIENT INSTRUCTIONS
[facility]  [address]   [place], [place] [address]  Telephone: [phone]     FAX [phone]     Discharge Instructions     Important reminders:     **If you have any signs and symptoms of illness (Cough, fever, congestion, nausea, vomiting, diarrhea, etc.) please call the wound care center prior to your appointment.     1. Increase Protein intake for optimal wound healing  2. No added salt to reduce any swelling  3. If diabetic, maintain good glucose control  4. If you smoke, smoking prohibits wound healing, we ask that you refrain from smoking.  5. When taking antibiotics take the entire prescription as ordered. Do not stop taking until medication is all gone unless otherwise instructed.   6. Exercise as tolerated.   7. Keep weight off wounds and reposition every 2 hours if applicable.  8. If wound(s) is on your lower extremity, elevate legs to the level of the heart or above for 30 minutes 4-5 times a day and/or when sitting. Avoid standing for long periods of time.   9. Do not get wounds wet in bath or shower unless otherwise instructed by your physician. If your wound is on your foot or leg, you may purchase a cast bag. Please ask at the pharmacy.        If Vascular testing is ordered, please call [address] [place] [address] [phone]) to schedule.     Vascular tests ordered by Wound Care Physicians may take up to 2 hours to complete. Please keep that in mind when scheduling.      If Vascular testing is scheduled, please bring supplies to replace your dressing after testing is done. The vascular department does not stock supplies.      Wound: Sacrum, Back     With each dressing change, rinse wounds with 0.9% Saline. (May use wound wash or soft contact solution. Both can be purchased at a local drug store). If unable to obtain saline, may use a gentle soap and water.     DRESSING CARE INSTRUCTIONS:Sacrum- Apply Zinc cream to periwound, place silver collagen to wound bed, place a stack of gauze over

## 2025-05-08 ENCOUNTER — HOSPITAL ENCOUNTER (OUTPATIENT)
Dept: WOUND CARE | Age: 85
Discharge: HOME | End: 2025-05-08
Attending: EMERGENCY MEDICINE
Payer: MEDICARE

## 2025-05-08 VITALS
HEART RATE: 64 BPM | DIASTOLIC BLOOD PRESSURE: 61 MMHG | SYSTOLIC BLOOD PRESSURE: 114 MMHG | TEMPERATURE: 97.1 F | RESPIRATION RATE: 16 BRPM

## 2025-05-08 DIAGNOSIS — L25.8 DERMATITIS ASSOCIATED WITH MOISTURE FROM URINARY INCONTINENCE: ICD-10-CM

## 2025-05-08 DIAGNOSIS — L89.223 PRESSURE ULCER OF UPPER THIGH, LEFT, STAGE III (HCC): ICD-10-CM

## 2025-05-08 DIAGNOSIS — R32 DERMATITIS ASSOCIATED WITH MOISTURE FROM URINARY INCONTINENCE: ICD-10-CM

## 2025-05-08 DIAGNOSIS — L89.154 DECUBITUS ULCER OF SACRAL REGION, STAGE 4 (HCC): Primary | ICD-10-CM

## 2025-05-08 DIAGNOSIS — L89.221: ICD-10-CM

## 2025-05-08 DIAGNOSIS — T14.8XXA DEEP TISSUE INJURY: ICD-10-CM

## 2025-05-08 PROCEDURE — 11043 DBRDMT MUSC&/FSCA 1ST 20/<: CPT

## 2025-05-08 PROCEDURE — 11043 DBRDMT MUSC&/FSCA 1ST 20/<: CPT | Performed by: EMERGENCY MEDICINE

## 2025-05-08 PROCEDURE — 10060 I&D ABSCESS SIMPLE/SINGLE: CPT

## 2025-05-08 PROCEDURE — 10060 I&D ABSCESS SIMPLE/SINGLE: CPT | Performed by: EMERGENCY MEDICINE

## 2025-05-08 RX ORDER — MUPIROCIN 20 MG/G
OINTMENT TOPICAL ONCE
OUTPATIENT
Start: 2025-05-08 | End: 2025-05-08

## 2025-05-08 RX ORDER — BETAMETHASONE DIPROPIONATE 0.5 MG/G
CREAM TOPICAL ONCE
OUTPATIENT
Start: 2025-05-08 | End: 2025-05-08

## 2025-05-08 RX ORDER — BACITRACIN ZINC AND POLYMYXIN B SULFATE 500; 1000 [USP'U]/G; [USP'U]/G
OINTMENT TOPICAL ONCE
OUTPATIENT
Start: 2025-05-08 | End: 2025-05-08

## 2025-05-08 RX ORDER — LIDOCAINE HYDROCHLORIDE 20 MG/ML
JELLY TOPICAL ONCE
OUTPATIENT
Start: 2025-05-08 | End: 2025-05-08

## 2025-05-08 RX ORDER — CLOBETASOL PROPIONATE 0.5 MG/G
OINTMENT TOPICAL ONCE
OUTPATIENT
Start: 2025-05-08 | End: 2025-05-08

## 2025-05-08 RX ORDER — SODIUM CHLOR/HYPOCHLOROUS ACID 0.033 %
SOLUTION, IRRIGATION IRRIGATION ONCE
OUTPATIENT
Start: 2025-05-08 | End: 2025-05-08

## 2025-05-08 RX ORDER — GINSENG 100 MG
CAPSULE ORAL ONCE
OUTPATIENT
Start: 2025-05-08 | End: 2025-05-08

## 2025-05-08 RX ORDER — LIDOCAINE HYDROCHLORIDE 40 MG/ML
SOLUTION TOPICAL ONCE
OUTPATIENT
Start: 2025-05-08 | End: 2025-05-08

## 2025-05-08 RX ORDER — LIDOCAINE 40 MG/G
CREAM TOPICAL ONCE
Status: DISCONTINUED | OUTPATIENT
Start: 2025-05-08 | End: 2025-05-09 | Stop reason: HOSPADM

## 2025-05-08 RX ORDER — LIDOCAINE 40 MG/G
CREAM TOPICAL ONCE
OUTPATIENT
Start: 2025-05-08 | End: 2025-05-08

## 2025-05-08 RX ORDER — GENTAMICIN SULFATE 1 MG/G
OINTMENT TOPICAL ONCE
OUTPATIENT
Start: 2025-05-08 | End: 2025-05-08

## 2025-05-08 RX ORDER — LIDOCAINE 50 MG/G
OINTMENT TOPICAL ONCE
OUTPATIENT
Start: 2025-05-08 | End: 2025-05-08

## 2025-05-08 RX ORDER — TRIAMCINOLONE ACETONIDE 1 MG/G
OINTMENT TOPICAL ONCE
OUTPATIENT
Start: 2025-05-08 | End: 2025-05-08

## 2025-05-08 RX ORDER — NEOMYCIN/BACITRACIN/POLYMYXINB 3.5-400-5K
OINTMENT (GRAM) TOPICAL ONCE
OUTPATIENT
Start: 2025-05-08 | End: 2025-05-08

## 2025-05-08 NOTE — PLAN OF CARE
Octavio Plascencia -[phone], [phone]      Patient Instructions   [facility]  [address]   [place], [place] [address]  Telephone: [phone]     FAX [phone]     Discharge Instructions     Important reminders:     **If you have any signs and symptoms of illness (Cough, fever, congestion, nausea, vomiting, diarrhea, etc.) please call the wound care center prior to your appointment.     1. Increase Protein intake for optimal wound healing  2. No added salt to reduce any swelling  3. If diabetic, maintain good glucose control  4. If you smoke, smoking prohibits wound healing, we ask that you refrain from smoking.  5. When taking antibiotics take the entire prescription as ordered. Do not stop taking until medication is all gone unless otherwise instructed.   6. Exercise as tolerated.   7. Keep weight off wounds and reposition every 2 hours if applicable.  8. If wound(s) is on your lower extremity, elevate legs to the level of the heart or above for 30 minutes 4-5 times a day and/or when sitting. Avoid standing for long periods of time.   9. Do not get wounds wet in bath or shower unless otherwise instructed by your physician. If your wound is on your foot or leg, you may purchase a cast bag. Please ask at the pharmacy.        If Vascular testing is ordered, please call [address] [place] [address] [phone]) to schedule.     Vascular tests ordered by Wound Care Physicians may take up to 2 hours to complete. Please keep that in mind when scheduling.      If Vascular testing is scheduled, please bring supplies to replace your dressing after testing is done. The vascular department does not stock supplies.      Wound: Sacrum, Back     With each dressing change, rinse wounds with 0.9% Saline. (May use wound wash or soft contact solution. Both can be purchased at a local drug store). If unable to obtain saline, may use a gentle soap and water.     DRESSING CARE INSTRUCTIONS:Sacrum- Apply Zinc

## 2025-05-08 NOTE — PROGRESS NOTES
[facility]     H&P or Progress Note: Medical Staff Progress Note    Regina Lawson  MEDICAL RECORD NUMBER:  [id number]  AGE: 84 y.o.   GENDER: female  : 1940  EPISODE DATE:  2025    Chief complaint and reason for visit:     Chief Complaint   Patient presents with    Wound Check     Sacrum f/u        HPI/Wound Narrative:      Regina Lawson is a 84 y.o. female who presents today for an evaluation of a wound/ulcer. Wound duration:  2023 .    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 Here for follow up. Pertinent associated symptoms: drainage , redness, swelling, skin discoloration, and impaired mobility    25: new blisters/bullae noted on RN intake    25: new wounds to left posterior thigh.   25: has new wound noted today  25: still with urine leakage. Found another urologist for additional opinion, but success rate not great per daughter.  24: cystoscopy done no new issues with wound care. Still some urinary leakage.  23: not getting turn well the last 2 weeks at [facility]  23: having a lot of stool incontinence, mostly mucinous. Contaminating wound. Dressings not being done on time and correctly per daughter.  23: patient has been admitted since last office visit.  She presented with bloating and found to have bowel obstruction.  Followed by GI, surgery, infectious diseases.  Patient underwent exploratory laparotomy, sigmoid colectomy, sigmoid colostomy and closure of rectal stump on 2023.  She is returning to our care related to a chronic sacral pressure ulcer.  23: doing well. No new issues. But npwt still hasn't been done yet  24: Patient had vera flow negative pressure wound therapy attempted but having issues with staying in place.  A lot of leakage her [facility] nursing report.   24: veraflo currently.  24: no new wound care issues. Tolerating collagen dressing changes. Vac was

## 2025-05-08 NOTE — PLAN OF CARE
Discharge instructions given.  Patient verbalized understanding.  Return to [facility] in 3 week(s).  Called/faxed orders to [facility].

## 2025-05-28 NOTE — PATIENT INSTRUCTIONS
[facility]  [address]   [place], [place] [address]  Telephone: [phone]     FAX [phone]     Discharge Instructions     Important reminders:     **If you have any signs and symptoms of illness (Cough, fever, congestion, nausea, vomiting, diarrhea, etc.) please call the wound care center prior to your appointment.     1. Increase Protein intake for optimal wound healing  2. No added salt to reduce any swelling  3. If diabetic, maintain good glucose control  4. If you smoke, smoking prohibits wound healing, we ask that you refrain from smoking.  5. When taking antibiotics take the entire prescription as ordered. Do not stop taking until medication is all gone unless otherwise instructed.   6. Exercise as tolerated.   7. Keep weight off wounds and reposition every 2 hours if applicable.  8. If wound(s) is on your lower extremity, elevate legs to the level of the heart or above for 30 minutes 4-5 times a day and/or when sitting. Avoid standing for long periods of time.   9. Do not get wounds wet in bath or shower unless otherwise instructed by your physician. If your wound is on your foot or leg, you may purchase a cast bag. Please ask at the pharmacy.        If Vascular testing is ordered, please call [address] [place] [address] [phone]) to schedule.     Vascular tests ordered by Wound Care Physicians may take up to 2 hours to complete. Please keep that in mind when scheduling.      If Vascular testing is scheduled, please bring supplies to replace your dressing after testing is done. The vascular department does not stock supplies.      Wound: Sacrum, Back     With each dressing change, rinse wounds with 0.9% Saline. (May use wound wash or soft contact solution. Both can be purchased at a local drug store). If unable to obtain saline, may use a gentle soap and water.     DRESSING CARE INSTRUCTIONS:Sacrum- Apply Zinc cream to periwound, place silver collagen to wound bed, lightly pack over top with

## 2025-05-29 ENCOUNTER — HOSPITAL ENCOUNTER (OUTPATIENT)
Dept: WOUND CARE | Age: 85
Discharge: HOME | End: 2025-05-29
Attending: EMERGENCY MEDICINE
Payer: MEDICARE

## 2025-05-29 VITALS
SYSTOLIC BLOOD PRESSURE: 111 MMHG | DIASTOLIC BLOOD PRESSURE: 56 MMHG | TEMPERATURE: 96.8 F | HEART RATE: 61 BPM | RESPIRATION RATE: 17 BRPM

## 2025-05-29 DIAGNOSIS — L89.223 PRESSURE ULCER OF UPPER THIGH, LEFT, STAGE III (HCC): ICD-10-CM

## 2025-05-29 DIAGNOSIS — L89.221: ICD-10-CM

## 2025-05-29 DIAGNOSIS — R32 DERMATITIS ASSOCIATED WITH MOISTURE FROM URINARY INCONTINENCE: ICD-10-CM

## 2025-05-29 DIAGNOSIS — L89.154 DECUBITUS ULCER OF SACRAL REGION, STAGE 4 (HCC): Primary | ICD-10-CM

## 2025-05-29 DIAGNOSIS — L25.8 DERMATITIS ASSOCIATED WITH MOISTURE FROM URINARY INCONTINENCE: ICD-10-CM

## 2025-05-29 DIAGNOSIS — T14.8XXA DEEP TISSUE INJURY: ICD-10-CM

## 2025-05-29 PROCEDURE — 11043 DBRDMT MUSC&/FSCA 1ST 20/<: CPT

## 2025-05-29 RX ORDER — MUPIROCIN 20 MG/G
OINTMENT TOPICAL ONCE
OUTPATIENT
Start: 2025-05-29 | End: 2025-05-29

## 2025-05-29 RX ORDER — LIDOCAINE 40 MG/G
CREAM TOPICAL ONCE
OUTPATIENT
Start: 2025-05-29 | End: 2025-05-29

## 2025-05-29 RX ORDER — SODIUM CHLOR/HYPOCHLOROUS ACID 0.033 %
SOLUTION, IRRIGATION IRRIGATION ONCE
OUTPATIENT
Start: 2025-05-29 | End: 2025-05-29

## 2025-05-29 RX ORDER — BACITRACIN ZINC AND POLYMYXIN B SULFATE 500; 1000 [USP'U]/G; [USP'U]/G
OINTMENT TOPICAL ONCE
OUTPATIENT
Start: 2025-05-29 | End: 2025-05-29

## 2025-05-29 RX ORDER — LIDOCAINE 50 MG/G
OINTMENT TOPICAL ONCE
OUTPATIENT
Start: 2025-05-29 | End: 2025-05-29

## 2025-05-29 RX ORDER — CLOBETASOL PROPIONATE 0.5 MG/G
OINTMENT TOPICAL ONCE
OUTPATIENT
Start: 2025-05-29 | End: 2025-05-29

## 2025-05-29 RX ORDER — LIDOCAINE HYDROCHLORIDE 20 MG/ML
JELLY TOPICAL ONCE
OUTPATIENT
Start: 2025-05-29 | End: 2025-05-29

## 2025-05-29 RX ORDER — LIDOCAINE 40 MG/G
CREAM TOPICAL ONCE
Status: DISCONTINUED | OUTPATIENT
Start: 2025-05-29 | End: 2025-05-30 | Stop reason: HOSPADM

## 2025-05-29 RX ORDER — NEOMYCIN/BACITRACIN/POLYMYXINB 3.5-400-5K
OINTMENT (GRAM) TOPICAL ONCE
OUTPATIENT
Start: 2025-05-29 | End: 2025-05-29

## 2025-05-29 RX ORDER — GINSENG 100 MG
CAPSULE ORAL ONCE
OUTPATIENT
Start: 2025-05-29 | End: 2025-05-29

## 2025-05-29 RX ORDER — BETAMETHASONE DIPROPIONATE 0.5 MG/G
CREAM TOPICAL ONCE
OUTPATIENT
Start: 2025-05-29 | End: 2025-05-29

## 2025-05-29 RX ORDER — TRIAMCINOLONE ACETONIDE 1 MG/G
OINTMENT TOPICAL ONCE
OUTPATIENT
Start: 2025-05-29 | End: 2025-05-29

## 2025-05-29 RX ORDER — LIDOCAINE HYDROCHLORIDE 40 MG/ML
SOLUTION TOPICAL ONCE
OUTPATIENT
Start: 2025-05-29 | End: 2025-05-29

## 2025-05-29 RX ORDER — GENTAMICIN SULFATE 1 MG/G
OINTMENT TOPICAL ONCE
OUTPATIENT
Start: 2025-05-29 | End: 2025-05-29

## 2025-05-29 NOTE — PLAN OF CARE
Discharge instructions given.  Patient verbalized understanding.  Return to [facility] in 2-3 week(s).

## 2025-05-29 NOTE — PROGRESS NOTES
[facility]     H&P or Progress Note: Medical Staff Progress Note    Regina Lawson  MEDICAL RECORD NUMBER:  [id number]  AGE: 84 y.o.   GENDER: female  : 1940  EPISODE DATE:  2025    Chief complaint and reason for visit:     Chief Complaint   Patient presents with    Wound Check     Sacral f/u        HPI/Wound Narrative:      Regina Lawson is a 84 y.o. female who presents today for an evaluation of a wound/ulcer. Wound duration:  2023 .    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 Here for follow up. Pertinent associated symptoms: drainage , redness, swelling, skin discoloration, and impaired mobility    25: Left thigh wound healed.  Treatment changed to sacral wound adding plain alginate    25: new blisters/bullae noted on RN intake    25: new wounds to left posterior thigh.   25: has new wound noted today  25: still with urine leakage. Found another urologist for additional opinion, but success rate not great per daughter.  24: cystoscopy done no new issues with wound care. Still some urinary leakage.  23: not getting turn well the last 2 weeks at [facility]  23: having a lot of stool incontinence, mostly mucinous. Contaminating wound. Dressings not being done on time and correctly per daughter.  23: patient has been admitted since last office visit.  She presented with bloating and found to have bowel obstruction.  Followed by GI, surgery, infectious diseases.  Patient underwent exploratory laparotomy, sigmoid colectomy, sigmoid colostomy and closure of rectal stump on 2023.  She is returning to our care related to a chronic sacral pressure ulcer.  23: doing well. No new issues. But npwt still hasn't been done yet  24: Patient had vera flow negative pressure wound therapy attempted but having issues with staying in place.  A lot of leakage her [facility] nursing report.   24: veraflo

## 2025-05-29 NOTE — PLAN OF CARE
[facility]    Patient Instructions   [facility]  [address]   [place], [place] [address]  Telephone: [phone]     FAX [phone]     Discharge Instructions     Important reminders:     **If you have any signs and symptoms of illness (Cough, fever, congestion, nausea, vomiting, diarrhea, etc.) please call the wound care center prior to your appointment.     1. Increase Protein intake for optimal wound healing  2. No added salt to reduce any swelling  3. If diabetic, maintain good glucose control  4. If you smoke, smoking prohibits wound healing, we ask that you refrain from smoking.  5. When taking antibiotics take the entire prescription as ordered. Do not stop taking until medication is all gone unless otherwise instructed.   6. Exercise as tolerated.   7. Keep weight off wounds and reposition every 2 hours if applicable.  8. If wound(s) is on your lower extremity, elevate legs to the level of the heart or above for 30 minutes 4-5 times a day and/or when sitting. Avoid standing for long periods of time.   9. Do not get wounds wet in bath or shower unless otherwise instructed by your physician. If your wound is on your foot or leg, you may purchase a cast bag. Please ask at the pharmacy.        If Vascular testing is ordered, please call [address] [place] [address] [phone]) to schedule.     Vascular tests ordered by Wound Care Physicians may take up to 2 hours to complete. Please keep that in mind when scheduling.      If Vascular testing is scheduled, please bring supplies to replace your dressing after testing is done. The vascular department does not stock supplies.      Wound: Sacrum, Back     With each dressing change, rinse wounds with 0.9% Saline. (May use wound wash or soft contact solution. Both can be purchased at a local drug store). If unable to obtain saline, may use a gentle soap and water.     DRESSING CARE INSTRUCTIONS:Sacrum- Apply Zinc cream to periwound, place silver

## 2025-06-17 NOTE — PATIENT INSTRUCTIONS
[facility]  [address]   [place], [place] [address]  Telephone: [phone]     FAX [phone]     Discharge Instructions     Important reminders:     **If you have any signs and symptoms of illness (Cough, fever, congestion, nausea, vomiting, diarrhea, etc.) please call the wound care center prior to your appointment.     1. Increase Protein intake for optimal wound healing  2. No added salt to reduce any swelling  3. If diabetic, maintain good glucose control  4. If you smoke, smoking prohibits wound healing, we ask that you refrain from smoking.  5. When taking antibiotics take the entire prescription as ordered. Do not stop taking until medication is all gone unless otherwise instructed.   6. Exercise as tolerated.   7. Keep weight off wounds and reposition every 2 hours if applicable.  8. If wound(s) is on your lower extremity, elevate legs to the level of the heart or above for 30 minutes 4-5 times a day and/or when sitting. Avoid standing for long periods of time.   9. Do not get wounds wet in bath or shower unless otherwise instructed by your physician. If your wound is on your foot or leg, you may purchase a cast bag. Please ask at the pharmacy.        If Vascular testing is ordered, please call [address] [place] [address] [phone]) to schedule.     Vascular tests ordered by Wound Care Physicians may take up to 2 hours to complete. Please keep that in mind when scheduling.      If Vascular testing is scheduled, please bring supplies to replace your dressing after testing is done. The vascular department does not stock supplies.      Wound: Sacrum, Back     With each dressing change, rinse wounds with 0.9% Saline. (May use wound wash or soft contact solution. Both can be purchased at a local drug store). If unable to obtain saline, may use a gentle soap and water.     DRESSING CARE INSTRUCTIONS:Sacrum- Apply Zinc cream to periwound, place silver collagen to wound bed, lightly pack over top with

## 2025-06-19 ENCOUNTER — HOSPITAL ENCOUNTER (OUTPATIENT)
Dept: WOUND CARE | Age: 85
Discharge: HOME | End: 2025-06-19
Attending: EMERGENCY MEDICINE

## 2025-06-25 NOTE — PATIENT INSTRUCTIONS
[facility]  [address]   [place], [place] [address]  Telephone: [phone]     FAX [phone]     Discharge Instructions     Important reminders:     **If you have any signs and symptoms of illness (Cough, fever, congestion, nausea, vomiting, diarrhea, etc.) please call the wound care center prior to your appointment.     1. Increase Protein intake for optimal wound healing  2. No added salt to reduce any swelling  3. If diabetic, maintain good glucose control  4. If you smoke, smoking prohibits wound healing, we ask that you refrain from smoking.  5. When taking antibiotics take the entire prescription as ordered. Do not stop taking until medication is all gone unless otherwise instructed.   6. Exercise as tolerated.   7. Keep weight off wounds and reposition every 2 hours if applicable.  8. If wound(s) is on your lower extremity, elevate legs to the level of the heart or above for 30 minutes 4-5 times a day and/or when sitting. Avoid standing for long periods of time.   9. Do not get wounds wet in bath or shower unless otherwise instructed by your physician. If your wound is on your foot or leg, you may purchase a cast bag. Please ask at the pharmacy.        If Vascular testing is ordered, please call [address] [place] [address] [phone]) to schedule.     Vascular tests ordered by Wound Care Physicians may take up to 2 hours to complete. Please keep that in mind when scheduling.      If Vascular testing is scheduled, please bring supplies to replace your dressing after testing is done. The vascular department does not stock supplies.      Wound: Sacrum, Back     With each dressing change, rinse wounds with 0.9% Saline. (May use wound wash or soft contact solution. Both can be purchased at a local drug store). If unable to obtain saline, may use a gentle soap and water.     DRESSING CARE INSTRUCTIONS:Sacrum- Apply Zinc cream to periwound, place silver collagen to wound bed, lightly pack over top with

## 2025-06-26 ENCOUNTER — HOSPITAL ENCOUNTER (OUTPATIENT)
Dept: WOUND CARE | Age: 85
Discharge: HOME | End: 2025-06-26
Attending: EMERGENCY MEDICINE
Payer: MEDICARE

## 2025-06-26 VITALS
RESPIRATION RATE: 16 BRPM | SYSTOLIC BLOOD PRESSURE: 155 MMHG | HEART RATE: 62 BPM | TEMPERATURE: 96.6 F | DIASTOLIC BLOOD PRESSURE: 68 MMHG

## 2025-06-26 DIAGNOSIS — R32 DERMATITIS ASSOCIATED WITH MOISTURE FROM URINARY INCONTINENCE: ICD-10-CM

## 2025-06-26 DIAGNOSIS — L89.154 DECUBITUS ULCER OF SACRAL REGION, STAGE 4 (HCC): Primary | ICD-10-CM

## 2025-06-26 DIAGNOSIS — T14.8XXA DEEP TISSUE INJURY: ICD-10-CM

## 2025-06-26 DIAGNOSIS — L89.221: ICD-10-CM

## 2025-06-26 DIAGNOSIS — L89.223 PRESSURE ULCER OF UPPER THIGH, LEFT, STAGE III (HCC): ICD-10-CM

## 2025-06-26 DIAGNOSIS — L25.8 DERMATITIS ASSOCIATED WITH MOISTURE FROM URINARY INCONTINENCE: ICD-10-CM

## 2025-06-26 PROCEDURE — 11043 DBRDMT MUSC&/FSCA 1ST 20/<: CPT

## 2025-06-26 PROCEDURE — 99212 OFFICE O/P EST SF 10 MIN: CPT | Performed by: EMERGENCY MEDICINE

## 2025-06-26 PROCEDURE — 11043 DBRDMT MUSC&/FSCA 1ST 20/<: CPT | Performed by: EMERGENCY MEDICINE

## 2025-06-26 RX ORDER — CLOBETASOL PROPIONATE 0.5 MG/G
OINTMENT TOPICAL ONCE
OUTPATIENT
Start: 2025-06-26 | End: 2025-06-26

## 2025-06-26 RX ORDER — MUPIROCIN 2 %
OINTMENT (GRAM) TOPICAL ONCE
OUTPATIENT
Start: 2025-06-26 | End: 2025-06-26

## 2025-06-26 RX ORDER — LIDOCAINE 40 MG/G
CREAM TOPICAL ONCE
Status: DISCONTINUED | OUTPATIENT
Start: 2025-06-26 | End: 2025-06-27 | Stop reason: HOSPADM

## 2025-06-26 RX ORDER — LIDOCAINE HYDROCHLORIDE 20 MG/ML
JELLY TOPICAL ONCE
OUTPATIENT
Start: 2025-06-26 | End: 2025-06-26

## 2025-06-26 RX ORDER — NEOMYCIN/BACITRACIN/POLYMYXINB 3.5-400-5K
OINTMENT (GRAM) TOPICAL ONCE
OUTPATIENT
Start: 2025-06-26 | End: 2025-06-26

## 2025-06-26 RX ORDER — TRIAMCINOLONE ACETONIDE 1 MG/G
OINTMENT TOPICAL ONCE
OUTPATIENT
Start: 2025-06-26 | End: 2025-06-26

## 2025-06-26 RX ORDER — LIDOCAINE 40 MG/G
CREAM TOPICAL ONCE
OUTPATIENT
Start: 2025-06-26 | End: 2025-06-26

## 2025-06-26 RX ORDER — LIDOCAINE HYDROCHLORIDE 40 MG/ML
SOLUTION TOPICAL ONCE
OUTPATIENT
Start: 2025-06-26 | End: 2025-06-26

## 2025-06-26 RX ORDER — BETAMETHASONE DIPROPIONATE 0.5 MG/G
CREAM TOPICAL ONCE
OUTPATIENT
Start: 2025-06-26 | End: 2025-06-26

## 2025-06-26 RX ORDER — GINSENG 100 MG
CAPSULE ORAL ONCE
OUTPATIENT
Start: 2025-06-26 | End: 2025-06-26

## 2025-06-26 RX ORDER — BACITRACIN ZINC AND POLYMYXIN B SULFATE 500; 1000 [USP'U]/G; [USP'U]/G
OINTMENT TOPICAL ONCE
OUTPATIENT
Start: 2025-06-26 | End: 2025-06-26

## 2025-06-26 RX ORDER — LIDOCAINE 50 MG/G
OINTMENT TOPICAL ONCE
OUTPATIENT
Start: 2025-06-26 | End: 2025-06-26

## 2025-06-26 RX ORDER — SODIUM CHLOR/HYPOCHLOROUS ACID 0.033 %
SOLUTION, IRRIGATION IRRIGATION ONCE
OUTPATIENT
Start: 2025-06-26 | End: 2025-06-26

## 2025-06-26 RX ORDER — GENTAMICIN SULFATE 1 MG/G
OINTMENT TOPICAL ONCE
OUTPATIENT
Start: 2025-06-26 | End: 2025-06-26

## 2025-06-26 NOTE — PROGRESS NOTES
[facility]     H&P or Progress Note: Medical Staff Progress Note    Regina Lawson  MEDICAL RECORD NUMBER:  [id number]  AGE: 84 y.o.   GENDER: female  : 1940  EPISODE DATE:  2025    Chief complaint and reason for visit:     Chief Complaint   Patient presents with    Wound Check     Sacrum f/u        HPI/Wound Narrative:      Regina Lawson is a 84 y.o. female who presents today for an evaluation of a wound/ulcer. Wound duration: 2023.    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 Here for follow up. Pertinent associated symptoms: drainage , redness, swelling, skin discoloration, and impaired mobility    25: still with skin irritation, dermatitis    25: new blisters/bullae noted on RN intake  25: new wounds to left posterior thigh.   25: has new wound noted today  25: still with urine leakage. Found another urologist for additional opinion, but success rate not great per daughter.  24: cystoscopy done no new issues with wound care. Still some urinary leakage.  23: not getting turn well the last 2 weeks at [facility]  23: having a lot of stool incontinence, mostly mucinous. Contaminating wound. Dressings not being done on time and correctly per daughter.  23: patient has been admitted since last office visit.  She presented with bloating and found to have bowel obstruction.  Followed by GI, surgery, infectious diseases.  Patient underwent exploratory laparotomy, sigmoid colectomy, sigmoid colostomy and closure of rectal stump on 2023.  She is returning to our care related to a chronic sacral pressure ulcer.  23: doing well. No new issues. But npwt still hasn't been done yet  24: Patient had vera flow negative pressure wound therapy attempted but having issues with staying in place.  A lot of leakage her [facility] nursing report.   24: veraflo currently.  24: no new wound care issues.

## 2025-06-26 NOTE — PLAN OF CARE
Discharge instructions given.  Patient verbalized understanding.  Return to [facility] in 3 week(s).  Called/faxed orders to  Facility

## 2025-07-15 NOTE — PATIENT INSTRUCTIONS
[facility]  [address]   [place], [place] [address]  Telephone: [phone]     FAX [phone]     Discharge Instructions     Important reminders:     **If you have any signs and symptoms of illness (Cough, fever, congestion, nausea, vomiting, diarrhea, etc.) please call the wound care center prior to your appointment.     1. Increase Protein intake for optimal wound healing  2. No added salt to reduce any swelling  3. If diabetic, maintain good glucose control  4. If you smoke, smoking prohibits wound healing, we ask that you refrain from smoking.  5. When taking antibiotics take the entire prescription as ordered. Do not stop taking until medication is all gone unless otherwise instructed.   6. Exercise as tolerated.   7. Keep weight off wounds and reposition every 2 hours if applicable.  8. If wound(s) is on your lower extremity, elevate legs to the level of the heart or above for 30 minutes 4-5 times a day and/or when sitting. Avoid standing for long periods of time.   9. Do not get wounds wet in bath or shower unless otherwise instructed by your physician. If your wound is on your foot or leg, you may purchase a cast bag. Please ask at the pharmacy.        If Vascular testing is ordered, please call [address] [place] [address] [phone]) to schedule.     Vascular tests ordered by Wound Care Physicians may take up to 2 hours to complete. Please keep that in mind when scheduling.      If Vascular testing is scheduled, please bring supplies to replace your dressing after testing is done. The vascular department does not stock supplies.      Wound: Sacrum, Back     With each dressing change, rinse wounds with 0.9% Saline. (May use wound wash or soft contact solution. Both can be purchased at a local drug store). If unable to obtain saline, may use a gentle soap and water.     DRESSING CARE INSTRUCTIONS:Sacrum- Apply Zinc cream to periwound, place silver collagen to wound bed, lightly pack over top with

## 2025-07-17 ENCOUNTER — HOSPITAL ENCOUNTER (OUTPATIENT)
Dept: WOUND CARE | Age: 85
Discharge: HOME | End: 2025-07-17
Attending: EMERGENCY MEDICINE
Payer: MEDICARE

## 2025-07-17 VITALS — SYSTOLIC BLOOD PRESSURE: 117 MMHG | DIASTOLIC BLOOD PRESSURE: 66 MMHG | HEART RATE: 60 BPM | RESPIRATION RATE: 16 BRPM

## 2025-07-17 DIAGNOSIS — L89.221: ICD-10-CM

## 2025-07-17 DIAGNOSIS — L89.223 PRESSURE ULCER OF UPPER THIGH, LEFT, STAGE III (HCC): ICD-10-CM

## 2025-07-17 DIAGNOSIS — L25.8 DERMATITIS ASSOCIATED WITH MOISTURE FROM URINARY INCONTINENCE: ICD-10-CM

## 2025-07-17 DIAGNOSIS — T14.8XXA DEEP TISSUE INJURY: ICD-10-CM

## 2025-07-17 DIAGNOSIS — L89.154 DECUBITUS ULCER OF SACRAL REGION, STAGE 4 (HCC): Primary | ICD-10-CM

## 2025-07-17 DIAGNOSIS — R32 DERMATITIS ASSOCIATED WITH MOISTURE FROM URINARY INCONTINENCE: ICD-10-CM

## 2025-07-17 PROCEDURE — 11043 DBRDMT MUSC&/FSCA 1ST 20/<: CPT | Performed by: EMERGENCY MEDICINE

## 2025-07-17 PROCEDURE — 11043 DBRDMT MUSC&/FSCA 1ST 20/<: CPT

## 2025-07-17 RX ORDER — BACITRACIN ZINC AND POLYMYXIN B SULFATE 500; 1000 [USP'U]/G; [USP'U]/G
OINTMENT TOPICAL ONCE
OUTPATIENT
Start: 2025-07-17 | End: 2025-07-17

## 2025-07-17 RX ORDER — LIDOCAINE HYDROCHLORIDE 20 MG/ML
JELLY TOPICAL ONCE
OUTPATIENT
Start: 2025-07-17 | End: 2025-07-17

## 2025-07-17 RX ORDER — GINSENG 100 MG
CAPSULE ORAL ONCE
OUTPATIENT
Start: 2025-07-17 | End: 2025-07-17

## 2025-07-17 RX ORDER — GENTAMICIN SULFATE 1 MG/G
OINTMENT TOPICAL ONCE
OUTPATIENT
Start: 2025-07-17 | End: 2025-07-17

## 2025-07-17 RX ORDER — LIDOCAINE 40 MG/G
CREAM TOPICAL ONCE
OUTPATIENT
Start: 2025-07-17 | End: 2025-07-17

## 2025-07-17 RX ORDER — TRIAMCINOLONE ACETONIDE 1 MG/G
OINTMENT TOPICAL ONCE
OUTPATIENT
Start: 2025-07-17 | End: 2025-07-17

## 2025-07-17 RX ORDER — SODIUM CHLOR/HYPOCHLOROUS ACID 0.033 %
SOLUTION, IRRIGATION IRRIGATION ONCE
OUTPATIENT
Start: 2025-07-17 | End: 2025-07-17

## 2025-07-17 RX ORDER — NEOMYCIN/BACITRACIN/POLYMYXINB 3.5-400-5K
OINTMENT (GRAM) TOPICAL ONCE
OUTPATIENT
Start: 2025-07-17 | End: 2025-07-17

## 2025-07-17 RX ORDER — CLOBETASOL PROPIONATE 0.5 MG/G
OINTMENT TOPICAL ONCE
OUTPATIENT
Start: 2025-07-17 | End: 2025-07-17

## 2025-07-17 RX ORDER — LIDOCAINE 50 MG/G
OINTMENT TOPICAL ONCE
OUTPATIENT
Start: 2025-07-17 | End: 2025-07-17

## 2025-07-17 RX ORDER — LIDOCAINE HYDROCHLORIDE 40 MG/ML
SOLUTION TOPICAL ONCE
OUTPATIENT
Start: 2025-07-17 | End: 2025-07-17

## 2025-07-17 RX ORDER — LIDOCAINE 40 MG/G
CREAM TOPICAL ONCE
Status: DISCONTINUED | OUTPATIENT
Start: 2025-07-17 | End: 2025-07-18 | Stop reason: HOSPADM

## 2025-07-17 RX ORDER — BETAMETHASONE DIPROPIONATE 0.5 MG/G
CREAM TOPICAL ONCE
OUTPATIENT
Start: 2025-07-17 | End: 2025-07-17

## 2025-07-17 RX ORDER — MUPIROCIN 2 %
OINTMENT (GRAM) TOPICAL ONCE
OUTPATIENT
Start: 2025-07-17 | End: 2025-07-17

## 2025-07-17 NOTE — PROGRESS NOTES
[facility]     H&P or Progress Note: Medical Staff Progress Note    Regina Lawson  MEDICAL RECORD NUMBER:  [id number]  AGE: 84 y.o.   GENDER: female  : 1940  EPISODE DATE:  2025    Chief complaint and reason for visit:     Chief Complaint   Patient presents with    Wound Check     Sacrum        HPI/Wound Narrative:      Regina Lawson is a 84 y.o. female who presents today for an evaluation of a wound/ulcer. Wound duration: 2023.    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 Here for follow up. Pertinent associated symptoms: drainage , redness, swelling, skin discoloration, and impaired mobility    25: still with skin irritation, dermatitis    25: new blisters/bullae noted on RN intake  25: new wounds to left posterior thigh.   25: has new wound noted today  25: still with urine leakage. Found another urologist for additional opinion, but success rate not great per daughter.  24: cystoscopy done no new issues with wound care. Still some urinary leakage.  23: not getting turn well the last 2 weeks at [facility]  23: having a lot of stool incontinence, mostly mucinous. Contaminating wound. Dressings not being done on time and correctly per daughter.  23: patient has been admitted since last office visit.  She presented with bloating and found to have bowel obstruction.  Followed by GI, surgery, infectious diseases.  Patient underwent exploratory laparotomy, sigmoid colectomy, sigmoid colostomy and closure of rectal stump on 2023.  She is returning to our care related to a chronic sacral pressure ulcer.  23: doing well. No new issues. But npwt still hasn't been done yet  24: Patient had vera flow negative pressure wound therapy attempted but having issues with staying in place.  A lot of leakage her [facility] nursing report.   24: veraflo currently.  24: no new wound care issues.

## 2025-07-17 NOTE — PLAN OF CARE
Discharge instructions given.  Patient verbalized understanding.  Return to [facility] in 1 week(s).  Called/faxed orders to  Facility

## 2025-08-07 ENCOUNTER — HOSPITAL ENCOUNTER (OUTPATIENT)
Dept: WOUND CARE | Age: 85
Discharge: HOME | End: 2025-08-07
Attending: EMERGENCY MEDICINE
Payer: MEDICARE

## 2025-08-07 VITALS
TEMPERATURE: 98 F | RESPIRATION RATE: 18 BRPM | SYSTOLIC BLOOD PRESSURE: 116 MMHG | HEART RATE: 65 BPM | DIASTOLIC BLOOD PRESSURE: 62 MMHG

## 2025-08-07 DIAGNOSIS — L89.223 PRESSURE ULCER OF UPPER THIGH, LEFT, STAGE III (HCC): ICD-10-CM

## 2025-08-07 DIAGNOSIS — L25.8 DERMATITIS ASSOCIATED WITH MOISTURE FROM URINARY INCONTINENCE: ICD-10-CM

## 2025-08-07 DIAGNOSIS — L89.221: ICD-10-CM

## 2025-08-07 DIAGNOSIS — L89.154 DECUBITUS ULCER OF SACRAL REGION, STAGE 4 (HCC): Primary | ICD-10-CM

## 2025-08-07 DIAGNOSIS — R32 DERMATITIS ASSOCIATED WITH MOISTURE FROM URINARY INCONTINENCE: ICD-10-CM

## 2025-08-07 DIAGNOSIS — T14.8XXA DEEP TISSUE INJURY: ICD-10-CM

## 2025-08-07 PROCEDURE — 11043 DBRDMT MUSC&/FSCA 1ST 20/<: CPT

## 2025-08-07 PROCEDURE — 11042 DBRDMT SUBQ TIS 1ST 20SQCM/<: CPT

## 2025-08-07 RX ORDER — LIDOCAINE HYDROCHLORIDE 20 MG/ML
JELLY TOPICAL ONCE
OUTPATIENT
Start: 2025-08-07 | End: 2025-08-07

## 2025-08-07 RX ORDER — MUPIROCIN 2 %
OINTMENT (GRAM) TOPICAL ONCE
OUTPATIENT
Start: 2025-08-07 | End: 2025-08-07

## 2025-08-07 RX ORDER — NEOMYCIN/BACITRACIN/POLYMYXINB 3.5-400-5K
OINTMENT (GRAM) TOPICAL ONCE
OUTPATIENT
Start: 2025-08-07 | End: 2025-08-07

## 2025-08-07 RX ORDER — LIDOCAINE 40 MG/G
CREAM TOPICAL ONCE
Status: DISCONTINUED | OUTPATIENT
Start: 2025-08-07 | End: 2025-08-08 | Stop reason: HOSPADM

## 2025-08-07 RX ORDER — TRIAMCINOLONE ACETONIDE 1 MG/G
OINTMENT TOPICAL ONCE
OUTPATIENT
Start: 2025-08-07 | End: 2025-08-07

## 2025-08-07 RX ORDER — SODIUM CHLOR/HYPOCHLOROUS ACID 0.033 %
SOLUTION, IRRIGATION IRRIGATION ONCE
OUTPATIENT
Start: 2025-08-07 | End: 2025-08-07

## 2025-08-07 RX ORDER — BETAMETHASONE DIPROPIONATE 0.5 MG/G
CREAM TOPICAL ONCE
OUTPATIENT
Start: 2025-08-07 | End: 2025-08-07

## 2025-08-07 RX ORDER — GINSENG 100 MG
CAPSULE ORAL ONCE
OUTPATIENT
Start: 2025-08-07 | End: 2025-08-07

## 2025-08-07 RX ORDER — LIDOCAINE 40 MG/G
CREAM TOPICAL ONCE
OUTPATIENT
Start: 2025-08-07 | End: 2025-08-07

## 2025-08-07 RX ORDER — LIDOCAINE HYDROCHLORIDE 40 MG/ML
SOLUTION TOPICAL ONCE
OUTPATIENT
Start: 2025-08-07 | End: 2025-08-07

## 2025-08-07 RX ORDER — LIDOCAINE 50 MG/G
OINTMENT TOPICAL ONCE
OUTPATIENT
Start: 2025-08-07 | End: 2025-08-07

## 2025-08-07 RX ORDER — GENTAMICIN SULFATE 1 MG/G
OINTMENT TOPICAL ONCE
OUTPATIENT
Start: 2025-08-07 | End: 2025-08-07

## 2025-08-07 RX ORDER — BACITRACIN ZINC AND POLYMYXIN B SULFATE 500; 1000 [USP'U]/G; [USP'U]/G
OINTMENT TOPICAL ONCE
OUTPATIENT
Start: 2025-08-07 | End: 2025-08-07

## 2025-08-07 RX ORDER — CLOBETASOL PROPIONATE 0.5 MG/G
OINTMENT TOPICAL ONCE
OUTPATIENT
Start: 2025-08-07 | End: 2025-08-07

## 2025-08-28 ENCOUNTER — HOSPITAL ENCOUNTER (OUTPATIENT)
Dept: WOUND CARE | Age: 85
Discharge: HOME | End: 2025-08-28
Attending: EMERGENCY MEDICINE
Payer: MEDICARE

## 2025-08-28 VITALS
DIASTOLIC BLOOD PRESSURE: 52 MMHG | SYSTOLIC BLOOD PRESSURE: 104 MMHG | TEMPERATURE: 96.9 F | HEART RATE: 62 BPM | RESPIRATION RATE: 16 BRPM

## 2025-08-28 DIAGNOSIS — R32 DERMATITIS ASSOCIATED WITH MOISTURE FROM URINARY INCONTINENCE: ICD-10-CM

## 2025-08-28 DIAGNOSIS — L25.8 DERMATITIS ASSOCIATED WITH MOISTURE FROM URINARY INCONTINENCE: ICD-10-CM

## 2025-08-28 DIAGNOSIS — T14.8XXA DEEP TISSUE INJURY: ICD-10-CM

## 2025-08-28 DIAGNOSIS — L89.223 PRESSURE ULCER OF UPPER THIGH, LEFT, STAGE III (HCC): ICD-10-CM

## 2025-08-28 DIAGNOSIS — L89.154 DECUBITUS ULCER OF SACRAL REGION, STAGE 4 (HCC): Primary | ICD-10-CM

## 2025-08-28 DIAGNOSIS — L89.221: ICD-10-CM

## 2025-08-28 PROCEDURE — 11043 DBRDMT MUSC&/FSCA 1ST 20/<: CPT | Performed by: EMERGENCY MEDICINE

## 2025-08-28 PROCEDURE — 11043 DBRDMT MUSC&/FSCA 1ST 20/<: CPT

## 2025-08-28 RX ORDER — LIDOCAINE 40 MG/G
CREAM TOPICAL ONCE
Status: DISCONTINUED | OUTPATIENT
Start: 2025-08-28 | End: 2025-08-29 | Stop reason: HOSPADM

## 2025-08-28 RX ORDER — GINSENG 100 MG
CAPSULE ORAL ONCE
OUTPATIENT
Start: 2025-08-28 | End: 2025-08-28

## 2025-08-28 RX ORDER — SODIUM CHLOR/HYPOCHLOROUS ACID 0.033 %
SOLUTION, IRRIGATION IRRIGATION ONCE
OUTPATIENT
Start: 2025-08-28 | End: 2025-08-28

## 2025-08-28 RX ORDER — LIDOCAINE HYDROCHLORIDE 20 MG/ML
JELLY TOPICAL ONCE
OUTPATIENT
Start: 2025-08-28 | End: 2025-08-28

## 2025-08-28 RX ORDER — GENTAMICIN SULFATE 1 MG/G
OINTMENT TOPICAL ONCE
OUTPATIENT
Start: 2025-08-28 | End: 2025-08-28

## 2025-08-28 RX ORDER — LIDOCAINE HYDROCHLORIDE 40 MG/ML
SOLUTION TOPICAL ONCE
OUTPATIENT
Start: 2025-08-28 | End: 2025-08-28

## 2025-08-28 RX ORDER — LIDOCAINE 40 MG/G
CREAM TOPICAL ONCE
OUTPATIENT
Start: 2025-08-28 | End: 2025-08-28

## 2025-08-28 RX ORDER — BETAMETHASONE DIPROPIONATE 0.5 MG/G
CREAM TOPICAL ONCE
OUTPATIENT
Start: 2025-08-28 | End: 2025-08-28

## 2025-08-28 RX ORDER — MUPIROCIN 2 %
OINTMENT (GRAM) TOPICAL ONCE
OUTPATIENT
Start: 2025-08-28 | End: 2025-08-28

## 2025-08-28 RX ORDER — NEOMYCIN/BACITRACIN/POLYMYXINB 3.5-400-5K
OINTMENT (GRAM) TOPICAL ONCE
OUTPATIENT
Start: 2025-08-28 | End: 2025-08-28

## 2025-08-28 RX ORDER — CLOBETASOL PROPIONATE 0.5 MG/G
OINTMENT TOPICAL ONCE
OUTPATIENT
Start: 2025-08-28 | End: 2025-08-28

## 2025-08-28 RX ORDER — LIDOCAINE 50 MG/G
OINTMENT TOPICAL ONCE
OUTPATIENT
Start: 2025-08-28 | End: 2025-08-28

## 2025-08-28 RX ORDER — TRIAMCINOLONE ACETONIDE 1 MG/G
OINTMENT TOPICAL ONCE
OUTPATIENT
Start: 2025-08-28 | End: 2025-08-28

## 2025-08-28 RX ORDER — BACITRACIN ZINC AND POLYMYXIN B SULFATE 500; 1000 [USP'U]/G; [USP'U]/G
OINTMENT TOPICAL ONCE
OUTPATIENT
Start: 2025-08-28 | End: 2025-08-28

## 2025-08-28 ASSESSMENT — PAIN SCALES - GENERAL: PAINLEVEL_OUTOF10: 2

## 2025-08-28 ASSESSMENT — PAIN DESCRIPTION - ORIENTATION: ORIENTATION: LEFT

## 2025-08-28 ASSESSMENT — PAIN DESCRIPTION - LOCATION: LOCATION: LEG

## 2025-08-28 ASSESSMENT — PAIN DESCRIPTION - DESCRIPTORS: DESCRIPTORS: BURNING

## 2025-08-28 ASSESSMENT — PAIN - FUNCTIONAL ASSESSMENT: PAIN_FUNCTIONAL_ASSESSMENT: ACTIVITIES ARE NOT PREVENTED

## (undated) PROCEDURE — 0WQF0ZZ REPAIR ABDOMINAL WALL, OPEN APPROACH: ICD-10-PCS

## (undated) PROCEDURE — 0D1L4Z4 BYPASS TRANSVERSE COLON TO CUTANEOUS, PERCUTANEOUS ENDOSCOPIC APPROACH: ICD-10-PCS

## (undated) DEVICE — SPONGE GZ W4XL4IN COT 12 PLY TYP VII WVN C FLD DSGN

## (undated) DEVICE — SUTURE CHROMIC GUT SZ 4-0 L27IN ABSRB BRN FS-2 L19MM 3/8 635H

## (undated) DEVICE — GOWN SIRUS NONREIN XL W/TWL: Brand: MEDLINE INDUSTRIES, INC.

## (undated) DEVICE — SOLUTION SCRB 4OZ 10% POVIDONE IOD ANTIMIC BTL

## (undated) DEVICE — CYSTOSCOPY: Brand: MEDLINE INDUSTRIES, INC.

## (undated) DEVICE — 1LYRTR 16FR10ML 100%SILI SNAP: Brand: MEDLINE INDUSTRIES, INC.

## (undated) DEVICE — SPONGE LAP W18XL18IN WHT COT 4 PLY FLD STRUNG RADPQ DISP ST 2 PER PACK

## (undated) DEVICE — GENERAL LAPAROSCOPY: Brand: MEDLINE INDUSTRIES, INC.

## (undated) DEVICE — SHEET,DRAPE,53X77,STERILE: Brand: MEDLINE

## (undated) DEVICE — TROCAR: Brand: KII FIOS FIRST ENTRY

## (undated) DEVICE — BANDAGE COMPR W4INXL12FT E DISP ESMARCH EVEN

## (undated) DEVICE — CANISTER, RIGID, 1200CC: Brand: MEDLINE INDUSTRIES, INC.

## (undated) DEVICE — SKIN MARKER REGULAR TIP WITH RULER CAP AND LABELS: Brand: DEVON

## (undated) DEVICE — LAPAROSCOPIC TROCAR SLEEVE/SINGLE USE: Brand: KII® LOW PROFILE OPTICAL ACCESS SYSTEM

## (undated) DEVICE — 30978 SEE SHARP - ENHANCED INTRAOPERATIVE LAPAROSCOPE CLEANING & DEFOGGING: Brand: 30978 SEE SHARP - ENHANCED INTRAOPERATIVE LAPAROSCOPE CLEANING & DEFOGGING

## (undated) DEVICE — SOLUTION IRRIG 3000ML STRL H2O USP UROMATIC PLAS CONT

## (undated) DEVICE — SUTURE ETHBND EXCEL SZ 0 L18IN NONABSORBABLE GRN L26MM MO-6 CX45D

## (undated) DEVICE — GLOVE ORTHO 8   MSG9480

## (undated) DEVICE — SOLUTION IV IRRIG WATER 1000ML POUR BRL 2F7114

## (undated) DEVICE — SOLUTION PREP POVIDONE IOD FOR SKIN MUCOUS MEM PRIOR TO

## (undated) DEVICE — MERCY FAIRFIELD TURNOVER KIT: Brand: MEDLINE INDUSTRIES, INC.

## (undated) DEVICE — CATHETER F BLLN 5CC 16FR 2 W HYDRGEL COAT LESS TRAUM LUB

## (undated) DEVICE — BLANKET WRM W29.9XL79.1IN UP BODY FORC AIR MISTRAL-AIR

## (undated) DEVICE — GLOVE SURG SZ 8 L12IN FNGR THK94MIL STD WHT LTX SYN POLYMER

## (undated) DEVICE — MINOR SET UP PK

## (undated) DEVICE — INTRODUCER CATH 16FR ORNG SUPRPUB PLAS SHRP TIP BVL TRCR

## (undated) DEVICE — COVER,TABLE,77X90,STERILE: Brand: MEDLINE

## (undated) DEVICE — SUTURE VICRYL + SZ 0 L18IN ABSRB CLR VCP112G

## (undated) DEVICE — SYRINGE MED 10ML LUERLOCK TIP W/O SFTY DISP

## (undated) DEVICE — SOLUTION IV IRRIG POUR BRL 0.9% SODIUM CHL 2F7124

## (undated) DEVICE — PLUG,CATHETER,DRAINAGE PROTECTOR,TUBE: Brand: MEDLINE

## (undated) DEVICE — SYSTEM SKIN CLSR 22CM DERMBND PRINEO

## (undated) DEVICE — SOLUTION IRRIG 1000ML 0.9% SOD CHL USP POUR PLAS BTL

## (undated) DEVICE — SUTURE MONOCRYL + SZ 4-0 L27IN ABSRB UD L19MM PS-2 3/8 CIR MCP426H

## (undated) DEVICE — 3M™ IOBAN™ 2 ANTIMICROBIAL INCISE DRAPE 6650EZ: Brand: IOBAN™ 2

## (undated) DEVICE — SUTURE VICRYL SZ 3-0 L18IN ABSRB UD L26MM SH 1/2 CIR J864D

## (undated) DEVICE — BASIC SINGLE BASIN 1-LF: Brand: MEDLINE INDUSTRIES, INC.

## (undated) DEVICE — INTENDED TO SUPPORT AND MAINTAIN THE POSITION OF AN ANESTHETIZED PATIENT DURING SURGERY: Brand: ERIN BEACH CHAIR FACE MASK

## (undated) DEVICE — 14FR COLON DECOMPRESSION SET: Brand: COOK

## (undated) DEVICE — TROCAR SLEEVE: Brand: KII ® LOW PROFILE SLEEVE

## (undated) DEVICE — PAD,NON-ADHERENT,3X8,STERILE,LF,1/PK: Brand: MEDLINE

## (undated) DEVICE — GAUZE,SPONGE,4"X4",8PLY,STRL,LF,10/TRAY: Brand: MEDLINE

## (undated) DEVICE — ELECTRODE PT RET AD L9FT HI MOIST COND ADH HYDRGEL CORDED

## (undated) DEVICE — DECANTER BTL 6IN: Brand: MEDLINE INDUSTRIES, INC.

## (undated) DEVICE — GOWN,REINF,POLY,AURORA,XLNG/XXL,STRL: Brand: MEDLINE

## (undated) DEVICE — SUTURE ABSORBABLE MONOFILAMENT 0 CTX 60 IN VIO PDS + PDP990G

## (undated) DEVICE — CHLORAPREP 26ML ORANGE

## (undated) DEVICE — NEEDLE ENDOSCP 1ML SYR CA HA SIDEKCK RIG INJ COAPTITE
Type: IMPLANTABLE DEVICE | Site: URETHRA | Status: NON-FUNCTIONAL
Removed: 2019-07-01

## (undated) DEVICE — GLOVE SURG SZ 65 L12IN FNGR THK87MIL WHT LTX FREE

## (undated) DEVICE — RELOAD STPL H1.8-3.8MM REG THCK TISS G 6 ROW GRIPPING SURF

## (undated) DEVICE — TOTAL BASIC PK

## (undated) DEVICE — INTENDED FOR TISSUE SEPARATION, AND OTHER PROCEDURES THAT REQUIRE A SHARP SURGICAL BLADE TO PUNCTURE OR CUT.: Brand: BARD-PARKER ® STAINLESS STEEL BLADES

## (undated) DEVICE — HYPODERMIC SAFETY NEEDLE: Brand: MAGELLAN

## (undated) DEVICE — Z DISCONTINUED BY MEDLINE USE 2711682 TRAY SKIN PREP DRY W/ PREM GLV

## (undated) DEVICE — PMI DISPOSABLE PUNCTURE CLOSURE DEVICE / SUTURE GRASPER: Brand: PMI

## (undated) DEVICE — SUTURE PERMAHAND SZ 2-0 L18IN NONABSORBABLE BLK L26MM SH C012D

## (undated) DEVICE — Y-TYPE TUR/BLADDER IRRIGATION SET, REGULATING CLAMP

## (undated) DEVICE — DUAL CUT SAGITTAL BLADE

## (undated) DEVICE — GAUZE,SPONGE,4"X4",16PLY,XRAY,STRL,LF: Brand: MEDLINE

## (undated) DEVICE — NEEDLE HYPO 25GA L1.5IN BVL ORIENTED ECLIPSE

## (undated) DEVICE — BAG URO DRN URO-CATCHER

## (undated) DEVICE — DALE FOLEY CATHETER HOLDER, LEGBAND, FITS UP TO 30": Brand: DALE FOLEY CATHETER HOLDER

## (undated) DEVICE — MERCY HEALTH WEST TURNOVER: Brand: MEDLINE INDUSTRIES, INC.

## (undated) DEVICE — RELOAD STPL H4.1X2MM DIA60MM THCK TISS GRN 6 ROW PWR GST B

## (undated) DEVICE — DRAPE C ARM UNIV W41XL74IN CLR PLAS XR VELC CLSR POLY STRP

## (undated) DEVICE — PENCIL ES L3M BTTN SWCH S STL HEX LOK BLDE ELECTRD HOLSTER

## (undated) DEVICE — DEVICE SEAL L23CM NANO COAT MARYLAND JAW OPN DIV LIGASURE

## (undated) DEVICE — SPONGE GZ W4XL4IN COT 12 PLY TYP VII WVN C FLD DSGN STERILE

## (undated) DEVICE — PICO 7 10CM X 30CM: Brand: PICO™ 7

## (undated) DEVICE — DRAPE THER FLUID WARMING 66X44 IN FLAT SLUSH DBL DISC ORS

## (undated) DEVICE — DRAINBAG,ANTI-REFLUX TOWER,L/F,2000ML,LL: Brand: MEDLINE

## (undated) DEVICE — SYRINGE IRRIG 60ML SFT PLIABLE BLB EZ TO GRP 1 HND USE W/

## (undated) DEVICE — NEEDLE HYPO 18GA L1.5IN THN WALL PIVOTING SHLD BVL ORIENTED

## (undated) DEVICE — STAPLER INT L28CM 60MM 12 FIRING B FRM PWD + ECHELON FLX

## (undated) DEVICE — APPLICATOR MEDICATED 26 CC SOLUTION HI LT ORNG CHLORAPREP

## (undated) DEVICE — TRANSFER SET 3": Brand: MEDLINE INDUSTRIES, INC.

## (undated) DEVICE — SOLUTION IV IRRIG 250ML ST LF 0.9% SODIUM 2F7122

## (undated) DEVICE — TOWEL,OR,DSP,ST,BLUE,DLX,10/PK,8PK/CS: Brand: MEDLINE

## (undated) DEVICE — KIT OR ROOM TURNOVER W/STRAP

## (undated) DEVICE — DRAPE,LAP,CHOLE,W/TROUGHS,STERILE: Brand: MEDLINE

## (undated) DEVICE — TOWEL,OR,DSP,ST,BLUE,STD,4/PK,20PK/CS: Brand: MEDLINE

## (undated) DEVICE — STOPCOCK ANGIO 1050PSI 1 W LUERLOCK FIX M FIT HI PRSS ST

## (undated) DEVICE — DRESSING PETRO W3XL3IN OIL EMUL N ADH GZ KNIT IMPREG CELOS

## (undated) DEVICE — GLOVE,SURG,SENSICARE SLT,LF,PF,8.5: Brand: MEDLINE

## (undated) DEVICE — AMPLATZ TYPE RENAL DILATOR/SHEATH SET

## (undated) DEVICE — DRAPE HND W114XL142IN BLU POLYPR W O PCH FEN CRD AND TB HLDR

## (undated) DEVICE — STERILE SURGICAL LUBRICANT, METAL TUBE: Brand: SURGILUBE

## (undated) DEVICE — 3M™ IOBAN™ 2 ANTIMICROBIAL INCISE DRAPE 6640EZ: Brand: IOBAN™ 2

## (undated) DEVICE — ZIMMER® STERILE DISPOSABLE TOURNIQUET CUFF WITH PLC, DUAL PORT, SINGLE BLADDER, 18 IN. (46 CM)

## (undated) DEVICE — LIQUIBAND RAPID ADHESIVE 36/CS 0.8ML: Brand: MEDLINE

## (undated) DEVICE — NEEDLE INJ 25GA P5MM SHFT L230CM SHTH DIA2.5MM S STL TEF

## (undated) DEVICE — Z DUP USE 2715602 GUIDEWIRE SURG L220MM DIA25MM SHLDR FOR GLEN KEELED AEQUALIS
Type: IMPLANTABLE DEVICE | Status: NON-FUNCTIONAL
Removed: 2019-10-09

## (undated) DEVICE — Device: Brand: SPOT EX ENDOSCOPIC TATTOO

## (undated) DEVICE — STAPLER 60MM POWERED ECHELON 3000  SHORT 340MM

## (undated) DEVICE — NEEDLE ENDOSCP 1ML SYR CA HA SIDEKCK RIG INJ COAPTITE

## (undated) DEVICE — GLOVE SURG SZ 65 CRM LTX FREE POLYISOPRENE POLYMER BEAD ANTI

## (undated) DEVICE — GUIDEWIRE ENDOSCP L150CM DIA0.038IN INTRO 14FR TIP 3CM S

## (undated) DEVICE — GOWN SIRUS NONREIN LG W/TWL: Brand: MEDLINE INDUSTRIES, INC.

## (undated) DEVICE — COVER LT HNDL BLU PLAS

## (undated) DEVICE — MATTRESS MAXI AIR TRNSF SGL PT USE DCS 37 45 48 52 75

## (undated) DEVICE — DECANTER BAG 9": Brand: MEDLINE INDUSTRIES, INC.

## (undated) DEVICE — GOWN,SIRUS,POLYRNF,BRTHSLV,XL,30/CS: Brand: MEDLINE

## (undated) DEVICE — SYRINGE MED 30ML STD CLR PLAS LUERLOCK TIP N CTRL DISP

## (undated) DEVICE — UNDERGLOVE SURG SZ 8 FNGR THK0.21MIL GRN LTX BEAD CUF

## (undated) DEVICE — SYRINGE,CONTROL,LL,FINGER,GRIP: Brand: MEDLINE INDUSTRIES, INC.

## (undated) DEVICE — PACK,CYSTOSCOPY,PK III,AURORA: Brand: MEDLINE

## (undated) DEVICE — Z DUP USE 2715828 BIT DRL DIA3.2MM PERIPH SCR FOR AEQUALIS PERFORM REVERSED

## (undated) DEVICE — CATHETER,URETHRAL,REDRUBBER,STRL,16FR: Brand: MEDLINE

## (undated) DEVICE — SUTURE VCRL + SZ 0 L18IN ABSRB CLR VCP112G

## (undated) DEVICE — KIT DRSG SM W12.5XH3.2XL18CM VAC SH DRP PD W/ CONN DISP RUL

## (undated) DEVICE — ULTRACLEAN ACCESSORY ELECTRODE, 6 INCH COATED BLADE WITH EXTENDED INSULATION: Brand: ULTRACLEAN

## (undated) DEVICE — MAJOR SET UP PK

## (undated) DEVICE — SUTURE VCRL + SZ 3-0 L18IN ABSRB UD SH 1/2 CIR TAPERCUT NDL VCP864D

## (undated) DEVICE — CATHETER URETH 14FR BLLN 5CC SIL HYDRGEL 2 W F LUBRICIOUS

## (undated) DEVICE — PENCIL SMK EVAC TELSCP 3 M TBNG

## (undated) DEVICE — GLOVE SURG SZ 6.5 L11.2IN FNGR THK9.8MIL STRW LTX POLYMER

## (undated) DEVICE — SUTURE STRATAFIX SPRL SZ 2 0 L14IN ABSRB UD MH L36MM 1 2 CIR SXMD2B401

## (undated) DEVICE — SUTURE PERMAHAND SZ 3-0 L30IN NONABSORBABLE BLK SILK BRAID A304H

## (undated) DEVICE — Z DISCONTINUED USE 2716304 SUTURE STRATAFIX SPRL SZ 3-0 L12IN ABSRB UD FS-1 L24MM 3/8

## (undated) DEVICE — GLOVE SURG SZ 85 L12IN FNGR THK13MIL WHT ISOLEX POLYISOPRENE

## (undated) DEVICE — CORD ES L10FT MPLR LAP

## (undated) DEVICE — SOLUTION IV IRRIG 500ML 0.9% SODIUM CHL 2F7123

## (undated) DEVICE — GUIDE GLEN REVERSED BLUEPRINT

## (undated) DEVICE — CATHETER F BLLN 5CC 14FR 2 W HYDRGEL COAT LESS TRAUM LUB

## (undated) DEVICE — AMPLATZ SUPER STIFF 0.35 X 180 WIRE

## (undated) DEVICE — GLOVE,SURG,SENSICARE SLT,LF,PF,8: Brand: MEDLINE

## (undated) DEVICE — Z DISCONTINUED USE 2275676 GLOVE SURG SZ 65 L12IN FNGR THK87MIL DK GRN LTX FREE ISOLEX

## (undated) DEVICE — CANISTER WND THER 500 ML NEG PRESSURE GEL TBNG STRL DISP

## (undated) DEVICE — SUTURE PERMAHAND SZ 2-0 L30IN NONABSORBABLE BLK SILK W/O A305H

## (undated) DEVICE — DRAPE,SPLIT ,77X120: Brand: MEDLINE

## (undated) DEVICE — BANDAGE COMPR W2INXL5YD TAN BRTH SELF ADH WRP W/ HND TEAR

## (undated) DEVICE — KIT SHLDR STBL MARCO FOR SPIDER LIMB POS

## (undated) DEVICE — CATHETER URETH 16FR BLLN 5CC STD LTX 2 W F TWO OPP DRNGE

## (undated) DEVICE — Device: Brand: SENSURA MIO

## (undated) DEVICE — STAPLER SKIN H3.9MM WIRE DIA0.58MM CRWN 6.9MM 35 STPL ROT

## (undated) DEVICE — SUTURE PERMAHAND SZ 3-0 L18IN NONABSORBABLE BLK L26MM SH C013D

## (undated) DEVICE — BLADE ES ELASTOMERIC COAT INSUL DURABLE BEND UPTO 90DEG

## (undated) DEVICE — Z INACTIVE USE 2635503 SOLUTION IRRIG 3000ML ST H2O USP UROMATIC PLAS CONT